# Patient Record
Sex: FEMALE | Race: WHITE | NOT HISPANIC OR LATINO | Employment: OTHER | URBAN - METROPOLITAN AREA
[De-identification: names, ages, dates, MRNs, and addresses within clinical notes are randomized per-mention and may not be internally consistent; named-entity substitution may affect disease eponyms.]

---

## 2017-05-31 DIAGNOSIS — E78.5 HYPERLIPIDEMIA, UNSPECIFIED HYPERLIPIDEMIA TYPE: Primary | ICD-10-CM

## 2017-05-31 RX ORDER — SIMVASTATIN 20 MG
TABLET ORAL
Qty: 90 TABLET | Refills: 1 | Status: SHIPPED | OUTPATIENT
Start: 2017-05-31 | End: 2017-08-16 | Stop reason: ALTCHOICE

## 2017-05-31 RX ORDER — LOSARTAN POTASSIUM 50 MG/1
50 TABLET ORAL DAILY
Qty: 90 TABLET | Refills: 1 | Status: SHIPPED | OUTPATIENT
Start: 2017-05-31 | End: 2017-08-29 | Stop reason: SDUPTHER

## 2017-05-31 RX ORDER — AMLODIPINE BESYLATE 5 MG/1
TABLET ORAL
Qty: 90 TABLET | Refills: 1 | Status: SHIPPED | OUTPATIENT
Start: 2017-05-31 | End: 2017-05-31 | Stop reason: SDUPTHER

## 2017-05-31 RX ORDER — SPIRONOLACTONE AND HYDROCHLOROTHIAZIDE 25; 25 MG/1; MG/1
1 TABLET ORAL DAILY
Qty: 90 TABLET | Refills: 1 | Status: SHIPPED | OUTPATIENT
Start: 2017-05-31 | End: 2017-08-16 | Stop reason: ALTCHOICE

## 2017-05-31 RX ORDER — AMLODIPINE BESYLATE 5 MG/1
TABLET ORAL
Qty: 90 TABLET | Refills: 0 | Status: SHIPPED | OUTPATIENT
Start: 2017-05-31 | End: 2017-08-29 | Stop reason: SDUPTHER

## 2017-08-16 ENCOUNTER — OFFICE VISIT (OUTPATIENT)
Dept: CARDIOLOGY | Facility: CLINIC | Age: 77
End: 2017-08-16

## 2017-08-16 VITALS
SYSTOLIC BLOOD PRESSURE: 150 MMHG | HEIGHT: 64 IN | HEART RATE: 71 BPM | DIASTOLIC BLOOD PRESSURE: 60 MMHG | BODY MASS INDEX: 25.27 KG/M2 | WEIGHT: 148 LBS

## 2017-08-16 DIAGNOSIS — I49.1 PREMATURE ATRIAL COMPLEXES: Primary | ICD-10-CM

## 2017-08-16 DIAGNOSIS — I10 ESSENTIAL HYPERTENSION: ICD-10-CM

## 2017-08-16 DIAGNOSIS — G47.33 OBSTRUCTIVE SLEEP APNEA SYNDROME: ICD-10-CM

## 2017-08-16 PROCEDURE — 93000 ELECTROCARDIOGRAM COMPLETE: CPT | Performed by: INTERNAL MEDICINE

## 2017-08-16 PROCEDURE — 99214 OFFICE O/P EST MOD 30 MIN: CPT | Performed by: INTERNAL MEDICINE

## 2017-08-16 NOTE — PROGRESS NOTES
Date of Office Visit: 2017  Encounter Provider: Sandra Winters MD  Place of Service: [facility] CARDIOLOGY  Patient Name: Itzel Edmond  :1940      Patient ID:  Itzel Edmond is a 77 y.o. female is here for  followup for hypertension.        History of Present Illness    She had a complete cardiovascular evaluation 2010 at [facility] including  a treadmill stress nuclear study showing no ischemia and an ejection fraction of 70%. She  had mild hypertension with exercise. She had a normal abdominal ultrasound showing no  evidence of abdominal aortic aneurysm. Her carotid duplex study done 2010 showed  moderate bilateral internal carotid artery disease. She had an echocardiogram done at  [facility] 2010 showing ejection fraction of 60 to 65% with mild  diastolic dysfunction, mild mitral and tricuspid insufficiency. She had a history of a  normal venous duplex study done for swelling of the right leg done 2009.      She saw me for palpitations and hypertension. We made some medication adjustments and set  her up for a sleep study. It turns out she had obstructive sleep apnea, was placed on a  CPAP machine, and she says she has done so much better.      She has a history of a dry, hacking cough with lisinopril and was switched to losartan and  has tolerated this well.     She had an exercise treadmill stress study done on 2013, which was  normal. She exercised for six minutes. She had a TSH, which was normal. Her creatinine  was slightly elevated at 1.23. She had a Holter recording, which was fairly unremarkable.  It showed sinus rhythm, rare premature ventricular complexes, and a short run of  nonsustained supraventricular tachycardia.      I saw on 2014. At that time she was having chest tightness and short-windedness  with activity which sounded anginal in nature. We set her up for a PET stress study  which  was done in 06/2014 which showed no evidence of ischemia. She was also having a lot of  lower extremity pain which was worse in the right than that left and she had normal  bilateral arterial duplex studies done.          She had an echocardiogram on 07/09/2015 which revealed an ejection fraction of 63%, grade I diastolic dysfunction, mild mitral and mild tricuspid insufficiency, and normal right ventricular systolic pressure.      She is here today for follow-up.  She is having some mild dizziness which she still has.  Because of this her Aldactazide was discontinued.  She thinks the dizziness may be more vertigo.  It did get slightly better with discontinuance of the Aldactazide but she started having lower extremity edema.  She has no chest pain.  She's had no syncope.  She hasn't difficulty breathing.  Her energy level is stable.  Overall she feels like she's doing pretty well.    Past Medical History:   Diagnosis Date   • Atrial premature complex    • Carotid stenosis    • Chest pain    • Claudication of lower extremity    • Difficulty breathing    • Dyspnea    • GERD (gastroesophageal reflux disease)    • Hyperlipidemia    • Hypertension    • JHOANA (obstructive sleep apnea)    • PAC (premature atrial contraction)    • Tachycardia          Past Surgical History:   Procedure Laterality Date   • BLADDER SURGERY     • HERNIA REPAIR     • HYSTERECTOMY     • OOPHORECTOMY         Current Outpatient Prescriptions on File Prior to Visit   Medication Sig Dispense Refill   • amLODIPine (NORVASC) 5 MG tablet TAKE 1 TABLET BY MOUTH DAILY. 90 tablet 0   • aspirin 81 MG tablet Take 1 tablet by mouth daily. 90 tablet 3   • Coenzyme Q10 (COQ-10 PO) Take 300 mg by mouth daily     • gabapentin (NEURONTIN) 300 MG capsule Take 1 capsule by mouth daily     • losartan (COZAAR) 50 MG tablet Take 1 tablet by mouth Daily. 90 tablet 1   • Misc Natural Products (OSTEO BI-FLEX JOINT SHIELD PO) Take by mouth     • tolterodine LA  (DETROL LA) 4 MG 24 hr capsule Take 4 mg by mouth daily.     • [DISCONTINUED] simvastatin (ZOCOR) 20 MG tablet TAKE 1 TABLET BY MOUTH EVERY NIGHT. 90 tablet 1   • [DISCONTINUED] spironolactone-hydrochlorothiazide (ALDACTAZIDE) 25-25 MG tablet Take 1 tablet by mouth Daily. 90 tablet 1     No current facility-administered medications on file prior to visit.        Social History     Social History   • Marital status:      Spouse name: N/A   • Number of children: N/A   • Years of education: N/A     Occupational History   • Not on file.     Social History Main Topics   • Smoking status: Never Smoker   • Smokeless tobacco: Not on file   • Alcohol use No      Comment: caffeine use   • Drug use: Not on file   • Sexual activity: Not on file     Other Topics Concern   • Not on file     Social History Narrative           Review of Systems   Constitution: Negative.   HENT: Negative for congestion and headaches.    Eyes: Negative for vision loss in left eye and vision loss in right eye.   Cardiovascular: Positive for dyspnea on exertion and leg swelling.   Respiratory: Negative.  Negative for cough, hemoptysis, shortness of breath, sleep disturbances due to breathing, snoring, sputum production and wheezing.    Endocrine: Negative.    Hematologic/Lymphatic: Negative.    Skin: Negative for poor wound healing and rash.   Musculoskeletal: Positive for joint pain. Negative for falls, gout, muscle cramps and myalgias.   Gastrointestinal: Negative for abdominal pain, diarrhea, dysphagia, hematemesis, melena, nausea and vomiting.   Neurological: Negative for excessive daytime sleepiness, dizziness, light-headedness, loss of balance, seizures and vertigo.   Psychiatric/Behavioral: Negative for depression and substance abuse. The patient is not nervous/anxious.        Procedures    ECG 12 Lead  Date/Time: 8/16/2017 11:36 AM  Performed by: SKYLER ESTEVEZ  Authorized by: SKYLER ESETVEZ   Comparison: compared with  "previous ECG   Similar to previous ECG  Rhythm: sinus rhythm  Clinical impression: normal ECG               Objective:      Vitals:    08/16/17 1108   BP: 150/60   Pulse: 71   Weight: 148 lb (67.1 kg)   Height: 64\" (162.6 cm)     Body mass index is 25.4 kg/(m^2).    Physical Exam   Constitutional: She is oriented to person, place, and time. She appears well-developed and well-nourished. No distress.   HENT:   Head: Normocephalic and atraumatic.   Eyes: Conjunctivae are normal. No scleral icterus.   Neck: Neck supple. No JVD present. Carotid bruit is not present. No thyromegaly present.   Cardiovascular: Normal rate, regular rhythm, S1 normal, S2 normal, normal heart sounds and intact distal pulses.   No extrasystoles are present. PMI is not displaced.  Exam reveals no gallop.    No murmur heard.  Pulses:       Carotid pulses are 2+ on the right side, and 2+ on the left side.       Radial pulses are 2+ on the right side, and 2+ on the left side.        Dorsalis pedis pulses are 2+ on the right side, and 2+ on the left side.        Posterior tibial pulses are 2+ on the right side, and 2+ on the left side.   Pulmonary/Chest: Effort normal and breath sounds normal. No respiratory distress. She has no wheezes. She has no rhonchi. She has no rales. She exhibits no tenderness.   Abdominal: Soft. Bowel sounds are normal. She exhibits no distension, no abdominal bruit and no mass. There is no tenderness.   Musculoskeletal: She exhibits no edema or deformity.   Lymphadenopathy:     She has no cervical adenopathy.   Neurological: She is alert and oriented to person, place, and time. No cranial nerve deficit.   Skin: Skin is warm and dry. No rash noted. She is not diaphoretic. No cyanosis. No pallor. Nails show no clubbing.   Psychiatric: She has a normal mood and affect. Judgment normal.   Vitals reviewed.      Lab Review:       Assessment:      Diagnosis Plan   1. Premature atrial complexes     2. Essential hypertension   "   3. Obstructive sleep apnea syndrome       1. Hypertension. Well controlled on current medical regimen. Has mild edema, restart aldactazide 25/25 1/2 tab daily.   2. Obstructive sleep apnea. Well controlled on CPAP.  3. Premature atrial complexes. Controlled with medications.  4. Hyperlipidemia on simvastatin.   5. Strong family history of coronary disease.  6. Gastroesophageal reflux disease, stable.  7. Moderate bilateral carotid artery stenosis. Normal carotid duplex study 12/2012.  8. Chest pain and dyspnea, normal PET stress study 6/2014.  9. Tachycardia at night, Normal Holter and TSH.  10. Right leg pain, normal LE duplex for claudication done 6/2014. She has neuropathy  of her right leg causing pain.  11. Chest pain and fatigue, normal echocardiogram 7/9/16.      Plan:       See back in 1 year.

## 2017-08-29 RX ORDER — SIMVASTATIN 20 MG
20 TABLET ORAL NIGHTLY
Qty: 90 TABLET | Refills: 0 | Status: SHIPPED | OUTPATIENT
Start: 2017-08-29 | End: 2018-10-02 | Stop reason: HOSPADM

## 2017-08-29 RX ORDER — LOSARTAN POTASSIUM 50 MG/1
50 TABLET ORAL DAILY
Qty: 90 TABLET | Refills: 3 | Status: SHIPPED | OUTPATIENT
Start: 2017-08-29 | End: 2020-05-04 | Stop reason: SDUPTHER

## 2017-08-29 RX ORDER — AMLODIPINE BESYLATE 5 MG/1
5 TABLET ORAL DAILY
Qty: 90 TABLET | Refills: 3 | Status: SHIPPED | OUTPATIENT
Start: 2017-08-29 | End: 2018-09-05 | Stop reason: SDUPTHER

## 2017-10-25 RX ORDER — SPIRONOLACTONE AND HYDROCHLOROTHIAZIDE 25; 25 MG/1; MG/1
TABLET ORAL
Qty: 45 TABLET | Refills: 3 | Status: SHIPPED | OUTPATIENT
Start: 2017-10-25 | End: 2018-09-05 | Stop reason: SDUPTHER

## 2018-09-05 RX ORDER — AMLODIPINE BESYLATE 5 MG/1
TABLET ORAL
Qty: 90 TABLET | Refills: 1 | Status: SHIPPED | OUTPATIENT
Start: 2018-09-05 | End: 2019-02-04 | Stop reason: SDUPTHER

## 2018-09-05 RX ORDER — SPIRONOLACTONE AND HYDROCHLOROTHIAZIDE 25; 25 MG/1; MG/1
TABLET ORAL
Qty: 45 TABLET | Refills: 1 | Status: SHIPPED | OUTPATIENT
Start: 2018-09-05 | End: 2019-02-04 | Stop reason: SDUPTHER

## 2018-10-02 ENCOUNTER — OFFICE VISIT (OUTPATIENT)
Dept: CARDIOLOGY | Facility: CLINIC | Age: 78
End: 2018-10-02

## 2018-10-02 VITALS
DIASTOLIC BLOOD PRESSURE: 76 MMHG | SYSTOLIC BLOOD PRESSURE: 130 MMHG | BODY MASS INDEX: 23.42 KG/M2 | HEART RATE: 66 BPM | WEIGHT: 137.2 LBS | HEIGHT: 64 IN

## 2018-10-02 DIAGNOSIS — I49.1 PREMATURE ATRIAL COMPLEXES: ICD-10-CM

## 2018-10-02 DIAGNOSIS — I10 ESSENTIAL HYPERTENSION: Primary | ICD-10-CM

## 2018-10-02 DIAGNOSIS — G47.33 OBSTRUCTIVE SLEEP APNEA SYNDROME: ICD-10-CM

## 2018-10-02 DIAGNOSIS — E78.5 HYPERLIPIDEMIA, UNSPECIFIED HYPERLIPIDEMIA TYPE: ICD-10-CM

## 2018-10-02 DIAGNOSIS — G25.81 RESTLESS LEG SYNDROME: ICD-10-CM

## 2018-10-02 PROCEDURE — 93000 ELECTROCARDIOGRAM COMPLETE: CPT | Performed by: NURSE PRACTITIONER

## 2018-10-02 PROCEDURE — 99214 OFFICE O/P EST MOD 30 MIN: CPT | Performed by: NURSE PRACTITIONER

## 2018-10-02 RX ORDER — DULOXETIN HYDROCHLORIDE 60 MG/1
60 CAPSULE, DELAYED RELEASE ORAL DAILY
COMMUNITY
End: 2022-04-28

## 2018-10-02 RX ORDER — PUMPKIN SEED EXTRACT/SOY GERM 300 MG
1 CAPSULE ORAL 2 TIMES DAILY
COMMUNITY
End: 2020-05-04

## 2018-10-02 RX ORDER — MELATONIN 10 MG
CAPSULE ORAL NIGHTLY
Status: ON HOLD | COMMUNITY
End: 2019-01-05

## 2018-10-02 NOTE — PROGRESS NOTES
"  Date of Office Visit: 10/02/2018  Encounter Provider: ARABELLA Calvo  Place of Service: [facility] CARDIOLOGY  Patient Name: Itzel Edmond  :1940      Chief Complaint   Patient presents with   • Follow-up   :     Dear Dr. Luther,     HPI: Itzel Edmond is a pleasant 78 y.o. female who presents today for cardiac follow up. She is a new patient to me and her previous records have been reviewed.  She has a history of palpitations, hypertension, hyperlipidemia, GERD, APCs, and obstructive sleep apnea.  She had a Holter monitor in  which showed normal sinus rhythm with rare PVCs and short run of nonsustained SVT.  She had a cardiac PET scan in 2014 which showed no evidence of ischemia.  She had an echocardiogram in 2015 which revealed an EF of 63%, grade 1 diastolic dysfunction, mild mitral and tricuspid insufficiency.  She is an established patient of Dr. Sandra Winters and was last in the office in 2017.    She presents today for follow-up.  Her  recently passed away in August from Alzheimer's disease.  She says she is very saddened that she had appointment in nursing home towards the end of his life.  She says she is now \"living her life\" and able to do the things that she wants.  She is not sleeping well at nighttime and she attributes this to taking care of him for so long.  She also experiences restless leg syndrome and I've recommended follow-up with her family physician.  She has no longer taking gabapentin due to dizziness.  On occasion her heart beat will race briefly and resolves on its own.  She denies chest pain, shortness of air, PND, orthopnea, edema, dizziness, or syncope.  She is compliant with her BiPAP machine.  Blood pressure and heart rate are both normal today.    Past Medical History:   Diagnosis Date   • Atrial premature complex    • Carotid stenosis    • Claudication of lower extremity (CMS/HCC)    • GERD " (gastroesophageal reflux disease)    • Hyperlipidemia    • Hypertension    • JHOANA (obstructive sleep apnea)     BiPap   • PAC (premature atrial contraction)    • Tachycardia        Past Surgical History:   Procedure Laterality Date   • BLADDER SURGERY     • HERNIA REPAIR     • HYSTERECTOMY     • OOPHORECTOMY         Social History     Social History   • Marital status:      Spouse name: N/A   • Number of children: N/A   • Years of education: N/A     Occupational History   • Not on file.     Social History Main Topics   • Smoking status: Never Smoker   • Smokeless tobacco: Never Used   • Alcohol use No      Comment: caffeine use   • Drug use: Unknown   • Sexual activity: Not on file       Family History   Problem Relation Age of Onset   • Heart disease Mother    • Stroke Mother    • Heart attack Father    • Heart disease Sister         s/p CABG   • Heart attack Brother    • Heart disease Brother         s/p CABG   • Heart disease Other    • Hypertension Other        The following portion of the patient's history were reviewed and updated as appropriate: past medical history, past surgical history, past social history, past family history, allergies, current medications, and problem list.    Review of Systems   Constitution: Negative for chills, diaphoresis, fever, malaise/fatigue, night sweats, weight gain and weight loss.   HENT: Negative for hearing loss, nosebleeds, sore throat and tinnitus.    Eyes: Negative for blurred vision, double vision, pain and visual disturbance.   Cardiovascular: Negative for chest pain, claudication, cyanosis, dyspnea on exertion, irregular heartbeat, leg swelling, near-syncope, orthopnea, palpitations, paroxysmal nocturnal dyspnea and syncope.   Respiratory: Positive for snoring. Negative for cough, hemoptysis, shortness of breath and wheezing.    Endocrine: Negative for cold intolerance, heat intolerance and polyuria.   Hematologic/Lymphatic: Negative for bleeding problem. Does  "not bruise/bleed easily.   Skin: Negative for color change, dry skin, flushing and itching.   Musculoskeletal: Negative for falls, joint pain, joint swelling, muscle cramps, muscle weakness and myalgias.   Gastrointestinal: Negative for abdominal pain, constipation, heartburn, melena, nausea and vomiting.   Genitourinary: Negative for dysuria and hematuria.   Neurological: Negative for excessive daytime sleepiness, dizziness, light-headedness, loss of balance, numbness, paresthesias, seizures and vertigo.   Psychiatric/Behavioral: Positive for depression. Negative for altered mental status, memory loss and substance abuse. The patient does not have insomnia and is not nervous/anxious.    Allergic/Immunologic: Negative for environmental allergies.       Allergies   Allergen Reactions   • Ace Inhibitors      Cough   • Gabapentin Dizziness   • Lisinopril Cough   • Pregabalin      Vision problems, dizziness         Current Outpatient Prescriptions:   •  amLODIPine (NORVASC) 5 MG tablet, TAKE 1 TABLET EVERY DAY, Disp: 90 tablet, Rfl: 1  •  aspirin 81 MG tablet, Take 1 tablet by mouth daily., Disp: 90 tablet, Rfl: 3  •  Coenzyme Q10 (COQ-10 PO), Take 300 mg by mouth daily, Disp: , Rfl:   •  DULoxetine (CYMBALTA) 30 MG capsule, Take 30 mg by mouth Daily., Disp: , Rfl:   •  losartan (COZAAR) 50 MG tablet, Take 1 tablet by mouth Daily., Disp: 90 tablet, Rfl: 3  •  Melatonin 10 MG capsule, Take  by mouth Every Night., Disp: , Rfl:   •  Misc Natural Products (OSTEO BI-FLEX JOINT SHIELD PO), Take by mouth, Disp: , Rfl:   •  Pumpkin Seed-Soy Germ (AZO BLADDER CONTROL/GO-LESS) capsule, Take  by mouth., Disp: , Rfl:   •  spironolactone-hydrochlorothiazide (ALDACTAZIDE) 25-25 MG tablet, TAKE 1/2 TABLET EVERY DAY, Disp: 45 tablet, Rfl: 1        Objective:     Vitals:    10/02/18 1112   BP: 130/76   Pulse: 66   Weight: 62.2 kg (137 lb 3.2 oz)   Height: 162.6 cm (64\")     Body mass index is 23.55 kg/m².    PHYSICAL EXAM:    Vitals " Reviewed.   General Appearance: No acute distress, well developed and well nourished. Thin.   Eyes: Conjunctiva and lids: No erythema, swelling, or discharge. Sclera non-icteric.   HENT: Atraumatic, normocephalic. External eyes, ears, and nose normal. No hearing loss noted. Mucous membranes normal. Lips not cyanotic. Neck supple with no tenderness.  Respiratory: No signs of respiratory distress. Respiration rhythm and depth normal.   Clear to auscultation. No rales, crackles, rhonchi, or wheezing auscultated.   Cardiovascular:  Jugular Venous Pressure: Normal  Heart Rate and Rhythm: Normal, Heart Sounds: Normal S1 and S2. No S3 or S4 noted.  Murmurs: No murmurs noted. No rubs, thrills, or gallops.   Arterial Pulses: Carotid pulses normal. No carotid bruit noted. Posterior tibialis and dorsalis pedis pulses normal.   Lower Extremities: No edema noted.  Gastrointestinal:  Abdomen soft, non-distended, non-tender. Normal bowel sounds. No hepatomegaly.   Musculoskeletal: Normal movement of extremities  Skin and Nails: General appearance normal. No pallor, cyanosis, diaphoresis. Skin temperature normal. No clubbing of fingernails.   Psychiatric: Patient alert and oriented to person, place, and time. Speech and behavior appropriate. Normal mood and affect.       ECG 12 Lead  Date/Time: 10/2/2018 11:05 AM  Performed by: JADA BRIDGES  Authorized by: JADA BRIDGES   Comparison: compared with previous ECG from 8/16/2017  Rhythm: sinus rhythm  Rate: normal  BPM: 66  Conduction: conduction normal  ST Segments: ST segments normal  T Waves: T waves normal  QRS axis: normal  Other: no other findings  Clinical impression: normal ECG              Assessment:       Diagnosis Plan   1. Essential hypertension     2. Premature atrial complexes     3. Obstructive sleep apnea syndrome     4. Hyperlipidemia, unspecified hyperlipidemia type     5. Restless leg syndrome            Plan:       1.  Hypertension: Blood pressure is  well-controlled on current medication regimen.  I reviewed her BMP from June 2018 which showed stable kidney function and normal potassium.    2.  APCs: History of atrial premature contractions and short run of SVT.  She occasionally experiences palpitations at night and feels that these are stable.      3.  Obstructive Sleep Apnea: She is compliant with CPAP machine.    4.  Hyperlipidemia: Managed by her PCP.  She has no longer taking simvastatin.    5.  Restless Leg Syndrome and insomnia: I've recommended follow-up with her PCP to discuss further management.    6.  Follow-up with Dr. Winters in one year, unless otherwise needed sooner.    As always, it has been a pleasure to participate in your patient's care. Thank you.       Sincerely,         ARABELLA Hogan

## 2018-12-26 ENCOUNTER — TELEPHONE (OUTPATIENT)
Dept: CARDIOLOGY | Facility: CLINIC | Age: 78
End: 2018-12-26

## 2018-12-26 NOTE — TELEPHONE ENCOUNTER
12/26/18  2:21 PM  Itzel Edmond  1940    Home Phone [phone]   Mobile [phone]       Itzel Edmond is a patient of Dr. Winters/Sia, calling in to say she is having fatigue, SOA, diaphoresis and nausea, with pain radiating down the back of her arms.    Asked pt to call and try to get in w/PCP. In the meantime, Dr. Cutler said if she cannot get in today to the PCP, she should go to the ER.    Called the pt and informed her. She could not get in w/PCP until 1/3/19. So advised her to come to [facility] ER.    Allison Lindsay RN

## 2019-01-05 ENCOUNTER — APPOINTMENT (OUTPATIENT)
Dept: GENERAL RADIOLOGY | Facility: HOSPITAL | Age: 79
End: 2019-01-05

## 2019-01-05 ENCOUNTER — HOSPITAL ENCOUNTER (OUTPATIENT)
Facility: HOSPITAL | Age: 79
Discharge: HOME | End: 2019-01-06
Attending: EMERGENCY MEDICINE | Admitting: INTERNAL MEDICINE

## 2019-01-05 DIAGNOSIS — R07.9 CHEST PAIN WITH HIGH RISK FOR CARDIAC ETIOLOGY: Primary | ICD-10-CM

## 2019-01-05 LAB
ALBUMIN SERPL-MCNC: 4.2 G/DL (ref 3.5–5.2)
ALBUMIN/GLOB SERPL: 1.5 G/DL
ALP SERPL-CCNC: 56 U/L (ref 39–117)
ALT SERPL W P-5'-P-CCNC: 14 U/L (ref 1–33)
ANION GAP SERPL CALCULATED.3IONS-SCNC: 13.9 MMOL/L
AST SERPL-CCNC: 24 U/L (ref 1–32)
BASOPHILS # BLD AUTO: 0 10*3/MM3 (ref 0–0.2)
BASOPHILS NFR BLD AUTO: 0 % (ref 0–1.5)
BILIRUB SERPL-MCNC: 0.7 MG/DL (ref 0.1–1.2)
BUN BLD-MCNC: 27 MG/DL (ref 8–23)
BUN/CREAT SERPL: 16.5 (ref 7–25)
CALCIUM SPEC-SCNC: 8.8 MG/DL (ref 8.6–10.5)
CHLORIDE SERPL-SCNC: 100 MMOL/L (ref 98–107)
CO2 SERPL-SCNC: 25.1 MMOL/L (ref 22–29)
CREAT BLD-MCNC: 1.64 MG/DL (ref 0.57–1)
DEPRECATED RDW RBC AUTO: 38.4 FL (ref 37–54)
EOSINOPHIL # BLD AUTO: 0.03 10*3/MM3 (ref 0–0.7)
EOSINOPHIL NFR BLD AUTO: 0.5 % (ref 0.3–6.2)
ERYTHROCYTE [DISTWIDTH] IN BLOOD BY AUTOMATED COUNT: 12.5 % (ref 11.7–13)
GFR SERPL CREATININE-BSD FRML MDRD: 30 ML/MIN/1.73
GLOBULIN UR ELPH-MCNC: 2.8 GM/DL
GLUCOSE BLD-MCNC: 108 MG/DL (ref 65–99)
HCT VFR BLD AUTO: 34.4 % (ref 35.6–45.5)
HGB BLD-MCNC: 12.2 G/DL (ref 11.9–15.5)
HOLD SPECIMEN: NORMAL
HOLD SPECIMEN: NORMAL
IMM GRANULOCYTES # BLD AUTO: 0.01 10*3/MM3 (ref 0–0.03)
IMM GRANULOCYTES NFR BLD AUTO: 0.2 % (ref 0–0.5)
LIPASE SERPL-CCNC: 64 U/L (ref 13–60)
LYMPHOCYTES # BLD AUTO: 0.86 10*3/MM3 (ref 0.9–4.8)
LYMPHOCYTES NFR BLD AUTO: 15.2 % (ref 19.6–45.3)
MCH RBC QN AUTO: 30 PG (ref 26.9–32)
MCHC RBC AUTO-ENTMCNC: 35.5 G/DL (ref 32.4–36.3)
MCV RBC AUTO: 84.7 FL (ref 80.5–98.2)
MONOCYTES # BLD AUTO: 0.59 10*3/MM3 (ref 0.2–1.2)
MONOCYTES NFR BLD AUTO: 10.4 % (ref 5–12)
NEUTROPHILS # BLD AUTO: 4.17 10*3/MM3 (ref 1.9–8.1)
NEUTROPHILS NFR BLD AUTO: 73.9 % (ref 42.7–76)
NT-PROBNP SERPL-MCNC: 549.8 PG/ML (ref 0–1800)
PLATELET # BLD AUTO: 223 10*3/MM3 (ref 140–500)
PMV BLD AUTO: 10.4 FL (ref 6–12)
POTASSIUM BLD-SCNC: 3.6 MMOL/L (ref 3.5–5.2)
PROT SERPL-MCNC: 7 G/DL (ref 6–8.5)
RBC # BLD AUTO: 4.06 10*6/MM3 (ref 3.9–5.2)
SODIUM BLD-SCNC: 139 MMOL/L (ref 136–145)
TROPONIN T SERPL-MCNC: <0.01 NG/ML (ref 0–0.03)
WBC NRBC COR # BLD: 5.65 10*3/MM3 (ref 4.5–10.7)
WHOLE BLOOD HOLD SPECIMEN: NORMAL
WHOLE BLOOD HOLD SPECIMEN: NORMAL

## 2019-01-05 PROCEDURE — 83690 ASSAY OF LIPASE: CPT | Performed by: PHYSICIAN ASSISTANT

## 2019-01-05 PROCEDURE — 99219 PR INITIAL OBSERVATION CARE/DAY 50 MINUTES: CPT | Performed by: INTERNAL MEDICINE

## 2019-01-05 PROCEDURE — 93010 ELECTROCARDIOGRAM REPORT: CPT | Performed by: INTERNAL MEDICINE

## 2019-01-05 PROCEDURE — 80053 COMPREHEN METABOLIC PANEL: CPT | Performed by: PHYSICIAN ASSISTANT

## 2019-01-05 PROCEDURE — G0378 HOSPITAL OBSERVATION PER HR: HCPCS

## 2019-01-05 PROCEDURE — 99284 EMERGENCY DEPT VISIT MOD MDM: CPT

## 2019-01-05 PROCEDURE — 85025 COMPLETE CBC W/AUTO DIFF WBC: CPT | Performed by: PHYSICIAN ASSISTANT

## 2019-01-05 PROCEDURE — 84484 ASSAY OF TROPONIN QUANT: CPT | Performed by: PHYSICIAN ASSISTANT

## 2019-01-05 PROCEDURE — 71045 X-RAY EXAM CHEST 1 VIEW: CPT

## 2019-01-05 PROCEDURE — 93005 ELECTROCARDIOGRAM TRACING: CPT | Performed by: EMERGENCY MEDICINE

## 2019-01-05 PROCEDURE — 84484 ASSAY OF TROPONIN QUANT: CPT | Performed by: INTERNAL MEDICINE

## 2019-01-05 PROCEDURE — 96360 HYDRATION IV INFUSION INIT: CPT

## 2019-01-05 PROCEDURE — 83880 ASSAY OF NATRIURETIC PEPTIDE: CPT | Performed by: PHYSICIAN ASSISTANT

## 2019-01-05 RX ORDER — SODIUM CHLORIDE 0.9 % (FLUSH) 0.9 %
3-10 SYRINGE (ML) INJECTION AS NEEDED
Status: DISCONTINUED | OUTPATIENT
Start: 2019-01-05 | End: 2019-01-06 | Stop reason: HOSPADM

## 2019-01-05 RX ORDER — DULOXETIN HYDROCHLORIDE 30 MG/1
30 CAPSULE, DELAYED RELEASE ORAL DAILY
Status: DISCONTINUED | OUTPATIENT
Start: 2019-01-06 | End: 2019-01-06 | Stop reason: HOSPADM

## 2019-01-05 RX ORDER — NITROGLYCERIN 0.4 MG/1
0.4 TABLET SUBLINGUAL
Status: DISCONTINUED | OUTPATIENT
Start: 2019-01-05 | End: 2019-01-06 | Stop reason: HOSPADM

## 2019-01-05 RX ORDER — DULOXETIN HYDROCHLORIDE 30 MG/1
30 CAPSULE, DELAYED RELEASE ORAL DAILY
Status: DISCONTINUED | OUTPATIENT
Start: 2019-01-05 | End: 2019-01-05

## 2019-01-05 RX ORDER — SPIRONOLACTONE AND HYDROCHLOROTHIAZIDE 25; 25 MG/1; MG/1
0.5 TABLET ORAL DAILY
Status: DISCONTINUED | OUTPATIENT
Start: 2019-01-05 | End: 2019-01-06 | Stop reason: HOSPADM

## 2019-01-05 RX ORDER — LOSARTAN POTASSIUM 50 MG/1
50 TABLET ORAL DAILY
Status: DISCONTINUED | OUTPATIENT
Start: 2019-01-05 | End: 2019-01-05

## 2019-01-05 RX ORDER — AMLODIPINE BESYLATE 5 MG/1
5 TABLET ORAL DAILY
Status: DISCONTINUED | OUTPATIENT
Start: 2019-01-05 | End: 2019-01-05

## 2019-01-05 RX ORDER — ACETAMINOPHEN 325 MG/1
650 TABLET ORAL EVERY 4 HOURS PRN
Status: DISCONTINUED | OUTPATIENT
Start: 2019-01-05 | End: 2019-01-06 | Stop reason: HOSPADM

## 2019-01-05 RX ORDER — LOSARTAN POTASSIUM 50 MG/1
50 TABLET ORAL DAILY
Status: DISCONTINUED | OUTPATIENT
Start: 2019-01-06 | End: 2019-01-06 | Stop reason: HOSPADM

## 2019-01-05 RX ORDER — SODIUM CHLORIDE 0.9 % (FLUSH) 0.9 %
10 SYRINGE (ML) INJECTION AS NEEDED
Status: DISCONTINUED | OUTPATIENT
Start: 2019-01-05 | End: 2019-01-06 | Stop reason: HOSPADM

## 2019-01-05 RX ORDER — SODIUM CHLORIDE 0.9 % (FLUSH) 0.9 %
3 SYRINGE (ML) INJECTION EVERY 12 HOURS SCHEDULED
Status: DISCONTINUED | OUTPATIENT
Start: 2019-01-05 | End: 2019-01-06 | Stop reason: HOSPADM

## 2019-01-05 RX ORDER — ASPIRIN 81 MG/1
81 TABLET, CHEWABLE ORAL DAILY
Status: DISCONTINUED | OUTPATIENT
Start: 2019-01-05 | End: 2019-01-06 | Stop reason: HOSPADM

## 2019-01-05 RX ORDER — AMLODIPINE BESYLATE 5 MG/1
5 TABLET ORAL DAILY
Status: DISCONTINUED | OUTPATIENT
Start: 2019-01-06 | End: 2019-01-06 | Stop reason: HOSPADM

## 2019-01-05 RX ADMIN — SODIUM CHLORIDE 1000 ML: 9 INJECTION, SOLUTION INTRAVENOUS at 11:20

## 2019-01-05 RX ADMIN — LOSARTAN POTASSIUM 50 MG: 50 TABLET ORAL at 17:30

## 2019-01-05 RX ADMIN — AMLODIPINE BESYLATE 5 MG: 5 TABLET ORAL at 17:29

## 2019-01-05 RX ADMIN — DULOXETIN HYDROCHLORIDE 30 MG: 30 CAPSULE, DELAYED RELEASE ORAL at 17:31

## 2019-01-05 RX ADMIN — SODIUM CHLORIDE, PRESERVATIVE FREE 3 ML: 5 INJECTION INTRAVENOUS at 20:01

## 2019-01-05 RX ADMIN — SPIRONOLACTONE AND HYDROCHLOROTHIAZIDE 0.5 TABLET: 25; 25 TABLET, FILM COATED ORAL at 17:27

## 2019-01-05 RX ADMIN — Medication 81 MG: at 17:27

## 2019-01-05 NOTE — PLAN OF CARE
Problem: Patient Care Overview  Goal: Plan of Care Review  Outcome: Ongoing (interventions implemented as appropriate)   01/05/19 9723   Coping/Psychosocial   Plan of Care Reviewed With patient;daughter   Plan of Care Review   Progress no change   OTHER   Outcome Summary admitted from ER for fatigue for 2 weeks, SOA w exertion, arms heavy, jaw pain per MD, family hx of CABG/heart disease; so plan for heart cath in am; HHD, NPO after midnight; VSS, SR on monitor; no pain or SOA at this time; will continue to monitor     Goal: Individualization and Mutuality  Outcome: Ongoing (interventions implemented as appropriate)    Goal: Discharge Needs Assessment  Outcome: Ongoing (interventions implemented as appropriate)    Goal: Interprofessional Rounds/Family Conf  Outcome: Ongoing (interventions implemented as appropriate)      Problem: Cardiac: ACS (Acute Coronary Syndrome) (Adult)  Goal: Signs and Symptoms of Listed Potential Problems Will be Absent, Minimized or Managed (Cardiac: ACS)  Outcome: Ongoing (interventions implemented as appropriate)

## 2019-01-05 NOTE — ED PROVIDER NOTES
"EMERGENCY DEPARTMENT ENCOUNTER    Room Number:  30/30  Date seen:  1/5/2019  Time seen: 8:58 AM  PCP: Rhoda Luther MD  Historian: Pt  Cardiology Dr. Winters     HPI:  Chief complaint: Vomiting, SOA  Context: Itzel Edmond is a 78 y.o. female who presents to the ED c/o vomiting multiple times yesterday with diarrhea. Pt also c/o episodes of SOA with exertion, nausea, diaphoresis, and  weakness for the past 2 weeks. Pt states her arms feel \"heavy\" with these episodes. Pt states they symptoms started as intermittent but are becoming more constant. Pt denies CP, fever, and urinary sx.      Duration:  2 weeks  Onset: gradual  Timing: intermittent  Intensity/Severity: moderate  Progression: worsening  Associated Symptoms: SOA with exertion, nausea, diaphoresis, weakness, arms heaviness   Aggravating Factors: exertion  Previous Episodes: none study  Treatment before arrival: none         MEDICAL RECORD REVIEW     No previous pertinent medical records.       ALLERGIES  Ace inhibitors; Gabapentin; Lisinopril; and Pregabalin    PAST MEDICAL HISTORY  Active Ambulatory Problems     Diagnosis Date Noted   • Hypertension 07/05/2016   • Obstructive sleep apnea syndrome 07/05/2016   • Premature atrial complexes 07/05/2016   • Hyperlipidemia 10/02/2018   • Restless leg syndrome 10/02/2018     Resolved Ambulatory Problems     Diagnosis Date Noted   • No Resolved Ambulatory Problems     Past Medical History:   Diagnosis Date   • Atrial premature complex    • Carotid stenosis    • Claudication of lower extremity (CMS/HCC)    • GERD (gastroesophageal reflux disease)    • Hyperlipidemia    • Hypertension    • JHOANA (obstructive sleep apnea)    • PAC (premature atrial contraction)    • Tachycardia        PAST SURGICAL HISTORY  Past Surgical History:   Procedure Laterality Date   • BLADDER SURGERY     • HERNIA REPAIR     • HYSTERECTOMY     • OOPHORECTOMY         FAMILY HISTORY  Family History   Problem Relation Age of Onset   • Heart " "disease Mother    • Stroke Mother    • Heart attack Father    • Heart disease Sister         s/p CABG   • Heart attack Brother    • Heart disease Brother         s/p CABG   • Heart disease Other    • Hypertension Other        SOCIAL HISTORY  Social History     Socioeconomic History   • Marital status:      Spouse name: Not on file   • Number of children: Not on file   • Years of education: Not on file   • Highest education level: Not on file   Social Needs   • Financial resource strain: Not on file   • Food insecurity - worry: Not on file   • Food insecurity - inability: Not on file   • Transportation needs - medical: Not on file   • Transportation needs - non-medical: Not on file   Occupational History   • Not on file   Tobacco Use   • Smoking status: Never Smoker   • Smokeless tobacco: Never Used   Substance and Sexual Activity   • Alcohol use: No     Comment: caffeine use   • Drug use: Not on file   • Sexual activity: Not on file   Other Topics Concern   • Not on file   Social History Narrative   • Not on file           REVIEW OF SYSTEMS  Review of Systems   Constitutional: Positive for diaphoresis. Negative for fever.   HENT: Negative for sore throat.    Eyes: Negative.    Respiratory: Positive for shortness of breath. Negative for cough.    Cardiovascular: Negative for chest pain.   Gastrointestinal: Positive for diarrhea, nausea and vomiting. Negative for abdominal pain.   Genitourinary: Negative for dysuria.   Musculoskeletal: Negative for neck pain.        Arms feels \"heavy\"   Skin: Negative for rash.   Allergic/Immunologic: Negative.    Neurological: Positive for weakness. Negative for numbness and headaches.   Hematological: Negative.    Psychiatric/Behavioral: Negative.    All other systems reviewed and are negative.          PHYSICAL EXAM  ED Triage Vitals [01/05/19 0853]   Temp Heart Rate Resp BP SpO2   98.2 °F (36.8 °C) 90 18 -- 98 %      Temp src Heart Rate Source Patient Position BP Location " FiO2 (%)   Tympanic Monitor -- -- --     Physical Exam   Constitutional: She is oriented to person, place, and time. No distress.   HENT:   Head: Normocephalic and atraumatic.   Eyes: EOM are normal. Pupils are equal, round, and reactive to light.   Neck: Normal range of motion. Neck supple.   Cardiovascular: Normal rate, regular rhythm and normal heart sounds.   Pulmonary/Chest: Effort normal and breath sounds normal. No respiratory distress.   Abdominal: Soft. There is no tenderness. There is no rebound and no guarding.   Musculoskeletal: Normal range of motion. She exhibits no edema.   Neurological: She is alert and oriented to person, place, and time. She has normal sensation and normal strength.   Skin: Skin is warm and dry. No rash noted.   Psychiatric: Mood and affect normal.   Nursing note and vitals reviewed.        LAB RESULTS  Recent Results (from the past 24 hour(s))   Light Blue Top    Collection Time: 01/05/19  9:06 AM   Result Value Ref Range    Extra Tube hold for add-on    Green Top (Gel)    Collection Time: 01/05/19  9:06 AM   Result Value Ref Range    Extra Tube Hold for add-ons.    Lavender Top    Collection Time: 01/05/19  9:06 AM   Result Value Ref Range    Extra Tube hold for add-on    Gold Top - SST    Collection Time: 01/05/19  9:06 AM   Result Value Ref Range    Extra Tube Hold for add-ons.    Lipase    Collection Time: 01/05/19  9:06 AM   Result Value Ref Range    Lipase 64 (H) 13 - 60 U/L   Comprehensive Metabolic Panel    Collection Time: 01/05/19  9:06 AM   Result Value Ref Range    Glucose 108 (H) 65 - 99 mg/dL    BUN 27 (H) 8 - 23 mg/dL    Creatinine 1.64 (H) 0.57 - 1.00 mg/dL    Sodium 139 136 - 145 mmol/L    Potassium 3.6 3.5 - 5.2 mmol/L    Chloride 100 98 - 107 mmol/L    CO2 25.1 22.0 - 29.0 mmol/L    Calcium 8.8 8.6 - 10.5 mg/dL    Total Protein 7.0 6.0 - 8.5 g/dL    Albumin 4.20 3.50 - 5.20 g/dL    ALT (SGPT) 14 1 - 33 U/L    AST (SGOT) 24 1 - 32 U/L    Alkaline Phosphatase 56  39 - 117 U/L    Total Bilirubin 0.7 0.1 - 1.2 mg/dL    eGFR Non African Amer 30 (L) >60 mL/min/1.73    Globulin 2.8 gm/dL    A/G Ratio 1.5 g/dL    BUN/Creatinine Ratio 16.5 7.0 - 25.0    Anion Gap 13.9 mmol/L   Troponin    Collection Time: 01/05/19  9:06 AM   Result Value Ref Range    Troponin T <0.010 0.000 - 0.030 ng/mL   BNP    Collection Time: 01/05/19  9:06 AM   Result Value Ref Range    proBNP 549.8 0.0-1,800.0 pg/mL   CBC Auto Differential    Collection Time: 01/05/19  9:06 AM   Result Value Ref Range    WBC 5.65 4.50 - 10.70 10*3/mm3    RBC 4.06 3.90 - 5.20 10*6/mm3    Hemoglobin 12.2 11.9 - 15.5 g/dL    Hematocrit 34.4 (L) 35.6 - 45.5 %    MCV 84.7 80.5 - 98.2 fL    MCH 30.0 26.9 - 32.0 pg    MCHC 35.5 32.4 - 36.3 g/dL    RDW 12.5 11.7 - 13.0 %    RDW-SD 38.4 37.0 - 54.0 fl    MPV 10.4 6.0 - 12.0 fL    Platelets 223 140 - 500 10*3/mm3    Neutrophil % 73.9 42.7 - 76.0 %    Lymphocyte % 15.2 (L) 19.6 - 45.3 %    Monocyte % 10.4 5.0 - 12.0 %    Eosinophil % 0.5 0.3 - 6.2 %    Basophil % 0.0 0.0 - 1.5 %    Immature Grans % 0.2 0.0 - 0.5 %    Neutrophils, Absolute 4.17 1.90 - 8.10 10*3/mm3    Lymphocytes, Absolute 0.86 (L) 0.90 - 4.80 10*3/mm3    Monocytes, Absolute 0.59 0.20 - 1.20 10*3/mm3    Eosinophils, Absolute 0.03 0.00 - 0.70 10*3/mm3    Basophils, Absolute 0.00 0.00 - 0.20 10*3/mm3    Immature Grans, Absolute 0.01 0.00 - 0.03 10*3/mm3       I ordered the above labs and reviewed the results        RADIOLOGY  XR Chest 1 View   Final Result   No focal pulmonary consolidation. COPD change.. Follow-up as   clinical indications persist.       This report was finalized on 1/5/2019 11:16 AM by Dr. Noah Nguyen M.D.              I ordered the above noted radiological studies and reviewed the images on the PACS system.      MEDICATIONS GIVEN IN ER  Medications   sodium chloride 0.9 % flush 10 mL (not administered)   sodium chloride 0.9 % bolus 1,000 mL (1,000 mL Intravenous New Bag 1/5/19 1120)            EKG  Interpreted by ED Physician. Please see their document for interpretation.         PROCEDURES  Procedures  HEART SCORE    History Highly suspicious (2)  ECG Normal (0)  Age > or = 65 (2)  Risk factors 1 or 2 (1)  Troponin < or = Normal limit (0)    This patient's HEART score is 5    HEART Score Key:  Scores 0-3: 0.9-1.7% risk of adverse cardiac event. In the HEART Score study, these patients were discharged (0.99% in the retrospective study, 1.7% in the prospective study)  Scores 4-6: 12-16.6% risk of adverse cardiac event. In the HEART Score study, these patients were admitted to the hospital. (11.6% retrospective, 16.6% prospective)  Scores ?7: 50-65% risk of adverse cardiac event. In the HEART Score study, these patients were candidates for early invasive measures. (65.2% retrospective, 50.1% prospective)          COURSE & MEDICAL DECISION MAKING  Pertinent Labs and Imaging studies that were ordered and reviewed are noted above.  Results were reviewed/discussed with the patient and they were also made aware of online access.  Pt also made aware that some labs, such as cultures, will not be resulted during ER visit and follow up with PMD is necessary.         PROGRESS AND CONSULTS  9:02 AM  EKG ordered.     9:13 AM  Labs, CXR, and IV fluids ordered for evaluation.     10:39 AM  Reviewed pt's history and workup with Dr. Dotson (ER physician).  After a bedside evaluation, Dr. Dotson agrees with the plan of care    10:59 AM  Consult placed to cardiology.     11:44 AM  Dr. Dotson spoke with Dr. Rosenbaum, cardiology, who agreed to admit.     Based on the patient's lab findings and presenting symptoms, the doctor and I feel it is appropriate to admit the patient for further management, evaluation, and treatment.  I have discussed this with the admitting team.  I have also discussed this with the patient/family.  They are in agreement with admission.          Disposition vitals:  /63   Pulse 70    "Temp 98.2 °F (36.8 °C) (Tympanic)   Resp 18   Ht 160 cm (63\")   Wt 60.9 kg (134 lb 3.2 oz)   SpO2 98%   BMI 23.77 kg/m²         DIAGNOSIS  Final diagnoses:   Chest pain with high risk for cardiac etiology         DISPOSITION  ADMISSION    Discussed treatment plan and reason for admission with pt/family and admitting physician.  Pt/family voiced understanding of the plan for admission for further testing/treatment as needed.         Documentation assistance provided by shyanne Gray for Cale Ballesteros PA-C.  Information recorded by the shyanne was done at my direction and has been verified and validated by me.         Barbara Gray  01/05/19 1213       Cale Ballesteros PA  01/05/19 1256    "

## 2019-01-05 NOTE — H&P
Date of Hospital Visit: 19  Encounter Provider: Joyce Mclaughlin, RN EXTENDER  Place of Service: [facility] CARDIOLOGY  Patient Name: Itzel Edmond  :1940  [id number]      Chief complaint: nausea, dyspnea, diaphoresis      History of Present Illness:  Ms. Edmond is an 78 year old woman who follows with Dr. Winters and has history of PACs, palpitations, hypertension, and sleep apnea on CPAP. She had a nuclear stress test in 2010 which showed no evidence of ischemia and EF 70%. Carotid doppler studies at that time shoowed moderate bilateral ICA disease. An Echocardiogram showed EF 60-65%, mild diastolic dysfunction, and mild MR/ TR. She reported chest pressure in  and had a treadmill stress test which was normal. She also reported palpitations at night and holter monitor showed rare PVCs. In , she reported dyspnea and chest tightness with exertion. She had a stress PET test which showed no evidence of ischemia and EF 79%. At office visit in 2015, she reported chest pain and fatigue. She had an echocardiogram which showed EF 63%, grade I diastolic dysfunction, and mild MR/ TR. She was last seen in office on 10/2/18 and no changes were warranted at that time.     She called our office and reported fatigue, dyspnea, diaphoresis, nausea, and bilateral arm pain. She was advised to go to ED at that time. She presents to ED today with continued dyspnea with exertion, nausea, diaphoresis, and weakness. She also reports diarrhea. Upon arrival, /70 with HR 90; ekg showed sinus rhythm with PACs. Labs: BUN 27, Crea 1.64, troponin <0.010, proBNP 550. CXR shows no acute disease.     Cardiac Testing:  Echocardiogram 7/9/15      Stress PET 14      Past Medical History:   Diagnosis Date   • Atrial premature complex    • Carotid stenosis    • Claudication of lower extremity (CMS/HCC)    • GERD (gastroesophageal reflux disease)    • Hyperlipidemia    •  Hypertension    • JHOANA (obstructive sleep apnea)     BiPap   • PAC (premature atrial contraction)    • Tachycardia          Past Surgical History:   Procedure Laterality Date   • BLADDER SURGERY     • HERNIA REPAIR     • HYSTERECTOMY     • OOPHORECTOMY           (Not in a hospital admission)    Current Meds  No current facility-administered medications on file prior to encounter.      Current Outpatient Medications on File Prior to Encounter   Medication Sig Dispense Refill   • amLODIPine (NORVASC) 5 MG tablet TAKE 1 TABLET EVERY DAY 90 tablet 1   • aspirin 81 MG tablet Take 1 tablet by mouth daily. 90 tablet 3   • Coenzyme Q10 (COQ-10 PO) Take 300 mg by mouth daily     • DULoxetine (CYMBALTA) 30 MG capsule Take 30 mg by mouth Daily.     • losartan (COZAAR) 50 MG tablet Take 1 tablet by mouth Daily. 90 tablet 3   • Melatonin 10 MG capsule Take  by mouth Every Night.     • Misc Natural Products (OSTEO BI-FLEX JOINT SHIELD PO) Take by mouth     • Pumpkin Seed-Soy Germ (AZO BLADDER CONTROL/GO-LESS) capsule Take  by mouth.     • spironolactone-hydrochlorothiazide (ALDACTAZIDE) 25-25 MG tablet TAKE 1/2 TABLET EVERY DAY 45 tablet 1         Social History     Socioeconomic History   • Marital status:      Spouse name: Not on file   • Number of children: Not on file   • Years of education: Not on file   • Highest education level: Not on file   Social Needs   • Financial resource strain: Not on file   • Food insecurity - worry: Not on file   • Food insecurity - inability: Not on file   • Transportation needs - medical: Not on file   • Transportation needs - non-medical: Not on file   Occupational History   • Not on file   Tobacco Use   • Smoking status: Never Smoker   • Smokeless tobacco: Never Used   Substance and Sexual Activity   • Alcohol use: No     Comment: caffeine use   • Drug use: Not on file   • Sexual activity: Not on file   Other Topics Concern   • Not on file   Social History Narrative   • Not on file  "      Family Hx: Non-contributory      REVIEW OF SYSTEMS:   ROS was performed and is negative except as outlined in HPI     REVIEW OF SYSTEMS:   CONSTITUTIONAL: No weight loss, fever, chills, weakness or fatigue.   HEENT: Eyes: No visual loss, blurred vision, double vision or yellow sclerae. Ears, Nose, Throat: No hearing loss, sneezing, congestion, runny nose or sore throat.   SKIN: No rash or itching.     RESPIRATORY: No shortness of breath, hemoptysis, cough or sputum.   GASTROINTESTINAL: No anorexia, nausea, vomiting or diarrhea. No abdominal pain, bright red blood per rectum or melena.  NEUROLOGICAL: No headache, dizziness, syncope, paralysis, numbness or tingling in the extremities.  MUSCULOSKELETAL: No muscle, back pain, joint pain or stiffness.   HEMATOLOGIC: No anemia, bleeding or bruising.   LYMPHATICS: No enlarged nodes.  PSYCHIATRIC: No history of depression, anxiety, hallucinations.   ENDOCRINOLOGIC: No reports of sweating, cold or heat intolerance. No polyuria or polydipsia.        Objective:     Vitals:    01/05/19 0853 01/05/19 0901   BP:  121/70   Pulse: 90    Resp: 18    Temp: 98.2 °F (36.8 °C)    TempSrc: Tympanic    SpO2: 98%    Weight:  60.9 kg (134 lb 3.2 oz)   Height: 160 cm (63\")      Body mass index is 23.77 kg/m².  Flowsheet Rows      First Filed Value   Admission Height  160 cm (63\") Documented at 01/05/2019 0853   Admission Weight  60.9 kg (134 lb 3.2 oz) Documented at 01/05/2019 0901          General Appearance:    Alert, oriented x 3, in no acute distress   Head:    Normocephalic, without obvious abnormality, atraumatic   Ears:    Ears appear intact with no abnormalities noted   Throat:   No oral lesions, dentition good   Neck:   No adenopathy, supple, trachea midline, no thyromegaly, no carotid bruit, no JVD   Lungs:    Breath sounds are equal and  clear to auscultation    Heart:   Normal S1 and S2, RRR, no murmur/gallop or rub   Abdomen:    Normal bowel sounds, obese, soft non-tender, " non-distended, no organomegaly, no guarding   Extremities:   Moves all extremities well, no edema, no cyanosis, no redness   Pulses:   Pulses palpable and equal bilaterally. Normal radial pulses   Skin:   No bleeding, bruising or rash   Lymph nodes:   No palpable adenopathy       EKG:          I personally viewed and interpreted the patient's EKG/Telemetry data    Assessment:  Active Hospital Problems    Diagnosis Date Noted   • Chest pain with high risk for cardiac etiology [R07.9] 01/05/2019      Resolved Hospital Problems   No resolved problems to display.         Plan:    78 years old white female with significant cardiac risk factors has episodes of chest tightness jaw pain associated with shortness of breath bradycardia and cold sweats and both arm pains moderate to severe in intensity gradually worsening over the several months    Considering the patient's symptoms highly suggestive of coronary artery disease options of stress test and Discussed and was decided to proceed with diagnostic heart catheter    Procedure, risks and options of cardiac cath explained to pt INCLUDING BUT NOT LIMITED TO MI, STROKE, DEATH, INFECTION HAEMORRHAGE, . Pt understands well and agrees with no further questions.    Christopher Rosenbaum MD

## 2019-01-05 NOTE — ED PROVIDER NOTES
"Pt is a 78 y.o. female who presents to the ED complaining of nausea that started 2-3 weeks ago. She c/o diaphoresis and mid sternal tightness which is worsened w/ exertion. It has radiated to her jaw and shoulder in the past, but has not today. Pt also notes that her heart occasionally \"flutters\" at night. She also c/o diarrhea and vomiting which began 1 day ago. PMHx: sleep apnea, HTN,       On exam,  GENERAL: not distressed  HENT: nares patent  EYES: no scleral icterus  CV: regular rhythm, regular rate, 2+ radial pulses bilat  RESPIRATORY: normal effort  ABDOMEN: soft, non tender  MUSCULOSKELETAL: no deformity, no BLE edema or tenderness, no chest wall tenderness  NEURO: alert, LI, FC  SKIN: warm, dry    Vital signs and nursing notes reviewed.        EKG          EKG time: 0913  Rhythm/Rate: sinus 77  P waves and WV: RAA  QRS, axis: low voltage   ST and T waves: normal     Interpreted Contemporaneously by me, independently viewed  No prior.     Labs and imaging reviewed.     Plan: Consult cardiology regarding admission vs. discharge      MD ATTESTATION NOTE    The MISBAH and I have discussed this patient's history, physical exam, and treatment plan.  I have reviewed the documentation and personally had a face to face interaction with the patient. I affirm the documentation and agree with the treatment and plan.  The attached note describes my personal findings.      Documentation assistance provided by shyanne Triplett for Dr. Dotson. Information recorded by the scribe was done at my direction and has been verified and validated by me.               Kelsey Triplett  01/05/19 3760       Bala Dotson II, MD  01/05/19 2118    "

## 2019-01-06 VITALS
TEMPERATURE: 97.5 F | DIASTOLIC BLOOD PRESSURE: 61 MMHG | RESPIRATION RATE: 16 BRPM | HEIGHT: 64 IN | SYSTOLIC BLOOD PRESSURE: 120 MMHG | WEIGHT: 134.2 LBS | OXYGEN SATURATION: 98 % | HEART RATE: 71 BPM | BODY MASS INDEX: 22.91 KG/M2

## 2019-01-06 LAB
ANION GAP SERPL CALCULATED.3IONS-SCNC: 12.5 MMOL/L
BUN BLD-MCNC: 21 MG/DL (ref 8–23)
BUN/CREAT SERPL: 16.5 (ref 7–25)
CALCIUM SPEC-SCNC: 7.9 MG/DL (ref 8.6–10.5)
CHLORIDE SERPL-SCNC: 104 MMOL/L (ref 98–107)
CO2 SERPL-SCNC: 25.5 MMOL/L (ref 22–29)
CREAT BLD-MCNC: 1.27 MG/DL (ref 0.57–1)
DEPRECATED RDW RBC AUTO: 39.6 FL (ref 37–54)
ERYTHROCYTE [DISTWIDTH] IN BLOOD BY AUTOMATED COUNT: 12.7 % (ref 11.7–13)
GFR SERPL CREATININE-BSD FRML MDRD: 40 ML/MIN/1.73
GLUCOSE BLD-MCNC: 110 MG/DL (ref 65–99)
HCT VFR BLD AUTO: 31 % (ref 35.6–45.5)
HGB BLD-MCNC: 10.9 G/DL (ref 11.9–15.5)
MCH RBC QN AUTO: 30 PG (ref 26.9–32)
MCHC RBC AUTO-ENTMCNC: 35.2 G/DL (ref 32.4–36.3)
MCV RBC AUTO: 85.4 FL (ref 80.5–98.2)
PLATELET # BLD AUTO: 179 10*3/MM3 (ref 140–500)
PMV BLD AUTO: 10.5 FL (ref 6–12)
POTASSIUM BLD-SCNC: 3.7 MMOL/L (ref 3.5–5.2)
RBC # BLD AUTO: 3.63 10*6/MM3 (ref 3.9–5.2)
SODIUM BLD-SCNC: 142 MMOL/L (ref 136–145)
TROPONIN T SERPL-MCNC: <0.01 NG/ML (ref 0–0.03)
WBC NRBC COR # BLD: 5.89 10*3/MM3 (ref 4.5–10.7)

## 2019-01-06 PROCEDURE — 25010000002 HEPARIN (PORCINE) PER 1000 UNITS: Performed by: INTERNAL MEDICINE

## 2019-01-06 PROCEDURE — C1894 INTRO/SHEATH, NON-LASER: HCPCS | Performed by: INTERNAL MEDICINE

## 2019-01-06 PROCEDURE — 93005 ELECTROCARDIOGRAM TRACING: CPT | Performed by: INTERNAL MEDICINE

## 2019-01-06 PROCEDURE — 80048 BASIC METABOLIC PNL TOTAL CA: CPT | Performed by: INTERNAL MEDICINE

## 2019-01-06 PROCEDURE — G0378 HOSPITAL OBSERVATION PER HR: HCPCS

## 2019-01-06 PROCEDURE — 84484 ASSAY OF TROPONIN QUANT: CPT | Performed by: INTERNAL MEDICINE

## 2019-01-06 PROCEDURE — A9270 NON-COVERED ITEM OR SERVICE: HCPCS | Performed by: INTERNAL MEDICINE

## 2019-01-06 PROCEDURE — 0 IOPAMIDOL PER 1 ML: Performed by: INTERNAL MEDICINE

## 2019-01-06 PROCEDURE — C1769 GUIDE WIRE: HCPCS | Performed by: INTERNAL MEDICINE

## 2019-01-06 PROCEDURE — 63710000001 SPIRONOLACTONE-HYDROCHLOROTHIAZIDE 25-25 MG TABLET: Performed by: INTERNAL MEDICINE

## 2019-01-06 PROCEDURE — 25010000002 MIDAZOLAM PER 1 MG: Performed by: INTERNAL MEDICINE

## 2019-01-06 PROCEDURE — 93010 ELECTROCARDIOGRAM REPORT: CPT | Performed by: INTERNAL MEDICINE

## 2019-01-06 PROCEDURE — 85027 COMPLETE CBC AUTOMATED: CPT | Performed by: INTERNAL MEDICINE

## 2019-01-06 PROCEDURE — 25010000002 FENTANYL CITRATE (PF) 100 MCG/2ML SOLUTION: Performed by: INTERNAL MEDICINE

## 2019-01-06 PROCEDURE — 99217 PR OBSERVATION CARE DISCHARGE MANAGEMENT: CPT | Performed by: INTERNAL MEDICINE

## 2019-01-06 PROCEDURE — 93458 L HRT ARTERY/VENTRICLE ANGIO: CPT | Performed by: INTERNAL MEDICINE

## 2019-01-06 RX ORDER — FENTANYL CITRATE 50 UG/ML
INJECTION, SOLUTION INTRAMUSCULAR; INTRAVENOUS AS NEEDED
Status: DISCONTINUED | OUTPATIENT
Start: 2019-01-06 | End: 2019-01-06 | Stop reason: HOSPADM

## 2019-01-06 RX ORDER — SODIUM CHLORIDE 9 MG/ML
INJECTION, SOLUTION INTRAVENOUS CONTINUOUS PRN
Status: COMPLETED | OUTPATIENT
Start: 2019-01-06 | End: 2019-01-06

## 2019-01-06 RX ORDER — MIDAZOLAM HYDROCHLORIDE 1 MG/ML
INJECTION INTRAMUSCULAR; INTRAVENOUS AS NEEDED
Status: DISCONTINUED | OUTPATIENT
Start: 2019-01-06 | End: 2019-01-06 | Stop reason: HOSPADM

## 2019-01-06 RX ORDER — SODIUM CHLORIDE 9 MG/ML
100 INJECTION, SOLUTION INTRAVENOUS CONTINUOUS
Status: DISCONTINUED | OUTPATIENT
Start: 2019-01-06 | End: 2019-01-06 | Stop reason: HOSPADM

## 2019-01-06 RX ORDER — LIDOCAINE HYDROCHLORIDE 20 MG/ML
INJECTION, SOLUTION INFILTRATION; PERINEURAL AS NEEDED
Status: DISCONTINUED | OUTPATIENT
Start: 2019-01-06 | End: 2019-01-06 | Stop reason: HOSPADM

## 2019-01-06 RX ADMIN — AMLODIPINE BESYLATE 5 MG: 5 TABLET ORAL at 12:01

## 2019-01-06 RX ADMIN — SPIRONOLACTONE AND HYDROCHLOROTHIAZIDE 0.5 TABLET: 25; 25 TABLET, FILM COATED ORAL at 11:59

## 2019-01-06 RX ADMIN — LOSARTAN POTASSIUM 50 MG: 50 TABLET ORAL at 12:00

## 2019-01-06 RX ADMIN — DULOXETIN HYDROCHLORIDE 30 MG: 30 CAPSULE, DELAYED RELEASE ORAL at 12:01

## 2019-01-06 RX ADMIN — Medication 81 MG: at 08:19

## 2019-01-06 RX ADMIN — SODIUM CHLORIDE 100 ML/HR: 9 INJECTION, SOLUTION INTRAVENOUS at 11:57

## 2019-01-06 RX ADMIN — SODIUM CHLORIDE, PRESERVATIVE FREE 3 ML: 5 INJECTION INTRAVENOUS at 08:21

## 2019-01-06 NOTE — PLAN OF CARE
Problem: Patient Care Overview  Goal: Plan of Care Review  Outcome: Ongoing (interventions implemented as appropriate)   01/06/19 0558   Coping/Psychosocial   Plan of Care Reviewed With patient   Plan of Care Review   Progress improving   OTHER   Outcome Summary pt's HR could go up to 120's once a while but remained SR most of the time; no c/o pain; NPO after midnight for heart cath; will continue to monitor.     Goal: Individualization and Mutuality  Outcome: Ongoing (interventions implemented as appropriate)    Goal: Discharge Needs Assessment  Outcome: Ongoing (interventions implemented as appropriate)    Goal: Interprofessional Rounds/Family Conf  Outcome: Ongoing (interventions implemented as appropriate)      Problem: Cardiac: ACS (Acute Coronary Syndrome) (Adult)  Goal: Signs and Symptoms of Listed Potential Problems Will be Absent, Minimized or Managed (Cardiac: ACS)  Outcome: Ongoing (interventions implemented as appropriate)

## 2019-01-06 NOTE — DISCHARGE SUMMARY
Itzel Edmond  [id number]    Date of Admit: 1/5/2019  Date of Discharge:  1/6/2019    Discharge Diagnosis:  Active Hospital Problems    Diagnosis Date Noted   • Chest pain with high risk for cardiac etiology [R07.9] 01/05/2019      Resolved Hospital Problems   No resolved problems to display.           Hospital Course:     Ms Edmond is an 78 year old patient who was admitted on 01/05/2019 with bilateral arm pain, WOODARD, nausea, and diaphoresis. She stated it had become worse over the last few months.  Her troponin's were negative but with her symptoms being highly suggestive of coronary artery disease it was felt best to proceed with catheterization.  She underwent cardiac catheterization today and showed no significant coronary disease.  It is felt she is stable for discharge.  She will follow up with Sia BELL in approximately 2-3 weeks and keep her previously scheduled appointment with Dr Winters.        Procedures Performed  Procedure(s):  Left Heart Cath  Coronary angiography  Left ventriculography       Consults     Date and Time Order Name Status Description    1/5/2019 1059 [facility] (on-call MD unless specified) Completed           Discharge Medications     Your medication list      CONTINUE taking these medications      Instructions Last Dose Given Next Dose Due   amLODIPine 5 MG tablet  Commonly known as:  NORVASC      TAKE 1 TABLET EVERY DAY       aspirin 81 MG tablet      Take 1 tablet by mouth daily.       AZO BLADDER CONTROL/GO-LESS capsule      Take 1 capsule by mouth 2 (Two) Times a Day.       COQ-10 PO      Take 100 mg by mouth Daily.       DULoxetine 30 MG capsule  Commonly known as:  CYMBALTA      Take 30 mg by mouth Daily.       losartan 50 MG tablet  Commonly known as:  COZAAR      Take 1 tablet by mouth Daily.       OSTEO BI-FLEX JOINT SHIELD PO      Take 1 capsule by mouth 2 (Two) Times a Day.       spironolactone-hydrochlorothiazide 25-25 MG tablet  Commonly known as:  ALDACTAZIDE       TAKE 1/2 TABLET EVERY DAY              Discharge Diet: Healthy Heart    Activity at Discharge: As tolerated    Discharge disposition: Home    Condition on Discharge:Stable    Follow-up Appointments  Future Appointments   Date Time Provider Department Center   10/2/2019 10:45 AM Sandra Winters MD [facility] None         Test Results Pending at Discharge       Christopher Rosenbaum MD  01/06/19  1:45 PM

## 2019-01-14 ENCOUNTER — OFFICE VISIT (OUTPATIENT)
Dept: CARDIOLOGY | Facility: CLINIC | Age: 79
End: 2019-01-14

## 2019-01-14 VITALS
DIASTOLIC BLOOD PRESSURE: 60 MMHG | HEART RATE: 71 BPM | SYSTOLIC BLOOD PRESSURE: 132 MMHG | BODY MASS INDEX: 23.15 KG/M2 | WEIGHT: 135.6 LBS | HEIGHT: 64 IN

## 2019-01-14 DIAGNOSIS — R73.9 ELEVATED BLOOD SUGAR LEVEL: ICD-10-CM

## 2019-01-14 DIAGNOSIS — I49.1 PREMATURE ATRIAL COMPLEXES: ICD-10-CM

## 2019-01-14 DIAGNOSIS — R07.2 PRECORDIAL PAIN: Primary | ICD-10-CM

## 2019-01-14 DIAGNOSIS — R01.1 HEART MURMUR: ICD-10-CM

## 2019-01-14 DIAGNOSIS — G47.33 OBSTRUCTIVE SLEEP APNEA SYNDROME: ICD-10-CM

## 2019-01-14 DIAGNOSIS — E78.5 HYPERLIPIDEMIA, UNSPECIFIED HYPERLIPIDEMIA TYPE: ICD-10-CM

## 2019-01-14 DIAGNOSIS — N28.9 RENAL INSUFFICIENCY: ICD-10-CM

## 2019-01-14 DIAGNOSIS — I10 ESSENTIAL HYPERTENSION: ICD-10-CM

## 2019-01-14 PROCEDURE — 99214 OFFICE O/P EST MOD 30 MIN: CPT | Performed by: NURSE PRACTITIONER

## 2019-01-14 PROCEDURE — 93000 ELECTROCARDIOGRAM COMPLETE: CPT | Performed by: NURSE PRACTITIONER

## 2019-01-14 NOTE — PROGRESS NOTES
Date of Office Visit: 2019  Encounter Provider: ARABELLA Calvo  Place of Service: [facility] CARDIOLOGY  Patient Name: Itzel Edmond  :1940      Chief Complaint   Patient presents with   • Follow-up   :     Dear Dr. Luther,     HPI: Itzel Edmond is a pleasant 78 y.o. female who presents today for cardiac follow up. She has a history of palpitations, hypertension, hyperlipidemia, GERD, APCs, and obstructive sleep apnea (compliant with BiPAP machine).  She had a Holter monitor in  which showed normal sinus rhythm with rare PVCs and short run of nonsustained SVT.  She had a cardiac PET scan in 2014 which showed no evidence of ischemia.  She had an echocardiogram in 2015 which revealed an EF of 63%, grade 1 diastolic dysfunction, mild mitral and tricuspid insufficiency.  She is an established patient of Dr. Sandra Winters .    I evaluated her on 10/2/18.  She reported occasional palpitations at nighttime that were brief and never sustained.  She denied chest pain or shortness of breath.  Overall, that she was doing well and recommended follow-up with Dr. Winters in one year.    She contacted our office on 18 with complaints of fatigue, shortness of air, diaphoresis, nausea, and pain radiating down the back of her arms.  She was recommended to present to the emergency department.  Upon review of the emergency department records she did not go until 19.  Her troponin level was normal and she was recommended to have a cardiac catheterization which showed the following results: Left main normal, and early atherosclerotic plaque of the LAD, left circumflex, and RCA.  Medical management was recommended.    She presents today for follow-up.  She is going for a sleep apnea evaluation next week.  She continues to experience mild chest pain and diaphoresis with exertion that resolves with rest.  She has a mild cough and has dizziness at  nighttime.  She denies shortness of breath, edema, palpitations, or syncope.  Her blood pressure and heart rate are both normal today.    Past Medical History:   Diagnosis Date   • Atrial premature complex    • Carotid stenosis    • Claudication of lower extremity (CMS/HCC)    • GERD (gastroesophageal reflux disease)    • Hyperlipidemia    • Hypertension    • JHOANA (obstructive sleep apnea)     BiPap   • PAC (premature atrial contraction)    • Tachycardia        Past Surgical History:   Procedure Laterality Date   • BACK SURGERY     • BLADDER SURGERY     • CARDIAC CATHETERIZATION N/A 1/6/2019    Procedure: Left Heart Cath;  Surgeon: Chrsitopher Rosenbaum MD;  Location:  [facility];  Service: Cardiology   • CARDIAC CATHETERIZATION N/A 1/6/2019    Procedure: Coronary angiography;  Surgeon: Christopher Rosenbaum MD;  Location:  [facility];  Service: Cardiology   • CARDIAC CATHETERIZATION N/A 1/6/2019    Procedure: Left ventriculography;  Surgeon: Christopher Rosenbaum MD;  Location:  [facility];  Service: Cardiology   • HEMORROIDECTOMY     • HERNIA REPAIR     • HYSTERECTOMY     • OOPHORECTOMY         Social History     Socioeconomic History   • Marital status:      Spouse name: Not on file   • Number of children: Not on file   • Years of education: Not on file   • Highest education level: Not on file   Social Needs   • Financial resource strain: Not on file   • Food insecurity - worry: Not on file   • Food insecurity - inability: Not on file   • Transportation needs - medical: Not on file   • Transportation needs - non-medical: Not on file   Occupational History   • Not on file   Tobacco Use   • Smoking status: Never Smoker   • Smokeless tobacco: Never Used   Substance and Sexual Activity   • Alcohol use: No     Comment: caffeine use   • Drug use: Not on file   • Sexual activity: Not on file   Other Topics Concern   • Not on file   Social History Narrative    • Not on file       Family History   Problem Relation Age of Onset   • Heart disease Mother    • Stroke Mother    • Heart attack Father    • Heart disease Sister         s/p CABG   • Heart attack Brother    • Heart disease Brother         s/p CABG   • Heart disease Other    • Hypertension Other        The following portion of the patient's history were reviewed and updated as appropriate: past medical history, past surgical history, past social history, past family history, allergies, current medications, and problem list.    Review of Systems   Constitution: Positive for chills and malaise/fatigue. Negative for diaphoresis, fever, night sweats, weight gain and weight loss.   HENT: Negative for hearing loss, nosebleeds, sore throat and tinnitus.    Eyes: Negative for blurred vision, double vision, pain and visual disturbance.   Cardiovascular: Positive for dyspnea on exertion. Negative for chest pain, claudication, cyanosis, irregular heartbeat, leg swelling, near-syncope, orthopnea, palpitations, paroxysmal nocturnal dyspnea and syncope.   Respiratory: Positive for cough and snoring. Negative for hemoptysis, shortness of breath and wheezing.    Endocrine: Negative.  Negative for cold intolerance, heat intolerance and polyuria.   Hematologic/Lymphatic: Negative for bleeding problem. Does not bruise/bleed easily.   Skin: Positive for itching. Negative for color change, dry skin and flushing.   Musculoskeletal: Positive for joint pain. Negative for falls, joint swelling, muscle cramps, muscle weakness and myalgias.   Gastrointestinal: Positive for nausea and vomiting. Negative for abdominal pain, constipation, heartburn and melena.   Genitourinary: Positive for frequency. Negative for dysuria and hematuria.   Neurological: Negative for excessive daytime sleepiness, dizziness, light-headedness, loss of balance, numbness, paresthesias, seizures and vertigo.   Psychiatric/Behavioral: Negative for altered mental status,  "depression, memory loss and substance abuse. The patient does not have insomnia and is not nervous/anxious.    Allergic/Immunologic: Negative for environmental allergies.       Allergies   Allergen Reactions   • Ace Inhibitors      Cough   • Gabapentin Dizziness   • Lisinopril Cough   • Pregabalin      Vision problems, dizziness         Current Outpatient Medications:   •  amLODIPine (NORVASC) 5 MG tablet, TAKE 1 TABLET EVERY DAY, Disp: 90 tablet, Rfl: 1  •  aspirin 81 MG tablet, Take 1 tablet by mouth daily., Disp: 90 tablet, Rfl: 3  •  Coenzyme Q10 (COQ-10 PO), Take 100 mg by mouth Daily., Disp: , Rfl:   •  DULoxetine (CYMBALTA) 30 MG capsule, Take 30 mg by mouth Daily., Disp: , Rfl:   •  losartan (COZAAR) 50 MG tablet, Take 1 tablet by mouth Daily., Disp: 90 tablet, Rfl: 3  •  Misc Natural Products (OSTEO BI-FLEX JOINT SHIELD PO), Take 1 capsule by mouth 2 (Two) Times a Day., Disp: , Rfl:   •  Pumpkin Seed-Soy Germ (AZO BLADDER CONTROL/GO-LESS) capsule, Take 1 capsule by mouth 2 (Two) Times a Day., Disp: , Rfl:   •  spironolactone-hydrochlorothiazide (ALDACTAZIDE) 25-25 MG tablet, TAKE 1/2 TABLET EVERY DAY, Disp: 45 tablet, Rfl: 1        Objective:     Vitals:    01/14/19 1407   BP: 132/60   BP Location: Left arm   Pulse: 71   Weight: 61.5 kg (135 lb 9.6 oz)   Height: 162.6 cm (64\")     Body mass index is 23.28 kg/m².    PHYSICAL EXAM:    Vitals Reviewed.   General Appearance: No acute distress, well developed and well nourished.   Eyes: Conjunctiva and lids: No erythema, swelling, or discharge. Sclera non-icteric.   HENT: Atraumatic, normocephalic. External eyes, ears, and nose normal. No hearing loss noted. Mucous membranes normal. Lips not cyanotic. Neck supple with no tenderness.  Respiratory: No signs of respiratory distress. Respiration rhythm and depth normal.   Clear to auscultation. No rales, crackles, rhonchi, or wheezing auscultated.   Cardiovascular:  Jugular Venous Pressure: Normal  Heart Rate and " Rhythm: Normal, Heart Sounds: Normal S1 and S2. No S3 or S4 noted.  Murmurs: RUSB grade 1/6 systolic murmur noted. No rubs, thrills, or gallops.   Arterial Pulses: Carotid pulses normal. No carotid bruit noted. Posterior tibialis and dorsalis pedis pulses normal.   Lower Extremities: No edema noted.  Gastrointestinal:  Abdomen soft, non-distended, non-tender. Normal bowel sounds. No hepatomegaly.   Musculoskeletal: Normal movement of extremities  Skin and Nails: General appearance normal. No pallor, cyanosis, diaphoresis. Skin temperature normal. No clubbing of fingernails.   Psychiatric: Patient alert and oriented to person, place, and time. Speech and behavior appropriate. Normal mood and affect.       ECG 12 Lead  Date/Time: 1/14/2019 2:09 PM  Performed by: Sia Jason APRN  Authorized by: Sia Jason APRN   Comparison: compared with previous ECG from 1/6/2019  Rhythm: sinus rhythm  Rate: normal  BPM: 71  Conduction: conduction normal  ST Segments: ST segments normal  T Waves: T waves normal  QRS axis: normal  Other findings: PRWP  Clinical impression: abnormal ECG              Assessment:       Diagnosis Plan   1. Precordial pain     2. Heart murmur  Adult Transthoracic Echo Complete W/ Cont if Necessary Per Protocol   3. Essential hypertension     4. Premature atrial complexes     5. Obstructive sleep apnea syndrome     6. Hyperlipidemia, unspecified hyperlipidemia type     7. Renal insufficiency     8. Elevated blood sugar level            Plan:       1.   Chest Pain: She continues to experience mild chest discomfort with exertion that resolves with rest.  She had an essentially normal cardiac catheterization.  She has a mild heart murmur so we'll check an echocardiogram.    2.  Hypertension: Blood pressure is well-controlled on current medication regimen.       2.  APCs: History of atrial premature contractions and short run of SVT.  She occasionally experiences palpitations at night and feels  that these are stable.       3.  Obstructive Sleep Apnea: She is compliant with Bipap/CPAP machine.     4.  Hyperlipidemia: Managed by her PCP.  She has no longer taking simvastatin.     5.  Elevated Creatinine: On 1/6/19 her creatinine was 1.27.  Continue to monitor.    6.  Elevated Blood Sugars: Her blood sugars have been greater than 99 on her last few lab test.  She is going to further discuss with Dr. Castillo.     7.  Follow-up with Dr. Winters as previously scheduled.     As always, it has been a pleasure to participate in your patient's care. Thank you.       Sincerely,         ARABELLA Hogan

## 2019-01-15 PROBLEM — R01.1 HEART MURMUR: Status: ACTIVE | Noted: 2019-01-15

## 2019-01-15 PROBLEM — R07.9 CHEST PAIN WITH HIGH RISK FOR CARDIAC ETIOLOGY: Status: RESOLVED | Noted: 2019-01-05 | Resolved: 2019-01-15

## 2019-01-24 ENCOUNTER — HOSPITAL ENCOUNTER (OUTPATIENT)
Dept: CARDIOLOGY | Facility: HOSPITAL | Age: 79
Discharge: HOME | End: 2019-01-24
Admitting: NURSE PRACTITIONER

## 2019-01-24 VITALS
BODY MASS INDEX: 23.05 KG/M2 | WEIGHT: 135 LBS | HEIGHT: 64 IN | SYSTOLIC BLOOD PRESSURE: 141 MMHG | DIASTOLIC BLOOD PRESSURE: 54 MMHG | HEART RATE: 82 BPM

## 2019-01-24 DIAGNOSIS — R01.1 HEART MURMUR: ICD-10-CM

## 2019-01-24 LAB
ASCENDING AORTA: 3 CM
BH CV ECHO MEAS - ACS: 1.5 CM
BH CV ECHO MEAS - AO MAX PG (FULL): 4.1 MMHG
BH CV ECHO MEAS - AO MAX PG: 7.2 MMHG
BH CV ECHO MEAS - AO MEAN PG (FULL): 2.3 MMHG
BH CV ECHO MEAS - AO MEAN PG: 4.3 MMHG
BH CV ECHO MEAS - AO ROOT AREA (BSA CORRECTED): 1.2
BH CV ECHO MEAS - AO ROOT AREA: 3.1 CM^2
BH CV ECHO MEAS - AO ROOT DIAM: 2 CM
BH CV ECHO MEAS - AO V2 MAX: 134.4 CM/SEC
BH CV ECHO MEAS - AO V2 MEAN: 97.6 CM/SEC
BH CV ECHO MEAS - AO V2 VTI: 29.5 CM
BH CV ECHO MEAS - AVA(I,A): 1.8 CM^2
BH CV ECHO MEAS - AVA(I,D): 1.8 CM^2
BH CV ECHO MEAS - AVA(V,A): 2 CM^2
BH CV ECHO MEAS - AVA(V,D): 2 CM^2
BH CV ECHO MEAS - BSA(HAYCOCK): 1.7 M^2
BH CV ECHO MEAS - BSA: 1.7 M^2
BH CV ECHO MEAS - BZI_BMI: 23.2 KILOGRAMS/M^2
BH CV ECHO MEAS - BZI_METRIC_HEIGHT: 162.6 CM
BH CV ECHO MEAS - BZI_METRIC_WEIGHT: 61.2 KG
BH CV ECHO MEAS - EDV(MOD-SP2): 54 ML
BH CV ECHO MEAS - EDV(MOD-SP4): 56 ML
BH CV ECHO MEAS - EDV(TEICH): 64.5 ML
BH CV ECHO MEAS - EF(CUBED): 69.1 %
BH CV ECHO MEAS - EF(MOD-BP): 64 %
BH CV ECHO MEAS - EF(MOD-SP2): 59.3 %
BH CV ECHO MEAS - EF(MOD-SP4): 67.9 %
BH CV ECHO MEAS - EF(TEICH): 61.4 %
BH CV ECHO MEAS - ESV(MOD-SP2): 22 ML
BH CV ECHO MEAS - ESV(MOD-SP4): 18 ML
BH CV ECHO MEAS - ESV(TEICH): 24.9 ML
BH CV ECHO MEAS - FS: 32.4 %
BH CV ECHO MEAS - IVS/LVPW: 0.96
BH CV ECHO MEAS - IVSD: 0.97 CM
BH CV ECHO MEAS - LAT PEAK E' VEL: 8 CM/SEC
BH CV ECHO MEAS - LV DIASTOLIC VOL/BSA (35-75): 33.8 ML/M^2
BH CV ECHO MEAS - LV MASS(C)D: 117.8 GRAMS
BH CV ECHO MEAS - LV MASS(C)DI: 71.2 GRAMS/M^2
BH CV ECHO MEAS - LV MAX PG: 3.1 MMHG
BH CV ECHO MEAS - LV MEAN PG: 2 MMHG
BH CV ECHO MEAS - LV SYSTOLIC VOL/BSA (12-30): 10.9 ML/M^2
BH CV ECHO MEAS - LV V1 MAX: 88.3 CM/SEC
BH CV ECHO MEAS - LV V1 MEAN: 66.8 CM/SEC
BH CV ECHO MEAS - LV V1 VTI: 17.9 CM
BH CV ECHO MEAS - LVIDD: 3.9 CM
BH CV ECHO MEAS - LVIDS: 2.6 CM
BH CV ECHO MEAS - LVLD AP2: 6.6 CM
BH CV ECHO MEAS - LVLD AP4: 7.2 CM
BH CV ECHO MEAS - LVLS AP2: 6.1 CM
BH CV ECHO MEAS - LVLS AP4: 5.6 CM
BH CV ECHO MEAS - LVOT AREA (M): 3.1 CM^2
BH CV ECHO MEAS - LVOT AREA: 3 CM^2
BH CV ECHO MEAS - LVOT DIAM: 2 CM
BH CV ECHO MEAS - LVPWD: 1 CM
BH CV ECHO MEAS - MED PEAK E' VEL: 7 CM/SEC
BH CV ECHO MEAS - MR MAX PG: 33.8 MMHG
BH CV ECHO MEAS - MR MAX VEL: 290.5 CM/SEC
BH CV ECHO MEAS - MV A DUR: 0.14 SEC
BH CV ECHO MEAS - MV A MAX VEL: 94.1 CM/SEC
BH CV ECHO MEAS - MV DEC SLOPE: 196.8 CM/SEC^2
BH CV ECHO MEAS - MV DEC TIME: 0.29 SEC
BH CV ECHO MEAS - MV E MAX VEL: 57.7 CM/SEC
BH CV ECHO MEAS - MV E/A: 0.61
BH CV ECHO MEAS - MV MAX PG: 6.8 MMHG
BH CV ECHO MEAS - MV MEAN PG: 1.8 MMHG
BH CV ECHO MEAS - MV P1/2T MAX VEL: 59.2 CM/SEC
BH CV ECHO MEAS - MV P1/2T: 88.1 MSEC
BH CV ECHO MEAS - MV V2 MAX: 130.3 CM/SEC
BH CV ECHO MEAS - MV V2 MEAN: 61.6 CM/SEC
BH CV ECHO MEAS - MV V2 VTI: 26.5 CM
BH CV ECHO MEAS - MVA P1/2T LCG: 3.7 CM^2
BH CV ECHO MEAS - MVA(P1/2T): 2.5 CM^2
BH CV ECHO MEAS - MVA(VTI): 2.1 CM^2
BH CV ECHO MEAS - PA ACC TIME: 0.12 SEC
BH CV ECHO MEAS - PA MAX PG (FULL): 2.7 MMHG
BH CV ECHO MEAS - PA MAX PG: 4 MMHG
BH CV ECHO MEAS - PA PR(ACCEL): 23.5 MMHG
BH CV ECHO MEAS - PA V2 MAX: 100.4 CM/SEC
BH CV ECHO MEAS - PULM A REVS DUR: 0.14 SEC
BH CV ECHO MEAS - PULM A REVS VEL: 25.2 CM/SEC
BH CV ECHO MEAS - PULM DIAS VEL: 37.3 CM/SEC
BH CV ECHO MEAS - PULM S/D: 1.3
BH CV ECHO MEAS - PULM SYS VEL: 49.5 CM/SEC
BH CV ECHO MEAS - PVA(V,A): 1.7 CM^2
BH CV ECHO MEAS - PVA(V,D): 1.7 CM^2
BH CV ECHO MEAS - QP/QS: 0.7
BH CV ECHO MEAS - RAP SYSTOLE: 3 MMHG
BH CV ECHO MEAS - RV MAX PG: 1.4 MMHG
BH CV ECHO MEAS - RV MEAN PG: 0.81 MMHG
BH CV ECHO MEAS - RV V1 MAX: 58.7 CM/SEC
BH CV ECHO MEAS - RV V1 MEAN: 42.9 CM/SEC
BH CV ECHO MEAS - RV V1 VTI: 13 CM
BH CV ECHO MEAS - RVOT AREA: 2.9 CM^2
BH CV ECHO MEAS - RVOT DIAM: 1.9 CM
BH CV ECHO MEAS - RVSP: 26.9 MMHG
BH CV ECHO MEAS - SI(AO): 55.7 ML/M^2
BH CV ECHO MEAS - SI(CUBED): 24.1 ML/M^2
BH CV ECHO MEAS - SI(LVOT): 32.9 ML/M^2
BH CV ECHO MEAS - SI(MOD-SP2): 19.3 ML/M^2
BH CV ECHO MEAS - SI(MOD-SP4): 23 ML/M^2
BH CV ECHO MEAS - SI(TEICH): 23.9 ML/M^2
BH CV ECHO MEAS - SUP REN AO DIAM: 1.9 CM
BH CV ECHO MEAS - SV(AO): 92.2 ML
BH CV ECHO MEAS - SV(CUBED): 39.9 ML
BH CV ECHO MEAS - SV(LVOT): 54.4 ML
BH CV ECHO MEAS - SV(MOD-SP2): 32 ML
BH CV ECHO MEAS - SV(MOD-SP4): 38 ML
BH CV ECHO MEAS - SV(RVOT): 37.9 ML
BH CV ECHO MEAS - SV(TEICH): 39.6 ML
BH CV ECHO MEAS - TAPSE (>1.6): 1.7 CM2
BH CV ECHO MEAS - TR MAX VEL: 244.3 CM/SEC
BH CV ECHO MEASUREMENTS AVERAGE E/E' RATIO: 7.69
BH CV XLRA - RV BASE: 2.6 CM
BH CV XLRA - TDI S': 16 CM/SEC
LEFT ATRIUM VOLUME INDEX: 17 ML/M2
LV EF 2D ECHO EST: 64 %
SINUS: 2.4 CM
STJ: 2.7 CM

## 2019-01-24 PROCEDURE — 93306 TTE W/DOPPLER COMPLETE: CPT

## 2019-01-24 PROCEDURE — 93306 TTE W/DOPPLER COMPLETE: CPT | Performed by: INTERNAL MEDICINE

## 2019-01-25 ENCOUNTER — TELEPHONE (OUTPATIENT)
Dept: CARDIOLOGY | Facility: CLINIC | Age: 79
End: 2019-01-25

## 2019-01-25 NOTE — TELEPHONE ENCOUNTER
----- Message from ARABELLA Estrella sent at 1/15/2019 12:38 PM EST -----    Follow-up on echocardiogram 1/24/19

## 2019-01-25 NOTE — TELEPHONE ENCOUNTER
Echocardiogram Results:    Result Text     · Left ventricular systolic function is normal. Calculated EF = 64%. Estimated EF was in agreement with the calculated EF. Estimated EF = 64%. Normal left ventricular cavity size and wall thickness noted. All left ventricular wall segments contract normally. Left ventricular diastolic dysfunction is noted (grade I) consistent with impaired relaxation.  · There is thickening of the aortic valve. Trace aortic valve regurgitation is present.  · Moderate MAC is present. Mild mitral valve regurgitation is present.  · Physiologic tricuspid valve regurgitation is present. Calculated right ventricular systolic pressure from tricuspid regurgitation is 26.9 mmHg. Insufficient TR velocity profile to estimate the right ventricular systolic pressure.        I spoke with patient via telephone and she verbalizes understanding of her results.    She is scheduled in February with Dr. Winters and she's feeling so good she wants to extend that out.  I have recommended follow-up in 3-4 months.    Linus-please cancel appointment with Dr. Winters in February and reschedule an 3-4 months.  Thank you

## 2019-02-05 RX ORDER — SPIRONOLACTONE AND HYDROCHLOROTHIAZIDE 25; 25 MG/1; MG/1
TABLET ORAL
Qty: 45 TABLET | Refills: 1 | Status: SHIPPED | OUTPATIENT
Start: 2019-02-05 | End: 2019-06-24 | Stop reason: SDUPTHER

## 2019-02-05 RX ORDER — AMLODIPINE BESYLATE 5 MG/1
TABLET ORAL
Qty: 90 TABLET | Refills: 1 | Status: SHIPPED | OUTPATIENT
Start: 2019-02-05 | End: 2019-06-24 | Stop reason: SDUPTHER

## 2019-04-30 ENCOUNTER — OFFICE VISIT (OUTPATIENT)
Dept: CARDIOLOGY | Facility: CLINIC | Age: 79
End: 2019-04-30

## 2019-04-30 VITALS
DIASTOLIC BLOOD PRESSURE: 70 MMHG | HEIGHT: 63 IN | BODY MASS INDEX: 24.56 KG/M2 | WEIGHT: 138.6 LBS | SYSTOLIC BLOOD PRESSURE: 130 MMHG | HEART RATE: 74 BPM

## 2019-04-30 DIAGNOSIS — I10 ESSENTIAL HYPERTENSION: Primary | ICD-10-CM

## 2019-04-30 DIAGNOSIS — E78.5 HYPERLIPIDEMIA, UNSPECIFIED HYPERLIPIDEMIA TYPE: ICD-10-CM

## 2019-04-30 DIAGNOSIS — G47.33 OBSTRUCTIVE SLEEP APNEA SYNDROME: ICD-10-CM

## 2019-04-30 PROCEDURE — 93000 ELECTROCARDIOGRAM COMPLETE: CPT | Performed by: INTERNAL MEDICINE

## 2019-04-30 PROCEDURE — 99214 OFFICE O/P EST MOD 30 MIN: CPT | Performed by: INTERNAL MEDICINE

## 2019-04-30 RX ORDER — ZALEPLON 10 MG/1
1 CAPSULE ORAL NIGHTLY
COMMUNITY
Start: 2019-03-22 | End: 2019-05-15 | Stop reason: ALTCHOICE

## 2019-04-30 RX ORDER — DOXYCYCLINE 100 MG/1
1 CAPSULE ORAL DAILY
Refills: 0 | COMMUNITY
Start: 2019-04-24 | End: 2020-10-15

## 2019-05-15 ENCOUNTER — OFFICE VISIT (OUTPATIENT)
Dept: SLEEP MEDICINE | Facility: HOSPITAL | Age: 79
End: 2019-05-15

## 2019-05-15 ENCOUNTER — DOCUMENTATION (OUTPATIENT)
Dept: SLEEP MEDICINE | Facility: HOSPITAL | Age: 79
End: 2019-05-15

## 2019-05-15 VITALS
WEIGHT: 138 LBS | SYSTOLIC BLOOD PRESSURE: 151 MMHG | OXYGEN SATURATION: 97 % | BODY MASS INDEX: 24.45 KG/M2 | DIASTOLIC BLOOD PRESSURE: 76 MMHG | HEART RATE: 78 BPM | HEIGHT: 63 IN

## 2019-05-15 DIAGNOSIS — Z78.9 DIFFICULTY WITH CPAP NASAL MASK USE: Primary | ICD-10-CM

## 2019-05-15 DIAGNOSIS — I49.1 PREMATURE ATRIAL COMPLEXES: ICD-10-CM

## 2019-05-15 DIAGNOSIS — G47.33 OBSTRUCTIVE SLEEP APNEA SYNDROME: ICD-10-CM

## 2019-05-15 PROCEDURE — G0463 HOSPITAL OUTPT CLINIC VISIT: HCPCS

## 2019-05-15 NOTE — PROGRESS NOTES
Patient seen in sleep clinic today with NP Becky Kaur Np requested epap min pressure change from setting of8cm to new setting of12cm. Changes made in [employer] through patients active modem. MAB

## 2019-05-15 NOTE — PROGRESS NOTES
[facility]  Telephone: [phone] / Fax: [phone] [place]  Telephone: [phone] / Fax: [phone] [place]ian [place]    Referring Physician: Dr. Winters  PCP: Rhdoa Luther MD    Reason for consult:  sleep apnea    Itzel Edmond is a 78 y.o.female  was seen in the Sleep Disorders Center today for evaluation of sleep apnea. She was diagnosed with JHOANA 20 years ago and has been on the machine on/off since then. She took a trip to [place] 10 yrs ago and stopped using her machine for few years. She has been seeing Dr. Harvey for JHOANA. She also developed insomnia and was prescribed Sonata-but it caused SE and she is no longer on it. She saw Dr. Winters recently and was recommended to see us so that we assist patient with resumption of PAP device.  She used the machine for the past few days.  Her current nasal mask leaks. She feels that air is escaping from her mask.  She tried a full facemask in the past, but it did not fit well and mask was leaking air constantly. Without the machine-she snores loudly and makes noises in her sleep. She wakes up gasping for breath. She has recurrent nocturnal arousals.  I reviewed her latest CPAP titration study done through Dr Harvey in 2015. At that time, she was recommended BIPAP 16/10. Here machine is 4 years old and works well. She got it from [employer].  Her sleep schedule is 11 PM-7:30 AM.  She falls asleep within 15 minutes but wakes up feeling tired.     Social history, patient is retired, no alcohol, no tobacco, drinks 1 coffee a day.    Review of systems, hematuria, frequent urination, painful joints and muscles, swollen joints, irregular heartbeat, chest pain, swollen ankles, shortness of breath, dizziness, anxiety, depression.  Rest is negative and outlined in sleep disorder questionnaire form.      Itzel Edmond  has a past medical history of Atrial premature complex, Carotid stenosis, Claudication of lower extremity (CMS/HCC), GERD  "(gastroesophageal reflux disease), Heart murmur, Hyperlipidemia, Hypertension, JHOANA (obstructive sleep apnea), PAC (premature atrial contraction), Precordial pain, Premature atrial complexes, Renal insufficiency, and Tachycardia.    Current Medications:    Current Outpatient Medications:   •  amLODIPine (NORVASC) 5 MG tablet, TAKE 1 TABLET EVERY DAY, Disp: 90 tablet, Rfl: 1  •  aspirin 81 MG tablet, Take 1 tablet by mouth daily., Disp: 90 tablet, Rfl: 3  •  Coenzyme Q10 (COQ-10 PO), Take 100 mg by mouth Daily., Disp: , Rfl:   •  doxycycline (MONODOX) 100 MG capsule, Take 1 capsule by mouth Daily., Disp: , Rfl: 0  •  DULoxetine (CYMBALTA) 30 MG capsule, Take 30 mg by mouth Daily., Disp: , Rfl:   •  losartan (COZAAR) 50 MG tablet, Take 1 tablet by mouth Daily., Disp: 90 tablet, Rfl: 3  •  Misc Natural Products (OSTEO BI-FLEX JOINT SHIELD PO), Take 1 capsule by mouth 2 (Two) Times a Day., Disp: , Rfl:   •  Pumpkin Seed-Soy Germ (AZO BLADDER CONTROL/GO-LESS) capsule, Take 1 capsule by mouth 2 (Two) Times a Day., Disp: , Rfl:   •  spironolactone-hydrochlorothiazide (ALDACTAZIDE) 25-25 MG tablet, TAKE 1/2 TABLET EVERY DAY, Disp: 45 tablet, Rfl: 1    I have reviewed Past Medical History, Past Surgical History, Medication List, Social History and Family History as entered in Sleep Questionnaire and EPIC.    ESS  2   Vital Signs /76   Pulse 78   Ht 160 cm (63\")   Wt 62.6 kg (138 lb)   SpO2 97%   BMI 24.45 kg/m²  Body mass index is 24.45 kg/m².    General Alert and oriented. No acute distress noted   Pharynx/Throat Class IV Mallampati airway, large tongue, no evidence of redundant lateral pharyngeal tissue. No oral lesions. No thrush. Moist mucous membranes.   Head Normocephalic. Symmetrical. Atraumatic.    Nose No septal deviation. No drainage   Chest Wall Normal shape. Symmetric expansion with respiration. No tenderness.   Neck Trachea midline, no thyromegaly or adenopathy    Lungs Clear to auscultation " bilaterally. No wheezes. No rhonchi. No rales. Respirations regular, even and unlabored.   Heart Regular rhythm and normal rate. Normal S1 and S2. No murmur   Abdomen Soft, non-tender and non-distended. Normal bowel sounds. No masses.   Extremities Moves all extremities well. No edema   Psychiatric Normal mood and affect.           Impression:  1. Difficulty with CPAP nasal mask use    2. Obstructive sleep apnea syndrome          Plan:  She will be fitted with Dreamwear nasal cushion. We looked at several masks.  She needs small headgear and medium nasal cushion.  She seemed to like this style the most.  Cole contacted DME.  They were able to link our lab to view the data on [employer].  Her current machine is auto BiPAP.  Her settings are IPAP max 20 cm, EPAP minimum of 8 cm, pressure support of 4.  In the past 30 days she used the device for 2 days.  The average pressure on those 2 days is 16/12.  Average AHI 17.4.  Average CA index is 8.8, average OA index is 6.3.  I asked the patient to use the device consistently.  I will ask Cole to adjust the pressures to EPAP minimum of 12.  IPAP max will stay at 20cm and pressure support will be kept at 4.  Patient will be asked to return to the clinic to see Dr. Ferrell in 4-6 weeks to evaluate AHI and mask fit.      I reviewed her previous titration study done at sleep centers [facility] in 2015.  I do not have the report of the diagnostic study done many years ago.  I informed her that with severe obstructive sleep apnea, her risk for stroke and cardiac arrhythmias is high, and treatment is crucial.  I asked her to use the device nightly, without interruptions.  I recommended she stops Sonata.    Thank you for allowing me to participate in your patient's care.      ARABELLA Rodriguez  [facility]  Phone: [phone]      Part of this note may be an electronic transcription/translation of spoken language to printed text using the Dragon Dictation System.  Some errors may exist even though the document was edited.

## 2019-06-24 RX ORDER — SPIRONOLACTONE AND HYDROCHLOROTHIAZIDE 25; 25 MG/1; MG/1
TABLET ORAL
Qty: 45 TABLET | Refills: 2 | Status: SHIPPED | OUTPATIENT
Start: 2019-06-24 | End: 2020-01-21

## 2019-06-24 RX ORDER — AMLODIPINE BESYLATE 5 MG/1
TABLET ORAL
Qty: 90 TABLET | Refills: 2 | Status: SHIPPED | OUTPATIENT
Start: 2019-06-24 | End: 2020-01-21

## 2019-07-19 ENCOUNTER — OFFICE VISIT (OUTPATIENT)
Dept: SLEEP MEDICINE | Facility: HOSPITAL | Age: 79
End: 2019-07-19

## 2019-07-19 VITALS
HEIGHT: 63 IN | DIASTOLIC BLOOD PRESSURE: 62 MMHG | SYSTOLIC BLOOD PRESSURE: 120 MMHG | WEIGHT: 141 LBS | BODY MASS INDEX: 24.98 KG/M2 | HEART RATE: 76 BPM

## 2019-07-19 DIAGNOSIS — Z78.9 DIFFICULTY WITH CPAP NASAL MASK USE: ICD-10-CM

## 2019-07-19 DIAGNOSIS — G25.81 RESTLESS LEGS SYNDROME (RLS): ICD-10-CM

## 2019-07-19 DIAGNOSIS — G47.33 OBSTRUCTIVE SLEEP APNEA: Primary | ICD-10-CM

## 2019-07-19 PROCEDURE — G0463 HOSPITAL OUTPT CLINIC VISIT: HCPCS

## 2019-07-19 RX ORDER — PRAMIPEXOLE DIHYDROCHLORIDE 0.25 MG/1
0.25 TABLET ORAL NIGHTLY
Qty: 30 TABLET | Refills: 2 | Status: SHIPPED | OUTPATIENT
Start: 2019-07-19 | End: 2020-12-04 | Stop reason: SDUPTHER

## 2019-07-19 NOTE — PROGRESS NOTES
[facility]  [address]  [address]rd [address]  [place] [facility] [address]  [phone]    PCP: Rhoda Luther MD    Reason for visit:  Sleep disorders: JHOANA    Itzel is a 79 y.o.female who was seen in the Sleep Disorders Center today. She has been on a CPAP for over 20 yrs. Her last study was in 2015 with Dr. Harvey. Last visit her pressure was changed from min epap 8 to min epap 12. She feels the pressure is too high. On review of her study pr of 8/4 was also adequate but unclear if enough supine sleep. Currently with dreamwear nasal pillows but does not fit well. She was previously using nasal mask. She sleeps from 11:30p to 5:30a. She reports dry mouth excessive. She has a chin strap and does not use it - hates it. Her sleep is very restless, she is not on meds.  [place] Sleepiness Scale is 5. Caffeine 1 per day. Alcohol 0 per week.    Itzel  reports that she has never smoked. She has never used smokeless tobacco.    Pertinent Positive Review of Systems of soa  Rest of Review of Systems was negative as recorded in Sleep Questionnaire.    Patient  has a past medical history of Atrial premature complex, Carotid stenosis, Claudication of lower extremity (CMS/HCC), GERD (gastroesophageal reflux disease), Heart murmur, Hyperlipidemia, Hypertension, JHOANA (obstructive sleep apnea), PAC (premature atrial contraction), Precordial pain, Premature atrial complexes, Renal insufficiency, and Tachycardia.     Current Medications:    Current Outpatient Medications:   •  amLODIPine (NORVASC) 5 MG tablet, TAKE 1 TABLET EVERY DAY, Disp: 90 tablet, Rfl: 2  •  aspirin 81 MG tablet, Take 1 tablet by mouth daily., Disp: 90 tablet, Rfl: 3  •  Coenzyme Q10 (COQ-10 PO), Take 100 mg by mouth Daily., Disp: , Rfl:   •  doxycycline (MONODOX) 100 MG capsule, Take 1 capsule by mouth Daily., Disp: , Rfl: 0  •  DULoxetine (CYMBALTA) 30 MG capsule, Take 30 mg by mouth Daily., Disp: , Rfl:   •  losartan (COZAAR) 50 MG tablet, Take 1  "tablet by mouth Daily., Disp: 90 tablet, Rfl: 3  •  Misc Natural Products (OSTEO BI-FLEX JOINT SHIELD PO), Take 1 capsule by mouth 2 (Two) Times a Day., Disp: , Rfl:   •  pramipexole (MIRAPEX) 0.25 MG tablet, Take 1 tablet by mouth Every Night for 90 days. Take 1 hour prior to bed-time., Disp: 30 tablet, Rfl: 2  •  Pumpkin Seed-Soy Germ (AZO BLADDER CONTROL/GO-LESS) capsule, Take 1 capsule by mouth 2 (Two) Times a Day., Disp: , Rfl:   •  spironolactone-hydrochlorothiazide (ALDACTAZIDE) 25-25 MG tablet, TAKE 1/2 TABLET EVERY DAY, Disp: 45 tablet, Rfl: 2   also entered in Sleep Questionnaire         Vital Signs: /62   Pulse 76   Ht 160 cm (63\")   Wt 64 kg (141 lb)   BMI 24.98 kg/m²     Body mass index is 24.98 kg/m².       Tongue: large      Dentition: good       Pharynx: Posterior pharyngeal pillars are narrow   Mallampatti: III (soft and hard palate and base of uvula visible)        General: Alert. Cooperative. Well developed. No acute distress.             Head:  Normocephalic. Symmetrical. Atraumatic.              Nose: No septal deviation. No drainage.          Throat: No oral lesions. No thrush. Moist mucous membranes.    Chest Wall:  Normal shape. Symmetric expansion with respiration. No tenderness.             Neck:  Trachea midline.           Lungs:  Clear to auscultation bilaterally. No wheezes. No rhonchi. No rales. Respirations regular, even and unlabored.            Heart:  Regular rhythm and normal rate. Normal S1 and S2. No murmur.     Abdomen:  Soft, non-tender and non-distended. Normal bowel sounds. No masses.  Extremities:  Moves all extremities well. No edema.    Psychiatric: Normal mood and affect.    Study:  · I could not find the diagnostic study that was done just prior to below.  · 12/14/2015 sleep center [facility]: CPAP was tried but failed.  There was subsequent successful titration with BiPAP.  Optimal pressures were 16/10.    Testing:  · Average usage 6 hours 35 minutes " average BiPAP pressure 18/14 with AHI 9.3.  Auto BiPAP is set between 20 and 12 with a pressure support of 4.    [facility] [employer]: [employer] switch to [employer]    Impression:  1. Obstructive sleep apnea    2. Difficulty with CPAP nasal mask use    3. Restless legs syndrome (RLS)        Plan:  Itzel got a new mask fit here, she does well with a [employer] small DreamWear mask. Go back to min epap 4 since she is having trouble with the pressures.    She has definite symptoms of RLS - try mirapex 0.25 mg qhs    I reiterated the importance of effective treatment of obstructive sleep apnea with PAP machine.  Cardiovascular health risks of untreated sleep apnea were again reviewed.  Patient was asked to remain cautious if there is persistent hypersomnolence. The benefit of weight loss in reducing severity of obstructive sleep apnea was discussed.  Patient would benefit from adhering to a strict diet to achieve ideal BMI.     Change of PAP supplies regularly is important for effective use.  Change of cushion on the mask or plugs on nasal pillows along with disposable filters once every month and change of mask frame, tubing, headgear and Velcro straps every 6 months at the minimum was reiterated.    Patient will follow up in this clinic in 1 month    Thank you for allowing me to participate in your patient's care.    Edwin Ferrell MD    Part of this note may be an electronic transcription/translation of spoken language to printed text using the Dragon Dictation System.

## 2019-09-16 ENCOUNTER — APPOINTMENT (OUTPATIENT)
Dept: SLEEP MEDICINE | Facility: HOSPITAL | Age: 79
End: 2019-09-16

## 2019-10-04 ENCOUNTER — OFFICE VISIT (OUTPATIENT)
Dept: SLEEP MEDICINE | Facility: HOSPITAL | Age: 79
End: 2019-10-04

## 2019-10-04 VITALS
WEIGHT: 141 LBS | DIASTOLIC BLOOD PRESSURE: 59 MMHG | SYSTOLIC BLOOD PRESSURE: 121 MMHG | BODY MASS INDEX: 24.98 KG/M2 | OXYGEN SATURATION: 98 % | HEART RATE: 72 BPM | HEIGHT: 63 IN

## 2019-10-04 DIAGNOSIS — G25.81 RESTLESS LEGS SYNDROME (RLS): ICD-10-CM

## 2019-10-04 DIAGNOSIS — G47.33 OBSTRUCTIVE SLEEP APNEA: Primary | ICD-10-CM

## 2019-10-04 PROCEDURE — G0463 HOSPITAL OUTPT CLINIC VISIT: HCPCS

## 2019-10-04 NOTE — PROGRESS NOTES
Pressure changed to IPAP max 20. EPAP min 8 ,PS 4 Humidifier 2 changed via modem . Per Becky CHAMBERS

## 2019-10-04 NOTE — PROGRESS NOTES
[facility]  Telephone: [phone] / Fax: [phone] [place]  Telephone: [phone] / Fax: [phone] Lian Llanes    PCP: Rhoda Luther MD    Reason for visit: JHOANA f/u    Itzel Edmond is a 79 y.o.female  was last seen at [facility] sleep lab in July 2019. She was started on Mirapex 0.25mg and has been taking right before bedtime. It helped the leg pain and leg movement at night.  However, she continues to wake up a lot during the night to use the restroom and is asking if Mirapex dose should be increased. She wakes up at 5:30am but feels tired. She likes her nasal mask-Dreamwear nasal. Despite using her machine nightly, she continues to snore.     Social history, no tobacco, no alcohol, no energy drinks, drinks 1 cup of coffee a day.    Review of systems, positive for nasal congestion, shortness of breath.  Rest is unremarkable    [facility]    Current Medications:    Current Outpatient Medications:   •  amLODIPine (NORVASC) 5 MG tablet, TAKE 1 TABLET EVERY DAY, Disp: 90 tablet, Rfl: 2  •  aspirin 81 MG tablet, Take 1 tablet by mouth daily., Disp: 90 tablet, Rfl: 3  •  Coenzyme Q10 (COQ-10 PO), Take 100 mg by mouth Daily., Disp: , Rfl:   •  doxycycline (MONODOX) 100 MG capsule, Take 1 capsule by mouth Daily., Disp: , Rfl: 0  •  DULoxetine (CYMBALTA) 30 MG capsule, Take 30 mg by mouth Daily., Disp: , Rfl:   •  losartan (COZAAR) 50 MG tablet, Take 1 tablet by mouth Daily., Disp: 90 tablet, Rfl: 3  •  Misc Natural Products (OSTEO BI-FLEX JOINT SHIELD PO), Take 1 capsule by mouth 2 (Two) Times a Day., Disp: , Rfl:   •  pramipexole (MIRAPEX) 0.25 MG tablet, Take 1 tablet by mouth Every Night for 90 days. Take 1 hour prior to bed-time., Disp: 30 tablet, Rfl: 2  •  Pumpkin Seed-Soy Germ (AZO BLADDER CONTROL/GO-LESS) capsule, Take 1 capsule by mouth 2 (Two) Times a Day., Disp: , Rfl:   •  spironolactone-hydrochlorothiazide (ALDACTAZIDE) 25-25 MG tablet, TAKE 1/2 TABLET EVERY DAY,  "Disp: 45 tablet, Rfl: 2   also entered in Sleep Questionnaire    Patient  has a past medical history of Atrial premature complex, Carotid stenosis, Claudication of lower extremity (CMS/HCC), GERD (gastroesophageal reflux disease), Heart murmur, Hyperlipidemia, Hypertension, JHOANA (obstructive sleep apnea), PAC (premature atrial contraction), Precordial pain, Premature atrial complexes, Renal insufficiency, and Tachycardia.    I have reviewed the Past Medical History, Past Surgical History, Social History and Family History.    Vital Signs /59   Pulse 72   Ht 160 cm (63\")   Wt 64 kg (141 lb)   SpO2 98%   BMI 24.98 kg/m²  Body mass index is 24.98 kg/m².    General Alert and oriented. No acute distress noted   Pharynx/Throat Class IV Mallampati airway, large tongue, no evidence of redundant lateral pharyngeal tissue. No oral lesions. No thrush. Moist mucous membranes.   Head Normocephalic. Symmetrical. Atraumatic.    Nose No septal deviation. No drainage   Chest Wall Normal shape. Symmetric expansion with respiration. No tenderness.   Neck Trachea midline, no thyromegaly or adenopathy    Lungs Clear to auscultation bilaterally. No wheezes. No rhonchi. No rales. Respirations regular, even and unlabored.   Heart Regular rhythm and normal rate. Normal S1 and S2. No murmur   Abdomen Soft, non-tender and non-distended. Normal bowel sounds. No masses.   Extremities Moves all extremities well. No edema   Psychiatric Normal mood and affect.     Testing:  Download July 5, 2019-October 2, 2019 shows 97% compliance with average nightly use of 6 hours and 52 minutes on auto BiPAP with average pressure of 11/10, AHI 7.  Her settings on the device are showing that maximum IPAP is set at 14, minimum EPAP is set at 4.  Her pressure support however is 0.    Study:  I could not find the diagnostic study that was done just prior to below.     12/14/2015 sleep center [facility]: CPAP was tried but failed.  There was " subsequent successful titration with BiPAP.  Optimal pressures were 16/10.    Impression:  1. Obstructive sleep apnea    2. Restless legs syndrome (RLS)          Plan:  She was advised to take the Mirapex 2-3 hours prior to bedtime. As RLS is now in a much better control, I would like to avoid raising the dose of Mirapex to avoid augmentation.  I will change her humidity to level 2 in view of some dry mouth. As her residual AHI is 7, I will change pressures to IPAP max 20, EPAP min 8, PS 4. She was asked to see Dr. Ferrell in  3 months. Patient uses the CPAP device and benefits from its use in terms of reduction of hypersomnia and snoring.  I reviewed download report with the patient.     Weight loss will be strongly beneficial to reduce the severity of sleep-disordered breathing.  Caution during activities that require prolonged concentration is strongly advised if sleepiness returns. Changing of PAP supplies regularly is important for effective use. Patient needs to change cushion on the mask or plugs on nasal pillows along with disposable filters once every month and change mask frame, tubing, headgear and Velcro straps every 6 months at the minimum.       Thank you for allowing me to participate in your patient's care.      ARABELLA Rodriguez  [facility]  Phone: [phone]      Part of this note may be an electronic transcription/translation of spoken language to printed text using the Dragon Dictation System.

## 2020-01-10 ENCOUNTER — OFFICE VISIT (OUTPATIENT)
Dept: SLEEP MEDICINE | Facility: HOSPITAL | Age: 80
End: 2020-01-10

## 2020-01-10 VITALS
WEIGHT: 140 LBS | SYSTOLIC BLOOD PRESSURE: 117 MMHG | OXYGEN SATURATION: 98 % | BODY MASS INDEX: 24.8 KG/M2 | DIASTOLIC BLOOD PRESSURE: 56 MMHG | HEIGHT: 63 IN | HEART RATE: 80 BPM

## 2020-01-10 DIAGNOSIS — J34.89 DRY NOSE: ICD-10-CM

## 2020-01-10 DIAGNOSIS — G25.81 RESTLESS LEGS SYNDROME (RLS): ICD-10-CM

## 2020-01-10 DIAGNOSIS — G47.33 OBSTRUCTIVE SLEEP APNEA: Primary | ICD-10-CM

## 2020-01-10 PROCEDURE — G0463 HOSPITAL OUTPT CLINIC VISIT: HCPCS

## 2020-01-21 RX ORDER — AMLODIPINE BESYLATE 5 MG/1
TABLET ORAL
Qty: 90 TABLET | Refills: 1 | Status: SHIPPED | OUTPATIENT
Start: 2020-01-21 | End: 2020-05-04 | Stop reason: SDUPTHER

## 2020-01-21 RX ORDER — SPIRONOLACTONE AND HYDROCHLOROTHIAZIDE 25; 25 MG/1; MG/1
TABLET ORAL
Qty: 45 TABLET | Refills: 1 | Status: SHIPPED | OUTPATIENT
Start: 2020-01-21 | End: 2020-05-04 | Stop reason: SDUPTHER

## 2020-01-30 ENCOUNTER — TELEPHONE (OUTPATIENT)
Dept: CARDIOLOGY | Facility: CLINIC | Age: 80
End: 2020-01-30

## 2020-01-30 NOTE — TELEPHONE ENCOUNTER
[facility] pharmacy called and want to clarify if pt is on spironolactone- hctz 25-25 MG  or HCTZ 25 MG.              S/w Dr Winters, she agreed on PCP. Called and informed [facility] pharmacy      Thanks  Lindsay SMALL

## 2020-05-04 ENCOUNTER — TELEMEDICINE - AUDIO (OUTPATIENT)
Dept: CARDIOLOGY | Facility: CLINIC | Age: 80
End: 2020-05-04

## 2020-05-04 VITALS — HEIGHT: 63 IN | WEIGHT: 140 LBS | BODY MASS INDEX: 24.8 KG/M2

## 2020-05-04 DIAGNOSIS — I25.10 ATHEROSCLEROSIS OF NATIVE CORONARY ARTERY OF NATIVE HEART WITHOUT ANGINA PECTORIS: Primary | ICD-10-CM

## 2020-05-04 DIAGNOSIS — R06.09 DYSPNEA ON EXERTION: ICD-10-CM

## 2020-05-04 DIAGNOSIS — G47.33 OBSTRUCTIVE SLEEP APNEA SYNDROME: ICD-10-CM

## 2020-05-04 DIAGNOSIS — G47.9 SLEEP DISTURBANCE: ICD-10-CM

## 2020-05-04 DIAGNOSIS — E78.2 MIXED HYPERLIPIDEMIA: ICD-10-CM

## 2020-05-04 DIAGNOSIS — I10 ESSENTIAL HYPERTENSION: ICD-10-CM

## 2020-05-04 PROBLEM — F41.1 GAD (GENERALIZED ANXIETY DISORDER): Status: ACTIVE | Noted: 2020-01-20

## 2020-05-04 PROCEDURE — 99441 PR PHYS/QHP TELEPHONE EVALUATION 5-10 MIN: CPT | Performed by: NURSE PRACTITIONER

## 2020-05-04 RX ORDER — ATORVASTATIN CALCIUM 20 MG/1
20 TABLET, FILM COATED ORAL DAILY
Qty: 90 TABLET | Refills: 2 | Status: SHIPPED | OUTPATIENT
Start: 2020-05-04 | End: 2020-10-15 | Stop reason: SDUPTHER

## 2020-05-04 RX ORDER — SPIRONOLACTONE AND HYDROCHLOROTHIAZIDE 25; 25 MG/1; MG/1
0.5 TABLET ORAL DAILY
Qty: 45 TABLET | Refills: 3 | Status: SHIPPED | OUTPATIENT
Start: 2020-05-04 | End: 2020-10-15 | Stop reason: SDUPTHER

## 2020-05-04 RX ORDER — AMLODIPINE BESYLATE 5 MG/1
5 TABLET ORAL DAILY
Qty: 90 TABLET | Refills: 3 | Status: SHIPPED | OUTPATIENT
Start: 2020-05-04 | End: 2020-10-15 | Stop reason: SDUPTHER

## 2020-05-04 RX ORDER — LOSARTAN POTASSIUM 50 MG/1
50 TABLET ORAL DAILY
Qty: 90 TABLET | Refills: 3 | Status: SHIPPED | OUTPATIENT
Start: 2020-05-04 | End: 2020-10-15 | Stop reason: SDUPTHER

## 2020-05-04 NOTE — PROGRESS NOTES
Telehealth Visit    Date of Visit: 2020  Encounter Provider: ARABELLA Calvo  Place of Service: [facility] CARDIOLOGY  Patient Name: Itzel Edmond  :1940  Primary Cardiologist: Dr. Sandra Winters    Chief Complaint   Patient presents with   • Follow-up   :     Dear Dr. Rhoda Luther,     HPI: Itzel Edmond is a pleasant 79 y.o. female who is an established patient of our practice. Due to COVID-19 virus, I am conducting a telehealth visit via telephone with patient and she has consented to this visit today.      She has a known history of hypertension, hyperlipidemia, and obstructive sleep apnea.  Since , she has had intermittent palpitations and Holter monitor at that time showed normal sinus rhythm with rare PVCs and short run of nonsustained SVT.  In 2014, she had a cardiac PET scan which showed no evidence of ischemia.  In 2015, she had an echocardiogram which revealed an EF of 63%, grade 1 diastolic dysfunction, and mild mitral and tricuspid insufficiency.    In 2019, she presented to the ED with symptoms of fatigue, shortness of breath, diaphoresis, nausea, and bilateral arm pain.  She ruled out for acute ACS.  On 2019 she had a cardiac catheterization completed which showed early atherosclerosis of the LAD, left circumflex, and RCA.  On 2019 2D echocardiogram revealed LVEF 64%, grade 1 diastolic dysfunction, trace aortic valve regurgitation, moderate mitral annular calcification with mild mitral valve regurgitation, and physiologic tricuspid valve regurgitation.  In 2019, she followed up with Dr. Winters and reported palpitations.    Today is her annual follow-up visit and were conducting this via telephone.  She thinks that she has been doing very well from a cardiac standpoint.  She has chronic shortness of breath which is unchanged, but is still able to do her daily activities.  She says she is not exercising as  "much as she should be.  She has some mild lightheadedness this morning of unknown reason.  She says she is not been sleeping well and averages about 5 hours of sleep per night.  She is taking melatonin 10 mg at nighttime.  She denies chest pain, PND, orthopnea, edema, palpitations, dizziness, or syncope.  Upon review of her medications, she is not taking simvastatin and she does not know why.  She says \"I need to be on that\".    I reviewed blood work from 1/20/2020: CBC normal.  CMP normal except for potassium 5.5, glucose 100, and BUN 24. Total cholesterol 194, triglycerides 207, HDL 37, and .  TSH abnormal at 4.74.  She had replete blood work on 2/11/2020 which showed potassium of 5.2 and BUN 26.  TSH normal at 3.7.    Past Medical History:   Diagnosis Date   • Atrial premature complex    • Carotid stenosis    • Claudication of lower extremity (CMS/HCC)    • GERD (gastroesophageal reflux disease)    • Heart murmur    • Hyperlipidemia    • Hypertension    • JHOANA (obstructive sleep apnea)     BiPap   • PAC (premature atrial contraction)    • Precordial pain    • Premature atrial complexes    • Renal insufficiency    • Tachycardia        Past Surgical History:   Procedure Laterality Date   • BACK SURGERY     • BLADDER SURGERY     • CARDIAC CATHETERIZATION N/A 1/6/2019    Procedure: Left Heart Cath;  Surgeon: Christopher Rosenbaum MD;  [facility];  Service: Cardiology   • CARDIAC CATHETERIZATION N/A 1/6/2019    Procedure: Coronary angiography;  Surgeon: Christopher Rosenbaum MD;  Location:  [facility] CATH INVASIVE LOCATION;  Service: Cardiology   • CARDIAC CATHETERIZATION N/A 1/6/2019    Procedure: Left ventriculography;  Surgeon: Christopher Rosenbaum MD;  Location:  [facility] CATH INVASIVE LOCATION;  Service: Cardiology   • HEMORROIDECTOMY     • HERNIA REPAIR     • HYSTERECTOMY     • OOPHORECTOMY         Social History     Socioeconomic History   • Marital status:      Spouse " name: Not on file   • Number of children: Not on file   • Years of education: Not on file   • Highest education level: Not on file   Tobacco Use   • Smoking status: Never Smoker   • Smokeless tobacco: Never Used   Substance and Sexual Activity   • Alcohol use: No     Comment: caffeine use: Very little       Family History   Problem Relation Age of Onset   • Heart disease Mother    • Stroke Mother    • Heart attack Father    • Heart disease Sister         s/p CABG   • Heart attack Brother    • Heart disease Brother         s/p CABG   • Heart disease Other    • Hypertension Other        The following portion of the patient's history were reviewed and updated as appropriate: past medical history, past surgical history, past social history, past family history, allergies, current medications, and problem list.    Review of Systems   Constitution: Positive for malaise/fatigue.   Cardiovascular: Positive for dyspnea on exertion. Negative for chest pain, irregular heartbeat, leg swelling, near-syncope, orthopnea, palpitations, paroxysmal nocturnal dyspnea and syncope.   Respiratory: Negative for cough and wheezing.    Neurological: Positive for light-headedness.       Allergies   Allergen Reactions   • Ace Inhibitors      Cough   • Gabapentin Dizziness     Other reaction(s): Dizziness   • Lisinopril Cough   • Pregabalin      Vision problems, dizziness   • Sulfamethoxazole-Trimethoprim Itching and Nausea And Vomiting         Current Outpatient Medications:   •  amLODIPine (NORVASC) 5 MG tablet, Take 1 tablet by mouth Daily., Disp: 90 tablet, Rfl: 3  •  aspirin 81 MG tablet, Take 1 tablet by mouth daily., Disp: 90 tablet, Rfl: 3  •  doxycycline (MONODOX) 100 MG capsule, Take 1 capsule by mouth Daily., Disp: , Rfl: 0  •  DULoxetine (CYMBALTA) 30 MG capsule, Take 30 mg by mouth Daily., Disp: , Rfl:   •  losartan (COZAAR) 50 MG tablet, Take 1 tablet by mouth Daily., Disp: 90 tablet, Rfl: 3  •  Mirabegron ER (Myrbetriq) 50 MG  "tablet sustained-release 24 hour 24 hr tablet, Take 50 mg by mouth Daily., Disp: , Rfl:   •  Misc Natural Products (OSTEO BI-FLEX JOINT SHIELD PO), Take 1 capsule by mouth 2 (Two) Times a Day., Disp: , Rfl:   •  spironolactone-hydrochlorothiazide (ALDACTAZIDE) 25-25 MG tablet, Take 0.5 tablets by mouth Daily., Disp: 45 tablet, Rfl: 3  •      Objective:     Vitals:    05/04/20 0950   Weight: 63.5 kg (140 lb)   Height: 160 cm (63\")     Body mass index is 24.8 kg/m².    Due to telehealth visit, there was no EKG, vitals, or weight performed in our office.  Vitals/Weight were reported by the patient and conducted at home.       Assessment:       Diagnosis Plan   1. Atherosclerosis of native coronary artery of native heart without angina pectoris     2. Essential hypertension     3. Dyspnea on exertion     4. Mixed hyperlipidemia     5. Obstructive sleep apnea syndrome     6. Sleep disturbance            Plan:       1.  Atherosclerosis: She was noted to have early plaque of the LAD, left circumflex, and RCA per catheterization.  Risk factor modification was discussed.  She is taking aspirin.  I think she would benefit from restarting statin therapy and would like to start atorvastatin 20 mg daily instead of simvastatin.    2.  Hypertension: Blood pressure was not checked today.  We will continue to monitor.    3.  Dyspnea upon Exertion: Chronic and unchanged according to patient.    4.  Hyperlipidemia: Start atorvastatin 20 mg daily.  If her lipids have not been rechecked by her 6-month appointment with me, I will arrange for them to be done at that time.    5.  Obstructive Sleep Apnea: Follows with [facility].    6.  Sleep Disturbance: She states that she is not sleeping well at nighttime and takes melatonin 10 mg daily.  I recommended stopping the melatonin and trying magnesium 500 mg OTC.  I will follow-up with her via telephone in a month to see if this is helping.    7.  She comes to our office on an " annual basis.  Since we are not seeing her face-to-face today, she would prefer a 6-month follow-up visit with me.    This patient has consented to a telehealth visit via telephone. The visit was scheduled as a follow-up telephone visit to comply with patient safety concerns in accordance with CDC recommendations.  All vitals recorded within this visit are reported by the patient.  I spent 25 minutes in total including but not limited to the 9 minutes spent in direct conversation with this patient.      As always, it has been a pleasure to participate in your patient's care. Thank you.       Sincerely,         ARABELLA Hogan        Dictated utilizing Dragon dictation

## 2020-06-15 ENCOUNTER — OFFICE VISIT (OUTPATIENT)
Dept: SLEEP MEDICINE | Facility: HOSPITAL | Age: 80
End: 2020-06-15

## 2020-06-15 VITALS
BODY MASS INDEX: 25.09 KG/M2 | HEIGHT: 63 IN | OXYGEN SATURATION: 98 % | DIASTOLIC BLOOD PRESSURE: 76 MMHG | WEIGHT: 141.6 LBS | SYSTOLIC BLOOD PRESSURE: 153 MMHG | HEART RATE: 80 BPM

## 2020-06-15 DIAGNOSIS — G25.81 RESTLESS LEGS SYNDROME (RLS): ICD-10-CM

## 2020-06-15 DIAGNOSIS — G47.33 OBSTRUCTIVE SLEEP APNEA: Primary | ICD-10-CM

## 2020-06-15 PROCEDURE — G0463 HOSPITAL OUTPT CLINIC VISIT: HCPCS

## 2020-06-15 NOTE — PROGRESS NOTES
[facility]  Telephone: [phone] / Fax: [phone] [place]  Telephone: [phone] / Fax: [phone] [place]ian [place]anes    PCP: Rhoda Luther MD    Reason for visit: JHOANA f/u    Itzel Edmond is a 80 y.o.female  was last seen at [facility] sleep lab in January 2020. She c/o snoring despite using the CPAP machine. She goes to bed at 11, up 2-3 times per night to use the restroom. She is up at 5am-6am feeling tired. She takes her diuretics in early evening. She also has RLS and is on Pramipexole 0.25mg. She takes it right at bedtime. She has pramipexole that she takes right at bedtime. She is unaware of aerophagia, excessive dry mouth or air leak.  Her ESS is 5.     SH- no tobacco, no alcohol,  1 cup of coffee daily    ROS- +nasal congestion, +SOA, +chest pain, rest is negative.    [facility] for supplies, [facility] for machine    Current Medications:    Current Outpatient Medications:   •  amLODIPine (NORVASC) 5 MG tablet, Take 1 tablet by mouth Daily., Disp: 90 tablet, Rfl: 3  •  aspirin 81 MG tablet, Take 1 tablet by mouth daily., Disp: 90 tablet, Rfl: 3  •  atorvastatin (LIPITOR) 20 MG tablet, Take 1 tablet by mouth Daily., Disp: 90 tablet, Rfl: 2  •  doxycycline (MONODOX) 100 MG capsule, Take 1 capsule by mouth Daily., Disp: , Rfl: 0  •  DULoxetine (CYMBALTA) 30 MG capsule, Take 30 mg by mouth Daily., Disp: , Rfl:   •  losartan (COZAAR) 50 MG tablet, Take 1 tablet by mouth Daily., Disp: 90 tablet, Rfl: 3  •  Mirabegron ER (Myrbetriq) 50 MG tablet sustained-release 24 hour 24 hr tablet, Take 50 mg by mouth Daily., Disp: , Rfl:   •  Misc Natural Products (OSTEO BI-FLEX JOINT SHIELD PO), Take 1 capsule by mouth 2 (Two) Times a Day., Disp: , Rfl:   •  spironolactone-hydrochlorothiazide (ALDACTAZIDE) 25-25 MG tablet, Take 0.5 tablets by mouth Daily., Disp: 45 tablet, Rfl: 3   also entered in Sleep Questionnaire    Patient  has a past medical history of Atrial premature complex, Carotid  "stenosis, Claudication of lower extremity (CMS/HCC), GERD (gastroesophageal reflux disease), Heart murmur, Hyperlipidemia, Hypertension, JHOANA (obstructive sleep apnea), PAC (premature atrial contraction), Precordial pain, Premature atrial complexes, Renal insufficiency, and Tachycardia.    I have reviewed the Past Medical History, Past Surgical History, Social History and Family History.    Vital Signs /76   Pulse 80   Ht 160 cm (63\")   Wt 64.2 kg (141 lb 9.6 oz)   SpO2 98%   BMI 25.08 kg/m²  Body mass index is 25.08 kg/m².    General Alert and oriented. No acute distress noted   Pharynx/Throat Class III Mallampati airway, large tongue, no evidence of redundant lateral pharyngeal tissue. No oral lesions. No thrush. Moist mucous membranes.   Head Normocephalic. Symmetrical. Atraumatic.    Nose No septal deviation. No drainage   Chest Wall Normal shape. Symmetric expansion with respiration. No tenderness.   Neck Trachea midline, no thyromegaly or adenopathy    Lungs Clear to auscultation bilaterally. No wheezes. No rhonchi. No rales. Respirations regular, even and unlabored.   Heart Regular rhythm and normal rate. Normal S1 and S2. No murmur   Abdomen Soft, non-tender and non-distended. Normal bowel sounds. No masses.   Extremities Moves all extremities well. No edema   Psychiatric Normal mood and affect.     Testing:  · Download 3/17/20- 6/14/20- 97% use with average nightly use of 5 hours and 45 minutes on auto BIPAP with max IPAP 20, min EPAP 8, PS 4, avg pr is 15/11, AHI 6.5.    Study:  · I could not find the diagnostic study that was done just prior to below.  · 12/14/2015 sleep center [facility]: CPAP was tried but failed.  There was subsequent successful titration with BiPAP.  Optimal pressures were 16/10.       Impression:  1. Obstructive sleep apnea    2. Restless legs syndrome (RLS)          Plan:  Advised to take diuretic in am. Take pramipexole 2-3 hrs prior to bedtime. Avoid naps and " excessive caffeine/alcohol. If taking pramipexole 2-3 hours prior to bed time does not help reduce RLS, can increase to 0.5mg.  Her current BIPAP is over 5 years old. She would like to replace her device. I will order her a new machine with settings of IPAP max 20, EPAP min 10. I asked her to decrease fluid intake after dinner, reduce bright light exposure 2 hours prior to bedtime. Avoid sleeping pills due to risk of falls and injury. I discussed all of the above with patient. Multiple questions were answered during the encounter today. I reviewed all of her medications in their original containers. She has both spironolactone/HCTZ and HCTZ by itself.  Advised to not take both. I asked her to f/u with Dr. Ferrell in 3 months to review her progress on the machine. I will order the new machine with slightly higher EPAP min as on most recent download data her residual AHI is 6.5.        Follow up with us in 3 months.    Thank you for allowing me to participate in your patient's care.      ARABELLA Rodriguez  [facility]  Phone: [phone]      Part of this note may be an electronic transcription/translation of spoken language to printed text using the Dragon Dictation System.

## 2020-06-22 ENCOUNTER — TELEPHONE (OUTPATIENT)
Dept: SLEEP MEDICINE | Facility: HOSPITAL | Age: 80
End: 2020-06-22

## 2020-08-31 ENCOUNTER — TELEPHONE (OUTPATIENT)
Dept: GASTROENTEROLOGY | Facility: CLINIC | Age: 80
End: 2020-08-31

## 2020-09-14 ENCOUNTER — TELEPHONE (OUTPATIENT)
Dept: SLEEP MEDICINE | Facility: HOSPITAL | Age: 80
End: 2020-09-14

## 2020-09-14 ENCOUNTER — OFFICE VISIT (OUTPATIENT)
Dept: SLEEP MEDICINE | Facility: HOSPITAL | Age: 80
End: 2020-09-14

## 2020-09-14 VITALS
BODY MASS INDEX: 25.87 KG/M2 | DIASTOLIC BLOOD PRESSURE: 68 MMHG | OXYGEN SATURATION: 98 % | WEIGHT: 146 LBS | HEIGHT: 63 IN | HEART RATE: 79 BPM | SYSTOLIC BLOOD PRESSURE: 136 MMHG

## 2020-09-14 DIAGNOSIS — G25.81 RESTLESS LEGS SYNDROME (RLS): ICD-10-CM

## 2020-09-14 DIAGNOSIS — G47.33 OBSTRUCTIVE SLEEP APNEA: Primary | ICD-10-CM

## 2020-09-14 PROCEDURE — G0463 HOSPITAL OUTPT CLINIC VISIT: HCPCS

## 2020-09-14 NOTE — PROGRESS NOTES
[facility]  Telephone: [phone] / Fax: [phone] [place]  Telephone: [phone] / Fax: [phone] Lian [place]anes    PCP: Rhoda Luther MD    Reason for visit: JHOANA f/u    Itzel Edmond is a 80 y.o.female  was last seen at [facility] sleep lab in June 2020. We tried ordering her a new machine last visit but she was told by DME that she won't be eligible for a new machine until next year. She reports burning smell coming from the machine. She washes her mask and hose. She uses distilled water and changes it regularly.    She increased the Pramipexole to 0.5mg few months. She takes it right at bedtime-not 2-3 hours prior to bedtime as previously advised. However, she still notices symptoms of RLS interfering with sleep. She tried OTC sleep aids. However, shortly after taking them she fell asleep on the toilet sit and almost fell on her face.    She reports air leak with nasal mask. She tried the FFM but she cannot tolerate it. She reports snoring despite BIPAP. Her sleep schedule is 11pm-5am. ESS is 5.    SH-  No tobacco, no alcohol, 1 cup of coffee per day, no energy drinks.    ROS- +nasal congestion, +SOA, +wheezing, rest is negative.    DME wants to use [employer] for a new machine, not [employer].      Current Medications:    Current Outpatient Medications:   •  amLODIPine (NORVASC) 5 MG tablet, Take 1 tablet by mouth Daily., Disp: 90 tablet, Rfl: 3  •  aspirin 81 MG tablet, Take 1 tablet by mouth daily., Disp: 90 tablet, Rfl: 3  •  atorvastatin (LIPITOR) 20 MG tablet, Take 1 tablet by mouth Daily., Disp: 90 tablet, Rfl: 2  •  doxycycline (MONODOX) 100 MG capsule, Take 1 capsule by mouth Daily., Disp: , Rfl: 0  •  DULoxetine (CYMBALTA) 30 MG capsule, Take 30 mg by mouth Daily., Disp: , Rfl:   •  losartan (COZAAR) 50 MG tablet, Take 1 tablet by mouth Daily., Disp: 90 tablet, Rfl: 3  •  Mirabegron ER (Myrbetriq) 50 MG tablet sustained-release 24 hour 24 hr tablet, Take 50 mg by mouth  "Daily., Disp: , Rfl:   •  Misc Natural Products (OSTEO BI-FLEX JOINT SHIELD PO), Take 1 capsule by mouth 2 (Two) Times a Day., Disp: , Rfl:   •  spironolactone-hydrochlorothiazide (ALDACTAZIDE) 25-25 MG tablet, Take 0.5 tablets by mouth Daily., Disp: 45 tablet, Rfl: 3   also entered in Sleep Questionnaire    Patient  has a past medical history of Atrial premature complex, Carotid stenosis, Claudication of lower extremity (CMS/HCC), GERD (gastroesophageal reflux disease), Heart murmur, Hyperlipidemia, Hypertension, JHOANA (obstructive sleep apnea), PAC (premature atrial contraction), Precordial pain, Premature atrial complexes, Renal insufficiency, and Tachycardia.    I have reviewed the Past Medical History, Past Surgical History, Social History and Family History.    Vital Signs /68   Pulse 79   Ht 160 cm (63\")   Wt 66.2 kg (146 lb)   SpO2 98%   BMI 25.86 kg/m²  Body mass index is 25.86 kg/m².    General Alert and oriented. No acute distress noted   Pharynx/Throat Class III Mallampati airway, large tongue, no evidence of redundant lateral pharyngeal tissue. No oral lesions. No thrush. Moist mucous membranes.   Head Normocephalic. Symmetrical. Atraumatic.    Nose No septal deviation. No drainage   Chest Wall Normal shape. Symmetric expansion with respiration. No tenderness.   Neck Trachea midline, no thyromegaly or adenopathy    Lungs Clear to auscultation bilaterally. No wheezes. No rhonchi. No rales. Respirations regular, even and unlabored.   Heart Regular rhythm and normal rate. Normal S1 and S2. No murmur   Abdomen Soft, non-tender and non-distended. Normal bowel sounds. No masses.   Extremities Moves all extremities well. No edema   Psychiatric Normal mood and affect.     Testing:  · Download 6/13/20-9/10/20- 96% use with average nightly use of 5 hours and 53 minutes on BIPAP with IPAP max 20, EPAP min 10, PS 4. Average pressures of 17/13. AHI 7.4,    Study-  · I could not find the diagnostic study " that was done just prior to below. 12/14/2015 sleep center [facility]: CPAP was tried but failed.  There was subsequent successful titration with BiPAP.  Optimal pressures were 16/10.    Impression:  1. Obstructive sleep apnea    2. Restless legs syndrome (RLS)          Plan:  We discussed the possible need to add Gabapentin. However, she is concerned re-drowsiness side effect. I asked her again today to take the pramipexole at 9pm or even a bit earlier to allow some time for the medication to take effect. She will continue abstaining from caffeine.     I reviewed download report with patient. Her average AHI is still elevated-though in a mild range of 7.4. I asked our staff to change EPAP min to 12cm. I will try ordering a new BIPAP device from [employer] for her as current machine emits burning smells and may break down at any time.    She uses the BIPAP device and benefits from its use in terms of reduction of hypersomnia and snoring. Weight loss will be strongly beneficial to reduce the severity of sleep-disordered breathing.  Caution during activities that require prolonged concentration is strongly advised if sleepiness returns.     Follow up with Dr. Ferrell in 3 months if she is able to obtain a new BIPAP from [facility]. Otherwise, f/u in 6 months.    Thank you for allowing me to participate in your patient's care.      ARABELLA Rodriguez  [facility]  Phone: [phone]      Part of this note may be an electronic transcription/translation of spoken language to printed text using the Dragon Dictation System.

## 2020-09-22 ENCOUNTER — OFFICE VISIT (OUTPATIENT)
Dept: GASTROENTEROLOGY | Facility: CLINIC | Age: 80
End: 2020-09-22

## 2020-09-22 VITALS — WEIGHT: 143.3 LBS | TEMPERATURE: 97 F | HEIGHT: 63 IN | BODY MASS INDEX: 25.39 KG/M2

## 2020-09-22 DIAGNOSIS — K59.01 SLOW TRANSIT CONSTIPATION: Primary | ICD-10-CM

## 2020-09-22 PROCEDURE — 99204 OFFICE O/P NEW MOD 45 MIN: CPT | Performed by: INTERNAL MEDICINE

## 2020-09-22 NOTE — PROGRESS NOTES
Chief Complaint   Patient presents with   • Constipation   • Hemorrhoids       Itzel Edmond is a 80 y.o. female who presents with constipation    80-year-old with past medical history significant for bladder reconstruction hysterectomy 20 years ago, now presents with constipation and the inability to evacuate despite excess straining.  This been going on and getting progressively worse for quite some time.  No rectal bleeding.  She has abdominal pain and fullness in the lower quadrants when she is unable to evacuate.  Her last colonoscopy was 5 years ago with removal of polyps.  She has told me that she has looked in the mirror at her anus and has seen what sounds like the beginnings of prolapse    There is no weight loss rectal bleeding      Past Medical History:   Diagnosis Date   • Atrial premature complex    • Carotid stenosis    • Claudication of lower extremity (CMS/HCC)    • GERD (gastroesophageal reflux disease)    • Heart murmur    • Hyperlipidemia    • Hypertension    • JHOANA (obstructive sleep apnea)     BiPap   • PAC (premature atrial contraction)    • Precordial pain    • Premature atrial complexes    • Renal insufficiency    • Tachycardia        Past Surgical History:   Procedure Laterality Date   • BACK SURGERY     • BLADDER SURGERY     • CARDIAC CATHETERIZATION N/A 1/6/2019    Procedure: Left Heart Cath;  Surgeon: Christopher Rosenbaum MD;  Location:  [facility];  Service: Cardiology   • CARDIAC CATHETERIZATION N/A 1/6/2019    Procedure: Coronary angiography;  Surgeon: Christopher Rosenbaum MD;  Location:  [facility];  Service: Cardiology   • CARDIAC CATHETERIZATION N/A 1/6/2019    Procedure: Left ventriculography;  Surgeon: Christopher Rosenbaum MD;  Location:  [facility];  Service: Cardiology   • COLONOSCOPY  2015   • HEMORROIDECTOMY     • HERNIA REPAIR     • HYSTERECTOMY     • OOPHORECTOMY           Current Outpatient Medications:   •   amLODIPine (NORVASC) 5 MG tablet, Take 1 tablet by mouth Daily., Disp: 90 tablet, Rfl: 3  •  aspirin 81 MG tablet, Take 1 tablet by mouth daily., Disp: 90 tablet, Rfl: 3  •  atorvastatin (LIPITOR) 20 MG tablet, Take 1 tablet by mouth Daily., Disp: 90 tablet, Rfl: 2  •  doxycycline (MONODOX) 100 MG capsule, Take 1 capsule by mouth Daily., Disp: , Rfl: 0  •  DULoxetine (CYMBALTA) 30 MG capsule, Take 30 mg by mouth Daily., Disp: , Rfl:   •  losartan (COZAAR) 50 MG tablet, Take 1 tablet by mouth Daily., Disp: 90 tablet, Rfl: 3  •  Mirabegron ER (Myrbetriq) 50 MG tablet sustained-release 24 hour 24 hr tablet, Take 50 mg by mouth Daily., Disp: , Rfl:   •  Misc Natural Products (OSTEO BI-FLEX JOINT SHIELD PO), Take 1 capsule by mouth 2 (Two) Times a Day., Disp: , Rfl:   •  spironolactone-hydrochlorothiazide (ALDACTAZIDE) 25-25 MG tablet, Take 0.5 tablets by mouth Daily., Disp: 45 tablet, Rfl: 3    Allergies   Allergen Reactions   • Ace Inhibitors      Cough   • Gabapentin Dizziness     Other reaction(s): Dizziness   • Lisinopril Cough   • Pregabalin      Vision problems, dizziness   • Sulfamethoxazole-Trimethoprim Itching and Nausea And Vomiting       Social History     Socioeconomic History   • Marital status:      Spouse name: Not on file   • Number of children: Not on file   • Years of education: Not on file   • Highest education level: Not on file   Tobacco Use   • Smoking status: Never Smoker   • Smokeless tobacco: Never Used   Substance and Sexual Activity   • Alcohol use: No     Comment: caffeine use: Very little       Family History   Problem Relation Age of Onset   • Heart disease Mother    • Stroke Mother    • Heart attack Father    • Heart disease Sister         s/p CABG   • Heart attack Brother    • Heart disease Brother         s/p CABG   • Heart disease Other    • Hypertension Other        Review of Systems   Gastrointestinal: Positive for abdominal distention, abdominal pain and constipation.   All  other systems reviewed and are negative.      Vitals:    09/22/20 1053   Temp: 97 °F (36.1 °C)       Physical Exam  Vitals signs and nursing note reviewed.   Constitutional:       Appearance: She is well-developed.   HENT:      Head: Normocephalic and atraumatic.   Eyes:      Pupils: Pupils are equal, round, and reactive to light.   Cardiovascular:      Rate and Rhythm: Normal rate and regular rhythm.      Heart sounds: Normal heart sounds.   Pulmonary:      Effort: Pulmonary effort is normal.      Breath sounds: Normal breath sounds.   Abdominal:      General: Bowel sounds are normal. There is no distension or abdominal bruit.      Palpations: Abdomen is soft. Abdomen is not rigid. There is no shifting dullness, fluid wave, mass or pulsatile mass.      Tenderness: There is no abdominal tenderness. There is no guarding.      Hernia: No hernia is present.   Musculoskeletal: Normal range of motion.   Skin:     General: Skin is warm and dry.   Neurological:      Mental Status: She is alert and oriented to person, place, and time.   Psychiatric:         Behavior: Behavior normal.         Thought Content: Thought content normal.         Thyroid function  function testing and electrolytes and CBC normal this year    Problem list    Incomplete evacuation with excess straining  History of bladder repair with hysterectomy and pelvic reconstruction 20 years ago  Possible prolapse  History of polyps      Assessment/Plan    Plan for colonoscopy to rule out obstructing lesion  Thyroid function testing and CBC and CMP normal earlier in the year these do not need to be repeated  If her colonoscopy was within normal limits, I do believe she is dealing with prolapse and possible pelvic floor dysfunction, a referral to Dr. Grayson with anorectal manometry and balloon expulsion testing with defecography may be in order  Unfortunately there may be an issue that would require surgical repair as she has had pelvic surgery in the past  In  the meantime she will continue lifestyle modifications which includes drinking plenty of water high-fiber diet, stool softeners

## 2020-09-25 ENCOUNTER — TRANSCRIBE ORDERS (OUTPATIENT)
Dept: SLEEP MEDICINE | Facility: HOSPITAL | Age: 80
End: 2020-09-25

## 2020-09-25 DIAGNOSIS — Z01.818 OTHER SPECIFIED PRE-OPERATIVE EXAMINATION: Primary | ICD-10-CM

## 2020-09-30 ENCOUNTER — LAB (OUTPATIENT)
Dept: LAB | Facility: HOSPITAL | Age: 80
End: 2020-09-30

## 2020-09-30 DIAGNOSIS — Z01.818 OTHER SPECIFIED PRE-OPERATIVE EXAMINATION: ICD-10-CM

## 2020-09-30 PROCEDURE — U0004 COV-19 TEST NON-CDC HGH THRU: HCPCS

## 2020-09-30 PROCEDURE — C9803 HOPD COVID-19 SPEC COLLECT: HCPCS

## 2020-10-01 LAB — SARS-COV-2 RNA RESP QL NAA+PROBE: NOT DETECTED

## 2020-10-02 ENCOUNTER — ANESTHESIA EVENT (OUTPATIENT)
Dept: GASTROENTEROLOGY | Facility: HOSPITAL | Age: 80
End: 2020-10-02

## 2020-10-02 ENCOUNTER — HOSPITAL ENCOUNTER (OUTPATIENT)
Facility: HOSPITAL | Age: 80
Setting detail: HOSPITAL OUTPATIENT SURGERY
Discharge: HOME | End: 2020-10-02
Attending: INTERNAL MEDICINE | Admitting: INTERNAL MEDICINE

## 2020-10-02 ENCOUNTER — ANESTHESIA (OUTPATIENT)
Dept: GASTROENTEROLOGY | Facility: HOSPITAL | Age: 80
End: 2020-10-02

## 2020-10-02 VITALS
HEIGHT: 63 IN | WEIGHT: 140 LBS | OXYGEN SATURATION: 95 % | HEART RATE: 66 BPM | DIASTOLIC BLOOD PRESSURE: 78 MMHG | BODY MASS INDEX: 24.8 KG/M2 | RESPIRATION RATE: 17 BRPM | SYSTOLIC BLOOD PRESSURE: 165 MMHG | TEMPERATURE: 97.4 F

## 2020-10-02 DIAGNOSIS — K59.01 SLOW TRANSIT CONSTIPATION: ICD-10-CM

## 2020-10-02 PROCEDURE — 45385 COLONOSCOPY W/LESION REMOVAL: CPT | Performed by: INTERNAL MEDICINE

## 2020-10-02 PROCEDURE — 88305 TISSUE EXAM BY PATHOLOGIST: CPT | Performed by: INTERNAL MEDICINE

## 2020-10-02 PROCEDURE — 25010000002 PROPOFOL 10 MG/ML EMULSION: Performed by: ANESTHESIOLOGY

## 2020-10-02 RX ORDER — LIDOCAINE HYDROCHLORIDE 20 MG/ML
INJECTION, SOLUTION INFILTRATION; PERINEURAL AS NEEDED
Status: DISCONTINUED | OUTPATIENT
Start: 2020-10-02 | End: 2020-10-02 | Stop reason: SURG

## 2020-10-02 RX ORDER — PROPOFOL 10 MG/ML
VIAL (ML) INTRAVENOUS CONTINUOUS PRN
Status: DISCONTINUED | OUTPATIENT
Start: 2020-10-02 | End: 2020-10-02 | Stop reason: SURG

## 2020-10-02 RX ORDER — PROPOFOL 10 MG/ML
VIAL (ML) INTRAVENOUS AS NEEDED
Status: DISCONTINUED | OUTPATIENT
Start: 2020-10-02 | End: 2020-10-02 | Stop reason: SURG

## 2020-10-02 RX ORDER — SODIUM CHLORIDE, SODIUM LACTATE, POTASSIUM CHLORIDE, CALCIUM CHLORIDE 600; 310; 30; 20 MG/100ML; MG/100ML; MG/100ML; MG/100ML
30 INJECTION, SOLUTION INTRAVENOUS CONTINUOUS PRN
Status: DISCONTINUED | OUTPATIENT
Start: 2020-10-02 | End: 2020-10-02 | Stop reason: HOSPADM

## 2020-10-02 RX ADMIN — SODIUM CHLORIDE, POTASSIUM CHLORIDE, SODIUM LACTATE AND CALCIUM CHLORIDE 30 ML/HR: 600; 310; 30; 20 INJECTION, SOLUTION INTRAVENOUS at 09:26

## 2020-10-02 RX ADMIN — PROPOFOL 100 MG: 10 INJECTION, EMULSION INTRAVENOUS at 10:03

## 2020-10-02 RX ADMIN — LIDOCAINE HYDROCHLORIDE 60 MG: 20 INJECTION, SOLUTION INFILTRATION; PERINEURAL at 10:03

## 2020-10-02 RX ADMIN — PROPOFOL 100 MCG/KG/MIN: 10 INJECTION, EMULSION INTRAVENOUS at 10:03

## 2020-10-02 NOTE — DISCHARGE INSTRUCTIONS
For the next 24 hours patient needs to be with a responsible adult.    For 24 hours DO NOT drive, operate machinery, appliances, drink alcohol, make important decisions or sign legal documents.    Start with a light or bland diet if you are feeling sick to your stomach otherwise advance to regular diet as tolerated.    Follow recommendations on procedure report if provided by your doctor.    Call Dr Frias for problems [phone]    Problems may include but not limited to: large amounts of bleeding, trouble breathing, repeated vomiting, severe unrelieved pain, fever or chills.

## 2020-10-02 NOTE — ANESTHESIA PREPROCEDURE EVALUATION
Anesthesia Evaluation     Patient summary reviewed and Nursing notes reviewed                Airway   Mallampati: I  TM distance: >3 FB  Neck ROM: full  No difficulty expected  Dental - normal exam     Pulmonary - normal exam   (+) sleep apnea,   Cardiovascular - normal exam    ECG reviewed    (+) hypertension, valvular problems/murmurs murmur, CAD, hyperlipidemia,  carotid artery disease      Neuro/Psych  (+) psychiatric history Anxiety,     GI/Hepatic/Renal/Endo    (+)  GERD,  renal disease CRI,     Musculoskeletal (-) negative ROS    Abdominal  - normal exam    Bowel sounds: normal.   Substance History - negative use     OB/GYN negative ob/gyn ROS         Other                        Anesthesia Plan    ASA 3     MAC       Anesthetic plan, all risks, benefits, and alternatives have been provided, discussed and informed consent has been obtained with: patient.

## 2020-10-02 NOTE — ANESTHESIA POSTPROCEDURE EVALUATION
"Patient: Itzel Edmond    Procedure Summary     Date: 10/02/20 Room / Location: [facility] ENDOSCOPY 7 / [facility] ENDOSCOPY    Anesthesia Start: 1001 Anesthesia Stop: 1043    Procedure: COLONOSCOPY into cecum with polypectomy (N/A ) Diagnosis:       Slow transit constipation      (Slow transit constipation [K59.01])    Surgeon: Tommy Frias MD Provider: Diego Bill MD    Anesthesia Type: MAC ASA Status: 3          Anesthesia Type: MAC    Vitals  Vitals Value Taken Time   /78 10/02/20 1102   Temp     Pulse 66 10/02/20 1102   Resp 17 10/02/20 1102   SpO2 95 % 10/02/20 1102           Post Anesthesia Care and Evaluation    Patient location during evaluation: PACU  Patient participation: complete - patient participated  Level of consciousness: awake  Pain score: 0  Pain management: adequate  Airway patency: patent  Anesthetic complications: No anesthetic complications  PONV Status: none  Cardiovascular status: acceptable  Respiratory status: acceptable  Hydration status: acceptable    Comments: /78 (BP Location: Left arm, Patient Position: Sitting)   Pulse 66   Temp 36.3 °C (97.4 °F) (Oral)   Resp 17   Ht 160 cm (63\")   Wt 63.5 kg (140 lb)   SpO2 95%   BMI 24.80 kg/m²       "

## 2020-10-05 ENCOUNTER — TELEPHONE (OUTPATIENT)
Dept: GASTROENTEROLOGY | Facility: CLINIC | Age: 80
End: 2020-10-05

## 2020-10-05 LAB
CYTO UR: NORMAL
LAB AP CASE REPORT: NORMAL
LAB AP CLINICAL INFORMATION: NORMAL
PATH REPORT.FINAL DX SPEC: NORMAL
PATH REPORT.GROSS SPEC: NORMAL

## 2020-10-05 NOTE — TELEPHONE ENCOUNTER
----- Message from Tommy Frias MD sent at 10/5/2020 12:41 PM EDT -----  Tubular adenoma colon polyp  Refer to colo- rectal surgeon, Dr. Grayson for prolapse

## 2020-10-05 NOTE — TELEPHONE ENCOUNTER
Call to pt.  Advise per DR Frias note.  Verb understanding.     Message to Dr Frias and Scheduling.

## 2020-10-05 NOTE — TELEPHONE ENCOUNTER
Spoke to pt she is scheduled with Dr Rock Grayson 11-20-20 at 9:15 Also had them add her to wait list . Spoke to No at there office . They will mail her some paper work to fill out and bring to appointment

## 2020-10-15 ENCOUNTER — OFFICE VISIT (OUTPATIENT)
Dept: CARDIOLOGY | Facility: CLINIC | Age: 80
End: 2020-10-15

## 2020-10-15 VITALS
HEART RATE: 74 BPM | SYSTOLIC BLOOD PRESSURE: 170 MMHG | WEIGHT: 148.2 LBS | DIASTOLIC BLOOD PRESSURE: 100 MMHG | HEIGHT: 63 IN | BODY MASS INDEX: 26.26 KG/M2

## 2020-10-15 DIAGNOSIS — G47.33 OBSTRUCTIVE SLEEP APNEA SYNDROME: ICD-10-CM

## 2020-10-15 DIAGNOSIS — I25.10 CORONARY ARTERY DISEASE INVOLVING NATIVE CORONARY ARTERY OF NATIVE HEART WITHOUT ANGINA PECTORIS: ICD-10-CM

## 2020-10-15 DIAGNOSIS — E78.2 MIXED HYPERLIPIDEMIA: ICD-10-CM

## 2020-10-15 DIAGNOSIS — R06.09 DYSPNEA ON EXERTION: ICD-10-CM

## 2020-10-15 DIAGNOSIS — I10 ESSENTIAL HYPERTENSION: Primary | ICD-10-CM

## 2020-10-15 PROCEDURE — 99214 OFFICE O/P EST MOD 30 MIN: CPT | Performed by: NURSE PRACTITIONER

## 2020-10-15 PROCEDURE — 93000 ELECTROCARDIOGRAM COMPLETE: CPT | Performed by: NURSE PRACTITIONER

## 2020-10-15 RX ORDER — AMLODIPINE BESYLATE 5 MG/1
5 TABLET ORAL DAILY
Qty: 90 TABLET | Refills: 3 | Status: SHIPPED | OUTPATIENT
Start: 2020-10-15

## 2020-10-15 RX ORDER — ATORVASTATIN CALCIUM 20 MG/1
20 TABLET, FILM COATED ORAL DAILY
Qty: 90 TABLET | Refills: 3 | Status: SHIPPED | OUTPATIENT
Start: 2020-10-15 | End: 2021-09-02

## 2020-10-15 RX ORDER — LOSARTAN POTASSIUM 50 MG/1
50 TABLET ORAL DAILY
Qty: 90 TABLET | Refills: 3 | Status: SHIPPED | OUTPATIENT
Start: 2020-10-15 | End: 2022-04-28

## 2020-10-15 RX ORDER — SPIRONOLACTONE AND HYDROCHLOROTHIAZIDE 25; 25 MG/1; MG/1
1 TABLET ORAL DAILY
Qty: 90 TABLET | Refills: 3 | Status: SHIPPED | OUTPATIENT
Start: 2020-10-15 | End: 2021-04-22 | Stop reason: ALTCHOICE

## 2020-10-15 RX ORDER — HYDROCHLOROTHIAZIDE 25 MG/1
25 TABLET ORAL DAILY
COMMUNITY
End: 2020-10-15

## 2020-10-15 NOTE — PROGRESS NOTES
Date of Office Visit: 10/15/2020  Encounter Provider: ARABELLA Calvo  Place of Service: [facility]  Patient Name: Itzel Edmond  :1940  Primary Cardiologist: Dr. Sandra Winters    Chief Complaint   Patient presents with   • Hypertension   • Follow-up   :     Dear Dr. Rhoda Luther,     HPI: Itzel Edmond is a pleasant 80 y.o. female who presents today for cardiac follow up.     She has a known history of hypertension, hyperlipidemia, and obstructive sleep apnea. In , she has had intermittent palpitations and Holter monitor at that time showed normal sinus rhythm with rare PVCs and short run of nonsustained SVT.  In 2014, she had a cardiac PET scan which showed no evidence of ischemia.  In 2015, she had an echocardiogram which revealed an EF of 63%, grade 1 diastolic dysfunction, and mild mitral and tricuspid insufficiency.     In 2019, she presented to the ED with symptoms of fatigue, shortness of breath, diaphoresis, nausea, and bilateral arm pain.  She ruled out for acute ACS. On 2019 she had a cardiac catheterization completed which showed early atherosclerosis of the LAD, left circumflex, and RCA.  On 2019 2D echocardiogram revealed LVEF 64%, grade 1 diastolic dysfunction, trace aortic valve regurgitation, moderate mitral annular calcification with mild mitral valve regurgitation, and physiologic tricuspid valve regurgitation.      She presents today for follow-up visit.  She explains that she became emotionally upset recently and had a run over to her neighbors because she was feeling shaky.  She said it was just due to her nerves.  She has some mild dyspnea with exertion which is been chronic.  She only experiences palpitations if she becomes emotionally upset.  She denies chest pain, edema, dizziness, or syncope.  Her blood pressure is quite elevated today and she does not check at home.  She just received a new BiPAP  machine and really likes it.  She has been taking HCTZ 25 mg daily, but also has a bottle of spironolactone-HCTZ which she has not been taking.    Past Medical History:   Diagnosis Date   • Atrial premature complex    • Carotid stenosis    • Claudication of lower extremity (CMS/HCC)    • GERD (gastroesophageal reflux disease)    • Heart murmur    • Hyperlipidemia    • Hypertension    • JHOANA (obstructive sleep apnea)     BiPap   • PAC (premature atrial contraction)    • Precordial pain    • Premature atrial complexes    • Renal insufficiency    • Tachycardia        Past Surgical History:   Procedure Laterality Date   • BACK SURGERY     • BLADDER SURGERY     • CARDIAC CATHETERIZATION N/A 1/6/2019    Procedure: Left Heart Cath;  Surgeon: Christopher Rosenbaum MD;  [facility];  Service: Cardiology   • CARDIAC CATHETERIZATION N/A 1/6/2019    Procedure: Coronary angiography;  Surgeon: Christopher Rosenbaum MD;  Location:  [facility] CATH INVASIVE LOCATION;  Service: Cardiology   • CARDIAC CATHETERIZATION N/A 1/6/2019    Procedure: Left ventriculography;  Surgeon: Christopher Rosenbaum MD;  Location:  [facility] CATH INVASIVE LOCATION;  Service: Cardiology   • COLONOSCOPY  2015   • COLONOSCOPY N/A 10/2/2020    Procedure: COLONOSCOPY into cecum with polypectomy;  Surgeon: Tommy Frias MD;  Location: [facility] ENDOSCOPY;  Service: Gastroenterology;  Laterality: N/A;  diverticulosis, polyps,stercoral ulcer, hemorrhoids   • HEMORROIDECTOMY     • HERNIA REPAIR     • HYSTERECTOMY     • OOPHORECTOMY         Social History     Socioeconomic History   • Marital status:      Spouse name: Not on file   • Number of children: Not on file   • Years of education: Not on file   • Highest education level: Not on file   Tobacco Use   • Smoking status: Never Smoker   • Smokeless tobacco: Never Used   Substance and Sexual Activity   • Alcohol use: No     Comment: caffeine use: 1 cup daily       Family History    Problem Relation Age of Onset   • Heart disease Mother    • Stroke Mother    • Heart attack Father    • Heart disease Sister         s/p CABG   • Heart attack Brother    • Heart disease Brother         s/p CABG   • Heart disease Other    • Hypertension Other        The following portion of the patient's history were reviewed and updated as appropriate: past medical history, past surgical history, past social history, past family history, allergies, current medications, and problem list.    Review of Systems   Constitution: Negative for chills, diaphoresis, fever, malaise/fatigue, night sweats, weight gain and weight loss.   HENT: Negative for hearing loss, nosebleeds, sore throat and tinnitus.    Eyes: Negative for blurred vision, double vision, pain and visual disturbance.   Cardiovascular: Positive for dyspnea on exertion and palpitations. Negative for chest pain, claudication, cyanosis, irregular heartbeat, leg swelling, near-syncope, orthopnea, paroxysmal nocturnal dyspnea and syncope.   Respiratory: Negative for cough, hemoptysis, shortness of breath, snoring and wheezing.    Endocrine: Negative for cold intolerance, heat intolerance and polyuria.   Hematologic/Lymphatic: Negative for bleeding problem. Does not bruise/bleed easily.   Skin: Negative for color change, dry skin, flushing and itching.   Musculoskeletal: Negative for falls, joint pain, joint swelling, muscle cramps, muscle weakness and myalgias.   Gastrointestinal: Negative for abdominal pain, constipation, heartburn, melena, nausea and vomiting.   Genitourinary: Negative for dysuria and hematuria.   Neurological: Negative for excessive daytime sleepiness, dizziness, light-headedness, loss of balance, numbness, paresthesias, seizures and vertigo.   Psychiatric/Behavioral: Negative for altered mental status, depression, memory loss and substance abuse. The patient does not have insomnia and is not nervous/anxious.    Allergic/Immunologic: Negative  "for environmental allergies.       Allergies   Allergen Reactions   • Ace Inhibitors      Cough   • Gabapentin Dizziness     Other reaction(s): Dizziness   • Lisinopril Cough   • Pregabalin      Vision problems, dizziness   • Sulfamethoxazole-Trimethoprim Itching and Nausea And Vomiting         Current Outpatient Medications:   •  amLODIPine (NORVASC) 5 MG tablet, Take 1 tablet by mouth Daily., Disp: 90 tablet, Rfl: 3  •  aspirin 81 MG tablet, Take 1 tablet by mouth daily., Disp: 90 tablet, Rfl: 3  •  atorvastatin (LIPITOR) 20 MG tablet, Take 1 tablet by mouth Daily., Disp: 90 tablet, Rfl: 2  •  DULoxetine (CYMBALTA) 30 MG capsule, Take 30 mg by mouth Daily., Disp: , Rfl:   •  hydroCHLOROthiazide (HYDRODIURIL) 25 MG tablet, Take 25 mg by mouth Daily., Disp: , Rfl:   •  losartan (COZAAR) 50 MG tablet, Take 1 tablet by mouth Daily., Disp: 90 tablet, Rfl: 3  •  Mirabegron ER (Myrbetriq) 50 MG tablet sustained-release 24 hour 24 hr tablet, Take 50 mg by mouth Daily., Disp: , Rfl:   •  Misc Natural Products (OSTEO BI-FLEX JOINT SHIELD PO), Take 1 capsule by mouth 2 (Two) Times a Day., Disp: , Rfl:   •  spironolactone-hydrochlorothiazide (ALDACTAZIDE) 25-25 MG tablet, Take 0.5 tablets by mouth Daily., Disp: 45 tablet, Rfl: 3        Objective:     Vitals:    10/15/20 1049 10/15/20 1050 10/15/20 1108   BP: 150/78 160/80 170/100   BP Location: Left arm Right arm Left arm   Patient Position:   Sitting   Pulse: 74     Weight: 67.2 kg (148 lb 3.2 oz)     Height: 160 cm (63\")       Body mass index is 26.25 kg/m².    PHYSICAL EXAM:    Vitals Reviewed.   General Appearance: No acute distress, well developed and well nourished. Obese.   Eyes: Conjunctiva and lids: No erythema, swelling, or discharge. Sclera non-icteric.   HENT: Atraumatic, normocephalic. External eyes, ears, and nose normal. No hearing loss noted. Mucous membranes normal. Lips not cyanotic. Neck supple with no tenderness.  Respiratory: No signs of respiratory " distress. Respiration rhythm and depth normal.   Clear to auscultation. No rales, crackles, rhonchi, or wheezing auscultated.   Cardiovascular:  Jugular Venous Pressure: Normal  Heart Rate and Rhythm: Normal, Heart Sounds: Normal S1 and S2. No S3 or S4 noted.  Murmurs: No murmurs noted. No rubs, thrills, or gallops.   Arterial Pulses: Carotid pulses normal. No carotid bruit noted. Posterior tibialis and dorsalis pedis pulses normal.   Lower Extremities: No edema noted.  Gastrointestinal:  Abdomen soft, non-distended, non-tender. Normal bowel sounds.    Musculoskeletal: Normal movement of extremities  Skin and Nails: General appearance normal. No pallor, cyanosis, diaphoresis. Skin temperature normal. No clubbing of fingernails.   Psychiatric: Patient alert and oriented to person, place, and time. Speech and behavior appropriate. Normal mood and affect.       ECG 12 Lead    Date/Time: 10/15/2020 10:49 AM  Performed by: Sia Jason APRN  Authorized by: Sia Jason APRN   Comparison: compared with previous ECG   Similar to previous ECG  Rhythm: sinus rhythm  Rate: normal  BPM: 74  Conduction: conduction normal  ST Segments: ST segments normal  T Waves: T waves normal  QRS axis: normal  Other: no other findings    Clinical impression: normal ECG              Assessment:       Diagnosis Plan   1. Essential hypertension     2. Coronary artery disease involving native coronary artery of native heart without angina pectoris     3. Dyspnea on exertion     4. Mixed hyperlipidemia     5. Obstructive sleep apnea syndrome            Plan:       1.    Essential Hypertension: Blood pressure quite elevated today.  We reviewed her medications I recommended she stop taking the plain hydrochlorothiazide.  She will restart spironolactone-HCTZ 25-25 mg and take 1 tablet daily.  I recommended a repeat BMP in 1 week.  She is going to purchase a blood pressure machine and start checking her blood pressures at home.  We will  follow-up with a telehealth visit in 1 month.    2.  Atherosclerosis: She was noted to have early plaque of the LAD, left circumflex, and RCA per catheterization.  Risk factor modification was discussed.    Continue aspirin and atorvastatin.    3.  Dyspnea upon Exertion: Chronic and unchanged according to patient.     4.  Hyperlipidemia: Continue atorvastatin 20 mg daily.       5.  Obstructive Sleep Apnea: Follows with [facility] and compliant with BiPAP machine.     6.  Follow-up in 1 month with a telehealth visit.       As always, it has been a pleasure to participate in your patient's care. Thank you.       Sincerely,       ARABELLA Hogan  [facility]      · COVID-19 Precautions - Patient was compliant in wearing a mask. When I saw the patient, I used appropriate personal protective equipment (PPE) including mask (standard procedure).  Additionally, I used gown, gloves, and eye shield if indicated.  Hand hygiene was completed before and after seeing the patient.  · Dictated utilizing Dragon Dictation

## 2020-10-16 ENCOUNTER — TELEPHONE (OUTPATIENT)
Dept: CARDIOLOGY | Facility: CLINIC | Age: 80
End: 2020-10-16

## 2020-10-16 NOTE — TELEPHONE ENCOUNTER
Plan:       1.    Essential Hypertension: Blood pressure quite elevated today.  We reviewed her medications I recommended she stop taking the plain hydrochlorothiazide.  She will restart spironolactone-HCTZ 25-25 mg and take 1 tablet daily.  I recommended a repeat BMP in 1 week.  She is going to purchase a blood pressure machine and start checking her blood pressures at home.  We will follow-up with a telehealth visit in 1 month.      Pt called and would like to speak with you in regards to her medication changes

## 2020-10-19 ENCOUNTER — TELEPHONE (OUTPATIENT)
Dept: CARDIOLOGY | Facility: CLINIC | Age: 80
End: 2020-10-19

## 2020-10-19 NOTE — TELEPHONE ENCOUNTER
----- Message from ARABELLA Estrella sent at 10/15/2020 12:33 PM EDT -----    Please schedule a telehealth visit with me in November.  Thank you

## 2020-10-21 NOTE — TELEPHONE ENCOUNTER
I have tried calling patient many times.  When I call, it rings and then does a click.  I do not think her phone is working.  I will await for her to call me back.

## 2020-10-26 NOTE — TELEPHONE ENCOUNTER
10/26/20 x 3    Sia,   I was unable to reach pt by phone. I mailed pt a letter asking her to contact the office to make an appointment.   Thanks,  Ramesh

## 2020-11-03 ENCOUNTER — TELEPHONE (OUTPATIENT)
Dept: CARDIOLOGY | Facility: CLINIC | Age: 80
End: 2020-11-03

## 2020-11-03 NOTE — TELEPHONE ENCOUNTER
----- Message from Clementina Romero sent at 6/12/2020  9:12 AM EDT -----  Sia   Pt continued to answer the phone and hang up. After three attempts of being unable to leave a voicemail, I mailed pt a letter with appointment date & time & phone number of the office.   Thanks,  Ramesh     ----- Message -----  From: Sia Jason APRN  Sent: 6/11/2020   9:55 PM EDT  To: ARABELLA Estrella, Estrella        ----- Message -----  From: Sia Jason APRN  Sent: 5/4/2020  10:41 AM EDT  To: ARABELLA Estrella Brandon Goss      Please arrange a 6 month follow up with me. Thanks.

## 2020-11-20 ENCOUNTER — OFFICE VISIT (OUTPATIENT)
Dept: SURGERY | Facility: CLINIC | Age: 80
End: 2020-11-20

## 2020-11-20 VITALS
TEMPERATURE: 97.4 F | DIASTOLIC BLOOD PRESSURE: 58 MMHG | OXYGEN SATURATION: 98 % | SYSTOLIC BLOOD PRESSURE: 120 MMHG | WEIGHT: 144.2 LBS | HEIGHT: 62 IN | BODY MASS INDEX: 26.54 KG/M2 | HEART RATE: 78 BPM

## 2020-11-20 DIAGNOSIS — K62.6 SOLITARY RECTAL ULCER: Primary | ICD-10-CM

## 2020-11-20 PROCEDURE — 99204 OFFICE O/P NEW MOD 45 MIN: CPT | Performed by: COLON & RECTAL SURGERY

## 2020-11-20 RX ORDER — MULTIPLE VITAMINS W/ MINERALS TAB 9MG-400MCG
1 TAB ORAL DAILY
COMMUNITY
End: 2021-04-22

## 2020-11-20 RX ORDER — UBIDECARENONE 100 MG
100 CAPSULE ORAL DAILY
COMMUNITY
End: 2021-04-22

## 2020-11-20 NOTE — PROGRESS NOTES
Itzel Edmond is a 80 y.o. female who is seen as a consult at the request of Nolberto Reynoso MD for rectal ulceration    HPI:  Denies tissue hanging out. Had bladder repair and sling 20+ years ago. Hysterectomy 40 yrs ago. Was told after recent cy that she had rectal prolapse. States strains sometimes with BM; Ritchie 3-4. Denies ss or fiber. Takes a probiotic. Denies rb or use of anal cream.  Denies any rectal bleeding.    Past Medical History:   Diagnosis Date   • Allergic rhinitis    • Atherosclerosis    • CAD (coronary artery disease) 2019    Overview:  Mild non obstructive CAD on cath 2019, medical mgmt recommended   • Carotid stenosis    • Cataract    • Cellulitis of right leg 2016   • Chest discomfort 2019   • Claudication of lower extremity (CMS/HCC)    • CMC arthritis    • Colon polyps     FOLLOWED BY DR. NOLBERTO REYNOSO   • Cystitis 2019   • WOODARD (dyspnea on exertion) 10/2020   • Dysphagia    • Environmental allergies    • Exercise intolerance    • JAMES (generalized anxiety disorder) 2020   • GERD (gastroesophageal reflux disease)    • Heart murmur    • Hemorrhoids    • Hyperglycemia    • Hyperkalemia 2020   • Hyperlipidemia     MIXED   • Hypertension    • Influenza 2018   • Insomnia    • Myalgia 2016   • Neuropathy 2017   • OAB (overactive bladder)    • JHOANA (obstructive sleep apnea)     BiPap   • Precordial pain 2019   • Premature atrial complexes    • Rectal bleeding    • Rectal prolapse 10/2020   • Rectal ulcer 10/02/2020    5 MM SINGLE ULCER ON CY   • Rectocele 2019   • Renal insufficiency    • Restless leg syndrome 10/2/2018   • SAB (spontaneous )      A1   • Serum potassium elevated 2016   • Slow transit constipation 2020    Added automatically from request for surgery [id number]   • Tachycardia 2013   • Trigger finger of left thumb 10/21/2019   • Urge incontinence    • Urinary frequency    • Vaginal atrophy        Past Surgical History:    Procedure Laterality Date   • ANTERIOR AND POSTERIOR VAGINAL REPAIR N/A    • BACK SURGERY     • BILATERAL SALPINGO OOPHORECTOMY Bilateral 2002   • BLADDER SURGERY N/A 2002    BLADDER SLING   • CARDIAC CATHETERIZATION N/A 1/6/2019    Procedure: Left Heart Cath;  Surgeon: Christopher Rosenbaum MD;  Location:  [facility] CATH INVASIVE LOCATION;  Service: Cardiology   • CARDIAC CATHETERIZATION N/A 1/6/2019    Procedure: Coronary angiography;  Surgeon: Christopher Rosenbaum MD;  Location:  [facility] CATH INVASIVE LOCATION;  Service: Cardiology   • CARDIAC CATHETERIZATION N/A 1/6/2019    Procedure: Left ventriculography;  Surgeon: Christopher Rosenbaum MD;  Location:  [facility] CATH INVASIVE LOCATION;  Service: Cardiology   • CATARACT EXTRACTION Left 12/15/2015    WITH LENS IMPLANT, DR. AMRIT DIAZ AT [facility]   • CATARACT EXTRACTION Right 12/01/2015     WITH LENS IMPLANT, DR. AMRIT DIAZ AT [facility]   • COLONOSCOPY N/A 10/2/2020    2 TUBULAR ADENOMA POLYPS IN TRANSVERSE, HEMORRHOIDS, DIVERTICULOSIS, 5 MM ULCER IN RECTUM, OTHER BIOPSIES BENIGN, DR. NOLBERTO REYNOSO AT [facility]   • COLONOSCOPY W/ POLYPECTOMY N/A 08/24/2015    1 CM TUBULAR ADENOMA POLYP IN TRANSVERSE, MODERATE DIVERTICULOSIS, HEMORRHOIDS, RESCOPE IN 5 YRS, DR. MAYA SAHU AT [place]   • HEMORROIDECTOMY N/A    • HERNIA REPAIR     • HYSTERECTOMY N/A 1970    [facility]       Social History:   reports that she has never smoked. She has never used smokeless tobacco. She reports that she does not drink alcohol or use drugs.      Marriage status:     Family History   Problem Relation Age of Onset   • Heart disease Mother    • Stroke Mother    • Heart attack Father    • Heart disease Father    • Heart disease Sister         s/p CABG   • Heart attack Brother    • Heart disease Brother         s/p CABG   • Hypertension Other    • Heart disease Maternal Grandmother    • Stroke Maternal Grandmother          Current Outpatient Medications:   •  amLODIPine (NORVASC) 5 MG  tablet, Take 1 tablet by mouth Daily., Disp: 90 tablet, Rfl: 3  •  aspirin 81 MG tablet, Take 1 tablet by mouth daily., Disp: 90 tablet, Rfl: 3  •  atorvastatin (LIPITOR) 20 MG tablet, Take 1 tablet by mouth Daily., Disp: 90 tablet, Rfl: 3  •  coenzyme Q10 100 MG capsule, Take 100 mg by mouth Daily., Disp: , Rfl:   •  DULoxetine (CYMBALTA) 30 MG capsule, Take 30 mg by mouth Daily., Disp: , Rfl:   •  losartan (COZAAR) 50 MG tablet, Take 1 tablet by mouth Daily., Disp: 90 tablet, Rfl: 3  •  Mirabegron ER (Myrbetriq) 50 MG tablet sustained-release 24 hour 24 hr tablet, Take 50 mg by mouth Daily., Disp: , Rfl:   •  Misc Natural Products (OSTEO BI-FLEX JOINT SHIELD PO), Take 1 capsule by mouth 2 (Two) Times a Day., Disp: , Rfl:   •  multivitamin with minerals (MULTIVITAMIN ADULT PO), Take 1 tablet by mouth Daily., Disp: , Rfl:   •  spironolactone-hydrochlorothiazide (ALDACTAZIDE) 25-25 MG tablet, Take 1 tablet by mouth Daily., Disp: 90 tablet, Rfl: 3    Allergy  Gabapentin, Pregabalin, Sulfamethoxazole-trimethoprim, Ace inhibitors, and Lisinopril    Review of Systems   Constitution: Negative for decreased appetite and weight gain.   HENT: Negative for congestion, hearing loss and hoarse voice.    Eyes: Negative for blurred vision, discharge and visual disturbance.   Cardiovascular: Negative for chest pain, cyanosis and leg swelling.   Respiratory: Positive for shortness of breath. Negative for cough, sleep disturbances due to breathing and snoring.    Endocrine: Negative for cold intolerance and heat intolerance.   Hematologic/Lymphatic: Does not bruise/bleed easily.   Skin: Positive for skin cancer. Negative for itching and poor wound healing.   Musculoskeletal: Positive for arthritis and joint pain. Negative for back pain and joint swelling.   Gastrointestinal: Negative for abdominal pain, change in bowel habit, bowel incontinence and constipation.   Genitourinary: Positive for bladder incontinence. Negative for  dysuria and hematuria.   Neurological: Negative for brief paralysis, excessive daytime sleepiness, dizziness, focal weakness, headaches, light-headedness and weakness.   Psychiatric/Behavioral: Negative for altered mental status and hallucinations. The patient does not have insomnia.    Allergic/Immunologic: Negative for HIV exposure and persistent infections.   All other systems reviewed and are negative.      Vitals:    11/20/20 0909   BP: 120/58   Pulse: 78   Temp: 97.4 °F (36.3 °C)   SpO2: 98%     Body mass index is 26.37 kg/m².    Physical Exam  Exam conducted with a chaperone present.   Constitutional:       General: She is not in acute distress.     Appearance: She is well-developed.   HENT:      Head: Normocephalic and atraumatic.      Nose: Nose normal.   Eyes:      Conjunctiva/sclera: Conjunctivae normal.      Pupils: Pupils are equal, round, and reactive to light.   Neck:      Musculoskeletal: Normal range of motion.      Trachea: No tracheal deviation.   Pulmonary:      Effort: Pulmonary effort is normal. No respiratory distress.      Breath sounds: Normal breath sounds.   Abdominal:      General: Bowel sounds are normal. There is no distension.      Palpations: Abdomen is soft.   Genitourinary:     Comments: Perianal exam: external hem - NONE  YASMINE- good tone, no masses  Anoscopy performed:  Grade 1 x 3 internal hem    Musculoskeletal: Normal range of motion.         General: No deformity.   Skin:     General: Skin is warm and dry.   Neurological:      Mental Status: She is alert and oriented to person, place, and time.      Cranial Nerves: No cranial nerve deficit.      Coordination: Coordination normal.      Gait: Gait normal.   Psychiatric:         Behavior: Behavior normal.         Judgment: Judgment normal.         Review of Medical Record:  I reviewed colonoscopy results from 10.02.2020 which showed a solitary rectal ulceration along with suspicion of rectal prolapse.     Assessment:  1. Solitary  rectal ulcer        Plan: Since patient is not prolapsing the treatment for solitary rectal ulcer syndrome is physical therapy and biofeedback.  I gave patient referral.    I recommend fiber therapy and detailed and gave written instructions on how to achieve a high fiber diet; 1 gummy daily x 7 days, then 2 daily x 7 days, then maintenance dose of 2 am and 2 pm.RTC 3 months.      Scribed for Rock Grayson MD by ARABELLA Fisher. 11/21/2020  10:14 EST  This patient was evaluated by me, recommendations made, documentation reviewed, edited, and revised by me, Rock Grayson MD

## 2020-12-04 ENCOUNTER — OFFICE VISIT (OUTPATIENT)
Dept: SLEEP MEDICINE | Facility: HOSPITAL | Age: 80
End: 2020-12-04

## 2020-12-04 ENCOUNTER — TELEPHONE (OUTPATIENT)
Dept: SLEEP MEDICINE | Facility: HOSPITAL | Age: 80
End: 2020-12-04

## 2020-12-04 VITALS
BODY MASS INDEX: 26.05 KG/M2 | OXYGEN SATURATION: 97 % | HEIGHT: 63 IN | HEART RATE: 83 BPM | SYSTOLIC BLOOD PRESSURE: 125 MMHG | WEIGHT: 147 LBS | DIASTOLIC BLOOD PRESSURE: 66 MMHG

## 2020-12-04 DIAGNOSIS — G47.33 OBSTRUCTIVE SLEEP APNEA: Primary | ICD-10-CM

## 2020-12-04 DIAGNOSIS — G47.61 PERIODIC LIMB MOVEMENT DISORDER (PLMD): ICD-10-CM

## 2020-12-04 PROCEDURE — G0463 HOSPITAL OUTPT CLINIC VISIT: HCPCS

## 2020-12-04 RX ORDER — PRAMIPEXOLE DIHYDROCHLORIDE 0.25 MG/1
0.25 TABLET ORAL NIGHTLY
Qty: 90 TABLET | Refills: 3 | Status: SHIPPED | OUTPATIENT
Start: 2020-12-04 | End: 2021-04-22

## 2020-12-04 NOTE — PROGRESS NOTES
[facility] SLEEP MEDICINE  [address]  3RD FLOOR  [facility] [address]  [phone]    PCP: Rhoda Luther MD    Reason for visit:  Sleep disorders: JHOANA    Itzel is a 80 y.o.female who was seen in the Sleep Disorders Center today. She got a refurbished device from [facility] [place] [facility] since not eligible for new machine. She sleeps from 11pm to 7am. She uses nasal mask and cannot tell if any leak. She is a restless sleeper. She takes pramipexole qhs with relief of restlessness. She is now using benadryl 25 mg qhs to help her sleep. She denies much dry mouth but her machine uses all the water in the tank. She has chin strap but does not like to use them. She is inquiring about INSPIRE. She is waking up rested and refreshed.  Rarden Sleepiness Scale is dnc. Caffeine 1 per day. Alcohol 0 per week.    Itzel  reports that she has never smoked. She has never used smokeless tobacco.    Pertinent Positive Review of Systems of nasal congestion, soa, depression  Rest of Review of Systems was negative as recorded in Sleep Questionnaire.    Patient  has a past medical history of Allergic rhinitis, Atherosclerosis, CAD (coronary artery disease) (1/16/2019), Carotid stenosis, Cataract, Cellulitis of right leg (06/2016), Chest discomfort (01/2019), Claudication of lower extremity (CMS/HCC), CMC arthritis, Colon polyps, Cystitis (11/19/2019), WOODARD (dyspnea on exertion) (10/2020), Dysphagia, Environmental allergies, Exercise intolerance, JAMES (generalized anxiety disorder) (1/20/2020), GERD (gastroesophageal reflux disease), Heart murmur, Hemorrhoids, Hyperglycemia, Hyperkalemia (02/2020), Hyperlipidemia, Hypertension, Influenza (02/14/2018), Insomnia, Myalgia (05/2016), Neuropathy (09/2017), OAB (overactive bladder), JHOANA (obstructive sleep apnea), Precordial pain (01/2019), Premature atrial complexes, Rectal bleeding, Rectal prolapse (10/2020), Rectal ulcer (10/02/2020), Rectocele (12/2019), Renal insufficiency, Restless leg  "syndrome (10/2/2018), SAB (spontaneous ), Serum potassium elevated (2016), Slow transit constipation (2020), Tachycardia (2013), Trigger finger of left thumb (10/21/2019), Urge incontinence, Urinary frequency, and Vaginal atrophy.     Current Medications:    Current Outpatient Medications:   •  amLODIPine (NORVASC) 5 MG tablet, Take 1 tablet by mouth Daily., Disp: 90 tablet, Rfl: 3  •  aspirin 81 MG tablet, Take 1 tablet by mouth daily., Disp: 90 tablet, Rfl: 3  •  atorvastatin (LIPITOR) 20 MG tablet, Take 1 tablet by mouth Daily., Disp: 90 tablet, Rfl: 3  •  coenzyme Q10 100 MG capsule, Take 100 mg by mouth Daily., Disp: , Rfl:   •  DULoxetine (CYMBALTA) 30 MG capsule, Take 30 mg by mouth Daily., Disp: , Rfl:   •  losartan (COZAAR) 50 MG tablet, Take 1 tablet by mouth Daily., Disp: 90 tablet, Rfl: 3  •  Mirabegron ER (Myrbetriq) 50 MG tablet sustained-release 24 hour 24 hr tablet, Take 50 mg by mouth Daily., Disp: , Rfl:   •  Misc Natural Products (OSTEO BI-FLEX JOINT SHIELD PO), Take 1 capsule by mouth 2 (Two) Times a Day., Disp: , Rfl:   •  multivitamin with minerals (MULTIVITAMIN ADULT PO), Take 1 tablet by mouth Daily., Disp: , Rfl:   •  pramipexole (Mirapex) 0.25 MG tablet, Take 1 tablet by mouth Every Night. Take 1 hour prior to bed-time., Disp: 90 tablet, Rfl: 3  •  spironolactone-hydrochlorothiazide (ALDACTAZIDE) 25-25 MG tablet, Take 1 tablet by mouth Daily., Disp: 90 tablet, Rfl: 3   also entered in Sleep Questionnaire         Vital Signs: /66   Pulse 83   Ht 160 cm (63\")   Wt 66.7 kg (147 lb)   SpO2 97%   BMI 26.04 kg/m²     Body mass index is 26.04 kg/m².       Tongue: Normal       Dentition: good       Pharynx: Posterior pharyngeal pillars are narrow   Mallampatti: III (soft and hard palate and base of uvula visible)        General: Alert. Cooperative. Well developed. No acute distress.             Head:  Normocephalic. Symmetrical. Atraumatic.              Nose: No septal " deviation. No drainage.          Throat: No oral lesions. No thrush. Moist mucous membranes.    Chest Wall:  Normal shape. Symmetric expansion with respiration. No tenderness.             Neck:  Trachea midline.           Lungs:  Clear to auscultation bilaterally. No wheezes. No rhonchi. No rales. Respirations regular, even and unlabored.            Heart:  Regular rhythm and normal rate. Normal S1 and S2. No murmur.     Abdomen:  Soft, non-tender and non-distended. Normal bowel sounds. No masses.  Extremities:  Moves all extremities well. No edema.    Psychiatric: Normal mood and affect.    Study:  · I could not find the diagnostic study that was done just prior to below.  · 12/14/2015 sleep center [facility]: CPAP was tried but failed.  There was subsequent successful titration with BiPAP.  Optimal pressures were 16/10.    Testing:  · 96% compliance average usage 6 hours 50 minutes AHI 1.7 average bilevel pressure is 17/13    DME Company: [employer]    Impression:  1. Obstructive sleep apnea    2. Periodic limb movement disorder (PLMD)        Plan:  Itzel is compliant.  She has good results with BiPAP pressure therapy.  She had questions about INSPIRE procedure.  Discussed and does not want INSPIRE.    Continue pramipexole with good control of symptoms.  Advised to limit caffeine.    She has excessive dry mouth at night.  Opening mouth at night - does not want to use chin strap. OK as wakes up rested.    I reiterated the importance of effective treatment of obstructive sleep apnea with PAP machine.  Cardiovascular health risks of untreated sleep apnea were again reviewed.  Patient was asked to remain cautious if there is persistent hypersomnolence. The benefit of weight loss in reducing severity of obstructive sleep apnea was discussed.  Patient would benefit from adhering to a strict diet to achieve ideal BMI.     Change of PAP supplies regularly is important for effective use.  Change of cushion on the mask  or plugs on nasal pillows along with disposable filters once every month and change of mask frame, tubing, headgear and Velcro straps every 6 months at the minimum was reiterated.    This patient is compliant with PAP machine and benefits from its use.  Apnea hypopneas index is corrected/improved.  Daytime hypersomnolence has resolved.     Patient will follow up in this clinic in 1 year APRN    Thank you for allowing me to participate in your patient's care.    Electronically signed by Edwin Ferrell MD, 12/04/20, 10:07 AM EST.    Part of this note may be an electronic transcription/translation of spoken language to printed text using the Dragon Dictation System.

## 2021-02-19 ENCOUNTER — OFFICE VISIT (OUTPATIENT)
Dept: SURGERY | Facility: CLINIC | Age: 81
End: 2021-02-19

## 2021-02-19 DIAGNOSIS — K62.6 SOLITARY RECTAL ULCER: Primary | ICD-10-CM

## 2021-02-19 PROCEDURE — 99441 PR PHYS/QHP TELEPHONE EVALUATION 5-10 MIN: CPT | Performed by: COLON & RECTAL SURGERY

## 2021-02-19 NOTE — PROGRESS NOTES
You have chosen to receive care through a telephone visit. Do you consent to use a telephone visit for your medical care today? Yes    Fiber helping a lot  Extremely pleased  No rb  BM More formed. Not having to strain.  PT Exercises - only went once but continuing exercises  Squatty potty helps    Plan: keep fiber  Encouraged to return to PT  F/u prn      Time 10 mins

## 2021-03-09 DIAGNOSIS — Z23 IMMUNIZATION DUE: ICD-10-CM

## 2021-04-22 ENCOUNTER — OFFICE VISIT (OUTPATIENT)
Dept: CARDIOLOGY | Facility: CLINIC | Age: 81
End: 2021-04-22

## 2021-04-22 VITALS
WEIGHT: 148 LBS | DIASTOLIC BLOOD PRESSURE: 60 MMHG | BODY MASS INDEX: 26.22 KG/M2 | HEIGHT: 63 IN | HEART RATE: 75 BPM | SYSTOLIC BLOOD PRESSURE: 120 MMHG

## 2021-04-22 DIAGNOSIS — E78.2 MIXED HYPERLIPIDEMIA: ICD-10-CM

## 2021-04-22 DIAGNOSIS — I49.1 PREMATURE ATRIAL COMPLEXES: ICD-10-CM

## 2021-04-22 DIAGNOSIS — G47.33 OBSTRUCTIVE SLEEP APNEA SYNDROME: ICD-10-CM

## 2021-04-22 DIAGNOSIS — I10 ESSENTIAL HYPERTENSION: ICD-10-CM

## 2021-04-22 DIAGNOSIS — I25.10 CORONARY ARTERY DISEASE INVOLVING NATIVE CORONARY ARTERY OF NATIVE HEART WITHOUT ANGINA PECTORIS: Primary | ICD-10-CM

## 2021-04-22 PROCEDURE — 93000 ELECTROCARDIOGRAM COMPLETE: CPT | Performed by: INTERNAL MEDICINE

## 2021-04-22 PROCEDURE — 99214 OFFICE O/P EST MOD 30 MIN: CPT | Performed by: INTERNAL MEDICINE

## 2021-04-22 RX ORDER — HYDROCHLOROTHIAZIDE 25 MG/1
25 TABLET ORAL DAILY
COMMUNITY

## 2021-04-22 NOTE — PROGRESS NOTES
Date of Office Visit: 2021  Encounter Provider: Sandra Winters MD  Place of Service: [facility] CARDIOLOGY  Patient Name: Itzel Edmond  :1940      Patient ID:  Itzel Edmond is a 80 y.o. female is here for  followup for hypertension.        History of Present Illness    She had a complete cardiovascular evaluation 2010 at [facility] [place] including  a treadmill stress nuclear study showing no ischemia and an ejection fraction of 70%. She  had mild hypertension with exercise. She had a normal abdominal ultrasound showing no  evidence of abdominal aortic aneurysm. Her carotid duplex study done 2010 showed  moderate bilateral internal carotid artery disease. She had an echocardiogram done at  [facility] 2010 showing ejection fraction of 60 to 65% with mild  diastolic dysfunction, mild mitral and tricuspid insufficiency. She had a history of a  normal venous duplex study done for swelling of the right leg done 2009.      She has obstructive sleep apnea, was placed on a  CPAP machine, and has done well with this.      She has a history of a dry, hacking cough with lisinopril and was switched to losartan and  has tolerated this well.     She had a Holter recording in , which was fairly unremarkable.  It showed sinus rhythm, rare premature ventricular complexes, and a short run of  nonsustained supraventricular tachycardia.      I saw on 2014. At that time she was having chest tightness and short-windedness  with activity which sounded anginal in nature. We set her up for a PET stress study which  was done in 2014 which showed no evidence of ischemia. She was also having a lot of  lower extremity pain which was worse in the right than that left and she had normal  bilateral arterial duplex studies done.      She had a cardiac catheterization done 19, showing minimal CAD. She had an echocardiogram done 2019  showing ejection fraction 64% with grade 1 diastolic dysfunction, mild mitral insufficiency, trace aortic insufficiency.     On 2/2/2021 show potassium 5.4 which is unchanged from about a week prior to that.  Potassium 2/1/2020 was 5.2.  Her creatinine on 2/2/21 was 1.4.  She had a normal TSH in 1/25/1 with a normal CBC done 1/25/21.  She saw Dr. Gary Junior for hyperkalemia and he stopped her spironolactone.  She is also having lower extremity edema at that time and he decreased her amlodipine to 5 mg daily.  He did not think she needed follow-up.  Her blood pressures been fine.  She has had no exertional chest tightness or pressure.  Her energy level is good.  She does not feel her heart racing or skipping.  She is had no dizziness or syncope.  She is really doing quite well.    Past Medical History:   Diagnosis Date   • Allergic rhinitis    • Atherosclerosis    • CAD (coronary artery disease) 1/16/2019    Overview:  Mild non obstructive CAD on cath 1/2019, medical mgmt recommended   • Carotid stenosis    • Cataract    • Cellulitis of right leg 06/2016   • Chest discomfort 01/2019   • Claudication of lower extremity (CMS/HCC)    • CMC arthritis    • Colon polyps     FOLLOWED BY DR. NOLBERTO REYNOSO   • Cystitis 11/19/2019   • WOODARD (dyspnea on exertion) 10/2020   • Dysphagia    • Elevated serum creatinine 02/2021   • Environmental allergies    • Exercise intolerance    • JAMES (generalized anxiety disorder) 1/20/2020   • GERD (gastroesophageal reflux disease)    • Heart murmur    • Hemorrhoids    • Hyperglycemia    • Hyperkalemia 02/2020   • Hyperlipidemia     MIXED   • Hypertension    • Influenza 02/14/2018   • Insomnia    • Myalgia 05/2016   • Neuropathy 09/2017   • OAB (overactive bladder)    • JHOANA (obstructive sleep apnea)     BiPap   • PLMD (periodic limb movement disorder)    • Precordial pain 01/2019   • Premature atrial complexes    • Rectal bleeding    • Rectal prolapse 10/2020   • Rectal ulcer 10/02/2020    5 MM  SINGLE ULCER ON CY   • Rectocele 2019   • Renal insufficiency    • Restless leg syndrome 10/2/2018   • SAB (spontaneous )      A1   • Serum potassium elevated 2016   • Slow transit constipation 2020   • Tachycardia 2013   • Trigger finger of left thumb 10/21/2019   • Urge incontinence    • Urinary frequency    • Vaginal atrophy          Past Surgical History:   Procedure Laterality Date   • ANTERIOR AND POSTERIOR VAGINAL REPAIR N/A    • BACK SURGERY     • BILATERAL SALPINGO OOPHORECTOMY Bilateral    • BLADDER SURGERY N/A     BLADDER SLING   • CARDIAC CATHETERIZATION N/A 2019    Procedure: Left Heart Cath;  Surgeon: Christopher Rosenbaum MD;  Location:  [facility];  Service: Cardiology   • CARDIAC CATHETERIZATION N/A 2019    Procedure: Coronary angiography;  Surgeon: Christopher Rosenbaum MD;  Location:  [facility];  Service: Cardiology   • CARDIAC CATHETERIZATION N/A 2019    Procedure: Left ventriculography;  Surgeon: Christopher Rosenbaum MD;  Location:  [facility];  Service: Cardiology   • CATARACT EXTRACTION Left 12/15/2015    WITH LENS IMPLANT, DR. AMRIT DIAZ AT [facility]   • CATARACT EXTRACTION Right 2015     WITH LENS IMPLANT, DR. AMRIT DIAZ AT [facility]   • COLONOSCOPY N/A 10/2/2020    2 TUBULAR ADENOMA POLYPS IN TRANSVERSE, HEMORRHOIDS, DIVERTICULOSIS, 5 MM ULCER IN RECTUM, OTHER BIOPSIES BENIGN, DR. NOLBERTO REYNOSO AT [facility]   • COLONOSCOPY W/ POLYPECTOMY N/A 2015    1 CM TUBULAR ADENOMA POLYP IN TRANSVERSE, MODERATE DIVERTICULOSIS, HEMORRHOIDS, RESCOPE IN 5 YRS, DR. MAYA SAHU AT [facility]   • HEMORROIDECTOMY N/A    • HERNIA REPAIR     • HYSTERECTOMY N/A 1970    [facility]       Current Outpatient Medications on File Prior to Visit   Medication Sig Dispense Refill   • amLODIPine (NORVASC) 5 MG tablet Take 1 tablet by mouth Daily. 90 tablet 3   • aspirin 81 MG tablet Take 1 tablet by mouth  daily. 90 tablet 3   • atorvastatin (LIPITOR) 20 MG tablet Take 1 tablet by mouth Daily. 90 tablet 3   • DULoxetine (CYMBALTA) 60 MG capsule Take 60 mg by mouth Daily.     • hydroCHLOROthiazide (HYDRODIURIL) 25 MG tablet Take 25 mg by mouth Daily.     • losartan (COZAAR) 50 MG tablet Take 1 tablet by mouth Daily. 90 tablet 3   • [DISCONTINUED] coenzyme Q10 100 MG capsule Take 100 mg by mouth Daily.     • [DISCONTINUED] Mirabegron ER (Myrbetriq) 50 MG tablet sustained-release 24 hour 24 hr tablet Take 50 mg by mouth Daily.     • [DISCONTINUED] Misc Natural Products (OSTEO BI-FLEX JOINT SHIELD PO) Take 1 capsule by mouth 2 (Two) Times a Day.     • [DISCONTINUED] multivitamin with minerals (MULTIVITAMIN ADULT PO) Take 1 tablet by mouth Daily.     • [DISCONTINUED] pramipexole (Mirapex) 0.25 MG tablet Take 1 tablet by mouth Every Night. Take 1 hour prior to bed-time. 90 tablet 3   • [DISCONTINUED] spironolactone-hydrochlorothiazide (ALDACTAZIDE) 25-25 MG tablet Take 1 tablet by mouth Daily. 90 tablet 3     No current facility-administered medications on file prior to visit.       Social History     Socioeconomic History   • Marital status:      Spouse name: Not on file   • Number of children: Not on file   • Years of education: Not on file   • Highest education level: Not on file   Tobacco Use   • Smoking status: Never Smoker   • Smokeless tobacco: Never Used   Substance and Sexual Activity   • Alcohol use: No     Comment: caffeine use: 1 cup daily   • Drug use: Never   • Sexual activity: Not Currently     Birth control/protection: Post-menopausal, Surgical     Comment: .           ROS    Procedures    ECG 12 Lead    Date/Time: 4/22/2021 1:24 PM  Performed by: Sandra Winters MD  Authorized by: Sandra Winters MD   Comparison: compared with previous ECG   Similar to previous ECG  Rhythm: sinus rhythm    Clinical impression: normal ECG                Objective:      Vitals:    04/22/21 1258   BP:  "120/60   BP Location: Left arm   Pulse: 75   Weight: 67.1 kg (148 lb)   Height: 160 cm (63\")     Body mass index is 26.22 kg/m².    Vitals reviewed.   Constitutional:       General: Not in acute distress.     Appearance: Well-developed. Not diaphoretic.   Eyes:      General: No scleral icterus.     Conjunctiva/sclera: Conjunctivae normal.   HENT:      Head: Normocephalic and atraumatic.   Neck:      Thyroid: No thyromegaly.      Vascular: No carotid bruit or JVD.      Lymphadenopathy: No cervical adenopathy.   Pulmonary:      Effort: Pulmonary effort is normal. No respiratory distress.      Breath sounds: Normal breath sounds. No wheezing. No rhonchi. No rales.   Chest:      Chest wall: Not tender to palpatation.   Cardiovascular:      Normal rate. Regular rhythm.      Murmurs: There is no murmur.      No gallop.   Pulses:     Intact distal pulses.   Edema:     Peripheral edema absent.   Abdominal:      General: Bowel sounds are normal. There is no distension or abdominal bruit.      Palpations: Abdomen is soft. There is no abdominal mass.      Tenderness: There is no abdominal tenderness.   Musculoskeletal:         General: No deformity.      Extremities: No clubbing present.     Cervical back: Neck supple. Skin:     General: Skin is warm and dry. There is no cyanosis.      Coloration: Skin is not pale.      Findings: No rash.   Neurological:      Mental Status: Alert and oriented to person, place, and time.      Cranial Nerves: No cranial nerve deficit.   Psychiatric:         Judgment: Judgment normal.         Lab Review:       Assessment:      Diagnosis Plan   1. Coronary artery disease involving native coronary artery of native heart without angina pectoris     2. Essential hypertension     3. Mixed hyperlipidemia     4. Premature atrial complexes     5. Obstructive sleep apnea syndrome       1. Hypertension. Well controlled on current medical regimen.   2. Obstructive sleep apnea. Refer to sleep medicine.   3. " Premature atrial complexes. Controlled with medications.  4. Hyperlipidemia on simvastatin.   5. Strong family history of coronary disease.  6. Gastroesophageal reflux disease, stable.  7. Normal carotid duplex study 12/2012.  8. Right leg pain, normal LE duplex for claudication done 6/2014. She has neuropathy  of her right leg causing pain.     Plan:       See Sia in 1 year, no changes.  Remain off spironolactone due to hyperkalemia and remain on reduced dose of amlodipine due to lower extremity edema.

## 2021-09-02 RX ORDER — ATORVASTATIN CALCIUM 20 MG/1
TABLET, FILM COATED ORAL
Qty: 90 TABLET | Refills: 3 | Status: SHIPPED | OUTPATIENT
Start: 2021-09-02 | End: 2022-04-28 | Stop reason: SDUPTHER

## 2021-11-28 ENCOUNTER — APPOINTMENT (OUTPATIENT)
Dept: MRI IMAGING | Facility: HOSPITAL | Age: 81
End: 2021-11-28

## 2021-11-28 ENCOUNTER — APPOINTMENT (OUTPATIENT)
Dept: GENERAL RADIOLOGY | Facility: HOSPITAL | Age: 81
End: 2021-11-28

## 2021-11-28 ENCOUNTER — APPOINTMENT (OUTPATIENT)
Dept: CT IMAGING | Facility: HOSPITAL | Age: 81
End: 2021-11-28

## 2021-11-28 ENCOUNTER — HOSPITAL ENCOUNTER (OUTPATIENT)
Facility: HOSPITAL | Age: 81
Setting detail: OBSERVATION
Discharge: HOME | End: 2021-11-29
Attending: EMERGENCY MEDICINE | Admitting: EMERGENCY MEDICINE

## 2021-11-28 DIAGNOSIS — M54.9 UPPER BACK PAIN: ICD-10-CM

## 2021-11-28 DIAGNOSIS — G45.9 TIA (TRANSIENT ISCHEMIC ATTACK): Primary | ICD-10-CM

## 2021-11-28 DIAGNOSIS — F07.81 POSTCONCUSSIVE SYNDROME: ICD-10-CM

## 2021-11-28 DIAGNOSIS — S06.0X0A CONCUSSION WITHOUT LOSS OF CONSCIOUSNESS, INITIAL ENCOUNTER: ICD-10-CM
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
## 2021-11-28 LAB
ALBUMIN SERPL-MCNC: 4.5 G/DL (ref 3.5–5.2)
ALBUMIN/GLOB SERPL: 2 G/DL
ALP SERPL-CCNC: 89 U/L (ref 39–117)
ALT SERPL W P-5'-P-CCNC: 17 U/L (ref 1–33)
ANION GAP SERPL CALCULATED.3IONS-SCNC: 9 MMOL/L (ref 5–15)
APTT PPP: 26.9 SECONDS (ref 22.7–35.4)
AST SERPL-CCNC: 23 U/L (ref 1–32)
BACTERIA UR QL AUTO: ABNORMAL /HPF
BASOPHILS # BLD AUTO: 0.03 10*3/MM3 (ref 0–0.2)
BASOPHILS NFR BLD AUTO: 0.4 % (ref 0–1.5)
BILIRUB SERPL-MCNC: 0.6 MG/DL (ref 0–1.2)
BILIRUB UR QL STRIP: NEGATIVE
BUN SERPL-MCNC: 23 MG/DL (ref 8–23)
BUN/CREAT SERPL: 18.1 (ref 7–25)
CALCIUM SPEC-SCNC: 9.4 MG/DL (ref 8.6–10.5)
CHLORIDE SERPL-SCNC: 101 MMOL/L (ref 98–107)
CLARITY UR: CLEAR
CO2 SERPL-SCNC: 29 MMOL/L (ref 22–29)
COLOR UR: YELLOW
CREAT SERPL-MCNC: 1.27 MG/DL (ref 0.57–1)
D DIMER PPP FEU-MCNC: 0.3 MCGFEU/ML (ref 0–0.49)
DEPRECATED RDW RBC AUTO: 40.3 FL (ref 37–54)
EOSINOPHIL # BLD AUTO: 0.18 10*3/MM3 (ref 0–0.4)
EOSINOPHIL NFR BLD AUTO: 2.3 % (ref 0.3–6.2)
ERYTHROCYTE [DISTWIDTH] IN BLOOD BY AUTOMATED COUNT: 12.9 % (ref 12.3–15.4)
GFR SERPL CREATININE-BSD FRML MDRD: 40 ML/MIN/1.73
GLOBULIN UR ELPH-MCNC: 2.3 GM/DL
GLUCOSE SERPL-MCNC: 104 MG/DL (ref 65–99)
GLUCOSE UR STRIP-MCNC: NEGATIVE MG/DL
HCT VFR BLD AUTO: 34.7 % (ref 34–46.6)
HGB BLD-MCNC: 12.1 G/DL (ref 12–15.9)
HGB UR QL STRIP.AUTO: NEGATIVE
HYALINE CASTS UR QL AUTO: ABNORMAL /LPF
IMM GRANULOCYTES # BLD AUTO: 0.03 10*3/MM3 (ref 0–0.05)
IMM GRANULOCYTES NFR BLD AUTO: 0.4 % (ref 0–0.5)
INR PPP: 1.05 (ref 0.9–1.1)
KETONES UR QL STRIP: NEGATIVE
LEUKOCYTE ESTERASE UR QL STRIP.AUTO: ABNORMAL
LYMPHOCYTES # BLD AUTO: 1.26 10*3/MM3 (ref 0.7–3.1)
LYMPHOCYTES NFR BLD AUTO: 15.8 % (ref 19.6–45.3)
MCH RBC QN AUTO: 30.2 PG (ref 26.6–33)
MCHC RBC AUTO-ENTMCNC: 34.9 G/DL (ref 31.5–35.7)
MCV RBC AUTO: 86.5 FL (ref 79–97)
MONOCYTES # BLD AUTO: 0.86 10*3/MM3 (ref 0.1–0.9)
MONOCYTES NFR BLD AUTO: 10.8 % (ref 5–12)
NEUTROPHILS NFR BLD AUTO: 5.63 10*3/MM3 (ref 1.7–7)
NEUTROPHILS NFR BLD AUTO: 70.3 % (ref 42.7–76)
NITRITE UR QL STRIP: NEGATIVE
NRBC BLD AUTO-RTO: 0 /100 WBC (ref 0–0.2)
NT-PROBNP SERPL-MCNC: 360.4 PG/ML (ref 0–1800)
PH UR STRIP.AUTO: 7 [PH] (ref 5–8)
PLATELET # BLD AUTO: 250 10*3/MM3 (ref 140–450)
PMV BLD AUTO: 9.7 FL (ref 6–12)
POTASSIUM SERPL-SCNC: 4.5 MMOL/L (ref 3.5–5.2)
PROT SERPL-MCNC: 6.8 G/DL (ref 6–8.5)
PROT UR QL STRIP: NEGATIVE
PROTHROMBIN TIME: 13.5 SECONDS (ref 11.7–14.2)
RBC # BLD AUTO: 4.01 10*6/MM3 (ref 3.77–5.28)
RBC # UR STRIP: ABNORMAL /HPF
REF LAB TEST METHOD: ABNORMAL
SARS-COV-2 RNA PNL SPEC NAA+PROBE: NOT DETECTED
SODIUM SERPL-SCNC: 139 MMOL/L (ref 136–145)
SP GR UR STRIP: 1.01 (ref 1–1.03)
SQUAMOUS #/AREA URNS HPF: ABNORMAL /HPF
TROPONIN T SERPL-MCNC: <0.01 NG/ML (ref 0–0.03)
UROBILINOGEN UR QL STRIP: ABNORMAL
WBC # UR STRIP: ABNORMAL /HPF
WBC NRBC COR # BLD: 7.99 10*3/MM3 (ref 3.4–10.8)

## 2021-11-28 PROCEDURE — 71045 X-RAY EXAM CHEST 1 VIEW: CPT

## 2021-11-28 PROCEDURE — C9803 HOPD COVID-19 SPEC COLLECT: HCPCS

## 2021-11-28 PROCEDURE — 85610 PROTHROMBIN TIME: CPT | Performed by: EMERGENCY MEDICINE

## 2021-11-28 PROCEDURE — 84484 ASSAY OF TROPONIN QUANT: CPT | Performed by: EMERGENCY MEDICINE

## 2021-11-28 PROCEDURE — 87635 SARS-COV-2 COVID-19 AMP PRB: CPT | Performed by: EMERGENCY MEDICINE

## 2021-11-28 PROCEDURE — G0378 HOSPITAL OBSERVATION PER HR: HCPCS

## 2021-11-28 PROCEDURE — 80053 COMPREHEN METABOLIC PANEL: CPT | Performed by: EMERGENCY MEDICINE

## 2021-11-28 PROCEDURE — 81001 URINALYSIS AUTO W/SCOPE: CPT | Performed by: NURSE PRACTITIONER

## 2021-11-28 PROCEDURE — 70450 CT HEAD/BRAIN W/O DYE: CPT

## 2021-11-28 PROCEDURE — 85379 FIBRIN DEGRADATION QUANT: CPT | Performed by: EMERGENCY MEDICINE

## 2021-11-28 PROCEDURE — 87077 CULTURE AEROBIC IDENTIFY: CPT | Performed by: NURSE PRACTITIONER

## 2021-11-28 PROCEDURE — 83880 ASSAY OF NATRIURETIC PEPTIDE: CPT | Performed by: EMERGENCY MEDICINE

## 2021-11-28 PROCEDURE — 70551 MRI BRAIN STEM W/O DYE: CPT

## 2021-11-28 PROCEDURE — 87186 SC STD MICRODIL/AGAR DIL: CPT | Performed by: NURSE PRACTITIONER

## 2021-11-28 PROCEDURE — 93005 ELECTROCARDIOGRAM TRACING: CPT | Performed by: EMERGENCY MEDICINE

## 2021-11-28 PROCEDURE — 85025 COMPLETE CBC W/AUTO DIFF WBC: CPT | Performed by: EMERGENCY MEDICINE

## 2021-11-28 PROCEDURE — 99284 EMERGENCY DEPT VISIT MOD MDM: CPT

## 2021-11-28 PROCEDURE — 85730 THROMBOPLASTIN TIME PARTIAL: CPT | Performed by: EMERGENCY MEDICINE

## 2021-11-28 PROCEDURE — 87086 URINE CULTURE/COLONY COUNT: CPT | Performed by: NURSE PRACTITIONER

## 2021-11-28 PROCEDURE — 99204 OFFICE O/P NEW MOD 45 MIN: CPT | Performed by: PSYCHIATRY & NEUROLOGY

## 2021-11-28 RX ORDER — ACETAMINOPHEN 650 MG/1
650 SUPPOSITORY RECTAL EVERY 4 HOURS PRN
Status: DISCONTINUED | OUTPATIENT
Start: 2021-11-28 | End: 2021-11-29 | Stop reason: HOSPADM

## 2021-11-28 RX ORDER — PRAMIPEXOLE DIHYDROCHLORIDE 0.25 MG/1
0.25 TABLET ORAL DAILY
Status: DISCONTINUED | OUTPATIENT
Start: 2021-11-28 | End: 2021-11-29 | Stop reason: HOSPADM

## 2021-11-28 RX ORDER — ONDANSETRON 2 MG/ML
4 INJECTION INTRAMUSCULAR; INTRAVENOUS EVERY 6 HOURS PRN
Status: DISCONTINUED | OUTPATIENT
Start: 2021-11-28 | End: 2021-11-29 | Stop reason: HOSPADM

## 2021-11-28 RX ORDER — DULOXETIN HYDROCHLORIDE 60 MG/1
60 CAPSULE, DELAYED RELEASE ORAL DAILY
Status: DISCONTINUED | OUTPATIENT
Start: 2021-11-28 | End: 2021-11-29 | Stop reason: HOSPADM

## 2021-11-28 RX ORDER — HYDROCHLOROTHIAZIDE 25 MG/1
25 TABLET ORAL DAILY
Status: DISCONTINUED | OUTPATIENT
Start: 2021-11-28 | End: 2021-11-29 | Stop reason: HOSPADM

## 2021-11-28 RX ORDER — AMLODIPINE BESYLATE 5 MG/1
5 TABLET ORAL DAILY
Status: DISCONTINUED | OUTPATIENT
Start: 2021-11-28 | End: 2021-11-29 | Stop reason: HOSPADM

## 2021-11-28 RX ORDER — SODIUM CHLORIDE 0.9 % (FLUSH) 0.9 %
10 SYRINGE (ML) INJECTION AS NEEDED
Status: DISCONTINUED | OUTPATIENT
Start: 2021-11-28 | End: 2021-11-29 | Stop reason: HOSPADM

## 2021-11-28 RX ORDER — PRAMIPEXOLE DIHYDROCHLORIDE 0.25 MG/1
0.25 TABLET ORAL DAILY
COMMUNITY
Start: 2021-08-09 | End: 2022-08-19 | Stop reason: SDUPTHER

## 2021-11-28 RX ORDER — ATORVASTATIN CALCIUM 80 MG/1
80 TABLET, FILM COATED ORAL NIGHTLY
Status: DISCONTINUED | OUTPATIENT
Start: 2021-11-28 | End: 2021-11-29

## 2021-11-28 RX ORDER — CHLORHEXIDINE GLUCONATE 0.12 MG/ML
RINSE ORAL
COMMUNITY
Start: 2021-08-24 | End: 2022-04-28 | Stop reason: ALTCHOICE

## 2021-11-28 RX ORDER — SODIUM CHLORIDE 0.9 % (FLUSH) 0.9 %
10 SYRINGE (ML) INJECTION EVERY 12 HOURS SCHEDULED
Status: DISCONTINUED | OUTPATIENT
Start: 2021-11-28 | End: 2021-11-29 | Stop reason: HOSPADM

## 2021-11-28 RX ORDER — ASPIRIN 325 MG
325 TABLET ORAL DAILY
Status: DISCONTINUED | OUTPATIENT
Start: 2021-11-28 | End: 2021-11-29

## 2021-11-28 RX ORDER — ACETAMINOPHEN 325 MG/1
650 TABLET ORAL EVERY 4 HOURS PRN
Status: DISCONTINUED | OUTPATIENT
Start: 2021-11-28 | End: 2021-11-29 | Stop reason: HOSPADM

## 2021-11-28 RX ORDER — ASPIRIN 325 MG
325 TABLET ORAL ONCE
Status: COMPLETED | OUTPATIENT
Start: 2021-11-28 | End: 2021-11-28

## 2021-11-28 RX ORDER — ASPIRIN 300 MG/1
300 SUPPOSITORY RECTAL DAILY
Status: DISCONTINUED | OUTPATIENT
Start: 2021-11-28 | End: 2021-11-29

## 2021-11-28 RX ORDER — LOSARTAN POTASSIUM 50 MG/1
50 TABLET ORAL DAILY
Status: DISCONTINUED | OUTPATIENT
Start: 2021-11-28 | End: 2021-11-29 | Stop reason: HOSPADM

## 2021-11-28 RX ADMIN — PRAMIPEXOLE DIHYDROCHLORIDE 0.25 MG: 0.25 TABLET ORAL at 18:23

## 2021-11-28 RX ADMIN — HYDROCHLOROTHIAZIDE 25 MG: 25 TABLET ORAL at 18:24

## 2021-11-28 RX ADMIN — DULOXETINE HYDROCHLORIDE 60 MG: 60 CAPSULE, DELAYED RELEASE ORAL at 18:24

## 2021-11-28 RX ADMIN — ASPIRIN 325 MG: 325 TABLET ORAL at 14:15

## 2021-11-28 RX ADMIN — AMLODIPINE BESYLATE 5 MG: 5 TABLET ORAL at 18:24

## 2021-11-28 RX ADMIN — LOSARTAN POTASSIUM 50 MG: 50 TABLET, FILM COATED ORAL at 18:24

## 2021-11-28 RX ADMIN — SODIUM CHLORIDE, PRESERVATIVE FREE 10 ML: 5 INJECTION INTRAVENOUS at 20:58

## 2021-11-28 RX ADMIN — ATORVASTATIN CALCIUM 80 MG: 80 TABLET, FILM COATED ORAL at 20:58

## 2021-11-29 ENCOUNTER — APPOINTMENT (OUTPATIENT)
Dept: CT IMAGING | Facility: HOSPITAL | Age: 81
End: 2021-11-29

## 2021-11-29 ENCOUNTER — TELEPHONE (OUTPATIENT)
Dept: NEUROSURGERY | Facility: CLINIC | Age: 81
End: 2021-11-29

## 2021-11-29 ENCOUNTER — APPOINTMENT (OUTPATIENT)
Dept: CARDIOLOGY | Facility: HOSPITAL | Age: 81
End: 2021-11-29

## 2021-11-29 VITALS
SYSTOLIC BLOOD PRESSURE: 142 MMHG | HEIGHT: 63 IN | RESPIRATION RATE: 18 BRPM | TEMPERATURE: 98.1 F | WEIGHT: 140 LBS | HEART RATE: 64 BPM | OXYGEN SATURATION: 99 % | BODY MASS INDEX: 24.8 KG/M2 | DIASTOLIC BLOOD PRESSURE: 66 MMHG

## 2021-11-29 DIAGNOSIS — D18.1 HYGROMA: Primary | ICD-10-CM

## 2021-11-29 LAB
ALBUMIN SERPL-MCNC: 3.7 G/DL (ref 3.5–5.2)
ALBUMIN SERPL-MCNC: 3.7 G/DL (ref 3.5–5.2)
ALBUMIN/GLOB SERPL: 1.8 G/DL
ALBUMIN/GLOB SERPL: 1.9 G/DL
ALP SERPL-CCNC: 76 U/L (ref 39–117)
ALP SERPL-CCNC: 77 U/L (ref 39–117)
ALT SERPL W P-5'-P-CCNC: 13 U/L (ref 1–33)
ALT SERPL W P-5'-P-CCNC: 16 U/L (ref 1–33)
ANION GAP SERPL CALCULATED.3IONS-SCNC: 7.6 MMOL/L (ref 5–15)
ANION GAP SERPL CALCULATED.3IONS-SCNC: 7.9 MMOL/L (ref 5–15)
AORTIC DIMENSIONLESS INDEX: 0.8 (DI)
ASCENDING AORTA: 2.8 CM
AST SERPL-CCNC: 19 U/L (ref 1–32)
AST SERPL-CCNC: 20 U/L (ref 1–32)
BH CV ECHO MEAS - ACS: 1.8 CM
BH CV ECHO MEAS - AI DEC SLOPE: 118 CM/SEC^2
BH CV ECHO MEAS - AI MAX PG: 34.1 MMHG
BH CV ECHO MEAS - AI MAX VEL: 292.1 CM/SEC
BH CV ECHO MEAS - AI P1/2T: 725.3 MSEC
BH CV ECHO MEAS - AO MAX PG (FULL): 5.1 MMHG
BH CV ECHO MEAS - AO MAX PG: 9.4 MMHG
BH CV ECHO MEAS - AO MEAN PG (FULL): 2.4 MMHG
BH CV ECHO MEAS - AO MEAN PG: 4.8 MMHG
BH CV ECHO MEAS - AO ROOT AREA (BSA CORRECTED): 1.4
BH CV ECHO MEAS - AO ROOT AREA: 4 CM^2
BH CV ECHO MEAS - AO ROOT DIAM: 2.3 CM
BH CV ECHO MEAS - AO V2 MAX: 153.1 CM/SEC
BH CV ECHO MEAS - AO V2 MEAN: 101.1 CM/SEC
BH CV ECHO MEAS - AO V2 VTI: 31.8 CM
BH CV ECHO MEAS - ASC AORTA: 2.8 CM
BH CV ECHO MEAS - AVA(I,A): 2 CM^2
BH CV ECHO MEAS - AVA(I,D): 2 CM^2
BH CV ECHO MEAS - AVA(V,A): 1.8 CM^2
BH CV ECHO MEAS - AVA(V,D): 1.8 CM^2
BH CV ECHO MEAS - BSA(HAYCOCK): 1.7 M^2
BH CV ECHO MEAS - BSA: 1.7 M^2
BH CV ECHO MEAS - BZI_BMI: 24.8 KILOGRAMS/M^2
BH CV ECHO MEAS - BZI_METRIC_HEIGHT: 160 CM
BH CV ECHO MEAS - BZI_METRIC_WEIGHT: 63.5 KG
BH CV ECHO MEAS - EDV(CUBED): 106 ML
BH CV ECHO MEAS - EDV(MOD-SP2): 40 ML
BH CV ECHO MEAS - EDV(MOD-SP4): 43 ML
BH CV ECHO MEAS - EDV(TEICH): 104.1 ML
BH CV ECHO MEAS - EF(CUBED): 79.5 %
BH CV ECHO MEAS - EF(MOD-BP): 64.7 %
BH CV ECHO MEAS - EF(MOD-SP2): 52.5 %
BH CV ECHO MEAS - EF(MOD-SP4): 74.4 %
BH CV ECHO MEAS - EF(TEICH): 71.9 %
BH CV ECHO MEAS - ESV(CUBED): 21.7 ML
BH CV ECHO MEAS - ESV(MOD-SP2): 19 ML
BH CV ECHO MEAS - ESV(MOD-SP4): 11 ML
BH CV ECHO MEAS - ESV(TEICH): 29.3 ML
BH CV ECHO MEAS - FS: 41.1 %
BH CV ECHO MEAS - IVS/LVPW: 1.1
BH CV ECHO MEAS - IVSD: 0.79 CM
BH CV ECHO MEAS - LAT PEAK E' VEL: 7 CM/SEC
BH CV ECHO MEAS - LV DIASTOLIC VOL/BSA (35-75): 25.9 ML/M^2
BH CV ECHO MEAS - LV MASS(C)D: 117.2 GRAMS
BH CV ECHO MEAS - LV MASS(C)DI: 70.5 GRAMS/M^2
BH CV ECHO MEAS - LV MAX PG: 4.3 MMHG
BH CV ECHO MEAS - LV MEAN PG: 2.4 MMHG
BH CV ECHO MEAS - LV SYSTOLIC VOL/BSA (12-30): 6.6 ML/M^2
BH CV ECHO MEAS - LV V1 MAX: 103.7 CM/SEC
BH CV ECHO MEAS - LV V1 MEAN: 72.2 CM/SEC
BH CV ECHO MEAS - LV V1 VTI: 24.4 CM
BH CV ECHO MEAS - LVIDD: 4.7 CM
BH CV ECHO MEAS - LVIDS: 2.8 CM
BH CV ECHO MEAS - LVLD AP2: 6.5 CM
BH CV ECHO MEAS - LVLD AP4: 6.4 CM
BH CV ECHO MEAS - LVLS AP2: 5.2 CM
BH CV ECHO MEAS - LVLS AP4: 5.1 CM
BH CV ECHO MEAS - LVOT AREA (M): 2.5 CM^2
BH CV ECHO MEAS - LVOT AREA: 2.6 CM^2
BH CV ECHO MEAS - LVOT DIAM: 1.8 CM
BH CV ECHO MEAS - LVPWD: 0.74 CM
BH CV ECHO MEAS - MED PEAK E' VEL: 7.1 CM/SEC
BH CV ECHO MEAS - MR MAX PG: 19.1 MMHG
BH CV ECHO MEAS - MR MAX VEL: 218.3 CM/SEC
BH CV ECHO MEAS - MV A DUR: 0.13 SEC
BH CV ECHO MEAS - MV A MAX VEL: 114 CM/SEC
BH CV ECHO MEAS - MV DEC SLOPE: 241.3 CM/SEC^2
BH CV ECHO MEAS - MV DEC TIME: 298 SEC
BH CV ECHO MEAS - MV E MAX VEL: 69 CM/SEC
BH CV ECHO MEAS - MV E/A: 0.61
BH CV ECHO MEAS - MV MAX PG: 6.8 MMHG
BH CV ECHO MEAS - MV MEAN PG: 1.7 MMHG
BH CV ECHO MEAS - MV P1/2T MAX VEL: 86.9 CM/SEC
BH CV ECHO MEAS - MV P1/2T: 105.4 MSEC
BH CV ECHO MEAS - MV V2 MAX: 130.3 CM/SEC
BH CV ECHO MEAS - MV V2 MEAN: 58.1 CM/SEC
BH CV ECHO MEAS - MV V2 VTI: 28.5 CM
BH CV ECHO MEAS - MVA P1/2T LCG: 2.5 CM^2
BH CV ECHO MEAS - MVA(P1/2T): 2.1 CM^2
BH CV ECHO MEAS - MVA(VTI): 2.2 CM^2
BH CV ECHO MEAS - PA ACC TIME: 0.05 SEC
BH CV ECHO MEAS - PA MAX PG (FULL): 3.2 MMHG
BH CV ECHO MEAS - PA MAX PG: 5.2 MMHG
BH CV ECHO MEAS - PA PR(ACCEL): 57.6 MMHG
BH CV ECHO MEAS - PA V2 MAX: 114 CM/SEC
BH CV ECHO MEAS - PI END-D VEL: 81.4 CM/SEC
BH CV ECHO MEAS - PULM A REVS DUR: 0.12 SEC
BH CV ECHO MEAS - PULM A REVS VEL: 45 CM/SEC
BH CV ECHO MEAS - PULM DIAS VEL: 27 CM/SEC
BH CV ECHO MEAS - PULM S/D: 1.6
BH CV ECHO MEAS - PULM SYS VEL: 43.7 CM/SEC
BH CV ECHO MEAS - RAP SYSTOLE: 3 MMHG
BH CV ECHO MEAS - RV MAX PG: 2 MMHG
BH CV ECHO MEAS - RV MEAN PG: 1.2 MMHG
BH CV ECHO MEAS - RV V1 MAX: 71.6 CM/SEC
BH CV ECHO MEAS - RV V1 MEAN: 51.1 CM/SEC
BH CV ECHO MEAS - RV V1 VTI: 13.8 CM
BH CV ECHO MEAS - RVSP: 27 MMHG
BH CV ECHO MEAS - SI(AO): 77.1 ML/M^2
BH CV ECHO MEAS - SI(CUBED): 50.8 ML/M^2
BH CV ECHO MEAS - SI(LVOT): 38 ML/M^2
BH CV ECHO MEAS - SI(MOD-SP2): 12.6 ML/M^2
BH CV ECHO MEAS - SI(MOD-SP4): 19.3 ML/M^2
BH CV ECHO MEAS - SI(TEICH): 45 ML/M^2
BH CV ECHO MEAS - SV(AO): 128.2 ML
BH CV ECHO MEAS - SV(CUBED): 84.3 ML
BH CV ECHO MEAS - SV(LVOT): 63.1 ML
BH CV ECHO MEAS - SV(MOD-SP2): 21 ML
BH CV ECHO MEAS - SV(MOD-SP4): 32 ML
BH CV ECHO MEAS - SV(TEICH): 74.8 ML
BH CV ECHO MEAS - TAPSE (>1.6): 2.2 CM
BH CV ECHO MEAS - TR MAX PG: 24 MMHG
BH CV ECHO MEAS - TR MAX VEL: 243.2 CM/SEC
BH CV ECHO MEASUREMENTS AVERAGE E/E' RATIO: 9.79
BH CV XLRA - RV BASE: 2.9 CM
BH CV XLRA - RV LENGTH: 5.3 CM
BH CV XLRA - RV MID: 2.4 CM
BH CV XLRA - TDI S': 13.9 CM/SEC
BILIRUB SERPL-MCNC: 0.6 MG/DL (ref 0–1.2)
BILIRUB SERPL-MCNC: 0.6 MG/DL (ref 0–1.2)
BUN SERPL-MCNC: 24 MG/DL (ref 8–23)
BUN SERPL-MCNC: 24 MG/DL (ref 8–23)
BUN/CREAT SERPL: 19.7 (ref 7–25)
BUN/CREAT SERPL: 20.5 (ref 7–25)
CALCIUM SPEC-SCNC: 8.6 MG/DL (ref 8.6–10.5)
CALCIUM SPEC-SCNC: 8.7 MG/DL (ref 8.6–10.5)
CHLORIDE SERPL-SCNC: 101 MMOL/L (ref 98–107)
CHLORIDE SERPL-SCNC: 98 MMOL/L (ref 98–107)
CHOLEST SERPL-MCNC: 105 MG/DL (ref 0–200)
CO2 SERPL-SCNC: 29.1 MMOL/L (ref 22–29)
CO2 SERPL-SCNC: 29.4 MMOL/L (ref 22–29)
CREAT SERPL-MCNC: 1.17 MG/DL (ref 0.57–1)
CREAT SERPL-MCNC: 1.22 MG/DL (ref 0.57–1)
DEPRECATED RDW RBC AUTO: 40.3 FL (ref 37–54)
ERYTHROCYTE [DISTWIDTH] IN BLOOD BY AUTOMATED COUNT: 12.9 % (ref 12.3–15.4)
GFR SERPL CREATININE-BSD FRML MDRD: 42 ML/MIN/1.73
GFR SERPL CREATININE-BSD FRML MDRD: 44 ML/MIN/1.73
GLOBULIN UR ELPH-MCNC: 2 GM/DL
GLOBULIN UR ELPH-MCNC: 2.1 GM/DL
GLUCOSE BLDC GLUCOMTR-MCNC: 132 MG/DL (ref 70–130)
GLUCOSE BLDC GLUCOMTR-MCNC: 139 MG/DL (ref 70–130)
GLUCOSE SERPL-MCNC: 84 MG/DL (ref 65–99)
GLUCOSE SERPL-MCNC: 85 MG/DL (ref 65–99)
HBA1C MFR BLD: 5.55 % (ref 4.8–5.6)
HCT VFR BLD AUTO: 32.1 % (ref 34–46.6)
HDLC SERPL-MCNC: 40 MG/DL (ref 40–60)
HGB BLD-MCNC: 11.2 G/DL (ref 12–15.9)
LDLC SERPL CALC-MCNC: 43 MG/DL (ref 0–100)
LDLC/HDLC SERPL: 1.02 {RATIO}
LEFT ATRIUM VOLUME INDEX: 21.2 ML/M2
MAXIMAL PREDICTED HEART RATE: 137 BPM
MCH RBC QN AUTO: 30.2 PG (ref 26.6–33)
MCHC RBC AUTO-ENTMCNC: 34.9 G/DL (ref 31.5–35.7)
MCV RBC AUTO: 86.5 FL (ref 79–97)
PLATELET # BLD AUTO: 235 10*3/MM3 (ref 140–450)
PMV BLD AUTO: 9.9 FL (ref 6–12)
POTASSIUM SERPL-SCNC: 3.9 MMOL/L (ref 3.5–5.2)
POTASSIUM SERPL-SCNC: 4.1 MMOL/L (ref 3.5–5.2)
PROT SERPL-MCNC: 5.7 G/DL (ref 6–8.5)
PROT SERPL-MCNC: 5.8 G/DL (ref 6–8.5)
RBC # BLD AUTO: 3.71 10*6/MM3 (ref 3.77–5.28)
SINUS: 2.8 CM
SODIUM SERPL-SCNC: 135 MMOL/L (ref 136–145)
SODIUM SERPL-SCNC: 138 MMOL/L (ref 136–145)
STRESS TARGET HR: 116 BPM
TRIGL SERPL-MCNC: 121 MG/DL (ref 0–150)
VLDLC SERPL-MCNC: 22 MG/DL (ref 5–40)
WBC NRBC COR # BLD: 7.19 10*3/MM3 (ref 3.4–10.8)

## 2021-11-29 PROCEDURE — 99213 OFFICE O/P EST LOW 20 MIN: CPT | Performed by: NURSE PRACTITIONER

## 2021-11-29 PROCEDURE — G0378 HOSPITAL OBSERVATION PER HR: HCPCS

## 2021-11-29 PROCEDURE — 93306 TTE W/DOPPLER COMPLETE: CPT | Performed by: INTERNAL MEDICINE

## 2021-11-29 PROCEDURE — 70496 CT ANGIOGRAPHY HEAD: CPT

## 2021-11-29 PROCEDURE — 80053 COMPREHEN METABOLIC PANEL: CPT | Performed by: NURSE PRACTITIONER

## 2021-11-29 PROCEDURE — 72125 CT NECK SPINE W/O DYE: CPT

## 2021-11-29 PROCEDURE — 70498 CT ANGIOGRAPHY NECK: CPT

## 2021-11-29 PROCEDURE — 99214 OFFICE O/P EST MOD 30 MIN: CPT | Performed by: NURSE PRACTITIONER

## 2021-11-29 PROCEDURE — 82962 GLUCOSE BLOOD TEST: CPT

## 2021-11-29 PROCEDURE — 85027 COMPLETE CBC AUTOMATED: CPT | Performed by: NURSE PRACTITIONER

## 2021-11-29 PROCEDURE — 83036 HEMOGLOBIN GLYCOSYLATED A1C: CPT | Performed by: NURSE PRACTITIONER

## 2021-11-29 PROCEDURE — 0 IOPAMIDOL PER 1 ML: Performed by: EMERGENCY MEDICINE

## 2021-11-29 PROCEDURE — 80061 LIPID PANEL: CPT | Performed by: NURSE PRACTITIONER

## 2021-11-29 PROCEDURE — 93306 TTE W/DOPPLER COMPLETE: CPT

## 2021-11-29 RX ORDER — ASPIRIN 81 MG/1
81 TABLET, CHEWABLE ORAL DAILY
Status: DISCONTINUED | OUTPATIENT
Start: 2021-11-30 | End: 2021-11-29 | Stop reason: HOSPADM

## 2021-11-29 RX ORDER — ATORVASTATIN CALCIUM 20 MG/1
20 TABLET, FILM COATED ORAL NIGHTLY
Status: DISCONTINUED | OUTPATIENT
Start: 2021-11-29 | End: 2021-11-29 | Stop reason: HOSPADM

## 2021-11-29 RX ORDER — SODIUM CHLORIDE, SODIUM LACTATE, POTASSIUM CHLORIDE, CALCIUM CHLORIDE 600; 310; 30; 20 MG/100ML; MG/100ML; MG/100ML; MG/100ML
50 INJECTION, SOLUTION INTRAVENOUS CONTINUOUS
Status: DISCONTINUED | OUTPATIENT
Start: 2021-11-29 | End: 2021-11-29 | Stop reason: HOSPADM

## 2021-11-29 RX ADMIN — ASPIRIN 325 MG: 325 TABLET ORAL at 09:06

## 2021-11-29 RX ADMIN — SODIUM CHLORIDE, POTASSIUM CHLORIDE, SODIUM LACTATE AND CALCIUM CHLORIDE 50 ML/HR: 600; 310; 30; 20 INJECTION, SOLUTION INTRAVENOUS at 05:01

## 2021-11-29 RX ADMIN — IOPAMIDOL 95 ML: 755 INJECTION, SOLUTION INTRAVENOUS at 04:45

## 2021-11-29 NOTE — TELEPHONE ENCOUNTER
----- Message from ARABELLA Tan sent at 11/29/2021  1:37 PM EST -----  Regarding: f/up  Will need a f/up with Dr. Bailey in 4 weeks with a CT head w/o contrast to f/up on her hygromas.  Thank you!

## 2021-11-30 ENCOUNTER — READMISSION MANAGEMENT (OUTPATIENT)
Dept: CALL CENTER | Facility: HOSPITAL | Age: 81
End: 2021-11-30

## 2021-11-30 LAB — BACTERIA SPEC AEROBE CULT: ABNORMAL

## 2021-11-30 NOTE — OUTREACH NOTE
Prep Survey      Responses   [facility] facility patient discharged from? [facility]   Is LACE score < 7 ? Yes   Emergency Room discharge w/ pulse ox? No   Eligibility Readm Mgmt   Discharge diagnosis transient ischemic attack   Does the patient have one of the following disease processes/diagnoses(primary or secondary)? Stroke (TIA)   Does the patient have Home health ordered? No   Is there a DME ordered? No   Prep survey completed? Yes          Sade Culp RN

## 2021-12-03 ENCOUNTER — LAB (OUTPATIENT)
Dept: LAB | Facility: HOSPITAL | Age: 81
End: 2021-12-03

## 2021-12-03 ENCOUNTER — OFFICE VISIT (OUTPATIENT)
Dept: SLEEP MEDICINE | Facility: HOSPITAL | Age: 81
End: 2021-12-03

## 2021-12-03 ENCOUNTER — TELEPHONE (OUTPATIENT)
Dept: NEUROSURGERY | Facility: CLINIC | Age: 81
End: 2021-12-03

## 2021-12-03 ENCOUNTER — TELEPHONE (OUTPATIENT)
Dept: SLEEP MEDICINE | Facility: HOSPITAL | Age: 81
End: 2021-12-03

## 2021-12-03 VITALS
BODY MASS INDEX: 25.16 KG/M2 | OXYGEN SATURATION: 99 % | HEART RATE: 83 BPM | DIASTOLIC BLOOD PRESSURE: 69 MMHG | WEIGHT: 142 LBS | SYSTOLIC BLOOD PRESSURE: 156 MMHG | HEIGHT: 63 IN

## 2021-12-03 DIAGNOSIS — D50.8 OTHER IRON DEFICIENCY ANEMIA: ICD-10-CM

## 2021-12-03 DIAGNOSIS — G47.33 OSA (OBSTRUCTIVE SLEEP APNEA): ICD-10-CM

## 2021-12-03 DIAGNOSIS — G25.81 RESTLESS LEG SYNDROME: ICD-10-CM

## 2021-12-03 DIAGNOSIS — N30.00 ACUTE CYSTITIS WITHOUT HEMATURIA: ICD-10-CM

## 2021-12-03 LAB
FERRITIN SERPL-MCNC: 105 NG/ML (ref 13–150)
IRON 24H UR-MRATE: 60 MCG/DL (ref 37–145)
IRON SATN MFR SERPL: 19 % (ref 20–50)
TIBC SERPL-MCNC: 323 MCG/DL (ref 298–536)
TRANSFERRIN SERPL-MCNC: 217 MG/DL (ref 200–360)

## 2021-12-03 PROCEDURE — 83540 ASSAY OF IRON: CPT

## 2021-12-03 PROCEDURE — 36415 COLL VENOUS BLD VENIPUNCTURE: CPT

## 2021-12-03 PROCEDURE — 84466 ASSAY OF TRANSFERRIN: CPT

## 2021-12-03 PROCEDURE — 82728 ASSAY OF FERRITIN: CPT

## 2021-12-03 NOTE — PROGRESS NOTES
[facility]  Telephone: [phone] / Fax: [phone] [place]  Telephone: [phone] / Fax: [phone] [place]    PCP: Rhoda Luther MD    Reason for visit: JHOANA f/u    Itzel Edmond is a 81 y.o.female  [facility].  She uses auto BIPAP device, IPAP max 20, EPAP 12, PS 4. She is struggling with restless sleep and restless legs.  She takes Pramipexole 0.25 mg right before she goes to bed. She also takes 2 OTC sleep aids. She is on Cymbalta for depression. It helps her stay asleep.    She was recently in the hospital and thinks she has a UTI. I looked at cultures. Culture grew E coli. She has been unaware of it. She reports foul smelling urine. I have printed her cultures report and asked her to contact Dr Rhoda Luther.  I will route this note to PCP's office also.    Her current machine is ResMed device.She bought it out of pocket 2-3 years ago. Her residual AHI is normal. She wants to hold off on getting a new machine for now as current machine still works.    SH- no tobacco, no alcohol, 1 caffeine, 0 alcohol    ROS +post nasal drip, +SOA. Rest is negative.    DME [facility]    Current Medications:    Current Outpatient Medications:   •  amLODIPine (NORVASC) 5 MG tablet, Take 1 tablet by mouth Daily., Disp: 90 tablet, Rfl: 3  •  aspirin 81 MG tablet, Take 1 tablet by mouth daily., Disp: 90 tablet, Rfl: 3  •  atorvastatin (LIPITOR) 20 MG tablet, TAKE 1 TABLET EVERY DAY, Disp: 90 tablet, Rfl: 3  •  chlorhexidine (PERIDEX) 0.12 % solution, USE TO BRUSH IMPLANTS TWICE DAILY, Disp: , Rfl:   •  DULoxetine (CYMBALTA) 60 MG capsule, Take 60 mg by mouth Daily., Disp: , Rfl:   •  hydroCHLOROthiazide (HYDRODIURIL) 25 MG tablet, Take 25 mg by mouth Daily., Disp: , Rfl:   •  losartan (COZAAR) 50 MG tablet, Take 1 tablet by mouth Daily., Disp: 90 tablet, Rfl: 3  •  pramipexole (MIRAPEX) 0.25 MG tablet, Take 0.25 mg by mouth Daily., Disp: , Rfl:    also  "entered in Sleep Questionnaire    Patient  has a past medical history of Allergic rhinitis, Atherosclerosis, CAD (coronary artery disease) (2019), Carotid stenosis, Cataract, Cellulitis of right leg (2016), Chest discomfort (2019), Claudication of lower extremity (HCC), CMC arthritis, Colon polyps, Cystitis (2019), WOODARD (dyspnea on exertion) (10/2020), Dysphagia, Elevated serum creatinine (2021), Environmental allergies, Exercise intolerance, JAMES (generalized anxiety disorder) (2020), GERD (gastroesophageal reflux disease), Heart murmur, Hemorrhoids, Hyperglycemia, Hyperkalemia (2020), Hyperlipidemia, Hypertension, Influenza (2018), Insomnia, Myalgia (2016), Neuropathy (2017), OAB (overactive bladder), JHOANA (obstructive sleep apnea), PLMD (periodic limb movement disorder), Precordial pain (2019), Premature atrial complexes, Rectal bleeding, Rectal prolapse (10/2020), Rectal ulcer (10/02/2020), Rectocele (2019), Renal insufficiency, Restless leg syndrome (10/2/2018), SAB (spontaneous ), Serum potassium elevated (2016), Slow transit constipation (2020), Tachycardia (2013), Trigger finger of left thumb (10/21/2019), Urge incontinence, Urinary frequency, and Vaginal atrophy.    I have reviewed the Past Medical History, Past Surgical History, Social History and Family History.    Vital Signs /69   Pulse 83   Ht 160 cm (63\")   Wt 64.4 kg (142 lb)   SpO2 99%   BMI 25.15 kg/m²  Body mass index is 25.15 kg/m².    General Alert and oriented. No acute distress noted   Pharynx/Throat Class III   Mallampati airway, large tongue, no evidence of redundant lateral pharyngeal tissue. No oral lesions. No thrush. Moist mucous membranes.   Head Normocephalic. Symmetrical. Atraumatic.    Nose No septal deviation. No drainage   Chest Wall Normal shape. Symmetric expansion with respiration. No tenderness.   Neck Trachea midline, no thyromegaly or adenopathy  "   Lungs Clear to auscultation bilaterally. No wheezes. No rhonchi. No rales. Respirations regular, even and unlabored.   Heart Regular rhythm and normal rate. Normal S1 and S2. No murmur   Abdomen Soft, non-tender and non-distended. Normal bowel sounds. No masses.   Extremities Moves all extremities well. No edema   Psychiatric Normal mood and affect.     Testing:  · Download 9/2/21-11/30/21 98% use with average nightly use of 6 hours and 5 minutes on auto BIPAP, IPAP max 20 EPAP min 12, PS 4.    Study:  · I could not find the diagnostic study that was done just prior to below.  · 12/14/2015 sleep center [facility]: CPAP was tried but failed.  There was subsequent successful titration with BiPAP.  Optimal             pressures were 16/10.       Urine culture-Result Notes    Urine Culture >100,000 CFU/mL Escherichia coli Abnormal             Resulting Agency: [facility]       Susceptibility     Escherichia coli     JOSÉ MIGUEL     Ampicillin <=2 ug/ml Susceptible     Ampicillin + Sulbactam <=2 ug/ml Susceptible     Cefazolin <=4 ug/ml Susceptible     Cefepime <=1 ug/ml Susceptible     Ceftazidime <=1 ug/ml Susceptible     Ceftriaxone <=1 ug/ml Susceptible     Gentamicin <=1 ug/ml Susceptible     Levofloxacin >=8 ug/ml Resistant     Nitrofurantoin <=16 ug/ml Susceptible     Piperacillin + Tazobactam <=4 ug/ml Susceptible     Tetracycline <=1 ug/ml Susceptible     Trimethoprim + Sulfamethoxazole <=20 ug/ml Susceptible               Linear View         Specimen Collected: 11/28/21 16:01 Last Resulted: 11/30/21 06:21             Impression:  1. JHOANA (obstructive sleep apnea)    2. Other iron deficiency anemia    3. Restless leg syndrome    4. Acute cystitis without hematuria          Plan:  Increase Pramipexole to 2 tabs of 0.25mg and take 2-3 hours prior to bed. Take Cymbalta at bedtime instead of in the morning, as it helps her sleep better. She only consumes 1 cup of caffeine per day, so caffeine does not appear to be an  issue. Check iron profile and ferritin level to see if iron deficiency plays role in her severe RLS. Continue auto BIPAP. Current pressures appear adequate, her residual AHI is 1.4. She will go to [employer] and get a new mask. She uses the machine and benefits from its use.  She will reach out to her PCP's office re- UTI.    RTC in 3-4 months to see Dr Ferrell.      Thank you for allowing me to participate in your patient's care.      ARABELLA Rodriguez  [facility]  Phone: [phone]      Part of this note may be an electronic transcription/translation of spoken language to printed text using the Dragon Dictation System.

## 2021-12-03 NOTE — TELEPHONE ENCOUNTER
----- Message from ARABELLA Rodriguez sent at 12/3/2021  1:29 PM EST -----  Anne please let pt know recent labs showed no iron deficiency.

## 2021-12-03 NOTE — TELEPHONE ENCOUNTER
Caller: Itzel Edmond    Relationship to patient: Self    Best call back number: [phone]  PATIENT CALLED AND DOES NOT WANT TO FOLLOW UP WITH OUR OFFICE AT THIS TIME, SHE STATES SHE HAS AN APPT, UNABLE TO SEE ONE IN CHART.  PLEASE CHECK AND CANCEL.    THANK YOU

## 2021-12-06 ENCOUNTER — READMISSION MANAGEMENT (OUTPATIENT)
Dept: CALL CENTER | Facility: HOSPITAL | Age: 81
End: 2021-12-06

## 2021-12-06 NOTE — OUTREACH NOTE
Stroke Week 1 Survey      Responses   [facility] patient discharged from? [place]   Does the patient have one of the following disease processes/diagnoses(primary or secondary)? Stroke (TIA)   Week 1 attempt successful? Yes   Call start time 1353   Call end time 1356   Discharge diagnosis transient ischemic attack   Person spoke with today (if not patient) and relationship patient   Meds reviewed with patient/caregiver? Yes   Is the patient having any side effects they believe may be caused by any medication additions or changes? No   Does the patient have all medications ordered at discharge? Yes   Prescription comments patient started on antibiotic for UTI   Is the patient taking all medications as directed (includes completed medication regime)? Yes   Does the patient have a primary care provider?  Yes   Does the patient have an appointment with their PCP within 7 days of discharge? Yes   Comments regarding PCP Pt saw PCP Rhoda Luther   Has the patient kept scheduled appointments due by today? Yes   Psychosocial issues? No   Does the patient require any assistance with activities of daily living such as eating, bathing, dressing, walking, etc.? No   Does the patient have any residual symptoms from stroke/TIA? No   Did the patient receive a copy of their discharge instructions? Yes   Nursing interventions Reviewed instructions with patient   What is the patient's perception of their health status since discharge? Improving   Is the patient/caregiver able to teach back signs and symptoms related to disease process for when to call PCP? Yes   Is the patient/caregiver able to teach back signs and symptoms related to disease process for when to call 911? Yes   If the patient is a current smoker, are they able to teach back resources for cessation? Not a smoker   Is the patient/caregiver able to teach back the hierarchy of who to call/visit for symptoms/problems? PCP, Specialist, Home health nurse, Urgent Care,  ED, 911 Yes   Additional teach back comments Pt states she is currently being treated for a UTI.  She feels this is improving.   Week 1 call completed? Yes   Wrap up additional comments Pt states she is doing well.  She feels she is improving.  She denies questions at this time.           Radha Grayson RN

## 2021-12-15 ENCOUNTER — READMISSION MANAGEMENT (OUTPATIENT)
Dept: CALL CENTER | Facility: HOSPITAL | Age: 81
End: 2021-12-15

## 2021-12-15 NOTE — OUTREACH NOTE
Stroke Week 2 Survey      Responses   [facility] patient discharged from? [place]   Does the patient have one of the following disease processes/diagnoses(primary or secondary)? Stroke (TIA)   Week 2 attempt successful? Yes   Call start time 1626   Call end time 1628   Discharge diagnosis transient ischemic attack   Meds reviewed with patient/caregiver? Yes   Is the patient having any side effects they believe may be caused by any medication additions or changes? No   Does the patient have all medications ordered at discharge? Yes   Prescription comments Patient has finished antibiotic   Is the patient taking all medications as directed (includes completed medication regime)? Yes   Comments regarding appointments PCP appt. 12/17/21   Does the patient have an appointment with their PCP within 7 days of discharge? Yes   Comments regarding PCP Pt saw PCP Rhoda Luther   Has the patient kept scheduled appointments due by today? Yes   Has home health visited the patient within 72 hours of discharge? N/A   Psychosocial issues? No   Does the patient require any assistance with activities of daily living such as eating, bathing, dressing, walking, etc.? No   Does the patient have any residual symptoms from stroke/TIA? No   Does the patient understand the diet ordered at discharge? Yes   Did the patient receive a copy of their discharge instructions? Yes   Nursing interventions Reviewed instructions with patient   What is the patient's perception of their health status since discharge? Improving   Nursing interventions Nurse provided patient education   Is the patient able to teach back FAST for Stroke? Yes   Is the patient/caregiver able to teach back the risk factors for a stroke? High blood pressure-goal below 120/80,  High Cholesterol   Is the patient/caregiver able to teach back signs and symptoms related to disease process for when to call PCP? Yes   Is the patient/caregiver able to teach back signs and symptoms  related to disease process for when to call 911? Yes   If the patient is a current smoker, are they able to teach back resources for cessation? Not a smoker   Is the patient/caregiver able to teach back the hierarchy of who to call/visit for symptoms/problems? PCP, Specialist, Home health nurse, Urgent Care, ED, 911 Yes   Week 2 call completed? Yes          Yvonne Mckenna RN

## 2021-12-22 ENCOUNTER — HOSPITAL ENCOUNTER (OUTPATIENT)
Dept: CT IMAGING | Facility: HOSPITAL | Age: 81
End: 2021-12-22

## 2021-12-22 ENCOUNTER — READMISSION MANAGEMENT (OUTPATIENT)
Dept: CALL CENTER | Facility: HOSPITAL | Age: 81
End: 2021-12-22

## 2021-12-22 ENCOUNTER — TELEPHONE (OUTPATIENT)
Dept: NEUROSURGERY | Facility: CLINIC | Age: 81
End: 2021-12-22

## 2021-12-22 NOTE — TELEPHONE ENCOUNTER
Reji Bailey MD Bisacky, Kaitlyn, MA  She needs to go for an unenhanced CT of her head now.  We can do a telephone visit to go over the results unless she insist on coming in which case then make her an appointment.             Previous Messages       ----- Message -----   From: Kathleen Sy MA   Sent: 12/21/2021   4:07 PM EST   To: Reji Bailey MD   Subject: review                                           DX: Hygroma     Dr. Bailey, patient was seen by our service on 11-29-21 while you were on call. Patient was to follow-up with a new head Ct but declined. Patient spoke with PCP Dr. Luther and now wants to follow-up.     How do you want me to proceed with follow-up

## 2021-12-22 NOTE — TELEPHONE ENCOUNTER
S/W patient and informed her that she has a CT Head on 12/28/2021 and a f/u televisit on 12.30.2021

## 2021-12-22 NOTE — TELEPHONE ENCOUNTER
Patient is unable to go today for head CT as she is going out of town. I will set her up for next week.

## 2021-12-22 NOTE — TELEPHONE ENCOUNTER
Patient is scheduled for a head CT on 12-28 @ 9:45. Per Dr. Bailey patient needs telephone visit after.

## 2021-12-22 NOTE — OUTREACH NOTE
Stroke Week 3 Survey      Responses   [facility] [place] patient discharged from? [place]   Does the patient have one of the following disease processes/diagnoses(primary or secondary)? Stroke (TIA)   Week 3 attempt successful? Yes   Call start time 1257   Call end time 1300   Discharge diagnosis transient ischemic attack   Is the patient taking all medications as directed (includes completed medication regime)? Yes   Does the patient have a primary care provider?  Yes   Has the patient kept scheduled appointments due by today? Yes   Psychosocial issues? No   Does the patient require any assistance with activities of daily living such as eating, bathing, dressing, walking, etc.? No   Does the patient have any residual symptoms from stroke/TIA? No   Does the patient understand the diet ordered at discharge? Yes   What is the patient's perception of their health status since discharge? Improving   Is the patient able to teach back FAST for Stroke? Yes   Is the patient/caregiver able to teach back the risk factors for a stroke? History of TIAs,  High Cholesterol,  High blood pressure-goal below 120/80   Is the patient/caregiver able to teach back signs and symptoms related to disease process for when to call PCP? Yes   Is the patient/caregiver able to teach back signs and symptoms related to disease process for when to call 911? Yes   If the patient is a current smoker, are they able to teach back resources for cessation? Not a smoker   Is the patient/caregiver able to teach back the hierarchy of who to call/visit for symptoms/problems? PCP, Specialist, Home health nurse, Urgent Care, ED, 911 Yes   Additional teach back comments State she is doing well   Week 3 call completed? Yes   Revoked No further contact(revokes)-requires comment   Is the patient interested in additional calls from an ambulatory ?  NOTE:  applies to high risk patients requiring additional follow-up. No   Graduated/Revoked comments  Denies questions or needs at this time          Tanya Gómez, [place]

## 2021-12-27 ENCOUNTER — APPOINTMENT (OUTPATIENT)
Dept: CT IMAGING | Facility: HOSPITAL | Age: 81
End: 2021-12-27

## 2021-12-28 ENCOUNTER — TELEPHONE (OUTPATIENT)
Dept: NEUROSURGERY | Facility: CLINIC | Age: 81
End: 2021-12-28

## 2021-12-28 ENCOUNTER — HOSPITAL ENCOUNTER (OUTPATIENT)
Dept: CT IMAGING | Facility: HOSPITAL | Age: 81
Discharge: HOME | End: 2021-12-28
Admitting: NURSE PRACTITIONER

## 2021-12-28 DIAGNOSIS — D18.1 HYGROMA: ICD-10-CM

## 2021-12-28 PROCEDURE — 70450 CT HEAD/BRAIN W/O DYE: CPT

## 2021-12-28 NOTE — TELEPHONE ENCOUNTER
Received a call today from Dr. Toth regarding CT head performed today 12/28/20214 follow-up on hygroma. The study was compared to previous image November 29 of 2021. The CT shows anterior and superior lateral enlargement of the right frontal hygroma. There is also increased attenuation of the hygroma and approximately 3 mm of right-to-left midline shift. She is seeing you in the office on December 30.

## 2021-12-29 NOTE — PROGRESS NOTES
Subjective   Patient ID: Itzel Edmond is a 81 y.o. female is here today via telephone for follow-up with a new CT Head done on 12.28.2021 at [facility].    You have chosen to receive care through a telephone visit. Do you consent to use a telephone visit for your medical care today? Yes    We had a telephone visit today.  The patient was at home and I was in the office.  We talked for 5 minutes.    History of Present Illness    The patient says that she feels fine.  She has no particular complaints at all.  He has no headache and no neurologic complaints.    The following portions of the patient's history were reviewed and updated as appropriate: allergies, current medications, past family history, past medical history, past social history, past surgical history and problem list.    Review of Systems    I have reviewed the review of systems as documented by my MA.      Objective       Physical Exam  Neurological:      Mental Status: She is alert and oriented to person, place, and time.       Neurologic Exam     Mental Status   Oriented to person, place, and time.           Assessment/Plan   Independent Review of Radiographic Studies:      I personally reviewed the images from the following studies.    I reviewed her CT scan done on 28 December.  This does show a slight increase in the size of the subdural by about 3 or 4 mm.  There is about 3 mm of midline shift.    Medical Decision Making:      I told the patient about the imaging.  I told her that from my point of view I would scan her again in a couple of weeks.  I would like to see her back in the office at that time however in order to discuss the details of the plan.  She is in agreement with that plan.    Diagnoses and all orders for this visit:    1. Traumatic subdural hemorrhage without loss of consciousness, subsequent encounter (Primary)  -     CT Head Without Contrast; Future      Return in about 2 weeks (around 1/13/2022).

## 2021-12-30 ENCOUNTER — OFFICE VISIT (OUTPATIENT)
Dept: NEUROSURGERY | Facility: CLINIC | Age: 81
End: 2021-12-30

## 2021-12-30 DIAGNOSIS — S06.5X0D TRAUMATIC SUBDURAL HEMORRHAGE WITHOUT LOSS OF CONSCIOUSNESS, SUBSEQUENT ENCOUNTER: Primary | ICD-10-CM

## 2021-12-30 PROBLEM — S06.5XAA SUBDURAL HEMORRHAGE FOLLOWING INJURY (HCC): Status: ACTIVE | Noted: 2021-12-30

## 2021-12-30 PROCEDURE — 99441 PR PHYS/QHP TELEPHONE EVALUATION 5-10 MIN: CPT | Performed by: NEUROLOGICAL SURGERY

## 2022-01-04 ENCOUNTER — TELEPHONE (OUTPATIENT)
Dept: SLEEP MEDICINE | Facility: HOSPITAL | Age: 82
End: 2022-01-04

## 2022-01-12 NOTE — PROGRESS NOTES
"Subjective   Patient ID: Itzel Edmond is a 81 y.o. female is here today for 2 week follow-up with a CT Head done on 01.13.2022 at.    Today patient denies HA's, no neurologic complaints.     Patient, Provider, and MA are all wearing a mask in our office today.     History of Present Illness     Patient returns today.  She is doing pretty well overall.  She has no headaches and really no neurologic deficit at all.  Her granddaughter is with her.    The following portions of the patient's history were reviewed and updated as appropriate: allergies, current medications, past family history, past medical history, past social history, past surgical history and problem list.    Review of Systems   Constitutional: Negative for chills and fever.   HENT: Negative for congestion.    Eyes: Negative for visual disturbance.   Neurological: Negative for dizziness, seizures, light-headedness and headaches.       I have reviewed the review of systems as documented by my MA.      Objective     Vitals:    01/13/22 1521   BP: 130/80   Cuff Size: Adult   Temp: 98 °F (36.7 °C)   Weight: 64.4 kg (142 lb)   Height: 160 cm (63\")     Body mass index is 25.15 kg/m².      Physical Exam  Neurological:      Mental Status: She is alert and oriented to person, place, and time.       Neurologic Exam     Mental Status   Oriented to person, place, and time.           Assessment/Plan   Independent Review of Radiographic Studies:      I personally reviewed the images from the following studies.    I reviewed her CT which was done earlier today.  This is shown an increase in the subdural hematoma from 1.5 cm to 1.8 cm.  Thus it is gotten bigger by about 3 or 4 mm.  Midline shift has also increased from 3 to 5 mm.    Medical Decision Making:      I told the patient and her granddaughter about the imaging.  I told her that with this subdural continuing to increase in size it is unlikely to reverse course and will almost certainly continue to grow.  At " some point it would become so large that it will either cause further massive bleeding into her brain or out will cause such pressure on the brain that the brain is no longer functioning and she will die.  Consequently I think we are at a point where we need to consider surgery to drain the fluid.  I explained the risk and complications of a bur hole.  I explained the entire surgery and postoperative hospital and home course to them.  I explained that there was a 2 or 3% chance of infection, bleeding, as well as anesthetic risk which is a little increased in an 81-year-old.  Most importantly I explained that this surgery never gets rid of all the fluid and that over 95% of the time any residual fluid reabsorbs and it goes away.  About 5% of the time it begins to recur again and we have to do a much bigger surgery to evacuate it.  They seem to understand all this fairly well.  The patient is a little resistant to have surgery and so they plan to talk to the patient's daughter before making a decision.  We will give them a copy of my office note so they can remember what I said and we will wait to hear back from them as to what they want to do.  The other option is to wait another 2 weeks and scan her again but there is some risk to that.  Diagnoses and all orders for this visit:    1. Traumatic subdural hemorrhage without loss of consciousness, subsequent encounter (Primary)      Return for Recheck and call after treatment or consultation.

## 2022-01-13 ENCOUNTER — HOSPITAL ENCOUNTER (OUTPATIENT)
Dept: CT IMAGING | Facility: HOSPITAL | Age: 82
Discharge: HOME | End: 2022-01-13
Admitting: NEUROLOGICAL SURGERY

## 2022-01-13 ENCOUNTER — OFFICE VISIT (OUTPATIENT)
Dept: NEUROSURGERY | Facility: CLINIC | Age: 82
End: 2022-01-13

## 2022-01-13 VITALS
TEMPERATURE: 98 F | DIASTOLIC BLOOD PRESSURE: 80 MMHG | SYSTOLIC BLOOD PRESSURE: 130 MMHG | HEIGHT: 63 IN | WEIGHT: 142 LBS | BODY MASS INDEX: 25.16 KG/M2

## 2022-01-13 DIAGNOSIS — S06.5X0D TRAUMATIC SUBDURAL HEMORRHAGE WITHOUT LOSS OF CONSCIOUSNESS, SUBSEQUENT ENCOUNTER: Primary | ICD-10-CM

## 2022-01-13 DIAGNOSIS — S06.5X0D TRAUMATIC SUBDURAL HEMORRHAGE WITHOUT LOSS OF CONSCIOUSNESS, SUBSEQUENT ENCOUNTER: ICD-10-CM

## 2022-01-13 PROCEDURE — 70450 CT HEAD/BRAIN W/O DYE: CPT

## 2022-01-13 PROCEDURE — 99213 OFFICE O/P EST LOW 20 MIN: CPT | Performed by: NEUROLOGICAL SURGERY

## 2022-01-14 ENCOUNTER — TELEPHONE (OUTPATIENT)
Dept: NEUROSURGERY | Facility: CLINIC | Age: 82
End: 2022-01-14

## 2022-01-14 NOTE — TELEPHONE ENCOUNTER
Caller: Itzel Edmond    Relationship: Self    Best call back number:[phone]    What is the best time to reach you:ANYTIME    Who are you requesting to speak with (clinical staff, provider,  specific staff member): CLINICAL STAFF    Do you know the name of the person who called: HUI    What was the call regarding:PT CALLED AND IS REQUESTING TO SPEAK TO HUI FOR SURGERY SCHEDULING-PT STATES SHE WOULD LIKE TO GET IT SCHEDULED AND OVER WITH. PT STATES SHE WOULD LIKE TO GIVE HER DAUGHTER A TIMEFRAME AS SHE IS COMING FROM OUT OF TIME-PLEASE ADVISE THANK YOU!    Do you require a callback: YES

## 2022-01-14 NOTE — TELEPHONE ENCOUNTER
I spoke with pt. She would like to schedule surgery. I am not entirely certain what the surgery is. I have asked Dr. Bailey to put in the order so that I can schedule. Pt would like this done as soon as possible.

## 2022-01-17 ENCOUNTER — TELEPHONE (OUTPATIENT)
Dept: NEUROSURGERY | Facility: CLINIC | Age: 82
End: 2022-01-17

## 2022-01-17 ENCOUNTER — PREP FOR SURGERY (OUTPATIENT)
Dept: OTHER | Facility: HOSPITAL | Age: 82
End: 2022-01-17

## 2022-01-17 DIAGNOSIS — S06.5X0D TRAUMATIC SUBDURAL HEMORRHAGE WITHOUT LOSS OF CONSCIOUSNESS, SUBSEQUENT ENCOUNTER: Primary | ICD-10-CM

## 2022-01-18 ENCOUNTER — TELEPHONE (OUTPATIENT)
Dept: NEUROSURGERY | Facility: CLINIC | Age: 82
End: 2022-01-18

## 2022-01-18 NOTE — TELEPHONE ENCOUNTER
Please call patient's daughter(Claribel).  claribel is concerned due to there is a slight change in patients vision .  Please call Claribel [phone]    Thank you

## 2022-01-19 ENCOUNTER — HOSPITAL ENCOUNTER (INPATIENT)
Facility: HOSPITAL | Age: 82
LOS: 3 days | Discharge: HOME | End: 2022-01-23
Attending: EMERGENCY MEDICINE | Admitting: INTERNAL MEDICINE

## 2022-01-19 ENCOUNTER — TELEPHONE (OUTPATIENT)
Dept: NEUROSURGERY | Facility: CLINIC | Age: 82
End: 2022-01-19

## 2022-01-19 ENCOUNTER — APPOINTMENT (OUTPATIENT)
Dept: CT IMAGING | Facility: HOSPITAL | Age: 82
End: 2022-01-19

## 2022-01-19 DIAGNOSIS — Z86.79 HISTORY OF CORONARY ARTERY DISEASE: ICD-10-CM

## 2022-01-19 DIAGNOSIS — H53.8 BLURRED VISION: ICD-10-CM

## 2022-01-19 DIAGNOSIS — Z86.79 HISTORY OF HYPERTENSION: ICD-10-CM

## 2022-01-19 DIAGNOSIS — S06.5XAA SDH (SUBDURAL HEMATOMA): ICD-10-CM

## 2022-01-19 DIAGNOSIS — S06.5XAA SUBDURAL HEMATOMA: Primary | ICD-10-CM

## 2022-01-19 DIAGNOSIS — R41.0 CONFUSION: ICD-10-CM

## 2022-01-19 LAB
ABO GROUP BLD: NORMAL
ALBUMIN SERPL-MCNC: 4.2 G/DL (ref 3.5–5.2)
ALBUMIN/GLOB SERPL: 1.8 G/DL
ALP SERPL-CCNC: 86 U/L (ref 39–117)
ALT SERPL W P-5'-P-CCNC: 20 U/L (ref 1–33)
ANION GAP SERPL CALCULATED.3IONS-SCNC: 13 MMOL/L (ref 5–15)
APTT PPP: 26.3 SECONDS (ref 22.7–35.4)
AST SERPL-CCNC: 24 U/L (ref 1–32)
BASOPHILS # BLD AUTO: 0.02 10*3/MM3 (ref 0–0.2)
BASOPHILS NFR BLD AUTO: 0.3 % (ref 0–1.5)
BILIRUB SERPL-MCNC: 0.3 MG/DL (ref 0–1.2)
BLD GP AB SCN SERPL QL: NEGATIVE
BUN SERPL-MCNC: 26 MG/DL (ref 8–23)
BUN/CREAT SERPL: 21.3 (ref 7–25)
CALCIUM SPEC-SCNC: 8.9 MG/DL (ref 8.6–10.5)
CHLORIDE SERPL-SCNC: 100 MMOL/L (ref 98–107)
CO2 SERPL-SCNC: 27 MMOL/L (ref 22–29)
CREAT SERPL-MCNC: 1.22 MG/DL (ref 0.57–1)
DEPRECATED RDW RBC AUTO: 40.8 FL (ref 37–54)
EOSINOPHIL # BLD AUTO: 0.21 10*3/MM3 (ref 0–0.4)
EOSINOPHIL NFR BLD AUTO: 2.8 % (ref 0.3–6.2)
ERYTHROCYTE [DISTWIDTH] IN BLOOD BY AUTOMATED COUNT: 12.7 % (ref 12.3–15.4)
GFR SERPL CREATININE-BSD FRML MDRD: 42 ML/MIN/1.73
GLOBULIN UR ELPH-MCNC: 2.3 GM/DL
GLUCOSE SERPL-MCNC: 100 MG/DL (ref 65–99)
HCT VFR BLD AUTO: 33.3 % (ref 34–46.6)
HGB BLD-MCNC: 11.2 G/DL (ref 12–15.9)
IMM GRANULOCYTES # BLD AUTO: 0.01 10*3/MM3 (ref 0–0.05)
IMM GRANULOCYTES NFR BLD AUTO: 0.1 % (ref 0–0.5)
INR PPP: 1.04 (ref 0.9–1.1)
LYMPHOCYTES # BLD AUTO: 1.57 10*3/MM3 (ref 0.7–3.1)
LYMPHOCYTES NFR BLD AUTO: 21 % (ref 19.6–45.3)
MCH RBC QN AUTO: 29.7 PG (ref 26.6–33)
MCHC RBC AUTO-ENTMCNC: 33.6 G/DL (ref 31.5–35.7)
MCV RBC AUTO: 88.3 FL (ref 79–97)
MONOCYTES # BLD AUTO: 0.75 10*3/MM3 (ref 0.1–0.9)
MONOCYTES NFR BLD AUTO: 10 % (ref 5–12)
NEUTROPHILS NFR BLD AUTO: 4.92 10*3/MM3 (ref 1.7–7)
NEUTROPHILS NFR BLD AUTO: 65.8 % (ref 42.7–76)
NRBC BLD AUTO-RTO: 0 /100 WBC (ref 0–0.2)
PLATELET # BLD AUTO: 227 10*3/MM3 (ref 140–450)
PMV BLD AUTO: 10.1 FL (ref 6–12)
POTASSIUM SERPL-SCNC: 3.8 MMOL/L (ref 3.5–5.2)
PROT SERPL-MCNC: 6.5 G/DL (ref 6–8.5)
PROTHROMBIN TIME: 13.4 SECONDS (ref 11.7–14.2)
RBC # BLD AUTO: 3.77 10*6/MM3 (ref 3.77–5.28)
RH BLD: POSITIVE
SARS-COV-2 RNA RESP QL NAA+PROBE: NOT DETECTED
SODIUM SERPL-SCNC: 140 MMOL/L (ref 136–145)
T&S EXPIRATION DATE: NORMAL
WBC NRBC COR # BLD: 7.48 10*3/MM3 (ref 3.4–10.8)

## 2022-01-19 PROCEDURE — U0005 INFEC AGEN DETEC AMPLI PROBE: HCPCS | Performed by: EMERGENCY MEDICINE

## 2022-01-19 PROCEDURE — 85025 COMPLETE CBC W/AUTO DIFF WBC: CPT | Performed by: EMERGENCY MEDICINE

## 2022-01-19 PROCEDURE — U0003 INFECTIOUS AGENT DETECTION BY NUCLEIC ACID (DNA OR RNA); SEVERE ACUTE RESPIRATORY SYNDROME CORONAVIRUS 2 (SARS-COV-2) (CORONAVIRUS DISEASE [COVID-19]), AMPLIFIED PROBE TECHNIQUE, MAKING USE OF HIGH THROUGHPUT TECHNOLOGIES AS DESCRIBED BY CMS-2020-01-R: HCPCS | Performed by: EMERGENCY MEDICINE

## 2022-01-19 PROCEDURE — G0378 HOSPITAL OBSERVATION PER HR: HCPCS

## 2022-01-19 PROCEDURE — 70450 CT HEAD/BRAIN W/O DYE: CPT

## 2022-01-19 PROCEDURE — 85730 THROMBOPLASTIN TIME PARTIAL: CPT | Performed by: EMERGENCY MEDICINE

## 2022-01-19 PROCEDURE — 86900 BLOOD TYPING SEROLOGIC ABO: CPT | Performed by: EMERGENCY MEDICINE

## 2022-01-19 PROCEDURE — 80053 COMPREHEN METABOLIC PANEL: CPT | Performed by: EMERGENCY MEDICINE

## 2022-01-19 PROCEDURE — 99284 EMERGENCY DEPT VISIT MOD MDM: CPT

## 2022-01-19 PROCEDURE — 86901 BLOOD TYPING SEROLOGIC RH(D): CPT | Performed by: EMERGENCY MEDICINE

## 2022-01-19 PROCEDURE — 85610 PROTHROMBIN TIME: CPT | Performed by: EMERGENCY MEDICINE

## 2022-01-19 PROCEDURE — 86850 RBC ANTIBODY SCREEN: CPT | Performed by: EMERGENCY MEDICINE

## 2022-01-19 RX ORDER — NITROGLYCERIN 0.4 MG/1
0.4 TABLET SUBLINGUAL
Status: DISCONTINUED | OUTPATIENT
Start: 2022-01-19 | End: 2022-01-23 | Stop reason: HOSPADM

## 2022-01-19 RX ORDER — DULOXETIN HYDROCHLORIDE 60 MG/1
60 CAPSULE, DELAYED RELEASE ORAL DAILY
Status: DISCONTINUED | OUTPATIENT
Start: 2022-01-20 | End: 2022-01-23 | Stop reason: HOSPADM

## 2022-01-19 RX ORDER — UREA 10 %
3 LOTION (ML) TOPICAL NIGHTLY PRN
Status: DISCONTINUED | OUTPATIENT
Start: 2022-01-19 | End: 2022-01-23 | Stop reason: HOSPADM

## 2022-01-19 RX ORDER — PRAMIPEXOLE DIHYDROCHLORIDE 0.25 MG/1
0.25 TABLET ORAL DAILY
Status: DISCONTINUED | OUTPATIENT
Start: 2022-01-20 | End: 2022-01-23 | Stop reason: HOSPADM

## 2022-01-19 RX ORDER — AMLODIPINE BESYLATE 5 MG/1
5 TABLET ORAL DAILY
Status: DISCONTINUED | OUTPATIENT
Start: 2022-01-20 | End: 2022-01-23 | Stop reason: HOSPADM

## 2022-01-19 RX ORDER — LOSARTAN POTASSIUM 50 MG/1
50 TABLET ORAL DAILY
Status: DISCONTINUED | OUTPATIENT
Start: 2022-01-20 | End: 2022-01-23 | Stop reason: HOSPADM

## 2022-01-19 RX ORDER — ONDANSETRON 4 MG/1
4 TABLET, FILM COATED ORAL EVERY 6 HOURS PRN
Status: DISCONTINUED | OUTPATIENT
Start: 2022-01-19 | End: 2022-01-23 | Stop reason: HOSPADM

## 2022-01-19 RX ORDER — ATORVASTATIN CALCIUM 20 MG/1
20 TABLET, FILM COATED ORAL DAILY
Status: DISCONTINUED | OUTPATIENT
Start: 2022-01-20 | End: 2022-01-21

## 2022-01-19 RX ORDER — ACETAMINOPHEN 325 MG/1
650 TABLET ORAL EVERY 4 HOURS PRN
Status: DISCONTINUED | OUTPATIENT
Start: 2022-01-19 | End: 2022-01-21

## 2022-01-19 RX ORDER — ONDANSETRON 2 MG/ML
4 INJECTION INTRAMUSCULAR; INTRAVENOUS EVERY 6 HOURS PRN
Status: DISCONTINUED | OUTPATIENT
Start: 2022-01-19 | End: 2022-01-23 | Stop reason: HOSPADM

## 2022-01-19 NOTE — TELEPHONE ENCOUNTER
CALLED PATIENT'S DAUGHTER BACK, AND PER JASON, I ADVISED THEM TO COME TO [facility] ED. I INFORMED [place] THAT JUST TO BE SAFE WE WOULD LIKE HER TO BE SEEN AT THE ED GIVING THAT DR. REYES HAS ALREADY DISCUSSED SURGERY AND TOLD THE PATIENT THAT SHE DOES NOT NEED TO WAIT ANY LONGER THAN 2 WEEKS TO HAVE SURGERY. PATIENTS VISION IS BEGINNING TO BE AFFECTED AND IF SHE REPORTS TO THE ED, THEN THE NEUROSURGEON ON CALL IS ABLE TO COME OVER AND EVALUATE HER IF NEED BE

## 2022-01-19 NOTE — ED TRIAGE NOTES
"Patient to er with family at side with c/o she was dx with traumatic subdural hemorrhage back in December related to fall. Family reported she is to have surgery on this but can not get in.\" family reported patient is now having vision changes. Reported no blood thinners.  Family reported she had MRI on Thursday which showed midline shift per family report. Patient has mask on in triage.   "

## 2022-01-19 NOTE — TELEPHONE ENCOUNTER
Pt is calling about surgery scheduling for right frontal no hole for subdural hematoma. Her daughter is in town from [place] to be with her.

## 2022-01-19 NOTE — ED PROVIDER NOTES
EMERGENCY DEPARTMENT ENCOUNTER    Room Number:  19/19  Date of encounter:  1/19/2022  PCP: Rhoda Luther MD  Historian: Patient, daughter      HPI:  Chief Complaint: Subdural hematoma  A complete HPI/ROS/PMH/PSH/SH/FH are unobtainable due to: None    Context: Itzel Edmond is a 81 y.o. female who presents to the ED via private vehicle for evaluation of worsening blurry vision over last couple days.  Patient reportedly diagnosed with a traumatic subdural hematoma back in December secondary to a fall.  Has been following up with neurosurgery for this, had a repeat head CT on 13 January which showed progression of the subdural hematoma with little bit of midline shift.  I was in discussions with neurosurgery about operative intervention for this, but was having difficulty with scheduling.  Ultimately over last few days with developing blurry vision and some comprehension confusion issues, they came to the ER.  Denies any numbness or weakness of the arms or legs, no significant headache but did describe some pressure in her head.  No nausea, vomiting, diarrhea.  Eating and drinking well, no balance or ambulatory issues.  Has not been taking her aspirin this week.      MEDICAL RECORD REVIEW    CT head without contrast January 13, 2022       IMPRESSION:  Moderate-sized isodense subdural hematoma overlying the right frontal lobe showing increase in size since the preceding CT dated 12/20/2021. Mild mass effect with right-to-left shift of the midline structures has also increased. No new abnormalities are identified.     This report was finalized on 1/13/2022 2:54 PM by Dr. Diego Garcia M.D.    Chart review in epic shows an office visit with Dr. Luther on December 17, 2021 for hospital follow-up, reportedly had been seen at an unclear outside hospital for a fall hitting the back of her head.  Developed a ataxia and slurred speech and was admitted for possible TIA.  MRI revealed bilateral hygromas age-indeterminate.   Neurosurgery follow-up recommended at that time.  Did follow-up with neurosurgery in the outpatient setting, they were notified of a follow-up head CT on December 28, 2021 for an enlarging anterior superior lateral right frontal hygroma with 3 mm midline shift at that time.  Saw Dr. Bailey in the office on December 30 with plans for repeat imaging in a couple of weeks.  She saw him again on the 13th, with discussions about operative intervention at that time, patient decided to wait overnight and think about it.  She did call back the next day looking to schedule the surgery.    PAST MEDICAL HISTORY  Active Ambulatory Problems     Diagnosis Date Noted   • Hypertension 07/05/2016   • Obstructive sleep apnea syndrome 07/05/2016   • Premature atrial complexes 07/05/2016   • Hyperlipidemia 10/02/2018   • Restless leg syndrome 10/02/2018   • Heart murmur 01/15/2019   • CAD (coronary artery disease) 01/16/2019   • JAMES (generalized anxiety disorder) 01/20/2020   • Slow transit constipation 09/22/2020   • TIA (transient ischemic attack) 11/28/2021   • Subdural hemorrhage following injury (HCC) 12/30/2021     Resolved Ambulatory Problems     Diagnosis Date Noted   • Chest pain with high risk for cardiac etiology 01/05/2019     Past Medical History:   Diagnosis Date   • Allergic rhinitis    • Atherosclerosis    • Carotid stenosis    • Cataract    • Cellulitis of right leg 06/2016   • Chest discomfort 01/2019   • Claudication of lower extremity (HCC)    • CMC arthritis    • Colon polyps    • Cystitis 11/19/2019   • WOODARD (dyspnea on exertion) 10/2020   • Dysphagia    • Elevated serum creatinine 02/2021   • Environmental allergies    • Exercise intolerance    • GERD (gastroesophageal reflux disease)    • Hemorrhoids    • Hyperglycemia    • Hyperkalemia 02/2020   • Influenza 02/14/2018   • Insomnia    • Myalgia 05/2016   • Neuropathy 09/2017   • OAB (overactive bladder)    • JHOANA (obstructive sleep apnea)    • PLMD (periodic limb  movement disorder)    • Precordial pain 2019   • Rectal bleeding    • Rectal prolapse 10/2020   • Rectal ulcer 10/02/2020   • Rectocele 2019   • Renal insufficiency    • SAB (spontaneous )    • Serum potassium elevated 2016   • Tachycardia 2013   • Trigger finger of left thumb 10/21/2019   • Urge incontinence    • Urinary frequency    • Vaginal atrophy          PAST SURGICAL HISTORY  Past Surgical History:   Procedure Laterality Date   • ANTERIOR AND POSTERIOR VAGINAL REPAIR N/A    • BACK SURGERY     • BILATERAL SALPINGO OOPHORECTOMY Bilateral    • BLADDER SURGERY N/A     BLADDER SLING   • CARDIAC CATHETERIZATION N/A 2019    Procedure: Left Heart Cath;  Surgeon: Christopher Rosenbaum MD;  Location:  [facility];  Service: Cardiology   • CARDIAC CATHETERIZATION N/A 2019    Procedure: Coronary angiography;  Surgeon: Christopher Rosenbaum MD;  Location:  [facility];  Service: Cardiology   • CARDIAC CATHETERIZATION N/A 2019    Procedure: Left ventriculography;  Surgeon: Christopher Rosenbaum MD;  Location:  [facility];  Service: Cardiology   • CATARACT EXTRACTION Left 12/15/2015    WITH LENS IMPLANT, DR. AMRIT DIAZ AT [facility]   • CATARACT EXTRACTION Right 2015     WITH LENS IMPLANT, DR. AMRIT DIAZ AT [facility]   • COLONOSCOPY N/A 10/2/2020    2 TUBULAR ADENOMA POLYPS IN TRANSVERSE, HEMORRHOIDS, DIVERTICULOSIS, 5 MM ULCER IN RECTUM, OTHER BIOPSIES BENIGN, DR. NOLBERTO REYNOSO AT [facility]   • COLONOSCOPY W/ POLYPECTOMY N/A 2015    1 CM TUBULAR ADENOMA POLYP IN TRANSVERSE, MODERATE DIVERTICULOSIS, HEMORRHOIDS, RESCOPE IN 5 YRS, DR. MAYA SAHU AT [place]   • HEMORROIDECTOMY N/A    • HERNIA REPAIR     • HYSTERECTOMY N/A 1970    [facility]         FAMILY HISTORY  Family History   Problem Relation Age of Onset   • Heart disease Mother    • Stroke Mother    • Heart attack Father    • Heart disease Father    • Heart  disease Sister         s/p CABG   • Heart attack Brother    • Heart disease Brother         s/p CABG   • Hypertension Other    • Heart disease Maternal Grandmother    • Stroke Maternal Grandmother          SOCIAL HISTORY  Social History     Socioeconomic History   • Marital status:    Tobacco Use   • Smoking status: Never Smoker   • Smokeless tobacco: Never Used   Substance and Sexual Activity   • Alcohol use: No     Comment: caffeine use: 1 cup daily   • Drug use: Never   • Sexual activity: Not Currently     Birth control/protection: Post-menopausal, Surgical     Comment: .         ALLERGIES  Gabapentin, Pregabalin, Sulfamethoxazole-trimethoprim, Ace inhibitors, and Lisinopril        REVIEW OF SYSTEMS  Review of Systems     All systems reviewed and negative except for those discussed in HPI.       PHYSICAL EXAM    I have reviewed the triage vital signs and nursing notes.    ED Triage Vitals [01/19/22 1702]   Temp Heart Rate Resp BP SpO2   98.2 °F (36.8 °C) 85 20 -- 97 %      Temp src Heart Rate Source Patient Position BP Location FiO2 (%)   Tympanic -- -- -- --       Physical Exam  General: No acute distress, nontoxic  HEENT: Mucous membranes moist, atraumatic, EOMI  Neck: Full ROM  Pulm: Symmetric chest rise, nonlabored, lungs CTAB  Cardiovascular: Regular rate and rhythm, intact distal pulses  GI: Soft, nontender, nondistended, no rebound, no guarding, bowel sounds present  MSK: Full ROM, no deformity  Skin: Warm, dry  Neuro: Awake, alert, oriented x 4, GCS 15, no dysarthria or aphasia, 5 out of 5 strength and sensation bilateral upper and lower extremities, no ataxia, moving all extremities, no focal deficits  Psych: Calm, cooperative      N95, protective eye goggles, and gloves used during this encounter. Patient in surgical mask.      LAB RESULTS  Recent Results (from the past 24 hour(s))   Comprehensive Metabolic Panel    Collection Time: 01/19/22  6:10 PM    Specimen: Arm, Left; Blood   Result  Value Ref Range    Glucose 100 (H) 65 - 99 mg/dL    BUN 26 (H) 8 - 23 mg/dL    Creatinine 1.22 (H) 0.57 - 1.00 mg/dL    Sodium 140 136 - 145 mmol/L    Potassium 3.8 3.5 - 5.2 mmol/L    Chloride 100 98 - 107 mmol/L    CO2 27.0 22.0 - 29.0 mmol/L    Calcium 8.9 8.6 - 10.5 mg/dL    Total Protein 6.5 6.0 - 8.5 g/dL    Albumin 4.20 3.50 - 5.20 g/dL    ALT (SGPT) 20 1 - 33 U/L    AST (SGOT) 24 1 - 32 U/L    Alkaline Phosphatase 86 39 - 117 U/L    Total Bilirubin 0.3 0.0 - 1.2 mg/dL    eGFR Non African Amer 42 (L) >60 mL/min/1.73    Globulin 2.3 gm/dL    A/G Ratio 1.8 g/dL    BUN/Creatinine Ratio 21.3 7.0 - 25.0    Anion Gap 13.0 5.0 - 15.0 mmol/L   CBC Auto Differential    Collection Time: 01/19/22  6:10 PM    Specimen: Arm, Left; Blood   Result Value Ref Range    WBC 7.48 3.40 - 10.80 10*3/mm3    RBC 3.77 3.77 - 5.28 10*6/mm3    Hemoglobin 11.2 (L) 12.0 - 15.9 g/dL    Hematocrit 33.3 (L) 34.0 - 46.6 %    MCV 88.3 79.0 - 97.0 fL    MCH 29.7 26.6 - 33.0 pg    MCHC 33.6 31.5 - 35.7 g/dL    RDW 12.7 12.3 - 15.4 %    RDW-SD 40.8 37.0 - 54.0 fl    MPV 10.1 6.0 - 12.0 fL    Platelets 227 140 - 450 10*3/mm3    Neutrophil % 65.8 42.7 - 76.0 %    Lymphocyte % 21.0 19.6 - 45.3 %    Monocyte % 10.0 5.0 - 12.0 %    Eosinophil % 2.8 0.3 - 6.2 %    Basophil % 0.3 0.0 - 1.5 %    Immature Grans % 0.1 0.0 - 0.5 %    Neutrophils, Absolute 4.92 1.70 - 7.00 10*3/mm3    Lymphocytes, Absolute 1.57 0.70 - 3.10 10*3/mm3    Monocytes, Absolute 0.75 0.10 - 0.90 10*3/mm3    Eosinophils, Absolute 0.21 0.00 - 0.40 10*3/mm3    Basophils, Absolute 0.02 0.00 - 0.20 10*3/mm3    Immature Grans, Absolute 0.01 0.00 - 0.05 10*3/mm3    nRBC 0.0 0.0 - 0.2 /100 WBC   Protime-INR    Collection Time: 01/19/22  6:11 PM    Specimen: Arm, Left; Blood   Result Value Ref Range    Protime 13.4 11.7 - 14.2 Seconds    INR 1.04 0.90 - 1.10   aPTT    Collection Time: 01/19/22  6:11 PM    Specimen: Arm, Left; Blood   Result Value Ref Range    PTT 26.3 22.7 - 35.4 seconds    Type & Screen    Collection Time: 01/19/22  6:11 PM    Specimen: Arm, Left; Blood   Result Value Ref Range    ABO Type B     RH type Positive     Antibody Screen Negative     T&S Expiration Date 1/22/2022 11:59:59 PM    COVID-19,[facility]-HOU[facility] CEPHEID/LY NP SWAB IN TRANSPORT MEDIA 8-12 HR TAT - Swab, Nasopharynx    Collection Time: 01/19/22  6:27 PM    Specimen: Nasopharynx; Swab   Result Value Ref Range    COVID19 Not Detected Not Detected - Ref. Range       Ordered the above labs and independently reviewed the results.        RADIOLOGY  CT Head Without Contrast    Result Date: 1/19/2022  EXAM:  CT HEAD WO CONTRAST-  HISTORY:  Enlarging subdural hematoma, increasing visual changes.  COMPARISON:  CT head without contrast 01/13/2022  TECHNIQUE: Noncontrast images of the brain were obtained. Reformatted images were reviewed. Radiation dose reduction techniques were utilized, including automated exposure control and exposure modulation based on body size.  FINDINGS:   There is redemonstration of a right cerebral hemispheric mixed density subdural hematoma, which may be slightly less dense compared to 01/13/2022. This is not significantly changed in size from 01/13/2022, measuring up to 1.4 cm overlying the anterior inferior right frontal lobe, previously 1.4 cm, 1.2 cm overlying the anterior right frontal lobe, previously 1.1 cm, and 1.5 cm overlying the posterior right frontal lobe, previously 1.5 cm. There is corresponding mass effect on the underlying brain parenchyma with partial effacement of the right lateral ventricle and 0.5 cm leftward midline shift at the level of the septum pellucidum, which is not significantly changed, previously 0.5 cm. No new intracranial hemorrhage is identified. There is mild global parenchymal volume loss. The basal cisterns are patent. Chronic small vessel ischemic disease is not significantly changed. The grey-white matter differentiation is maintained. There is calcific  intracranial atherosclerosis with involvement of the posterior circulation.       No significant change in size of a mixed density right cerebral hemispheric subdural hematoma and corresponding mass effect. No new intracranial hemorrhage is identified. If there is clinical concern for acute infarction, this would be better assessed with MRI.  Discussed with Dr. Romo at 6:30 PM.  This report was finalized on 1/19/2022 6:30 PM by Dr. Manasa Rogers M.D.        I ordered the above noted radiological studies. Reviewed by me.  See dictation for official radiology interpretation.      PROCEDURES    Procedures      MEDICATIONS GIVEN IN ER    Medications   nitroglycerin (NITROSTAT) SL tablet 0.4 mg (has no administration in time range)   acetaminophen (TYLENOL) tablet 650 mg (has no administration in time range)   ondansetron (ZOFRAN) tablet 4 mg (has no administration in time range)     Or   ondansetron (ZOFRAN) injection 4 mg (has no administration in time range)   melatonin tablet 3 mg (has no administration in time range)         PROGRESS, DATA ANALYSIS, CONSULTS, AND MEDICAL DECISION MAKING    All labs have been independently reviewed by me.  All radiology studies have been reviewed by me and discussed with radiologist dictating the report.   EKG's independently viewed and interpreted by me.  Discussion below represents my analysis of pertinent findings related to patient's condition, differential diagnosis, treatment plan and final disposition.    Plan for repeat imaging of the head, lab work-up, plan for hospitalization.  Overall patient is well-appearing and in no acute distress.    ED Course as of 01/19/22 2142 Wed Jan 19, 2022   1835 Discussed with Dr. Rogers, Radiologist, CT appears stable as compared to January 13 [DC]   1836 WBC: 7.48 [DC]   1836 Hemoglobin(!): 11.2 [DC]   1858 Discussed with Dr. Hilario, Neurosurgery, discussed patient's clinical course and findings today, treatment modalities, and need for  hospitalization.  He will consult. [DC]   1906 Discussed with Dr. Meza, hospitalist, discussed patient's clinical course and findings today, treatment modalities, and need for hospitalization. [DC]      ED Course User Index  [DC] Aleksey Romo MD       AS OF 21:42 EST VITALS:    BP - 140/53  HR - 72  TEMP - 98.2 °F (36.8 °C) (Tympanic)  02 SATS - 96%        DIAGNOSIS  Final diagnoses:   Subdural hematoma (HCC)   Confusion   Blurred vision   History of hypertension   History of coronary artery disease         DISPOSITION  HOSPITALIZATION    Discussed treatment plan and reason for hospitalization with pt/family and hospitalizing physician.  Pt/family voiced understanding of the plan for hospitalization for further testing/treatment as needed.                  Aleksey Romo MD  01/19/22 5884

## 2022-01-20 LAB
ANION GAP SERPL CALCULATED.3IONS-SCNC: 9.9 MMOL/L (ref 5–15)
BUN SERPL-MCNC: 25 MG/DL (ref 8–23)
BUN/CREAT SERPL: 25 (ref 7–25)
CALCIUM SPEC-SCNC: 9.2 MG/DL (ref 8.6–10.5)
CHLORIDE SERPL-SCNC: 101 MMOL/L (ref 98–107)
CO2 SERPL-SCNC: 29.1 MMOL/L (ref 22–29)
CREAT SERPL-MCNC: 1 MG/DL (ref 0.57–1)
DEPRECATED RDW RBC AUTO: 41.4 FL (ref 37–54)
ERYTHROCYTE [DISTWIDTH] IN BLOOD BY AUTOMATED COUNT: 12.7 % (ref 12.3–15.4)
GFR SERPL CREATININE-BSD FRML MDRD: 53 ML/MIN/1.73
GLUCOSE SERPL-MCNC: 96 MG/DL (ref 65–99)
HCT VFR BLD AUTO: 34.5 % (ref 34–46.6)
HGB BLD-MCNC: 11.5 G/DL (ref 12–15.9)
MCH RBC QN AUTO: 29.8 PG (ref 26.6–33)
MCHC RBC AUTO-ENTMCNC: 33.3 G/DL (ref 31.5–35.7)
MCV RBC AUTO: 89.4 FL (ref 79–97)
PLATELET # BLD AUTO: 236 10*3/MM3 (ref 140–450)
PMV BLD AUTO: 10.4 FL (ref 6–12)
POTASSIUM SERPL-SCNC: 3.8 MMOL/L (ref 3.5–5.2)
RBC # BLD AUTO: 3.86 10*6/MM3 (ref 3.77–5.28)
SODIUM SERPL-SCNC: 140 MMOL/L (ref 136–145)
WBC NRBC COR # BLD: 7.59 10*3/MM3 (ref 3.4–10.8)

## 2022-01-20 PROCEDURE — 99214 OFFICE O/P EST MOD 30 MIN: CPT | Performed by: NURSE PRACTITIONER

## 2022-01-20 PROCEDURE — 80048 BASIC METABOLIC PNL TOTAL CA: CPT | Performed by: INTERNAL MEDICINE

## 2022-01-20 PROCEDURE — 36415 COLL VENOUS BLD VENIPUNCTURE: CPT | Performed by: INTERNAL MEDICINE

## 2022-01-20 PROCEDURE — 85027 COMPLETE CBC AUTOMATED: CPT | Performed by: INTERNAL MEDICINE

## 2022-01-20 RX ADMIN — Medication 3 MG: at 23:38

## 2022-01-20 RX ADMIN — AMLODIPINE BESYLATE 5 MG: 5 TABLET ORAL at 10:53

## 2022-01-20 RX ADMIN — DULOXETINE HYDROCHLORIDE 60 MG: 60 CAPSULE, DELAYED RELEASE ORAL at 10:53

## 2022-01-20 RX ADMIN — PRAMIPEXOLE DIHYDROCHLORIDE 0.25 MG: 0.25 TABLET ORAL at 10:53

## 2022-01-20 RX ADMIN — LOSARTAN POTASSIUM 50 MG: 50 TABLET ORAL at 10:53

## 2022-01-20 RX ADMIN — ATORVASTATIN CALCIUM 20 MG: 20 TABLET, FILM COATED ORAL at 10:53

## 2022-01-20 NOTE — PROGRESS NOTES
Discharge Planning Assessment  [facility]     Patient Name: Itzel Edmond  MRN: [id number]  Today's Date: 1/20/2022    Admit Date: 1/19/2022     Discharge Needs Assessment     Row Name 01/20/22 1336       Living Environment    Lives With alone    Current Living Arrangements home/apartment/condo    Primary Care Provided by self    Provides Primary Care For no one    Family Caregiver if Needed child(emmanuel), adult    Family Caregiver Names daughter, Kaci Gordon, [phone]    Quality of Family Relationships helpful; involved; supportive    Able to Return to Prior Arrangements yes       Resource/Environmental Concerns    Resource/Environmental Concerns none       Transition Planning    Patient/Family Anticipates Transition to home    Patient/Family Anticipated Services at Transition none    Transportation Anticipated family or friend will provide       Discharge Needs Assessment    Equipment Currently Used at Home bipap    Concerns to be Addressed discharge planning    Discharge Coordination/Progress Home               Discharge Plan     Row Name 01/20/22 4234       Plan    Plan Home, follow for needs    Patient/Family in Agreement with Plan yes    Plan Comments ROTH notice checked. CCP met with pt and daughter at bedside to verify information and discuss d/c planning. Pt resides alone in a single level home, uses bipap, and denies past HH/SNF. Pt uses [facility]. Pt uncertain of needs pending treatment course. CCP to follow. Isabelle Harper LCSW              Continued Care and Services - Admitted Since 1/19/2022    Coordination has not been started for this encounter.       Expected Discharge Date and Time     Expected Discharge Date Expected Discharge Time    Jan 21, 2022          Demographic Summary     Row Name 01/20/22 133       General Information    Admission Type observation    Arrived From home    Required Notices Provided Observation Status Notice    Referral Source admission  list    Reason for Consult discharge planning    Preferred Language English               Functional Status     Row Name 01/20/22 1336       Functional Status    Usual Activity Tolerance good    Current Activity Tolerance good       Functional Status, IADL    Medications independent    Meal Preparation independent    Housekeeping independent    Laundry independent    Shopping independent       Mental Status Summary    Recent Changes in Mental Status/Cognitive Functioning no changes               Psychosocial    No documentation.                Abuse/Neglect    No documentation.                Legal    No documentation.                Substance Abuse    No documentation.                Patient Forms    No documentation.                   Terri Harper LCSW

## 2022-01-20 NOTE — CONSULTS
[facility], [place], operated by [facility] NEUROSURGERY CONSULT NOTE    Patient name: Itzel Edmond  Referring Provider: Aleksey Romo MD  Reason for Consultation: Management of Condition    Patient Care Team:  Rhoda Luther MD as PCP - General (Internal Medicine)  Sandra Winters MD as Consulting Physician (Cardiology)    Chief complaint: pressure temporal right side of her head, blurred vision    Subjective .     History of present illness:    Patient is a 81 y.o. right handed female  Dr. Bailey previously saw on 1/13 for a SDH that had increased in size from 1.5cm to 1.8cm and midline shift that had increased from 3 to 5 mm.  Dr. Bailey had discussed the possibility of a no hole and she was to return in 2 weeks with another CT head.  She was told to go home and think about the surgery, as there was no urgency, and she could return in 2 weeks with a CT head.  Daughter states that they went home and thought about it, the next day they decided they wanted surgery, and she had tried to make an appointment with Dr. Bailey, and he was unavailable.  In addition, she was having blurred vision, and pressure in the right temporal area and she was told to go to the ER.          Review of Systems  Review of Systems   Constitutional: Negative for activity change.   Eyes: Positive for visual disturbance ( upon admission, not presently).   Respiratory: Negative for chest tightness.    Cardiovascular: Negative for chest pain.   Genitourinary: Negative for difficulty urinating.   Musculoskeletal: Negative for gait problem.   Neurological: Negative for dizziness and speech difficulty.   Psychiatric/Behavioral: Negative for confusion.   All other systems reviewed and are negative.      History  PAST MEDICAL HISTORY  Past Medical History:   Diagnosis Date   • Allergic rhinitis    • Atherosclerosis    • CAD (coronary artery disease) 1/16/2019    Overview:  Mild non obstructive CAD on cath 1/2019, medical mgmt recommended   • Carotid stenosis    • Cataract     • Cellulitis of right leg 2016   • Chest discomfort 2019   • Claudication of lower extremity (HCC)    • CMC arthritis    • Colon polyps     FOLLOWED BY DR. NOLBERTO REYNOSO   • Cystitis 2019   • WOODARD (dyspnea on exertion) 10/2020   • Dysphagia    • Elevated serum creatinine 2021   • Environmental allergies    • Exercise intolerance    • JAMES (generalized anxiety disorder) 2020   • GERD (gastroesophageal reflux disease)    • Heart murmur    • Hemorrhoids    • Hyperglycemia    • Hyperkalemia 2020   • Hyperlipidemia     MIXED   • Hypertension    • Influenza 2018   • Insomnia    • Myalgia 2016   • Neuropathy 2017   • OAB (overactive bladder)    • JHOANA (obstructive sleep apnea)     BiPap   • PLMD (periodic limb movement disorder)    • Precordial pain 2019   • Premature atrial complexes    • Rectal bleeding    • Rectal prolapse 10/2020   • Rectal ulcer 10/02/2020    5 MM SINGLE ULCER ON CY   • Rectocele 2019   • Renal insufficiency    • Restless leg syndrome 10/2/2018   • SAB (spontaneous )      A1   • Serum potassium elevated 2016   • Slow transit constipation 2020   • Tachycardia 2013   • Trigger finger of left thumb 10/21/2019   • Urge incontinence    • Urinary frequency    • Vaginal atrophy        PAST SURGICAL HISTORY  Past Surgical History:   Procedure Laterality Date   • ANTERIOR AND POSTERIOR VAGINAL REPAIR N/A    • BACK SURGERY     • BILATERAL SALPINGO OOPHORECTOMY Bilateral    • BLADDER SURGERY N/A     BLADDER SLING   • CARDIAC CATHETERIZATION N/A 2019    Procedure: Left Heart Cath;  Surgeon: Christopher Rosenbaum MD;  Location:  [facility];  Service: Cardiology   • CARDIAC CATHETERIZATION N/A 2019    Procedure: Coronary angiography;  Surgeon: Christopher Rosenbaum MD;  Location:  [facility];  Service: Cardiology   • CARDIAC CATHETERIZATION N/A 2019    Procedure: Left ventriculography;  Surgeon:  Christopher Rosenbaum MD;  Location:  [facility];  Service: Cardiology   • CATARACT EXTRACTION Left 12/15/2015    WITH LENS IMPLANT, DR. AMRIT DIAZ AT [facility]   • CATARACT EXTRACTION Right 12/01/2015     WITH LENS IMPLANT, DR. AMRIT DIAZ AT [facility]   • COLONOSCOPY N/A 10/2/2020    2 TUBULAR ADENOMA POLYPS IN TRANSVERSE, HEMORRHOIDS, DIVERTICULOSIS, 5 MM ULCER IN RECTUM, OTHER BIOPSIES BENIGN, DR. NOLBERTO REYNOSO AT [facility]   • COLONOSCOPY W/ POLYPECTOMY N/A 08/24/2015    1 CM TUBULAR ADENOMA POLYP IN TRANSVERSE, MODERATE DIVERTICULOSIS, HEMORRHOIDS, RESCOPE IN 5 YRS, DR. MAYA SAHU AT [place]   • HEMORROIDECTOMY N/A    • HERNIA REPAIR     • HYSTERECTOMY N/A 1970    OVARIES IN [facility]       FAMILY HISTORY  Family History   Problem Relation Age of Onset   • Heart disease Mother    • Stroke Mother    • Heart attack Father    • Heart disease Father    • Heart disease Sister         s/p CABG   • Heart attack Brother    • Heart disease Brother         s/p CABG   • Hypertension Other    • Heart disease Maternal Grandmother    • Stroke Maternal Grandmother        SOCIAL HISTORY  Social History     Tobacco Use   • Smoking status: Never Smoker   • Smokeless tobacco: Never Used   Substance Use Topics   • Alcohol use: No     Comment: caffeine use: 1 cup daily   • Drug use: Never       unemployed    Allergies:  Gabapentin, Pregabalin, Sulfamethoxazole-trimethoprim, Ace inhibitors, and Lisinopril    MEDICATIONS:  Medications Prior to Admission   Medication Sig Dispense Refill Last Dose   • amLODIPine (NORVASC) 5 MG tablet Take 1 tablet by mouth Daily. 90 tablet 3    • aspirin 81 MG tablet Take 1 tablet by mouth daily. 90 tablet 3    • atorvastatin (LIPITOR) 20 MG tablet TAKE 1 TABLET EVERY DAY 90 tablet 3    • chlorhexidine (PERIDEX) 0.12 % solution USE TO BRUSH IMPLANTS TWICE DAILY      • DULoxetine (CYMBALTA) 60 MG capsule Take 60 mg by mouth Daily.      • hydroCHLOROthiazide (HYDRODIURIL) 25 MG tablet  Take 25 mg by mouth Daily.      • losartan (COZAAR) 50 MG tablet Take 1 tablet by mouth Daily. 90 tablet 3    • pramipexole (MIRAPEX) 0.25 MG tablet Take 0.25 mg by mouth Daily.          Objective     Results Review:  LABS:    Admission on 01/19/2022   Component Date Value Ref Range Status   • Glucose 01/19/2022 100* 65 - 99 mg/dL Final   • BUN 01/19/2022 26* 8 - 23 mg/dL Final   • Creatinine 01/19/2022 1.22* 0.57 - 1.00 mg/dL Final   • Sodium 01/19/2022 140  136 - 145 mmol/L Final   • Potassium 01/19/2022 3.8  3.5 - 5.2 mmol/L Final   • Chloride 01/19/2022 100  98 - 107 mmol/L Final   • CO2 01/19/2022 27.0  22.0 - 29.0 mmol/L Final   • Calcium 01/19/2022 8.9  8.6 - 10.5 mg/dL Final   • Total Protein 01/19/2022 6.5  6.0 - 8.5 g/dL Final   • Albumin 01/19/2022 4.20  3.50 - 5.20 g/dL Final   • ALT (SGPT) 01/19/2022 20  1 - 33 U/L Final   • AST (SGOT) 01/19/2022 24  1 - 32 U/L Final   • Alkaline Phosphatase 01/19/2022 86  39 - 117 U/L Final   • Total Bilirubin 01/19/2022 0.3  0.0 - 1.2 mg/dL Final   • eGFR Non African Amer 01/19/2022 42* >60 mL/min/1.73 Final   • Globulin 01/19/2022 2.3  gm/dL Final   • A/G Ratio 01/19/2022 1.8  g/dL Final   • BUN/Creatinine Ratio 01/19/2022 21.3  7.0 - 25.0 Final   • Anion Gap 01/19/2022 13.0  5.0 - 15.0 mmol/L Final   • Protime 01/19/2022 13.4  11.7 - 14.2 Seconds Final   • INR 01/19/2022 1.04  0.90 - 1.10 Final   • PTT 01/19/2022 26.3  22.7 - 35.4 seconds Final   • ABO Type 01/19/2022 B   Final   • RH type 01/19/2022 Positive   Final   • Antibody Screen 01/19/2022 Negative   Final   • T&S Expiration Date 01/19/2022 1/22/2022 11:59:59 PM   Final   • WBC 01/19/2022 7.48  3.40 - 10.80 10*3/mm3 Final   • RBC 01/19/2022 3.77  3.77 - 5.28 10*6/mm3 Final   • Hemoglobin 01/19/2022 11.2* 12.0 - 15.9 g/dL Final   • Hematocrit 01/19/2022 33.3* 34.0 - 46.6 % Final   • MCV 01/19/2022 88.3  79.0 - 97.0 fL Final   • MCH 01/19/2022 29.7  26.6 - 33.0 pg Final   • MCHC 01/19/2022 33.6  31.5 - 35.7  g/dL Final   • RDW 01/19/2022 12.7  12.3 - 15.4 % Final   • RDW-SD 01/19/2022 40.8  37.0 - 54.0 fl Final   • MPV 01/19/2022 10.1  6.0 - 12.0 fL Final   • Platelets 01/19/2022 227  140 - 450 10*3/mm3 Final   • Neutrophil % 01/19/2022 65.8  42.7 - 76.0 % Final   • Lymphocyte % 01/19/2022 21.0  19.6 - 45.3 % Final   • Monocyte % 01/19/2022 10.0  5.0 - 12.0 % Final   • Eosinophil % 01/19/2022 2.8  0.3 - 6.2 % Final   • Basophil % 01/19/2022 0.3  0.0 - 1.5 % Final   • Immature Grans % 01/19/2022 0.1  0.0 - 0.5 % Final   • Neutrophils, Absolute 01/19/2022 4.92  1.70 - 7.00 10*3/mm3 Final   • Lymphocytes, Absolute 01/19/2022 1.57  0.70 - 3.10 10*3/mm3 Final   • Monocytes, Absolute 01/19/2022 0.75  0.10 - 0.90 10*3/mm3 Final   • Eosinophils, Absolute 01/19/2022 0.21  0.00 - 0.40 10*3/mm3 Final   • Basophils, Absolute 01/19/2022 0.02  0.00 - 0.20 10*3/mm3 Final   • Immature Grans, Absolute 01/19/2022 0.01  0.00 - 0.05 10*3/mm3 Final   • nRBC 01/19/2022 0.0  0.0 - 0.2 /100 WBC Final   • COVID19 01/19/2022 Not Detected  Not Detected - Ref. Range Final   • Glucose 01/20/2022 96  65 - 99 mg/dL Final   • BUN 01/20/2022 25* 8 - 23 mg/dL Final   • Creatinine 01/20/2022 1.00  0.57 - 1.00 mg/dL Final   • Sodium 01/20/2022 140  136 - 145 mmol/L Final   • Potassium 01/20/2022 3.8  3.5 - 5.2 mmol/L Final   • Chloride 01/20/2022 101  98 - 107 mmol/L Final   • CO2 01/20/2022 29.1* 22.0 - 29.0 mmol/L Final   • Calcium 01/20/2022 9.2  8.6 - 10.5 mg/dL Final   • eGFR Non African Amer 01/20/2022 53* >60 mL/min/1.73 Final   • BUN/Creatinine Ratio 01/20/2022 25.0  7.0 - 25.0 Final   • Anion Gap 01/20/2022 9.9  5.0 - 15.0 mmol/L Final   • WBC 01/20/2022 7.59  3.40 - 10.80 10*3/mm3 Final   • RBC 01/20/2022 3.86  3.77 - 5.28 10*6/mm3 Final   • Hemoglobin 01/20/2022 11.5* 12.0 - 15.9 g/dL Final   • Hematocrit 01/20/2022 34.5  34.0 - 46.6 % Final   • MCV 01/20/2022 89.4  79.0 - 97.0 fL Final   • MCH 01/20/2022 29.8  26.6 - 33.0 pg Final   • MCHC  01/20/2022 33.3  31.5 - 35.7 g/dL Final   • RDW 01/20/2022 12.7  12.3 - 15.4 % Final   • RDW-SD 01/20/2022 41.4  37.0 - 54.0 fl Final   • MPV 01/20/2022 10.4  6.0 - 12.0 fL Final   • Platelets 01/20/2022 236  140 - 450 10*3/mm3 Final       DIAGNOSTICS:  CT head-year-old redemonstration of a right cerebral hemispheric mixed density subdural hematoma, possibly slightly less dense than compared to 1/13/2022, and not significantly changed.  The right cerebral hemispheric mixed density subdural hematoma previously measuring 1.4 overlying the anterior inferior right frontal lobe, presently 1.4, 1.2 cm overlying the anterior right frontal lobe previously 1.1, 1.5 cm overlying the posterior right frontal lobe previously 1.5 cm.  There is corresponding mass-effect on the underlying brain parenchyma with partial effacement of the right lateral ventricle and 0.5 cm leftward midline shift at the level of the septum pellucidum, which is not significantly changed.  No new intracranial hemorrhage identified.    Results Review:   I reviewed the patient's new clinical results.  I personally reviewed and interpreted the patient's results with Dr. Hilario.    Vital Signs   Temp:  [98.1 °F (36.7 °C)-98.6 °F (37 °C)] 98.1 °F (36.7 °C)  Heart Rate:  [66-91] 67  Resp:  [18-20] 18  BP: (140-181)/() 150/67    Physical Exam:  Physical Exam  Vitals and nursing note reviewed.   Constitutional:       Appearance: Normal appearance.   HENT:      Head: Normocephalic and atraumatic.      Right Ear: Hearing normal.      Left Ear: Hearing normal.      Mouth/Throat:      Mouth: Mucous membranes are moist.   Eyes:      General: Lids are normal.      Extraocular Movements: Extraocular movements intact.      Conjunctiva/sclera: Conjunctivae normal.      Pupils: Pupils are equal, round, and reactive to light.      Visual Fields: Right eye visual fields normal and left eye visual fields normal.   Cardiovascular:      Rate and Rhythm: Normal rate.       Pulses: Normal pulses.   Pulmonary:      Effort: Pulmonary effort is normal.      Breath sounds: Normal air entry.   Musculoskeletal:         General: Normal range of motion.      Cervical back: Normal range of motion and neck supple.      Right lower leg: No edema.      Left lower leg: No edema.   Skin:     General: Skin is warm and dry.   Neurological:      Mental Status: She is alert and oriented to person, place, and time.      GCS: GCS eye subscore is 4. GCS verbal subscore is 5. GCS motor subscore is 6.      Cranial Nerves: Cranial nerves are intact.      Sensory: Sensation is intact.      Motor: Motor function is intact.      Coordination: Coordination is intact. Finger-Nose-Finger Test and Heel to Shin Test normal.      Deep Tendon Reflexes: Strength normal.   Psychiatric:         Attention and Perception: Attention normal.         Mood and Affect: Mood normal.         Speech: Speech normal.         Behavior: Behavior is cooperative.         Cognition and Memory: Cognition normal.       Neurologic Exam     Mental Status   Oriented to person, place, and time.   Attention: normal. Concentration: normal.   Speech: speech is normal   Able to name object. Able to read. Able to repeat. Unable to write: not tested. Normal comprehension.     Cranial Nerves     CN III, IV, VI   Pupils are equal, round, and reactive to light.  Right pupil: Size: 4 mm. Shape: regular. Reactivity: brisk. Accommodation: intact.   Left pupil: Size: 4 mm. Shape: regular. Reactivity: brisk. Accommodation: intact.   CN III: no CN III palsy    Motor Exam   Muscle bulk: normal    Strength   Strength 5/5 throughout.     Sensory Exam   Light touch normal.     Gait, Coordination, and Reflexes     Coordination   Finger to nose coordination: normal  Heel to shin coordination: normal    Tremor   Resting tremor: absent  Intention tremor: absent  Action tremor: absent      Assessment/Plan       Subdural hematoma (HCC)    80yo female who had a fall in  "December and developed a SDH.  CT scans have revealed an increase in the SDH and therefore surgery was advised, on 1/13, to place a no hole to drain the blood. She and her dtr wanted time to think about the surgery, and she was to return in 2 weeks with a new CT head. She started having more symptoms on 1/19 and was then sent to the ER.      PLAN:   Discussed with daughter and patient the possibility of MMA vs no holes for treatment. Explained MMA to stop the bleeding to the subdural and discussed no holes to drain blood from the hematoma.  After discussing further with Dr. Hilario the dtr explained that her mother had been having increased cognitive issues and leaning to the side, while walking.  No holes were then advised to relieve the symptoms  NPO after MN    I discussed the patient's findings and my recommendations with patient, family, nursing staff and Dr. Sulaiman Triplett, APRN  01/20/22  09:59 EST    \"Dictated utilizing Dragon dictation\".      "

## 2022-01-20 NOTE — ED NOTES
Nursing report ED to floor  Itzel Edmond  81 y.o.  female    HPI :   Chief Complaint   Patient presents with   • Blurred Vision       Admitting doctor:   Diana Meza MD    Admitting diagnosis:   The primary encounter diagnosis was Subdural hematoma (HCC). Diagnoses of Confusion, Blurred vision, History of hypertension, and History of coronary artery disease were also pertinent to this visit.    Code status:   Current Code Status     Date Active Code Status Order ID Comments User Context       1/19/2022 1938 CPR (Attempt to Resuscitate) [id number]  Diana Meza MD ED     Advance Care Planning Activity      Questions for Current Code Status     Question Answer    Code Status (Patient has no pulse and is not breathing) CPR (Attempt to Resuscitate)    Medical Interventions (Patient has pulse or is breathing) Full          Allergies:   Gabapentin, Pregabalin, Sulfamethoxazole-trimethoprim, Ace inhibitors, and Lisinopril    Intake and Output  No intake or output data in the 24 hours ending 01/19/22 2016    Weight:   There were no vitals filed for this visit.    Most recent vitals:   Vitals:    01/19/22 1702 01/19/22 1803 01/19/22 1804   BP:  168/72    Pulse: 85 67 80   Resp: 20     Temp: 98.2 °F (36.8 °C)     TempSrc: Tympanic     SpO2: 97% 96% 97%       Active LDAs/IV Access:   Lines, Drains & Airways     Active LDAs     Name Placement date Placement time Site Days    Peripheral IV 01/19/22 1812 Left; Lower Forearm 01/19/22 1812  Forearm  less than 1                Labs (abnormal labs have a star):   Labs Reviewed   COMPREHENSIVE METABOLIC PANEL - Abnormal; Notable for the following components:       Result Value    Glucose 100 (*)     BUN 26 (*)     Creatinine 1.22 (*)     eGFR Non  Amer 42 (*)     All other components within normal limits    Narrative:     GFR Normal >60  Chronic Kidney Disease <60  Kidney Failure <15     CBC WITH AUTO DIFFERENTIAL - Abnormal; Notable for the following  components:    Hemoglobin 11.2 (*)     Hematocrit 33.3 (*)     All other components within normal limits   COVID-19,[facility] CEPHEID/LY, NP SWAB IN TRANSPORT MEDIA 8-12 HR TAT - Normal    Narrative:     Fact sheet for providers: https://www.fda.gov/media/803215/download     Fact sheet for patients: https://www.fda.gov/media/460143/download   PROTIME-INR - Normal   APTT - Normal   COVID PRE-OP / PRE-PROCEDURE SCREENING ORDER (NO ISOLATION)    Narrative:     The following orders were created for panel order COVID PRE-OP / PRE-PROCEDURE SCREENING ORDER (NO ISOLATION) - Swab, Nasopharynx.  Procedure                               Abnormality         Status                     ---------                               -----------         ------                     COVID-19,[facility]...[[id number]]  Normal              Final result                 Please view results for these tests on the individual orders.   TYPE AND SCREEN   CBC AND DIFFERENTIAL    Narrative:     The following orders were created for panel order CBC & Differential.  Procedure                               Abnormality         Status                     ---------                               -----------         ------                     CBC Auto Differential[[id number]]        Abnormal            Final result                 Please view results for these tests on the individual orders.       EKG:   No orders to display       Meds given in ED:   Medications   nitroglycerin (NITROSTAT) SL tablet 0.4 mg (has no administration in time range)   acetaminophen (TYLENOL) tablet 650 mg (has no administration in time range)   ondansetron (ZOFRAN) tablet 4 mg (has no administration in time range)     Or   ondansetron (ZOFRAN) injection 4 mg (has no administration in time range)   melatonin tablet 3 mg (has no administration in time range)       Imaging results:  CT Head Without Contrast    Result Date: 1/19/2022  No significant change in size of a mixed  density right cerebral hemispheric subdural hematoma and corresponding mass effect. No new intracranial hemorrhage is identified. If there is clinical concern for acute infarction, this would be better assessed with MRI.  Discussed with Dr. Romo at 6:30 PM.  This report was finalized on 1/19/2022 6:30 PM by Dr. Manasa Rogers M.D.        Ambulatory status:   - as tolerated    Social issues:   Social History     Socioeconomic History   • Marital status:    Tobacco Use   • Smoking status: Never Smoker   • Smokeless tobacco: Never Used   Substance and Sexual Activity   • Alcohol use: No     Comment: caffeine use: 1 cup daily   • Drug use: Never   • Sexual activity: Not Currently     Birth control/protection: Post-menopausal, Surgical     Comment: .       NIH Stroke Scale:        Nursing report ED to floor:       Amado Christiansen RN  01/19/22 2016

## 2022-01-20 NOTE — PROGRESS NOTES
Name: Itzel Edmond ADMIT: 2022   : 1940  PCP: Rhoda Luther MD    MRN: [id number] LOS: 0 days   AGE/SEX: 81 y.o. female  ROOM: [facility]     Subjective   Subjective   No new complaints or events overnight.  Blurred vision has improved.  Daughter present at bedside.    Review of Systems   Constitutional: Negative for chills and fever.   Eyes: Negative for photophobia and visual disturbance.   Respiratory: Negative for chest tightness and shortness of breath.    Cardiovascular: Negative for chest pain, palpitations and leg swelling.   Gastrointestinal: Negative for abdominal pain, diarrhea, nausea and vomiting.   Genitourinary: Negative for difficulty urinating and dysuria.   Musculoskeletal: Negative for back pain and gait problem.   Neurological: Negative for dizziness, light-headedness and headaches.   Hematological: Negative for adenopathy. Does not bruise/bleed easily.   Psychiatric/Behavioral: Negative for agitation and confusion.        Objective   Objective   Vital Signs  Temp:  [98.1 °F (36.7 °C)-98.6 °F (37 °C)] 98.1 °F (36.7 °C)  Heart Rate:  [66-91] 67  Resp:  [18-20] 18  BP: (140-181)/() 150/67  SpO2:  [92 %-97 %] 92 %  on   ;   Device (Oxygen Therapy): room air  Body mass index is 25.33 kg/m².  Physical Exam  Vitals and nursing note reviewed. Exam conducted with a chaperone present.   Constitutional:       Appearance: Normal appearance.   HENT:      Head: Normocephalic and atraumatic.   Eyes:      Extraocular Movements: Extraocular movements intact.      Conjunctiva/sclera: Conjunctivae normal.   Cardiovascular:      Rate and Rhythm: Normal rate.      Heart sounds: Murmur heard.       Pulmonary:      Effort: Pulmonary effort is normal. No respiratory distress.      Breath sounds: Normal breath sounds.   Abdominal:      General: Bowel sounds are normal. There is no distension.      Palpations: Abdomen is soft.      Tenderness: There is no abdominal tenderness.   Musculoskeletal:          General: No swelling or tenderness. Normal range of motion.   Skin:     General: Skin is warm and dry.   Neurological:      Mental Status: She is alert and oriented to person, place, and time. Mental status is at baseline.   Psychiatric:         Mood and Affect: Mood normal.         Behavior: Behavior normal.         Results Review     I reviewed the patient's new clinical results.  Results from last 7 days   Lab Units 01/20/22  0745 01/19/22  1810   WBC 10*3/mm3 7.59 7.48   HEMOGLOBIN g/dL 11.5* 11.2*   PLATELETS 10*3/mm3 236 227     Results from last 7 days   Lab Units 01/20/22  0744 01/19/22  1810   SODIUM mmol/L 140 140   POTASSIUM mmol/L 3.8 3.8   CHLORIDE mmol/L 101 100   CO2 mmol/L 29.1* 27.0   BUN mg/dL 25* 26*   CREATININE mg/dL 1.00 1.22*   GLUCOSE mg/dL 96 100*   EGFR IF NONAFRICN AM mL/min/1.73 53* 42*     Results from last 7 days   Lab Units 01/19/22  1810   ALBUMIN g/dL 4.20   BILIRUBIN mg/dL 0.3   ALK PHOS U/L 86   AST (SGOT) U/L 24   ALT (SGPT) U/L 20     Results from last 7 days   Lab Units 01/20/22  0744 01/19/22  1810   CALCIUM mg/dL 9.2 8.9   ALBUMIN g/dL  --  4.20       COVID19   Date Value Ref Range Status   01/19/2022 Not Detected Not Detected - Ref. Range Final   11/28/2021 Not Detected Not Detected - Ref. Range Final     No results found for: HGBA1C, POCGLU    CT Head Without Contrast  Narrative: EXAM:  CT HEAD WO CONTRAST-     HISTORY:  Enlarging subdural hematoma, increasing visual changes.     COMPARISON:  CT head without contrast 01/13/2022     TECHNIQUE: Noncontrast images of the brain were obtained. Reformatted  images were reviewed. Radiation dose reduction techniques were utilized,  including automated exposure control and exposure modulation based on  body size.     FINDINGS:       There is redemonstration of a right cerebral hemispheric mixed density  subdural hematoma, which may be slightly less dense compared to  01/13/2022. This is not significantly changed in size from  01/13/2022,  measuring up to 1.4 cm overlying the anterior inferior right frontal  lobe, previously 1.4 cm, 1.2 cm overlying the anterior right frontal  lobe, previously 1.1 cm, and 1.5 cm overlying the posterior right  frontal lobe, previously 1.5 cm. There is corresponding mass effect on  the underlying brain parenchyma with partial effacement of the right  lateral ventricle and 0.5 cm leftward midline shift at the level of the  septum pellucidum, which is not significantly changed, previously 0.5  cm. No new intracranial hemorrhage is identified.  There is mild global parenchymal volume loss. The basal cisterns are  patent. Chronic small vessel ischemic disease is not significantly  changed. The grey-white matter differentiation is maintained. There is  calcific intracranial atherosclerosis with involvement of the posterior  circulation.        Impression: No significant change in size of a mixed density right cerebral  hemispheric subdural hematoma and corresponding mass effect. No new  intracranial hemorrhage is identified. If there is clinical concern for  acute infarction, this would be better assessed with MRI.     Discussed with Dr. Romo at 6:30 PM.     This report was finalized on 1/19/2022 6:30 PM by Dr. Manasa Rogers M.D.       Scheduled Medications  amLODIPine, 5 mg, Oral, Daily  atorvastatin, 20 mg, Oral, Daily  DULoxetine, 60 mg, Oral, Daily  losartan, 50 mg, Oral, Daily  pramipexole, 0.25 mg, Oral, Daily    Infusions   Diet  Diet Regular  NPO Diet       Assessment/Plan     Active Hospital Problems    Diagnosis  POA   • **Subdural hematoma (HCC) [S06.5X9A]  Yes   • TIA (transient ischemic attack) [G45.9]  Yes   • CAD (coronary artery disease) [I25.10]  Yes   • Heart murmur [R01.1]  Yes   • Hyperlipidemia [E78.5]  Yes   • Obstructive sleep apnea syndrome [G47.33]  Yes   • Hypertension [I10]  Yes      Resolved Hospital Problems   No resolved problems to display.     · SDH: Per [facility], appreciate their  recommendations.  Plan is for OR tomorrow followed by ICU.  We will try to see before surgery tomorrow, if not, we will resume care when she is out of the unit.  Otherwise, labs have remained largely unremarkable.  Blurred vision has corrected.  N.p.o. after midnight, hold aspirin.  · HTN: Stable, continue current regimen.  Creatinine was very minimally elevated on admission and has corrected- HCTZ was held.    · SCDs for DVT prophylaxis.  · Full code.  · Discussed with patient and nursing staff.  · Anticipate discharge TBARABELLA Mcpherson  [facility]  01/20/22  15:23 EST

## 2022-01-20 NOTE — PLAN OF CARE
Goal Outcome Evaluation:  Plan of Care Reviewed With: patient        Progress: no change  Outcome Summary: Pt remains stable. Up ad jesus. Voiding without difficulty. No c/o pain. Neurosx consulted, made NPO for possible sx. CPAP utilized through the night. WCAMILCAR.

## 2022-01-20 NOTE — H&P
HISTORY AND PHYSICAL   [facility]        Date of Admission: 2022  Patient Identification:  Name: Itzel Edmond  Age: 81 y.o.  Sex: female  :  1940  MRN: [id number]                     Primary Care Physician: Rhoda Luther MD    Chief Complaint:  81 year old female who presented to the emergency room with blurred vision for the last two days; she had a fall which resulted in a subdural hematoma; she has been followed by neurosurgery and surgery was recommended but has not been scheduled; she had midline shift on an mri; she denies difficulty with keeping her balance; no numbness, weakness or tingling    History of Present Illness:   As above    Past Medical History:  Past Medical History:   Diagnosis Date   • Allergic rhinitis    • Atherosclerosis    • CAD (coronary artery disease) 2019    Overview:  Mild non obstructive CAD on cath 2019, medical mgmt recommended   • Carotid stenosis    • Cataract    • Cellulitis of right leg 2016   • Chest discomfort 2019   • Claudication of lower extremity (HCC)    • CMC arthritis    • Colon polyps     FOLLOWED BY DR. NOLBERTO REYNOSO   • Cystitis 2019   • WOODARD (dyspnea on exertion) 10/2020   • Dysphagia    • Elevated serum creatinine 2021   • Environmental allergies    • Exercise intolerance    • JAMES (generalized anxiety disorder) 2020   • GERD (gastroesophageal reflux disease)    • Heart murmur    • Hemorrhoids    • Hyperglycemia    • Hyperkalemia 2020   • Hyperlipidemia     MIXED   • Hypertension    • Influenza 2018   • Insomnia    • Myalgia 2016   • Neuropathy 2017   • OAB (overactive bladder)    • JHOANA (obstructive sleep apnea)     BiPap   • PLMD (periodic limb movement disorder)    • Precordial pain 2019   • Premature atrial complexes    • Rectal bleeding    • Rectal prolapse 10/2020   • Rectal ulcer 10/02/2020    5 MM SINGLE ULCER ON CY   • Rectocele 2019   • Renal insufficiency    • Restless leg  syndrome 10/2/2018   • SAB (spontaneous )      A1   • Serum potassium elevated 2016   • Slow transit constipation 2020   • Tachycardia 2013   • Trigger finger of left thumb 10/21/2019   • Urge incontinence    • Urinary frequency    • Vaginal atrophy      Past Surgical History:  Past Surgical History:   Procedure Laterality Date   • ANTERIOR AND POSTERIOR VAGINAL REPAIR N/A    • BACK SURGERY     • BILATERAL SALPINGO OOPHORECTOMY Bilateral    • BLADDER SURGERY N/A     BLADDER SLING   • CARDIAC CATHETERIZATION N/A 2019    Procedure: Left Heart Cath;  Surgeon: Christopher Rosenbaum MD;  Location:  [facility];  Service: Cardiology   • CARDIAC CATHETERIZATION N/A 2019    Procedure: Coronary angiography;  Surgeon: Christopher Rosenbaum MD;  Location:  [facility];  Service: Cardiology   • CARDIAC CATHETERIZATION N/A 2019    Procedure: Left ventriculography;  Surgeon: Christopher Rosenbaum MD;  Location:  [facility];  Service: Cardiology   • CATARACT EXTRACTION Left 12/15/2015    WITH LENS IMPLANT, DR. AMRIT DIAZ AT [facility]   • CATARACT EXTRACTION Right 2015     WITH LENS IMPLANT, DR. AMRIT DIAZ AT [facility]   • COLONOSCOPY N/A 10/2/2020    2 TUBULAR ADENOMA POLYPS IN TRANSVERSE, HEMORRHOIDS, DIVERTICULOSIS, 5 MM ULCER IN RECTUM, OTHER BIOPSIES BENIGN, DR. NOLBERTO REYNOSO AT [facility]   • COLONOSCOPY W/ POLYPECTOMY N/A 2015    1 CM TUBULAR ADENOMA POLYP IN TRANSVERSE, MODERATE DIVERTICULOSIS, HEMORRHOIDS, RESCOPE IN 5 YRS, DR. MAYA SAHU AT [facility]   • HEMORROIDECTOMY N/A    • HERNIA REPAIR     • HYSTERECTOMY N/A 1970    [facility]      Home Meds:  (Not in a hospital admission)      Allergies:  Allergies   Allergen Reactions   • Gabapentin Dizziness     Other reaction(s): Dizziness   • Pregabalin Dizziness     Vision problems, dizziness   • Sulfamethoxazole-Trimethoprim Itching and Nausea And Vomiting   • Ace  Inhibitors Cough     Cough   • Lisinopril Cough     Immunizations:  Immunization History   Administered Date(s) Administered   • COVID-19 (PFIZER) PURPLE CAP 02/14/2021, 03/05/2021, 09/15/2021   • Fluzone High Dose =>65 Years ([employer] ONLY) 09/10/2018, 10/03/2019   • Fluzone High-Dose 65+yrs 10/13/2020, 11/19/2021   • Influenza TIV (IM) 09/20/2013   • Pneumococcal Conjugate 13-Valent (PCV13) 11/16/2016   • Pneumococcal Polysaccharide (PPSV23) 10/01/2008   • Td 06/18/1996   • Tdap 05/07/2013   • Zostavax 10/01/2009     Social History:   Social History     Social History Narrative   • Not on file     Social History     Socioeconomic History   • Marital status:    Tobacco Use   • Smoking status: Never Smoker   • Smokeless tobacco: Never Used   Substance and Sexual Activity   • Alcohol use: No     Comment: caffeine use: 1 cup daily   • Drug use: Never   • Sexual activity: Not Currently     Birth control/protection: Post-menopausal, Surgical     Comment: .       Family History:  Family History   Problem Relation Age of Onset   • Heart disease Mother    • Stroke Mother    • Heart attack Father    • Heart disease Father    • Heart disease Sister         s/p CABG   • Heart attack Brother    • Heart disease Brother         s/p CABG   • Hypertension Other    • Heart disease Maternal Grandmother    • Stroke Maternal Grandmother         Review of Systems  See history of present illness and past medical history.  Patient denies headache, dizziness, syncope, falls, trauma, change in vision, change in hearing, change in taste, changes in weight, changes in appetite, focal weakness, numbness, or paresthesia.  Patient denies chest pain, palpitations, dyspnea, orthopnea, PND, cough, sinus pressure, rhinorrhea, epistaxis, hemoptysis, nausea, vomiting,hematemesis, diarrhea, constipation or hematchezia.  Denies cold or heat intolerance, polydipsia, polyuria, polyphagia. Denies hematuria, pyuria, dysuria, hesitancy, frequency  or urgency. Denies consumption of raw and under cooked meats foods or change in water source.  Denies fever, chills, sweats, night sweats.  Denies missing any routine medications. Remainder of ROS is negative.    Objective:  T Max 24 hrs: Temp (24hrs), Av.2 °F (36.8 °C), Min:98.2 °F (36.8 °C), Max:98.2 °F (36.8 °C)    Vitals Ranges:   Temp:  [98.2 °F (36.8 °C)] 98.2 °F (36.8 °C)  Heart Rate:  [67-85] 80  Resp:  [20] 20  BP: (168)/(72) 168/72      Exam:  /72   Pulse 80   Temp 98.2 °F (36.8 °C) (Tympanic)   Resp 20   SpO2 97%     General Appearance:    Alert, cooperative, no distress, appears stated age   Head:    Normocephalic, without obvious abnormality, atraumatic   Eyes:    PERRL, conjunctivae/corneas clear, EOM's intact, both eyes   Ears:    Normal external ear canals, both ears   Nose:   Nares normal, septum midline, mucosa normal, no drainage    or sinus tenderness   Throat:   Lips, mucosa, and tongue normal   Neck:   Supple, symmetrical, trachea midline, no adenopathy;     thyroid:  no enlargement/tenderness/nodules; no carotid    bruit or JVD   Back:     Symmetric, no curvature, ROM normal, no CVA tenderness   Lungs:     Decreased breath sounds bilaterally, respirations unlabored   Chest Wall:    No tenderness or deformity    Heart:    Regular rate and rhythm, S1 and S2 normal, no murmur, rub   or gallop   Abdomen:     Soft, nontender, bowel sounds active all four quadrants,     no masses, no hepatomegaly, no splenomegaly   Extremities:   Extremities normal, atraumatic, no cyanosis or edema   Pulses:   2+ and symmetric all extremities   Skin:   Skin color, texture, turgor normal, no rashes or lesions   Lymph nodes:   Cervical, supraclavicular, and axillary nodes normal   Neurologic:   CNII-XII intact, normal strength, sensation intact throughout      .    Data Review:  Labs in chart were reviewed.  WBC   Date Value Ref Range Status   2022 7.48 3.40 - 10.80 10*3/mm3 Final     Hemoglobin    Date Value Ref Range Status   01/19/2022 11.2 (L) 12.0 - 15.9 g/dL Final     Hematocrit   Date Value Ref Range Status   01/19/2022 33.3 (L) 34.0 - 46.6 % Final     Platelets   Date Value Ref Range Status   01/19/2022 227 140 - 450 10*3/mm3 Final     Sodium   Date Value Ref Range Status   01/19/2022 140 136 - 145 mmol/L Final     Potassium   Date Value Ref Range Status   01/19/2022 3.8 3.5 - 5.2 mmol/L Final     Chloride   Date Value Ref Range Status   01/19/2022 100 98 - 107 mmol/L Final     CO2   Date Value Ref Range Status   01/19/2022 27.0 22.0 - 29.0 mmol/L Final     BUN   Date Value Ref Range Status   01/19/2022 26 (H) 8 - 23 mg/dL Final     Creatinine   Date Value Ref Range Status   01/19/2022 1.22 (H) 0.57 - 1.00 mg/dL Final     Glucose   Date Value Ref Range Status   01/19/2022 100 (H) 65 - 99 mg/dL Final     Calcium   Date Value Ref Range Status   01/19/2022 8.9 8.6 - 10.5 mg/dL Final     AST (SGOT)   Date Value Ref Range Status   01/19/2022 24 1 - 32 U/L Final     ALT (SGPT)   Date Value Ref Range Status   01/19/2022 20 1 - 33 U/L Final     Alkaline Phosphatase   Date Value Ref Range Status   01/19/2022 86 39 - 117 U/L Final                Imaging Results (All)     Procedure Component Value Units Date/Time    CT Head Without Contrast [[id number]] Collected: 01/19/22 1821     Updated: 01/19/22 1833    Narrative:      EXAM:  CT HEAD WO CONTRAST-     HISTORY:  Enlarging subdural hematoma, increasing visual changes.     COMPARISON:  CT head without contrast 01/13/2022     TECHNIQUE: Noncontrast images of the brain were obtained. Reformatted  images were reviewed. Radiation dose reduction techniques were utilized,  including automated exposure control and exposure modulation based on  body size.     FINDINGS:       There is redemonstration of a right cerebral hemispheric mixed density  subdural hematoma, which may be slightly less dense compared to  01/13/2022. This is not significantly changed in size from  01/13/2022,  measuring up to 1.4 cm overlying the anterior inferior right frontal  lobe, previously 1.4 cm, 1.2 cm overlying the anterior right frontal  lobe, previously 1.1 cm, and 1.5 cm overlying the posterior right  frontal lobe, previously 1.5 cm. There is corresponding mass effect on  the underlying brain parenchyma with partial effacement of the right  lateral ventricle and 0.5 cm leftward midline shift at the level of the  septum pellucidum, which is not significantly changed, previously 0.5  cm. No new intracranial hemorrhage is identified.  There is mild global parenchymal volume loss. The basal cisterns are  patent. Chronic small vessel ischemic disease is not significantly  changed. The grey-white matter differentiation is maintained. There is  calcific intracranial atherosclerosis with involvement of the posterior  circulation.          Impression:      No significant change in size of a mixed density right cerebral  hemispheric subdural hematoma and corresponding mass effect. No new  intracranial hemorrhage is identified. If there is clinical concern for  acute infarction, this would be better assessed with MRI.     Discussed with Dr. Romo at 6:30 PM.     This report was finalized on 1/19/2022 6:30 PM by Dr. Manasa Rogers M.D.           Patient Active Problem List   Diagnosis Code   • Hypertension I10   • Obstructive sleep apnea syndrome G47.33   • Premature atrial complexes I49.1   • Hyperlipidemia E78.5   • Restless leg syndrome G25.81   • Heart murmur R01.1   • CAD (coronary artery disease) I25.10   • JAMES (generalized anxiety disorder) F41.1   • Slow transit constipation K59.01   • TIA (transient ischemic attack) G45.9   • Subdural hemorrhage following injury (HCC) S06.5X9A   • Subdural hematoma (HCC) S06.5X9A       Assessment:    Subdural hematoma (HCC)  ckd3  Cad  Sleep apnea  Anemia  Hyperglycemia  hypertension    Plan:  Neurosurgery to see  Keep npo after midnight in case she needs  surgery  Trend hgb and creatinine  Monitor on telemetry  DMounaw patient, family and ED Provider    Diana Meza MD  1/19/2022  19:39 EST

## 2022-01-21 ENCOUNTER — ANESTHESIA EVENT (OUTPATIENT)
Dept: PERIOP | Facility: HOSPITAL | Age: 82
End: 2022-01-21

## 2022-01-21 ENCOUNTER — ANESTHESIA (OUTPATIENT)
Dept: PERIOP | Facility: HOSPITAL | Age: 82
End: 2022-01-21

## 2022-01-21 LAB — GLUCOSE BLDC GLUCOMTR-MCNC: 121 MG/DL (ref 70–130)

## 2022-01-21 PROCEDURE — C1713 ANCHOR/SCREW BN/BN,TIS/BN: HCPCS | Performed by: NEUROLOGICAL SURGERY

## 2022-01-21 PROCEDURE — 0 CEFAZOLIN IN DEXTROSE 2-4 GM/100ML-% SOLUTION: Performed by: NEUROLOGICAL SURGERY

## 2022-01-21 PROCEDURE — 25010000002 ONDANSETRON PER 1 MG: Performed by: NURSE ANESTHETIST, CERTIFIED REGISTERED

## 2022-01-21 PROCEDURE — 25010000002 DEXAMETHASONE PER 1 MG: Performed by: NURSE ANESTHETIST, CERTIFIED REGISTERED

## 2022-01-21 PROCEDURE — 82962 GLUCOSE BLOOD TEST: CPT

## 2022-01-21 PROCEDURE — 0 CEFAZOLIN PER 500 MG: Performed by: NEUROLOGICAL SURGERY

## 2022-01-21 PROCEDURE — C1889 IMPLANT/INSERT DEVICE, NOC: HCPCS | Performed by: NEUROLOGICAL SURGERY

## 2022-01-21 PROCEDURE — 25010000002 NEOSTIGMINE 5 MG/10ML SOLUTION: Performed by: NURSE ANESTHETIST, CERTIFIED REGISTERED

## 2022-01-21 PROCEDURE — 00940ZZ DRAINAGE OF INTRACRANIAL SUBDURAL SPACE, OPEN APPROACH: ICD-10-PCS | Performed by: HOSPITALIST

## 2022-01-21 PROCEDURE — 25010000002 FENTANYL CITRATE (PF) 50 MCG/ML SOLUTION: Performed by: NURSE ANESTHETIST, CERTIFIED REGISTERED

## 2022-01-21 PROCEDURE — 25010000002 PROPOFOL 10 MG/ML EMULSION: Performed by: NURSE ANESTHETIST, CERTIFIED REGISTERED

## 2022-01-21 PROCEDURE — 61154 BURR HOLE W/EVAC&/DRG HMTMA: CPT | Performed by: NEUROLOGICAL SURGERY

## 2022-01-21 PROCEDURE — 0 POTASSIUM CHLORIDE PER 2 MEQ: Performed by: NEUROLOGICAL SURGERY

## 2022-01-21 DEVICE — FLOSEAL HEMOSTATIC MATRIX, 10ML
Type: IMPLANTABLE DEVICE | Site: CRANIAL | Status: FUNCTIONAL
Brand: FLOSEAL HEMOSTATIC MATRIX

## 2022-01-21 DEVICE — WAX BONE HEMO NAT 2.5G: Type: IMPLANTABLE DEVICE | Site: CRANIAL | Status: FUNCTIONAL

## 2022-01-21 DEVICE — SCRW CRS/DRV DRL FREE MICRO TI 1.5X4MM: Type: IMPLANTABLE DEVICE | Site: CRANIAL | Status: FUNCTIONAL

## 2022-01-21 DEVICE — PLT CVR LEVELONE BURHL LP CONTRL .3X17X1.5MM: Type: IMPLANTABLE DEVICE | Site: CRANIAL | Status: FUNCTIONAL

## 2022-01-21 RX ORDER — ROCURONIUM BROMIDE 10 MG/ML
INJECTION, SOLUTION INTRAVENOUS AS NEEDED
Status: DISCONTINUED | OUTPATIENT
Start: 2022-01-21 | End: 2022-01-21 | Stop reason: SURG

## 2022-01-21 RX ORDER — NALOXONE HCL 0.4 MG/ML
0.2 VIAL (ML) INJECTION AS NEEDED
Status: DISCONTINUED | OUTPATIENT
Start: 2022-01-21 | End: 2022-01-21 | Stop reason: HOSPADM

## 2022-01-21 RX ORDER — GLYCOPYRROLATE 0.2 MG/ML
INJECTION INTRAMUSCULAR; INTRAVENOUS AS NEEDED
Status: DISCONTINUED | OUTPATIENT
Start: 2022-01-21 | End: 2022-01-21 | Stop reason: SURG

## 2022-01-21 RX ORDER — ONDANSETRON 2 MG/ML
INJECTION INTRAMUSCULAR; INTRAVENOUS AS NEEDED
Status: DISCONTINUED | OUTPATIENT
Start: 2022-01-21 | End: 2022-01-21 | Stop reason: SURG

## 2022-01-21 RX ORDER — EPHEDRINE SULFATE 50 MG/ML
5 INJECTION, SOLUTION INTRAVENOUS ONCE AS NEEDED
Status: DISCONTINUED | OUTPATIENT
Start: 2022-01-21 | End: 2022-01-21 | Stop reason: HOSPADM

## 2022-01-21 RX ORDER — SODIUM CHLORIDE AND POTASSIUM CHLORIDE 150; 450 MG/100ML; MG/100ML
75 INJECTION, SOLUTION INTRAVENOUS CONTINUOUS
Status: DISCONTINUED | OUTPATIENT
Start: 2022-01-21 | End: 2022-01-22

## 2022-01-21 RX ORDER — ONDANSETRON 2 MG/ML
4 INJECTION INTRAMUSCULAR; INTRAVENOUS EVERY 6 HOURS PRN
Status: DISCONTINUED | OUTPATIENT
Start: 2022-01-21 | End: 2022-01-23 | Stop reason: HOSPADM

## 2022-01-21 RX ORDER — MORPHINE SULFATE 2 MG/ML
2 INJECTION, SOLUTION INTRAMUSCULAR; INTRAVENOUS
Status: DISCONTINUED | OUTPATIENT
Start: 2022-01-21 | End: 2022-01-23 | Stop reason: HOSPADM

## 2022-01-21 RX ORDER — HYDROCODONE BITARTRATE AND ACETAMINOPHEN 7.5; 325 MG/1; MG/1
1 TABLET ORAL ONCE AS NEEDED
Status: DISCONTINUED | OUTPATIENT
Start: 2022-01-21 | End: 2022-01-21 | Stop reason: HOSPADM

## 2022-01-21 RX ORDER — LIDOCAINE HYDROCHLORIDE 20 MG/ML
INJECTION, SOLUTION INFILTRATION; PERINEURAL AS NEEDED
Status: DISCONTINUED | OUTPATIENT
Start: 2022-01-21 | End: 2022-01-21 | Stop reason: SURG

## 2022-01-21 RX ORDER — IBUPROFEN 600 MG/1
600 TABLET ORAL ONCE AS NEEDED
Status: DISCONTINUED | OUTPATIENT
Start: 2022-01-21 | End: 2022-01-21 | Stop reason: HOSPADM

## 2022-01-21 RX ORDER — EPHEDRINE SULFATE 50 MG/ML
INJECTION, SOLUTION INTRAVENOUS AS NEEDED
Status: DISCONTINUED | OUTPATIENT
Start: 2022-01-21 | End: 2022-01-21 | Stop reason: SURG

## 2022-01-21 RX ORDER — ONDANSETRON 4 MG/1
4 TABLET, FILM COATED ORAL EVERY 6 HOURS PRN
Status: DISCONTINUED | OUTPATIENT
Start: 2022-01-21 | End: 2022-01-23 | Stop reason: HOSPADM

## 2022-01-21 RX ORDER — LIDOCAINE HYDROCHLORIDE AND EPINEPHRINE 10; 10 MG/ML; UG/ML
INJECTION, SOLUTION INFILTRATION; PERINEURAL AS NEEDED
Status: DISCONTINUED | OUTPATIENT
Start: 2022-01-21 | End: 2022-01-21 | Stop reason: HOSPADM

## 2022-01-21 RX ORDER — MIDAZOLAM HYDROCHLORIDE 1 MG/ML
0.5 INJECTION INTRAMUSCULAR; INTRAVENOUS
Status: DISCONTINUED | OUTPATIENT
Start: 2022-01-21 | End: 2022-01-21 | Stop reason: HOSPADM

## 2022-01-21 RX ORDER — HYDROMORPHONE HYDROCHLORIDE 1 MG/ML
0.5 INJECTION, SOLUTION INTRAMUSCULAR; INTRAVENOUS; SUBCUTANEOUS
Status: DISCONTINUED | OUTPATIENT
Start: 2022-01-21 | End: 2022-01-21 | Stop reason: HOSPADM

## 2022-01-21 RX ORDER — AMOXICILLIN 250 MG
1 CAPSULE ORAL NIGHTLY PRN
Status: DISCONTINUED | OUTPATIENT
Start: 2022-01-21 | End: 2022-01-23 | Stop reason: HOSPADM

## 2022-01-21 RX ORDER — PROMETHAZINE HYDROCHLORIDE 25 MG/1
25 SUPPOSITORY RECTAL ONCE AS NEEDED
Status: DISCONTINUED | OUTPATIENT
Start: 2022-01-21 | End: 2022-01-21 | Stop reason: HOSPADM

## 2022-01-21 RX ORDER — NEOSTIGMINE METHYLSULFATE 0.5 MG/ML
INJECTION, SOLUTION INTRAVENOUS AS NEEDED
Status: DISCONTINUED | OUTPATIENT
Start: 2022-01-21 | End: 2022-01-21 | Stop reason: SURG

## 2022-01-21 RX ORDER — CEFAZOLIN SODIUM 2 G/100ML
2 INJECTION, SOLUTION INTRAVENOUS ONCE
Status: COMPLETED | OUTPATIENT
Start: 2022-01-21 | End: 2022-01-21

## 2022-01-21 RX ORDER — FENTANYL CITRATE 50 UG/ML
50 INJECTION, SOLUTION INTRAMUSCULAR; INTRAVENOUS
Status: DISCONTINUED | OUTPATIENT
Start: 2022-01-21 | End: 2022-01-21 | Stop reason: HOSPADM

## 2022-01-21 RX ORDER — SODIUM CHLORIDE 0.9 % (FLUSH) 0.9 %
3-10 SYRINGE (ML) INJECTION AS NEEDED
Status: DISCONTINUED | OUTPATIENT
Start: 2022-01-21 | End: 2022-01-21 | Stop reason: HOSPADM

## 2022-01-21 RX ORDER — LIDOCAINE HYDROCHLORIDE 10 MG/ML
0.5 INJECTION, SOLUTION EPIDURAL; INFILTRATION; INTRACAUDAL; PERINEURAL ONCE AS NEEDED
Status: DISCONTINUED | OUTPATIENT
Start: 2022-01-21 | End: 2022-01-21 | Stop reason: HOSPADM

## 2022-01-21 RX ORDER — SODIUM CHLORIDE, SODIUM LACTATE, POTASSIUM CHLORIDE, CALCIUM CHLORIDE 600; 310; 30; 20 MG/100ML; MG/100ML; MG/100ML; MG/100ML
INJECTION, SOLUTION INTRAVENOUS CONTINUOUS PRN
Status: DISCONTINUED | OUTPATIENT
Start: 2022-01-21 | End: 2022-01-21 | Stop reason: SURG

## 2022-01-21 RX ORDER — LIDOCAINE HYDROCHLORIDE 40 MG/ML
SOLUTION TOPICAL AS NEEDED
Status: DISCONTINUED | OUTPATIENT
Start: 2022-01-21 | End: 2022-01-21 | Stop reason: SURG

## 2022-01-21 RX ORDER — PROMETHAZINE HYDROCHLORIDE 25 MG/1
25 TABLET ORAL ONCE AS NEEDED
Status: DISCONTINUED | OUTPATIENT
Start: 2022-01-21 | End: 2022-01-21 | Stop reason: HOSPADM

## 2022-01-21 RX ORDER — DEXAMETHASONE SODIUM PHOSPHATE 10 MG/ML
INJECTION INTRAMUSCULAR; INTRAVENOUS AS NEEDED
Status: DISCONTINUED | OUTPATIENT
Start: 2022-01-21 | End: 2022-01-21 | Stop reason: SURG

## 2022-01-21 RX ORDER — FAMOTIDINE 10 MG/ML
20 INJECTION, SOLUTION INTRAVENOUS ONCE
Status: COMPLETED | OUTPATIENT
Start: 2022-01-21 | End: 2022-01-21

## 2022-01-21 RX ORDER — DIPHENHYDRAMINE HCL 25 MG
25 CAPSULE ORAL
Status: DISCONTINUED | OUTPATIENT
Start: 2022-01-21 | End: 2022-01-21 | Stop reason: HOSPADM

## 2022-01-21 RX ORDER — HYDRALAZINE HYDROCHLORIDE 20 MG/ML
5 INJECTION INTRAMUSCULAR; INTRAVENOUS
Status: DISCONTINUED | OUTPATIENT
Start: 2022-01-21 | End: 2022-01-21 | Stop reason: HOSPADM

## 2022-01-21 RX ORDER — DIPHENHYDRAMINE HYDROCHLORIDE 50 MG/ML
12.5 INJECTION INTRAMUSCULAR; INTRAVENOUS
Status: DISCONTINUED | OUTPATIENT
Start: 2022-01-21 | End: 2022-01-21 | Stop reason: HOSPADM

## 2022-01-21 RX ORDER — OXYCODONE AND ACETAMINOPHEN 7.5; 325 MG/1; MG/1
1 TABLET ORAL EVERY 4 HOURS PRN
Status: DISCONTINUED | OUTPATIENT
Start: 2022-01-21 | End: 2022-01-21 | Stop reason: HOSPADM

## 2022-01-21 RX ORDER — OXYCODONE HYDROCHLORIDE AND ACETAMINOPHEN 5; 325 MG/1; MG/1
2 TABLET ORAL EVERY 6 HOURS PRN
Status: DISCONTINUED | OUTPATIENT
Start: 2022-01-21 | End: 2022-01-23 | Stop reason: HOSPADM

## 2022-01-21 RX ORDER — LABETALOL HYDROCHLORIDE 5 MG/ML
5 INJECTION, SOLUTION INTRAVENOUS
Status: DISCONTINUED | OUTPATIENT
Start: 2022-01-21 | End: 2022-01-21 | Stop reason: HOSPADM

## 2022-01-21 RX ORDER — FLUMAZENIL 0.1 MG/ML
0.2 INJECTION INTRAVENOUS AS NEEDED
Status: DISCONTINUED | OUTPATIENT
Start: 2022-01-21 | End: 2022-01-21 | Stop reason: HOSPADM

## 2022-01-21 RX ORDER — DOCUSATE SODIUM 100 MG/1
100 CAPSULE, LIQUID FILLED ORAL 2 TIMES DAILY PRN
Status: DISCONTINUED | OUTPATIENT
Start: 2022-01-21 | End: 2022-01-23 | Stop reason: HOSPADM

## 2022-01-21 RX ORDER — SODIUM CHLORIDE, SODIUM LACTATE, POTASSIUM CHLORIDE, CALCIUM CHLORIDE 600; 310; 30; 20 MG/100ML; MG/100ML; MG/100ML; MG/100ML
9 INJECTION, SOLUTION INTRAVENOUS CONTINUOUS
Status: DISCONTINUED | OUTPATIENT
Start: 2022-01-21 | End: 2022-01-21

## 2022-01-21 RX ORDER — SODIUM CHLORIDE 0.9 % (FLUSH) 0.9 %
3 SYRINGE (ML) INJECTION EVERY 12 HOURS SCHEDULED
Status: DISCONTINUED | OUTPATIENT
Start: 2022-01-21 | End: 2022-01-21 | Stop reason: HOSPADM

## 2022-01-21 RX ORDER — ONDANSETRON 2 MG/ML
4 INJECTION INTRAMUSCULAR; INTRAVENOUS ONCE AS NEEDED
Status: DISCONTINUED | OUTPATIENT
Start: 2022-01-21 | End: 2022-01-21 | Stop reason: HOSPADM

## 2022-01-21 RX ORDER — MAGNESIUM HYDROXIDE 1200 MG/15ML
LIQUID ORAL AS NEEDED
Status: DISCONTINUED | OUTPATIENT
Start: 2022-01-21 | End: 2022-01-21 | Stop reason: HOSPADM

## 2022-01-21 RX ORDER — FENTANYL CITRATE 50 UG/ML
INJECTION, SOLUTION INTRAMUSCULAR; INTRAVENOUS AS NEEDED
Status: DISCONTINUED | OUTPATIENT
Start: 2022-01-21 | End: 2022-01-21 | Stop reason: SURG

## 2022-01-21 RX ORDER — PROPOFOL 10 MG/ML
VIAL (ML) INTRAVENOUS AS NEEDED
Status: DISCONTINUED | OUTPATIENT
Start: 2022-01-21 | End: 2022-01-21 | Stop reason: SURG

## 2022-01-21 RX ADMIN — FENTANYL CITRATE 25 MCG: 0.05 INJECTION, SOLUTION INTRAMUSCULAR; INTRAVENOUS at 08:11

## 2022-01-21 RX ADMIN — SODIUM CHLORIDE, POTASSIUM CHLORIDE, SODIUM LACTATE AND CALCIUM CHLORIDE: 600; 310; 30; 20 INJECTION, SOLUTION INTRAVENOUS at 07:22

## 2022-01-21 RX ADMIN — DEXAMETHASONE SODIUM PHOSPHATE 6 MG: 10 INJECTION INTRAMUSCULAR; INTRAVENOUS at 08:18

## 2022-01-21 RX ADMIN — GLYCOPYRROLATE 0.3 MG: 0.2 INJECTION INTRAMUSCULAR; INTRAVENOUS at 09:16

## 2022-01-21 RX ADMIN — AMLODIPINE BESYLATE 5 MG: 5 TABLET ORAL at 13:13

## 2022-01-21 RX ADMIN — LIDOCAINE HYDROCHLORIDE 80 MG: 20 INJECTION, SOLUTION INFILTRATION; PERINEURAL at 08:11

## 2022-01-21 RX ADMIN — NEOSTIGMINE METHYLSULFATE 2.5 MG: 0.5 INJECTION INTRAVENOUS at 09:16

## 2022-01-21 RX ADMIN — SODIUM CHLORIDE, POTASSIUM CHLORIDE, SODIUM LACTATE AND CALCIUM CHLORIDE 9 ML/HR: 600; 310; 30; 20 INJECTION, SOLUTION INTRAVENOUS at 07:29

## 2022-01-21 RX ADMIN — CEFAZOLIN SODIUM 2 G: 2 INJECTION, SOLUTION INTRAVENOUS at 07:55

## 2022-01-21 RX ADMIN — LIDOCAINE HYDROCHLORIDE 1 EACH: 40 SOLUTION TOPICAL at 08:13

## 2022-01-21 RX ADMIN — PROPOFOL 120 MG: 10 INJECTION, EMULSION INTRAVENOUS at 08:11

## 2022-01-21 RX ADMIN — OXYCODONE HYDROCHLORIDE AND ACETAMINOPHEN 1 TABLET: 7.5; 325 TABLET ORAL at 15:34

## 2022-01-21 RX ADMIN — ROCURONIUM BROMIDE 30 MG: 50 INJECTION INTRAVENOUS at 08:11

## 2022-01-21 RX ADMIN — LOSARTAN POTASSIUM 50 MG: 50 TABLET ORAL at 13:12

## 2022-01-21 RX ADMIN — EPHEDRINE SULFATE 10 MG: 50 INJECTION INTRAVENOUS at 08:23

## 2022-01-21 RX ADMIN — FAMOTIDINE 20 MG: 10 INJECTION INTRAVENOUS at 07:29

## 2022-01-21 RX ADMIN — POTASSIUM CHLORIDE AND SODIUM CHLORIDE 75 ML/HR: 450; 150 INJECTION, SOLUTION INTRAVENOUS at 13:13

## 2022-01-21 RX ADMIN — ONDANSETRON 4 MG: 2 INJECTION INTRAMUSCULAR; INTRAVENOUS at 09:14

## 2022-01-21 NOTE — PROGRESS NOTES
Name: Itzel Edmond ADMIT: 2022   : 1940  PCP: Rhoda Luther MD    MRN: [id number] LOS: 1 days   AGE/SEX: 81 y.o. female  ROOM: [facility]/MAIN OR     Subjective   Subjective   With complaints of sore throat postextubation.  No new events overnight.. OR without complication today. unfortunately still on bed holds for ICU therefore will have to remain in PACU until something opens up.     Review of Systems   Constitutional: Negative for chills and fever.   Eyes: Negative for photophobia and visual disturbance.   Respiratory: Negative for chest tightness and shortness of breath.    Cardiovascular: Negative for chest pain, palpitations and leg swelling.   Gastrointestinal: Negative for abdominal pain, diarrhea, nausea and vomiting.   Genitourinary: Negative for difficulty urinating and dysuria.   Musculoskeletal: Negative for back pain and gait problem.   Neurological: Negative for dizziness, light-headedness and headaches.   Hematological: Negative for adenopathy. Does not bruise/bleed easily.   Psychiatric/Behavioral: Negative for agitation and confusion.        Objective   Objective   Vital Signs  Temp:  [97.8 °F (36.6 °C)-98.5 °F (36.9 °C)] 98.2 °F (36.8 °C)  Heart Rate:  [66-95] 84  Resp:  [16-18] 16  BP: (125-157)/(46-92) 139/63  SpO2:  [92 %-98 %] 96 %  on  Flow (L/min):  [2-4] 2;   Device (Oxygen Therapy): room air  Body mass index is 25.33 kg/m².  Physical Exam  Vitals and nursing note reviewed. Exam conducted with a chaperone present.   Constitutional:       Appearance: Normal appearance.   HENT:      Head: Normocephalic and atraumatic.   Eyes:      Extraocular Movements: Extraocular movements intact.      Conjunctiva/sclera: Conjunctivae normal.   Cardiovascular:      Rate and Rhythm: Normal rate.      Heart sounds: Murmur heard.       Pulmonary:      Effort: Pulmonary effort is normal. No respiratory distress.      Breath sounds: Normal breath sounds.   Abdominal:      General: Bowel  sounds are normal. There is no distension.      Palpations: Abdomen is soft.      Tenderness: There is no abdominal tenderness.   Musculoskeletal:         General: No swelling or tenderness. Normal range of motion.   Skin:     General: Skin is warm and dry.   Neurological:      Mental Status: She is alert and oriented to person, place, and time. Mental status is at baseline.   Psychiatric:         Mood and Affect: Mood normal.         Behavior: Behavior normal.         Results Review     I reviewed the patient's new clinical results.  Results from last 7 days   Lab Units 01/20/22  0745 01/19/22  1810   WBC 10*3/mm3 7.59 7.48   HEMOGLOBIN g/dL 11.5* 11.2*   PLATELETS 10*3/mm3 236 227     Results from last 7 days   Lab Units 01/20/22  0744 01/19/22  1810   SODIUM mmol/L 140 140   POTASSIUM mmol/L 3.8 3.8   CHLORIDE mmol/L 101 100   CO2 mmol/L 29.1* 27.0   BUN mg/dL 25* 26*   CREATININE mg/dL 1.00 1.22*   GLUCOSE mg/dL 96 100*   EGFR IF NONAFRICN AM mL/min/1.73 53* 42*     Results from last 7 days   Lab Units 01/19/22  1810   ALBUMIN g/dL 4.20   BILIRUBIN mg/dL 0.3   ALK PHOS U/L 86   AST (SGOT) U/L 24   ALT (SGPT) U/L 20     Results from last 7 days   Lab Units 01/20/22  0744 01/19/22  1810   CALCIUM mg/dL 9.2 8.9   ALBUMIN g/dL  --  4.20       COVID19   Date Value Ref Range Status   01/19/2022 Not Detected Not Detected - Ref. Range Final   11/28/2021 Not Detected Not Detected - Ref. Range Final     No results found for: HGBA1C, POCGLU    CT Head Without Contrast  Narrative: EXAM:  CT HEAD WO CONTRAST-     HISTORY:  Enlarging subdural hematoma, increasing visual changes.     COMPARISON:  CT head without contrast 01/13/2022     TECHNIQUE: Noncontrast images of the brain were obtained. Reformatted  images were reviewed. Radiation dose reduction techniques were utilized,  including automated exposure control and exposure modulation based on  body size.     FINDINGS:       There is redemonstration of a right cerebral  hemispheric mixed density  subdural hematoma, which may be slightly less dense compared to  01/13/2022. This is not significantly changed in size from 01/13/2022,  measuring up to 1.4 cm overlying the anterior inferior right frontal  lobe, previously 1.4 cm, 1.2 cm overlying the anterior right frontal  lobe, previously 1.1 cm, and 1.5 cm overlying the posterior right  frontal lobe, previously 1.5 cm. There is corresponding mass effect on  the underlying brain parenchyma with partial effacement of the right  lateral ventricle and 0.5 cm leftward midline shift at the level of the  septum pellucidum, which is not significantly changed, previously 0.5  cm. No new intracranial hemorrhage is identified.  There is mild global parenchymal volume loss. The basal cisterns are  patent. Chronic small vessel ischemic disease is not significantly  changed. The grey-white matter differentiation is maintained. There is  calcific intracranial atherosclerosis with involvement of the posterior  circulation.        Impression: No significant change in size of a mixed density right cerebral  hemispheric subdural hematoma and corresponding mass effect. No new  intracranial hemorrhage is identified. If there is clinical concern for  acute infarction, this would be better assessed with MRI.     Discussed with Dr. Romo at 6:30 PM.     This report was finalized on 1/19/2022 6:30 PM by Dr. Manasa Rogers M.D.       Scheduled Medications  amLODIPine, 5 mg, Oral, Daily  DULoxetine, 60 mg, Oral, Daily  losartan, 50 mg, Oral, Daily  pramipexole, 0.25 mg, Oral, Daily    Infusions  niCARdipine, 5-15 mg/hr  sodium chloride 0.45 % with KCl 20 mEq, 75 mL/hr, Last Rate: 75 mL/hr (01/21/22 1313)    Diet  Diet Regular       Assessment/Plan     Active Hospital Problems    Diagnosis  POA   • **Subdural hematoma (HCC) [S06.5X9A]  Yes   • TIA (transient ischemic attack) [G45.9]  Yes   • CAD (coronary artery disease) [I25.10]  Yes   • Heart murmur [R01.1]  Yes    • Hyperlipidemia [E78.5]  Yes   • Obstructive sleep apnea syndrome [G47.33]  Yes   • Hypertension [I10]  Yes      Resolved Hospital Problems   No resolved problems to display.     · SDH: Per [facility], appreciate their recommendations.  Status post no hole 1/21/22 without complication and she is doing great postoperatively. Labs have remained largely unremarkable.  Blurred vision has corrected. Possibly home tomorrow should she have no complications throughout the night.   · HTN: Stable, continue current regimen.  Creatinine was very minimally elevated on admission and has corrected- HCTZ was held.  Could likely resume tomorrow.  Monitor labs.    · SCDs for DVT prophylaxis.  · Full code.  · Discussed with patient and nursing staff.  · Anticipate discharge [facility]  [facility]  01/21/22  14:49 EST

## 2022-01-21 NOTE — SIGNIFICANT NOTE
Patient daughter Kaci updated on patient status, patient will likely be held in PACU overnight due to ICU bed availability; questions encouraged. [facility] contact phone numbers provided

## 2022-01-21 NOTE — ANESTHESIA PREPROCEDURE EVALUATION
Anesthesia Evaluation     Patient summary reviewed and Nursing notes reviewed   no history of anesthetic complications:  NPO Solid Status: > 8 hours  NPO Liquid Status: > 2 hours           Airway   Mallampati: II  TM distance: >3 FB  Neck ROM: full  No difficulty expected  Dental    (+) upper dentures and lower dentures    Pulmonary    (+) shortness of breath, sleep apnea on CPAP,   Cardiovascular   Exercise tolerance: good (4-7 METS)    ECG reviewed    (+) hypertension, hyperlipidemia,   (-) angina, WOODARD    ROS comment: TTE:  · Calculated left ventricular EF = 64.7% Estimated left ventricular EF was in agreement with the calculated left ventricular EF. Left ventricular systolic function is normal. Normal left ventricular cavity size and wall thickness noted. All left ventricular wall segments contract normally. Left ventricular diastolic function is consistent with (grade I) impaired relaxation.  · Normal left atrial cavity size noted. Saline test results are negative.  · Moderate mitral annular calcification is present. There is mild calcification of the mitral valve. Mild mitral valve regurgitation is present. No significant mitral valve stenosis is present.        Neuro/Psych  (+) TIA, psychiatric history Anxiety,       ROS Comment: SDH d/t fall  GI/Hepatic/Renal/Endo    (+)   renal disease,     Musculoskeletal (-) negative ROS    Abdominal    Substance History      OB/GYN          Other                        Anesthesia Plan    ASA 3     general   (I have reviewed the patient's history with the patient and the chart, including all pertinent laboratory results and imaging. I have explained the risks of anesthesia including but not limited to dental damage, corneal abrasion, nerve injury, MI, stroke, and death. Questions asked and answered. Anesthetic plan discussed with patient and team as indicated. Patient expressed understanding of the above.  )  intravenous induction     Anesthetic plan, all risks, benefits,  and alternatives have been provided, discussed and informed consent has been obtained with: patient.        CODE STATUS:    Code Status (Patient has no pulse and is not breathing): CPR (Attempt to Resuscitate)  Medical Interventions (Patient has pulse or is breathing): Full

## 2022-01-21 NOTE — PLAN OF CARE
Goal Outcome Evaluation:  Plan of Care Reviewed With: patient           Outcome Summary: Vital signs stable, patient A/O x4, neuro status stable overnight.  Consent signed for surgery, patient NPO since midnight, CHG bath completed, gown changed, no jewelry or dentures.  Will continue to monitor.

## 2022-01-21 NOTE — ANESTHESIA PROCEDURE NOTES
Airway  Urgency: elective    Date/Time: 1/21/2022 8:13 AM  Airway not difficult    General Information and Staff    Patient location during procedure: OR  Anesthesiologist: Conner Bravo MD  CRNA: Massimo Salinas CRNA    Indications and Patient Condition  Indications for airway management: airway protection    Preoxygenated: yes  MILS not maintained throughout  Mask difficulty assessment: 1 - vent by mask    Final Airway Details  Final airway type: endotracheal airway      Successful airway: ETT  Cuffed: yes   Successful intubation technique: direct laryngoscopy  Facilitating devices/methods: anterior pressure/BURP  Endotracheal tube insertion site: oral  Blade: Cheryl  Blade size: 3  ETT size (mm): 7.0  Cormack-Lehane Classification: grade I - full view of glottis  Placement verified by: chest auscultation and capnometry   Cuff volume (mL): 0  Measured from: lips  ETT/EBT  to lips (cm): 19  Number of attempts at approach: 1  Assessment: lips, teeth, and gum same as pre-op and atraumatic intubation    Additional Comments  Pre O2, SIAI

## 2022-01-21 NOTE — CONSULTS
Pulmonary Consultation     Patient Name: Itzel Edmond  Age/Sex: 81 y.o. female  : 1940  MRN: [id number]    Date of Admission: 2022  Date of Encounter Visit: 22  Encounter Provider: Sonal Parsons MD  Referring Provider: Diana Meza MD  Place of Service: [facility]  Patient Care Team:  Rhoda Luther MD as PCP - General (Internal Medicine)  Sandra Winters MD as Consulting Physician (Cardiology)      Subjective:     Consulted for: Subdural hematoma status post bur hole    Chief Complaint: Balance issues and lightheadedness    History of Present Illness:  Itzel Edmond is a 81 y.o. female with history of previous TIA but no history of any brain bleed before who is not on any anticoagulation.  Patient had a fall back in November and hit her head on a marble table but did not seek any attention at the time.  She started having some problem with dizziness, and lightheadedness, and while walking she was drifting slightly to the right.  Eventually she had a work-up with a CAT scan that showed subdural hematoma and surgery was advised but patient wanted to think about it and could not get in time to follow-up with the neurosurgeon so she was admitted and was seen in consultation by neurosurgery team and was taken to the OR today with bur hole procedure and evacuation of the hematoma.  Postoperatively she is doing great, awake and responsive, in no distress.  Patient denies any history of smoking or tobacco or other illicit substance abuse, she denies any asthma or COPD or respiratory issues  Denies any significant cardiac history except for benign heart murmur  Currently is in no distress denies any visual deficit, alert and oriented x4 and able to provide me with detailed history    Pulmonary Functions Testing Results:    No results found for: FEV1, FVC, TEX8NFU, TLC, DLCO    Review of Systems:   Review of Systems   Constitutional: Negative.    HENT: Negative.    Eyes:  Negative.    Respiratory: Negative.    Cardiovascular: Negative.    Gastrointestinal: Negative.    Endocrine: Negative.    Genitourinary: Negative.    Musculoskeletal: Negative.    Skin: Negative.    Allergic/Immunologic: Negative.    Neurological: Positive for dizziness and light-headedness. Negative for tremors, seizures, syncope, facial asymmetry, speech difficulty, weakness, numbness and headaches.   Hematological: Negative.    Psychiatric/Behavioral: Negative.      Past Medical History:  Past Medical History:   Diagnosis Date   • Allergic rhinitis    • Atherosclerosis    • CAD (coronary artery disease) 2019    Overview:  Mild non obstructive CAD on cath 2019, medical mgmt recommended   • Carotid stenosis    • Cataract    • Cellulitis of right leg 2016   • Chest discomfort 2019   • Claudication of lower extremity (HCC)    • CMC arthritis    • Colon polyps     FOLLOWED BY DR. NOLBERTO REYNOSO   • Cystitis 2019   • WOODARD (dyspnea on exertion) 10/2020   • Dysphagia    • Elevated serum creatinine 2021   • Environmental allergies    • Exercise intolerance    • JAMES (generalized anxiety disorder) 2020   • GERD (gastroesophageal reflux disease)    • Heart murmur    • Hemorrhoids    • Hyperglycemia    • Hyperkalemia 2020   • Hyperlipidemia     MIXED   • Hypertension    • Influenza 2018   • Insomnia    • Myalgia 2016   • Neuropathy 2017   • OAB (overactive bladder)    • JHOANA (obstructive sleep apnea)     BiPap   • PLMD (periodic limb movement disorder)    • Precordial pain 2019   • Premature atrial complexes    • Rectal bleeding    • Rectal prolapse 10/2020   • Rectal ulcer 10/02/2020    5 MM SINGLE ULCER ON CY   • Rectocele 2019   • Renal insufficiency    • Restless leg syndrome 10/2/2018   • SAB (spontaneous )      A1   • Serum potassium elevated 2016   • Slow transit constipation 2020   • Tachycardia 2013   • Trigger finger of left thumb 10/21/2019   •  Urge incontinence    • Urinary frequency    • Vaginal atrophy        Past Surgical History:   Procedure Laterality Date   • ANTERIOR AND POSTERIOR VAGINAL REPAIR N/A    • BACK SURGERY     • BILATERAL SALPINGO OOPHORECTOMY Bilateral 2002   • BLADDER SURGERY N/A 2002    BLADDER SLING   • CARDIAC CATHETERIZATION N/A 1/6/2019    Procedure: Left Heart Cath;  Surgeon: Christopher Rosenbaum MD;  Location:  [facility];  Service: Cardiology   • CARDIAC CATHETERIZATION N/A 1/6/2019    Procedure: Coronary angiography;  Surgeon: Christopher Rosenbaum MD;  Location:  [facility];  Service: Cardiology   • CARDIAC CATHETERIZATION N/A 1/6/2019    Procedure: Left ventriculography;  Surgeon: Christopher Rosenbaum MD;  Location:  [facility];  Service: Cardiology   • CATARACT EXTRACTION Left 12/15/2015    WITH LENS IMPLANT, DR. AMRIT DIAZ AT [facility]   • CATARACT EXTRACTION Right 12/01/2015     WITH LENS IMPLANT, DR. AMRIT DIAZ AT [facility]   • COLONOSCOPY N/A 10/2/2020    2 TUBULAR ADENOMA POLYPS IN TRANSVERSE, HEMORRHOIDS, DIVERTICULOSIS, 5 MM ULCER IN RECTUM, OTHER BIOPSIES BENIGN, DR. NOLBERTO REYNOSO AT [facility]   • COLONOSCOPY W/ POLYPECTOMY N/A 08/24/2015    1 CM TUBULAR ADENOMA POLYP IN TRANSVERSE, MODERATE DIVERTICULOSIS, HEMORRHOIDS, RESCOPE IN 5 YRS, DR. MAYA SAHU AT [place]   • HEMORROIDECTOMY N/A    • HERNIA REPAIR     • HYSTERECTOMY N/A 1970    [facility]       Home Medications:   Medications Prior to Admission   Medication Sig Dispense Refill Last Dose   • amLODIPine (NORVASC) 5 MG tablet Take 1 tablet by mouth Daily. 90 tablet 3    • aspirin 81 MG tablet Take 1 tablet by mouth daily. 90 tablet 3    • atorvastatin (LIPITOR) 20 MG tablet TAKE 1 TABLET EVERY DAY 90 tablet 3    • chlorhexidine (PERIDEX) 0.12 % solution USE TO BRUSH IMPLANTS TWICE DAILY      • DULoxetine (CYMBALTA) 60 MG capsule Take 60 mg by mouth Daily.      • hydroCHLOROthiazide (HYDRODIURIL) 25  MG tablet Take 25 mg by mouth Daily.      • losartan (COZAAR) 50 MG tablet Take 1 tablet by mouth Daily. 90 tablet 3    • pramipexole (MIRAPEX) 0.25 MG tablet Take 0.25 mg by mouth Daily.          Inpatient Medications:  Scheduled Meds:amLODIPine, 5 mg, Oral, Daily  DULoxetine, 60 mg, Oral, Daily  losartan, 50 mg, Oral, Daily  pramipexole, 0.25 mg, Oral, Daily      Continuous Infusions:niCARdipine, 5-15 mg/hr  sodium chloride 0.45 % with KCl 20 mEq, 75 mL/hr, Last Rate: 75 mL/hr (01/21/22 1313)      PRN Meds:.diphenhydrAMINE  •  diphenhydrAMINE  •  docusate sodium  •  ePHEDrine  •  fentanyl  •  flumazenil  •  hydrALAZINE  •  HYDROcodone-acetaminophen  •  HYDROmorphone  •  ibuprofen  •  labetalol  •  melatonin  •  Morphine  •  naloxone  •  nitroglycerin  •  ondansetron **OR** ondansetron  •  ondansetron  •  ondansetron **OR** ondansetron  •  oxyCODONE-acetaminophen  •  oxyCODONE-acetaminophen  •  phenol  •  promethazine **OR** promethazine  •  senna-docusate sodium    Allergies:  Allergies   Allergen Reactions   • Gabapentin Dizziness     Other reaction(s): Dizziness   • Pregabalin Dizziness     Vision problems, dizziness   • Sulfamethoxazole-Trimethoprim Itching and Nausea And Vomiting   • Ace Inhibitors Cough     Cough   • Lisinopril Cough       Past Social History:  Social History     Socioeconomic History   • Marital status:    Tobacco Use   • Smoking status: Never Smoker   • Smokeless tobacco: Never Used   Vaping Use   • Vaping Use: Never used   Substance and Sexual Activity   • Alcohol use: No     Comment: caffeine use: 1 cup daily   • Drug use: Never   • Sexual activity: Not Currently     Birth control/protection: Post-menopausal, Surgical     Comment: .       Past Family History:  Family History   Problem Relation Age of Onset   • Heart disease Mother    • Stroke Mother    • Heart attack Father    • Heart disease Father    • Heart disease Sister         s/p CABG   • Heart attack Brother    • Heart  disease Brother         s/p CABG   • Hypertension Other    • Heart disease Maternal Grandmother    • Stroke Maternal Grandmother            Objective:   Temp:  [97.8 °F (36.6 °C)-98.5 °F (36.9 °C)] 98.2 °F (36.8 °C)  Heart Rate:  [66-95] 95  Resp:  [16-18] 16  BP: (125-157)/(46-92) 148/79  SpO2:  [92 %-98 %] 95 %  on  Flow (L/min):  [2-4] 2 Device (Oxygen Therapy): room air     Intake/Output Summary (Last 24 hours) at 1/21/2022 1519  Last data filed at 1/21/2022 1341  Gross per 24 hour   Intake 1000 ml   Output 850 ml   Net 150 ml     Body mass index is 25.33 kg/m².      01/19/22 2249   Weight: 64.9 kg (143 lb)     Weight change:     Physical Exam:   Physical Exam   General:    No acute distress, alert and oriented x4, pleasant                   Head:    Normocephalic, the incision on the right side of the head is sutured and stapled with no bleeding or discharge. External ears and nose are normal   Eyes:          Conjunctivae and sclerae normal, no icterus, PERRLA, no  discharge   Throat:   No oral lesions, no thrush, oral mucosa moist.    Neck:   Supple, trachea midline. No JVD, no cervical or supraclavicular lymphadenopathy    Lungs:     Normal chest on inspection, CTAB, no wheezes. No rhonchi. No crackles. Respirations regular, even and unlabored.      Heart:    Regular rhythm and normal rate.  Systolic murmurs,No gallops, or rubs noted.   Abdomen:     Soft, nontender, nondistended, positive bowel sounds. No hepatospleenomegaly    Extremities:   No clubbing, cyanosis, or edema.     Pulses:   Pulses palpable and equal bilaterally.    Skin:   No bleeding or rash. No bumps, good turgor pressure    Neuro:   Nonfocal.  Moves all extremities well. Strength 5/5 and symmetrical, no sensory deficit    Psychiatric:   Normal mood and affect.     Lab Review:   Results from last 7 days   Lab Units 01/20/22  0744 01/19/22  1810   SODIUM mmol/L 140 140   POTASSIUM mmol/L 3.8 3.8   CHLORIDE mmol/L 101 100   CO2 mmol/L 29.1*  27.0   BUN mg/dL 25* 26*   CREATININE mg/dL 1.00 1.22*   GLUCOSE mg/dL 96 100*   CALCIUM mg/dL 9.2 8.9   AST (SGOT) U/L  --  24   ALT (SGPT) U/L  --  20   ALBUMIN g/dL  --  4.20         Results from last 7 days   Lab Units 01/20/22  0745 01/19/22 1810 01/19/22 1810   WBC 10*3/mm3 7.59  --  7.48   HEMOGLOBIN g/dL 11.5*  --  11.2*   HEMATOCRIT % 34.5  --  33.3*   PLATELETS 10*3/mm3 236  --  227   MCV fL 89.4   < > 88.3   MCH pg 29.8   < > 29.7   MCHC g/dL 33.3   < > 33.6   RDW % 12.7   < > 12.7   RDW-SD fl 41.4   < > 40.8   MPV fL 10.4   < > 10.1   NEUTROPHIL % %  --   --  65.8   LYMPHOCYTE % %  --   --  21.0   MONOCYTES % %  --   --  10.0   EOSINOPHIL % %  --   --  2.8   BASOPHIL % %  --   --  0.3   IMM GRAN % %  --   --  0.1   NEUTROS ABS 10*3/mm3  --   --  4.92   LYMPHS ABS 10*3/mm3  --   --  1.57   MONOS ABS 10*3/mm3  --   --  0.75   EOS ABS 10*3/mm3  --   --  0.21   BASOS ABS 10*3/mm3  --   --  0.02   IMMATURE GRANS (ABS) 10*3/mm3  --   --  0.01   NRBC /100 WBC  --   --  0.0    < > = values in this interval not displayed.     Results from last 7 days   Lab Units 01/19/22 1811   INR  1.04   APTT seconds 26.3               Invalid input(s): LDLCALC                                  Results from last 7 days   Lab Units 01/19/22 1827   COVID19  Not Detected             Imaging:  Imaging Results (Most Recent)     Procedure Component Value Units Date/Time    CT Head Without Contrast [[id number]] Collected: 01/19/22 1821     Updated: 01/19/22 1833    Narrative:      EXAM:  CT HEAD WO CONTRAST-     HISTORY:  Enlarging subdural hematoma, increasing visual changes.     COMPARISON:  CT head without contrast 01/13/2022     TECHNIQUE: Noncontrast images of the brain were obtained. Reformatted  images were reviewed. Radiation dose reduction techniques were utilized,  including automated exposure control and exposure modulation based on  body size.     FINDINGS:       There is redemonstration of a right cerebral hemispheric  mixed density  subdural hematoma, which may be slightly less dense compared to  01/13/2022. This is not significantly changed in size from 01/13/2022,  measuring up to 1.4 cm overlying the anterior inferior right frontal  lobe, previously 1.4 cm, 1.2 cm overlying the anterior right frontal  lobe, previously 1.1 cm, and 1.5 cm overlying the posterior right  frontal lobe, previously 1.5 cm. There is corresponding mass effect on  the underlying brain parenchyma with partial effacement of the right  lateral ventricle and 0.5 cm leftward midline shift at the level of the  septum pellucidum, which is not significantly changed, previously 0.5  cm. No new intracranial hemorrhage is identified.  There is mild global parenchymal volume loss. The basal cisterns are  patent. Chronic small vessel ischemic disease is not significantly  changed. The grey-white matter differentiation is maintained. There is  calcific intracranial atherosclerosis with involvement of the posterior  circulation.          Impression:      No significant change in size of a mixed density right cerebral  hemispheric subdural hematoma and corresponding mass effect. No new  intracranial hemorrhage is identified. If there is clinical concern for  acute infarction, this would be better assessed with MRI.     Discussed with Dr. Romo at 6:30 PM.     This report was finalized on 1/19/2022 6:30 PM by Dr. Manasa Rogers M.D.             I personally viewed and interpreted the patient's imaging studies.    Assessment:   Traumatic subdural hematoma  Essential hypertension  Obstructive sleep apnea  Coronary artery disease  History of TIA      Plan:     Patient is doing pretty good, neurologically intact, hemodynamically stable, pleasant and cooperative, and in no apparent distress.  Given the nature of the surgery she will be monitored in the ICU for the first 24 hours with frequent neurochecks with the plan to consider stat CT of the head in case of any new onset  changes  Monitor for hemodynamic abnormalities  Discussed with the patient and encouraged to report any subjective neuro changes  Otherwise little to add from the critical care standpoint except monitoring for any changes and acting accordingly      Thank you for allowing me to participate in the care of Itzel BEAR Mayito. Feel free to contact me directly with any further questions or concerns.    Sonal Parsons MD  [facility]   01/21/22  15:19 EST    Dictated utilizing Dragon dictation

## 2022-01-21 NOTE — ANESTHESIA POSTPROCEDURE EVALUATION
Patient: Itzel Edmond    Procedure Summary     Date: 01/21/22 Room / Location: [facility] OR 21 / [facility] MAIN OR    Anesthesia Start: 0800 Anesthesia Stop: 0952    Procedure: YADI HOLE (Right Head) Diagnosis:       SDH (subdural hematoma) (HCC)      (SDH (subdural hematoma) (HCC) [S06.5X9A])    Surgeons: Austin Hilario MD Provider: Conner Bravo MD    Anesthesia Type: general ASA Status: 3          Anesthesia Type: general    Vitals  Vitals Value Taken Time   /70 01/21/22 1201   Temp 36.8 °C (98.2 °F) 01/21/22 0949   Pulse 70 01/21/22 1211   Resp 16 01/21/22 1200   SpO2 95 % 01/21/22 1211   Vitals shown include unvalidated device data.        Post Anesthesia Care and Evaluation    Patient location during evaluation: bedside  Patient participation: complete - patient participated  Level of consciousness: awake and alert  Pain management: adequate  Airway patency: patent  Anesthetic complications: No anesthetic complications  PONV Status: controlled  Cardiovascular status: blood pressure returned to baseline and acceptable  Respiratory status: acceptable  Hydration status: acceptable

## 2022-01-21 NOTE — OP NOTE
Preoperative diagnosis: Right subacute on chronic subdural hematoma    Postoperative diagnosis: Same    Procedure performed: Omid Hilario MD      Assistant: Luiza Ramos CFA who was instrumental in helping with hemostasis, visualization of neural structures, and retraction of neural structures.  Her skilled assistance was necessary for the success of this case    Indications for the procedure: Patient is an 81-year-old female who was admitted through the emergency room with a right sided acute on chronic subdural hematoma.  She has been followed by another neurosurgeon in this practice with expansion of this right-sided subdural hematoma on follow-up imaging.  They previously discussed a craniotomy for evacuation of the subdural hematoma.  She presents to the hospital with concern for worsening of her condition.  Her daughter is at the bedside and reports that she has had some memory issues over the last couple weeks.  She has been repeating herself and had increased instability while walking.  She was consented for right-sided no holes and a possible craniotomy for evacuation of the right sided subdural hematoma.  The risks and benefits of the procedure were discussed with the patient and her daughter including the risk of hemorrhage, infection, stroke.  They expressed understanding of the risks and benefits of the procedure and elected to proceed.    Description: Patient was brought into the operating room.  A timeout was performed.  She was put to sleep with general endotracheal anesthesia.  Her scalp was prepped and draped in the usual sterile fashion.  2 separate 2 cm incisions were planned on the right side of her head.  The region was then infiltrated with 1% lidocaine with epinephrine.  An incision was performed with a 10 blade knife.  Both areas were opened and retracted with self-retaining retractors.  Hemostasis was obtained using bipolar electrocautery.  An acorn drill bit was used with a  high-speed electric drill to make 2 small bur holes.  The dura was then coagulated using bipolar forceps and then opened sharply with an 11 blade knife.  This opening was then widened using bipolar electrocautery.  There was evidence of some membranes underneath the opening which were then further opened using electrocautery.  Brisk dark fluid was evacuated under pressure.  2 L of saline irrigation was then used to irrigate until the fluid came back clear.  Her brain expanded to the undersurface of the skull.  There is no room to place a subdural drain.  2 medium size bur hole covers were secured into place using 6 separate 4 mm screws.  The galea was then closed with 2-0 Vicryl sutures and the skin closed with 3-0 Monocryl suture..  The wounds were also irrigated with antibiotic solution.  She was then extubated and transferred to the recovery unit in stable condition.    Complications: None  Estimated blood loss: Less than 50 mL  Disposition: Admit to ICU for overnight observation.

## 2022-01-21 NOTE — SIGNIFICANT NOTE
DISCUSSED WITH PATIENT ORDER TO D/C CATHETER SOON. SHE REPORTS Hx OF URINARY FREQUENCY AND ASKS TO PLEASE KEEP CATHETER IN FOR THE MOMENT UNTIL SHE IS ADMITTED TO A ROOM WITH A BATHROOM

## 2022-01-21 NOTE — PLAN OF CARE
Goal Outcome Evaluation:  VSS- Pt alert and oriented and pleasant. Pt up ad jesus. Pt eating and drinking well and voiding well. Pt NPO after midnight for surgery.

## 2022-01-22 ENCOUNTER — APPOINTMENT (OUTPATIENT)
Dept: CT IMAGING | Facility: HOSPITAL | Age: 82
End: 2022-01-22

## 2022-01-22 LAB
ANION GAP SERPL CALCULATED.3IONS-SCNC: 10.3 MMOL/L (ref 5–15)
APTT PPP: 26.2 SECONDS (ref 22.7–35.4)
BASOPHILS # BLD AUTO: 0.01 10*3/MM3 (ref 0–0.2)
BASOPHILS NFR BLD AUTO: 0.1 % (ref 0–1.5)
BUN SERPL-MCNC: 28 MG/DL (ref 8–23)
BUN/CREAT SERPL: 22.6 (ref 7–25)
CALCIUM SPEC-SCNC: 8.4 MG/DL (ref 8.6–10.5)
CHLORIDE SERPL-SCNC: 102 MMOL/L (ref 98–107)
CO2 SERPL-SCNC: 23.7 MMOL/L (ref 22–29)
CREAT SERPL-MCNC: 1.24 MG/DL (ref 0.57–1)
DEPRECATED RDW RBC AUTO: 43.5 FL (ref 37–54)
EOSINOPHIL # BLD AUTO: 0 10*3/MM3 (ref 0–0.4)
EOSINOPHIL NFR BLD AUTO: 0 % (ref 0.3–6.2)
ERYTHROCYTE [DISTWIDTH] IN BLOOD BY AUTOMATED COUNT: 12.7 % (ref 12.3–15.4)
GFR SERPL CREATININE-BSD FRML MDRD: 41 ML/MIN/1.73
GLUCOSE SERPL-MCNC: 157 MG/DL (ref 65–99)
HCT VFR BLD AUTO: 30.9 % (ref 34–46.6)
HGB BLD-MCNC: 10 G/DL (ref 12–15.9)
IMM GRANULOCYTES # BLD AUTO: 0.05 10*3/MM3 (ref 0–0.05)
IMM GRANULOCYTES NFR BLD AUTO: 0.4 % (ref 0–0.5)
INR PPP: 1.08 (ref 0.9–1.1)
LYMPHOCYTES # BLD AUTO: 0.79 10*3/MM3 (ref 0.7–3.1)
LYMPHOCYTES NFR BLD AUTO: 6.5 % (ref 19.6–45.3)
MCH RBC QN AUTO: 29.8 PG (ref 26.6–33)
MCHC RBC AUTO-ENTMCNC: 32.4 G/DL (ref 31.5–35.7)
MCV RBC AUTO: 92 FL (ref 79–97)
MONOCYTES # BLD AUTO: 0.71 10*3/MM3 (ref 0.1–0.9)
MONOCYTES NFR BLD AUTO: 5.9 % (ref 5–12)
NEUTROPHILS NFR BLD AUTO: 10.56 10*3/MM3 (ref 1.7–7)
NEUTROPHILS NFR BLD AUTO: 87.1 % (ref 42.7–76)
NRBC BLD AUTO-RTO: 0 /100 WBC (ref 0–0.2)
PLATELET # BLD AUTO: 210 10*3/MM3 (ref 140–450)
PMV BLD AUTO: 10.6 FL (ref 6–12)
POTASSIUM SERPL-SCNC: 4.8 MMOL/L (ref 3.5–5.2)
PROTHROMBIN TIME: 13.8 SECONDS (ref 11.7–14.2)
RBC # BLD AUTO: 3.36 10*6/MM3 (ref 3.77–5.28)
SODIUM SERPL-SCNC: 136 MMOL/L (ref 136–145)
WBC NRBC COR # BLD: 12.12 10*3/MM3 (ref 3.4–10.8)

## 2022-01-22 PROCEDURE — 85025 COMPLETE CBC W/AUTO DIFF WBC: CPT | Performed by: NEUROLOGICAL SURGERY

## 2022-01-22 PROCEDURE — 85730 THROMBOPLASTIN TIME PARTIAL: CPT | Performed by: NEUROLOGICAL SURGERY

## 2022-01-22 PROCEDURE — 85610 PROTHROMBIN TIME: CPT | Performed by: NEUROLOGICAL SURGERY

## 2022-01-22 PROCEDURE — 80048 BASIC METABOLIC PNL TOTAL CA: CPT | Performed by: NEUROLOGICAL SURGERY

## 2022-01-22 PROCEDURE — 0 POTASSIUM CHLORIDE PER 2 MEQ: Performed by: NEUROLOGICAL SURGERY

## 2022-01-22 PROCEDURE — 70450 CT HEAD/BRAIN W/O DYE: CPT

## 2022-01-22 PROCEDURE — 99024 POSTOP FOLLOW-UP VISIT: CPT | Performed by: NEUROLOGICAL SURGERY

## 2022-01-22 RX ADMIN — POTASSIUM CHLORIDE AND SODIUM CHLORIDE 75 ML/HR: 450; 150 INJECTION, SOLUTION INTRAVENOUS at 04:20

## 2022-01-22 RX ADMIN — Medication 3 MG: at 20:18

## 2022-01-22 RX ADMIN — LOSARTAN POTASSIUM 50 MG: 50 TABLET ORAL at 09:41

## 2022-01-22 RX ADMIN — AMLODIPINE BESYLATE 5 MG: 5 TABLET ORAL at 09:41

## 2022-01-22 RX ADMIN — OXYCODONE AND ACETAMINOPHEN 2 TABLET: 5; 325 TABLET ORAL at 04:27

## 2022-01-22 RX ADMIN — OXYCODONE AND ACETAMINOPHEN 2 TABLET: 5; 325 TABLET ORAL at 20:17

## 2022-01-22 RX ADMIN — DULOXETINE HYDROCHLORIDE 60 MG: 60 CAPSULE, DELAYED RELEASE ORAL at 09:41

## 2022-01-22 RX ADMIN — PRAMIPEXOLE DIHYDROCHLORIDE 0.25 MG: 0.25 TABLET ORAL at 09:41

## 2022-01-22 NOTE — SIGNIFICANT NOTE
Patient daughter updated on patient status and room assignment to , questions encouraged. [facility] contact phone numbers provided including CCU nurse desk number

## 2022-01-22 NOTE — PROGRESS NOTES
Neurosurgery progress note    Postop day #1 s/p right-sided bur holes for subdural hematoma    Subjective: No events reported overnight.  She reports that she is doing well today.  No new complaints.  Her pain is well controlled    Objective:  Vitals:    01/22/22 0500 01/22/22 0600 01/22/22 0700 01/22/22 0800   BP: 123/57 117/52 125/56 127/56   BP Location:   Right arm    Patient Position:   Lying    Pulse: 77 71 84 75   Resp:   18    Temp: 98.2 °F (36.8 °C)  97.8 °F (36.6 °C)    TempSrc:   Oral    SpO2: 92% 92% 94% 94%   Weight:       Height:           Physical exam  Awake, alert, oriented x3  Pupils equal round reactive to light  Extraocular muscles intact  Face symmetric  Speech is fluent and clear  No pronator drift  Motor exam  Bilateral deltoids 5/5, bilateral biceps 5/5, bilateral triceps 5/5, bilateral wrist extension 5/5 bilateral hand  5/5  Bilateral hip flexion 5/5, bilateral knee extension 5/5, bilateral DF/PF 5/5  Gait deferred  Able to detect  light touch in all 4 extremities  Incisions are clean dry and intact    Assessment/plan  81-year-old female POD #1 s/p right-sided no holes for subdural hematoma  -She appears to be recovering very well from the procedure.  Reviewed the postoperative CT head which shows successful evacuation of the acute/chronic traumatic subdural hematoma.  -From my standpoint it is okay to transfer her to the floor today.  If she continues to do well I would consider discharge home tomorrow.  -Recommend PT evaluation  -Continue to maintain systolic blood pressure less than 150 mmHg

## 2022-01-22 NOTE — PLAN OF CARE
Goal Outcome Evaluation:           Progress: improving  Outcome Summary: Pt admitted to CCU after no hole procedure with Dr. Hilario. AAO, moves extremities with no issues. VSS. BP maintained < 150 sys. AM CT completed. AM labs pending.       Problem: Hypertension Comorbidity  Goal: Blood Pressure in Desired Range  Outcome: Ongoing, Progressing  Intervention: Maintain Hypertension-Management Strategies  Recent Flowsheet Documentation  Taken 1/21/2022 2035 by Manisha Zhang RN  Medication Review/Management: medications reviewed     Problem: Adult Inpatient Plan of Care  Goal: Plan of Care Review  Outcome: Ongoing, Progressing  Flowsheets (Taken 1/22/2022 0436)  Progress: improving  Outcome Summary: Pt admitted to CCU after no hole procedure with Dr. Hilario.  Goal: Patient-Specific Goal (Individualized)  Outcome: Ongoing, Progressing  Goal: Absence of Hospital-Acquired Illness or Injury  Outcome: Ongoing, Progressing  Intervention: Identify and Manage Fall Risk  Recent Flowsheet Documentation  Taken 1/21/2022 2035 by Manisha Zhang RN  Safety Promotion/Fall Prevention:   fall prevention program maintained   room organization consistent   safety round/check completed  Intervention: Prevent Skin Injury  Recent Flowsheet Documentation  Taken 1/21/2022 2035 by Manisha Zhang RN  Body Position: position changed independently  Skin Protection:   adhesive use limited   tubing/devices free from skin contact   incontinence pads utilized  Intervention: Prevent and Manage VTE (venous thromboembolism) Risk  Recent Flowsheet Documentation  Taken 1/21/2022 2035 by Manisha Zhang RN  VTE Prevention/Management:   bilateral   dorsiflexion/plantar flexion performed   sequential compression devices on  Intervention: Prevent Infection  Recent Flowsheet Documentation  Taken 1/21/2022 2035 by Manisha Zhang RN  Infection Prevention:   cohorting utilized   hand hygiene promoted   rest/sleep promoted   single patient room  provided  Goal: Optimal Comfort and Wellbeing  Outcome: Ongoing, Progressing  Intervention: Provide Person-Centered Care  Recent Flowsheet Documentation  Taken 1/21/2022 2035 by Manisha Zhang RN  Trust Relationship/Rapport:   care explained   choices provided   emotional support provided   thoughts/feelings acknowledged  Goal: Readiness for Transition of Care  Outcome: Ongoing, Progressing  Intervention: Mutually Develop Transition Plan  Recent Flowsheet Documentation  Taken 1/21/2022 2110 by Manisha Zhang RN  Transportation Anticipated: family or friend will provide  Patient/Family Anticipated Services at Transition: none  Patient/Family Anticipates Transition to: home  Taken 1/21/2022 2109 by Manisha Zhang RN  Equipment Currently Used at Home: none     Problem: Fall Injury Risk  Goal: Absence of Fall and Fall-Related Injury  Outcome: Ongoing, Progressing  Intervention: Identify and Manage Contributors to Fall Injury Risk  Recent Flowsheet Documentation  Taken 1/21/2022 2035 by Manisha Zhang RN  Medication Review/Management: medications reviewed  Intervention: Promote Injury-Free Environment  Recent Flowsheet Documentation  Taken 1/21/2022 2035 by Manisha Zhang RN  Safety Promotion/Fall Prevention:   fall prevention program maintained   room organization consistent   safety round/check completed

## 2022-01-22 NOTE — PROGRESS NOTES
"                                              LOS: 2 days   Patient Care Team:  Rhoda Luther MD as PCP - General (Internal Medicine)  Sandra Winters MD as Consulting Physician (Cardiology)    Chief Complaint:  F/up subdural hemorrhage and critical care management    Subjective   Interval History  I reviewed the prior records.     Up in the room.  No headache or dizziness.  Blood pressure seems to be stable.  No visual disturbance    REVIEW OF SYSTEMS:   CARDIOVASCULAR: No chest pain, chest pressure or chest discomfort. No palpitations or edema.    GASTROINTESTINAL: No anorexia, nausea, vomiting or diarrhea. No abdominal pain.  CONSTITUTIONAL: No fever or chills.     Ventilator/Non-Invasive Ventilation Settings (From admission, onward)            None                Physical Exam:     Vital Signs  Temp:  [97.8 °F (36.6 °C)-98.3 °F (36.8 °C)] 97.9 °F (36.6 °C)  Heart Rate:  [] 71  Resp:  [16-20] 18  BP: ()/() 135/83    Intake/Output Summary (Last 24 hours) at 1/22/2022 1356  Last data filed at 1/22/2022 0420  Gross per 24 hour   Intake 1765 ml   Output 1625 ml   Net 140 ml     Flowsheet Rows      First Filed Value   Admission Height 160 cm (63\") Documented at 01/19/2022 2249   Admission Weight 64.9 kg (143 lb) Documented at 01/19/2022 2249          PPE used per hospital policy    General Appearance:   Alert, cooperative, in no acute distress   ENMT:  Mallampati score 3, moist mucous membrane   Eyes:  Pupils equal and reactive to light. EOMI   Neck:   Large. Trachea midline. No thyromegaly.   Lungs:    Clear to auscultation,respirations regular, even and nonlabored    Heart:   Regular rhythm and normal rate, normal S1 and S2, no         murmur   Skin:   No rash or ecchymosis   Abdomen:    Obese. Soft. No tenderness. No HSM.   Neuro/psych:  Conscious, alert, oriented x3. Strength 5/5 in upper and lower  ext.  Appropriate mood and affect   Extremities:  No cyanosis, clubbing or edema.  Warm " extremities and well-perfused          Results Review:        Results from last 7 days   Lab Units 01/22/22  0355 01/20/22  0744 01/20/22  0744 01/19/22  1810 01/19/22  1810   SODIUM mmol/L 136  --  140  --  140   POTASSIUM mmol/L 4.8  --  3.8  --  3.8   CHLORIDE mmol/L 102  --  101  --  100   CO2 mmol/L 23.7  --  29.1*  --  27.0   BUN mg/dL 28*  --  25*  --  26*   CREATININE mg/dL 1.24*  --  1.00  --  1.22*   GLUCOSE mg/dL 157*   < > 96   < > 100*   CALCIUM mg/dL 8.4*  --  9.2  --  8.9    < > = values in this interval not displayed.         Results from last 7 days   Lab Units 01/22/22  0355 01/20/22  0745 01/19/22  1810   WBC 10*3/mm3 12.12* 7.59 7.48   HEMOGLOBIN g/dL 10.0* 11.5* 11.2*   HEMATOCRIT % 30.9* 34.5 33.3*   PLATELETS 10*3/mm3 210 236 227     Results from last 7 days   Lab Units 01/22/22  0355 01/19/22  1811   INR  1.08 1.04   APTT seconds 26.2 26.3         I reviewed the patient's new clinical results.        Medication Review:   amLODIPine, 5 mg, Oral, Daily  DULoxetine, 60 mg, Oral, Daily  losartan, 50 mg, Oral, Daily  pramipexole, 0.25 mg, Oral, Daily        niCARdipine, 5-15 mg/hr        Diagnostic imaging:  I personally and independently reviewed the following images:  CT brain 1/22/2022: Postsurgical changes.  No reaccumulation of bleeding.    Assessment   1. Traumatic subdural hematoma s/p right-sided no holes  2. Mild leukocytosis, stress-induced      Pertinent medical problems:  · CKD stage II, at baseline  · Chronic anemia  · HTN  · JHOANA  · CAD  · History of TIA  · RLS      All problems new to me    Plan       · Losartan and amlodipine for HTN  · Monitor blood pressure  · Neuro Check  · Continue Duloxetine and Pramipexole  · DVT prophylaxis SCD  · PT OT    Transfer to floor.  Discussed with staff      Johnny Cornell MD  01/22/22  13:56 EST            This note was dictated utilizing Dragon dictation

## 2022-01-22 NOTE — PROGRESS NOTES
Name: Itzel Edmond ADMIT: 2022   : 1940  PCP: Rhoda Luther MD    MRN: [id number] LOS: 2 days   AGE/SEX: 81 y.o. female  ROOM: [facility]     Subjective   Subjective   Looks great postop and denies complaint    Review of Systems     Objective   Objective   Vital Signs  Temp:  [97.8 °F (36.6 °C)-98.3 °F (36.8 °C)] 97.9 °F (36.6 °C)  Heart Rate:  [] 73  Resp:  [16-18] 18  BP: ()/() 121/59  SpO2:  [92 %-100 %] 96 %  on   ;   Device (Oxygen Therapy): room air  Body mass index is 28.7 kg/m².  Physical Exam  Vitals and nursing note reviewed. Exam conducted with a chaperone present.   Constitutional:       Appearance: Normal appearance.   HENT:      Head: Normocephalic and atraumatic.   Eyes:      Extraocular Movements: Extraocular movements intact.      Conjunctiva/sclera: Conjunctivae normal.   Cardiovascular:      Rate and Rhythm: Normal rate.      Heart sounds: Murmur heard.       Pulmonary:      Effort: Pulmonary effort is normal. No respiratory distress.      Breath sounds: Normal breath sounds.   Abdominal:      General: Bowel sounds are normal. There is no distension.      Palpations: Abdomen is soft.      Tenderness: There is no abdominal tenderness.   Musculoskeletal:         General: No swelling or tenderness. Normal range of motion.   Skin:     General: Skin is warm and dry.      Comments: Incision well approximated   Neurological:      Mental Status: She is alert and oriented to person, place, and time. Mental status is at baseline.   Psychiatric:         Mood and Affect: Mood normal.         Behavior: Behavior normal.         Results Review     I reviewed the patient's new clinical results.  Results from last 7 days   Lab Units 22  0355 22  0745 22  1810   WBC 10*3/mm3 12.12* 7.59 7.48   HEMOGLOBIN g/dL 10.0* 11.5* 11.2*   PLATELETS 10*3/mm3 210 236 227     Results from last 7 days   Lab Units 22  0355 22  0744 22  1810   SODIUM mmol/L 136  140 140   POTASSIUM mmol/L 4.8 3.8 3.8   CHLORIDE mmol/L 102 101 100   CO2 mmol/L 23.7 29.1* 27.0   BUN mg/dL 28* 25* 26*   CREATININE mg/dL 1.24* 1.00 1.22*   GLUCOSE mg/dL 157* 96 100*   EGFR IF NONAFRICN AM mL/min/1.73 41* 53* 42*     Results from last 7 days   Lab Units 01/19/22  1810   ALBUMIN g/dL 4.20   BILIRUBIN mg/dL 0.3   ALK PHOS U/L 86   AST (SGOT) U/L 24   ALT (SGPT) U/L 20     Results from last 7 days   Lab Units 01/22/22  0355 01/20/22  0744 01/19/22  1810   CALCIUM mg/dL 8.4* 9.2 8.9   ALBUMIN g/dL  --   --  4.20       COVID19   Date Value Ref Range Status   01/19/2022 Not Detected Not Detected - Ref. Range Final   11/28/2021 Not Detected Not Detected - Ref. Range Final     Glucose   Date/Time Value Ref Range Status   01/21/2022 2137 121 70 - 130 mg/dL Final     Comment:     Meter: [id number] : [id number] Leatha Dyer RN       CT Head Without Contrast  Narrative: CT HEAD WITHOUT CONTRAST     HISTORY: Postop craniotomy     COMPARISON: 01/19/2022     TECHNIQUE: Axial CT imaging was obtained through the brain. No IV  contrast was administered.     FINDINGS:  2 right sided no holes are identified. On prior exam, the patient was  noted to have an isodense right cerebral convexity subdural hematoma.  Maximum thickness was approximately 1.6 cm near the vertex. Residual  predominantly low attenuation fluid is seen overlying the right cerebral  convexity. Maximum thickness is 9 mm. There is a small amount of  expected pneumocephalus. Midline shift has decreased, now measuring 2  mm, previously measuring 5 mm. There is mild atrophy. There is soft  tissue swelling and extensive subcutaneous air within the right frontal  scalp. Paranasal sinuses and mastoid air cells appear clear. There is a  small amount of residual acute appearing hemorrhage overlying the right  frontal convexity. This measures approximately 1.2 cm. Previously, it  measured 4 cm.     Impression: Postsurgical changes, as noted above.  Fluid overlying the right cerebral  convexity appears improved in both thickness and attenuation. Midline  shift is also improved.     Radiation dose reduction techniques were utilized, including automated  exposure control and exposure modulation based on body size.     This report was finalized on 1/22/2022 4:29 AM by Dr. Rhoda Roth M.D.       Scheduled Medications  amLODIPine, 5 mg, Oral, Daily  DULoxetine, 60 mg, Oral, Daily  losartan, 50 mg, Oral, Daily  pramipexole, 0.25 mg, Oral, Daily    Infusions   Diet  Diet Regular       Assessment/Plan     Active Hospital Problems    Diagnosis  POA   • **Subdural hematoma (HCC) [S06.5X9A]  Yes   • TIA (transient ischemic attack) [G45.9]  Yes   • CAD (coronary artery disease) [I25.10]  Yes   • Heart murmur [R01.1]  Yes   • Hyperlipidemia [E78.5]  Yes   • Obstructive sleep apnea syndrome [G47.33]  Yes   • Hypertension [I10]  Yes      Resolved Hospital Problems   No resolved problems to display.       81 y.o. female admitted with Subdural hematoma (HCC) s/p Celso holes    · Cont routine post op care. Encouraged her to get OOB  · BP well controlled on current meds  · OK to come to tele today, anticipate d/c tomorrow  · SCDs  · D/w daughter at bedside as well      Cecilio Harding MD  [facility]  01/22/22  18:35 EST

## 2022-01-23 ENCOUNTER — READMISSION MANAGEMENT (OUTPATIENT)
Dept: CALL CENTER | Facility: HOSPITAL | Age: 82
End: 2022-01-23

## 2022-01-23 VITALS
HEART RATE: 87 BPM | BODY MASS INDEX: 27.85 KG/M2 | RESPIRATION RATE: 18 BRPM | HEIGHT: 63 IN | SYSTOLIC BLOOD PRESSURE: 143 MMHG | TEMPERATURE: 97.1 F | WEIGHT: 157.19 LBS | DIASTOLIC BLOOD PRESSURE: 62 MMHG | OXYGEN SATURATION: 95 %

## 2022-01-23 PROCEDURE — 99024 POSTOP FOLLOW-UP VISIT: CPT | Performed by: NURSE PRACTITIONER

## 2022-01-23 RX ORDER — OXYCODONE HYDROCHLORIDE AND ACETAMINOPHEN 5; 325 MG/1; MG/1
1 TABLET ORAL EVERY 6 HOURS PRN
Qty: 12 TABLET | Refills: 0 | Status: SHIPPED | OUTPATIENT
Start: 2022-01-23 | End: 2022-01-31

## 2022-01-23 RX ORDER — PSEUDOEPHEDRINE HCL 30 MG
100 TABLET ORAL 2 TIMES DAILY PRN
Start: 2022-01-23 | End: 2022-04-28 | Stop reason: ALTCHOICE

## 2022-01-23 RX ADMIN — PRAMIPEXOLE DIHYDROCHLORIDE 0.25 MG: 0.25 TABLET ORAL at 08:07

## 2022-01-23 RX ADMIN — AMLODIPINE BESYLATE 5 MG: 5 TABLET ORAL at 08:07

## 2022-01-23 RX ADMIN — LOSARTAN POTASSIUM 50 MG: 50 TABLET ORAL at 08:07

## 2022-01-23 RX ADMIN — DULOXETINE HYDROCHLORIDE 60 MG: 60 CAPSULE, DELAYED RELEASE ORAL at 08:07

## 2022-01-23 RX ADMIN — OXYCODONE AND ACETAMINOPHEN 2 TABLET: 5; 325 TABLET ORAL at 08:10

## 2022-01-23 NOTE — DISCHARGE SUMMARY
Patient Name: tIzel Edmond  : 1940  MRN: [id number]    Date of Admission: 2022  Date of Discharge:  2022  Primary Care Physician: Rhoda Luther MD      Chief Complaint:   Blurred Vision      Discharge Diagnoses     Active Hospital Problems    Diagnosis  POA   • **Subdural hematoma (HCC) [S06.5X9A]  Yes   • CAD (coronary artery disease) [I25.10]  Yes   • Heart murmur [R01.1]  Yes   • Hyperlipidemia [E78.5]  Yes   • Obstructive sleep apnea syndrome [G47.33]  Yes   • Hypertension [I10]  Yes      Resolved Hospital Problems   No resolved problems to display.        Hospital Course     Ms. Edmond is a 81 y.o. female with a history of CAD, hypertension and JHOANA along with a subdural hematoma which has been known for several months but had shown increasing size in recent weeks and patient had also developed some blurry vision.  Because of this she decided to come to the emergency room and was admitted for symptomatic subdural hematoma.  Neurosurgery saw her and recommended bur holes which she agreed to.  She underwent that procedure on  and tolerated it well.  She was monitored in the ICU overnight without any issues.  She has been up and ambulatory on the medical floor now and is doing well enough to go home.  She is going to be staying with her daughter for a while.      Day of Discharge     Subjective:  Denies any complaints, looks great    Physical Exam:  Temp:  [97.1 °F (36.2 °C)-97.9 °F (36.6 °C)] 97.1 °F (36.2 °C)  Heart Rate:  [70-87] 87  Resp:  [16-18] 18  BP: (121-154)/(59-88) 143/62  Body mass index is 27.84 kg/m².  Physical Exam  Vitals and nursing note reviewed. Exam conducted with a chaperone present.   Constitutional:       Appearance: Normal appearance.   HENT:      Head: Normocephalic and atraumatic.   Eyes:      Extraocular Movements: Extraocular movements intact.      Conjunctiva/sclera: Conjunctivae normal.   Cardiovascular:      Rate and Rhythm: Normal rate.      Heart  sounds: Murmur heard.       Pulmonary:      Effort: Pulmonary effort is normal. No respiratory distress.      Breath sounds: Normal breath sounds.   Abdominal:      General: Bowel sounds are normal. There is no distension.      Palpations: Abdomen is soft.      Tenderness: There is no abdominal tenderness.   Musculoskeletal:         General: No swelling or tenderness. Normal range of motion.   Skin:     General: Skin is warm and dry.      Comments: Incision well approximated   Neurological:      Mental Status: She is alert and oriented to person, place, and time. Mental status is at baseline.   Psychiatric:         Mood and Affect: Mood normal.         Behavior: Behavior normal.         Consultants     Consult Orders (all) (From admission, onward)     Start     Ordered    01/21/22 0956  Inpatient Intensivist Consult  Once        Provider:  Sonal Parsons MD    01/21/22 0955    01/19/22 1900  [facility] (on-call MD unless specified) Details  Once,   Status:  Canceled        Specialty:  Hospitalist  Provider:  Diana Meza MD    01/19/22 1859    01/19/22 1837  Neurosurgery (on-call MD unless specified)  STAT        Specialty:  Neurosurgery  Provider:  Austin Hilario MD    01/19/22 1836              Procedures     NO HOLE      Imaging Results (All)     Procedure Component Value Units Date/Time    CT Head Without Contrast [[id number]] Collected: 01/22/22 0420     Updated: 01/22/22 0432    Narrative:      CT HEAD WITHOUT CONTRAST     HISTORY: Postop craniotomy     COMPARISON: 01/19/2022     TECHNIQUE: Axial CT imaging was obtained through the brain. No IV  contrast was administered.     FINDINGS:  2 right sided no holes are identified. On prior exam, the patient was  noted to have an isodense right cerebral convexity subdural hematoma.  Maximum thickness was approximately 1.6 cm near the vertex. Residual  predominantly low attenuation fluid is seen overlying the right cerebral  convexity. Maximum thickness is  9 mm. There is a small amount of  expected pneumocephalus. Midline shift has decreased, now measuring 2  mm, previously measuring 5 mm. There is mild atrophy. There is soft  tissue swelling and extensive subcutaneous air within the right frontal  scalp. Paranasal sinuses and mastoid air cells appear clear. There is a  small amount of residual acute appearing hemorrhage overlying the right  frontal convexity. This measures approximately 1.2 cm. Previously, it  measured 4 cm.       Impression:      Postsurgical changes, as noted above. Fluid overlying the right cerebral  convexity appears improved in both thickness and attenuation. Midline  shift is also improved.     Radiation dose reduction techniques were utilized, including automated  exposure control and exposure modulation based on body size.     This report was finalized on 1/22/2022 4:29 AM by Dr. Rhoda Roth M.D.       CT Head Without Contrast [[id number]] Collected: 01/19/22 1821     Updated: 01/19/22 1833    Narrative:      EXAM:  CT HEAD WO CONTRAST-     HISTORY:  Enlarging subdural hematoma, increasing visual changes.     COMPARISON:  CT head without contrast 01/13/2022     TECHNIQUE: Noncontrast images of the brain were obtained. Reformatted  images were reviewed. Radiation dose reduction techniques were utilized,  including automated exposure control and exposure modulation based on  body size.     FINDINGS:       There is redemonstration of a right cerebral hemispheric mixed density  subdural hematoma, which may be slightly less dense compared to  01/13/2022. This is not significantly changed in size from 01/13/2022,  measuring up to 1.4 cm overlying the anterior inferior right frontal  lobe, previously 1.4 cm, 1.2 cm overlying the anterior right frontal  lobe, previously 1.1 cm, and 1.5 cm overlying the posterior right  frontal lobe, previously 1.5 cm. There is corresponding mass effect on  the underlying brain parenchyma with partial effacement  of the right  lateral ventricle and 0.5 cm leftward midline shift at the level of the  septum pellucidum, which is not significantly changed, previously 0.5  cm. No new intracranial hemorrhage is identified.  There is mild global parenchymal volume loss. The basal cisterns are  patent. Chronic small vessel ischemic disease is not significantly  changed. The grey-white matter differentiation is maintained. There is  calcific intracranial atherosclerosis with involvement of the posterior  circulation.          Impression:      No significant change in size of a mixed density right cerebral  hemispheric subdural hematoma and corresponding mass effect. No new  intracranial hemorrhage is identified. If there is clinical concern for  acute infarction, this would be better assessed with MRI.     Discussed with Dr. Romo at 6:30 PM.     This report was finalized on 1/19/2022 6:30 PM by Dr. Manasa Rogers M.D.               Pertinent Labs     Results from last 7 days   Lab Units 01/22/22  0355 01/20/22  0745 01/19/22  1810   WBC 10*3/mm3 12.12* 7.59 7.48   HEMOGLOBIN g/dL 10.0* 11.5* 11.2*   PLATELETS 10*3/mm3 210 236 227     Results from last 7 days   Lab Units 01/22/22  0355 01/20/22  0744 01/19/22  1810   SODIUM mmol/L 136 140 140   POTASSIUM mmol/L 4.8 3.8 3.8   CHLORIDE mmol/L 102 101 100   CO2 mmol/L 23.7 29.1* 27.0   BUN mg/dL 28* 25* 26*   CREATININE mg/dL 1.24* 1.00 1.22*   GLUCOSE mg/dL 157* 96 100*   EGFR IF NONAFRICN AM mL/min/1.73 41* 53* 42*     Results from last 7 days   Lab Units 01/19/22  1810   ALBUMIN g/dL 4.20   BILIRUBIN mg/dL 0.3   ALK PHOS U/L 86   AST (SGOT) U/L 24   ALT (SGPT) U/L 20     Results from last 7 days   Lab Units 01/22/22  0355 01/20/22  0744 01/19/22  1810   CALCIUM mg/dL 8.4* 9.2 8.9   ALBUMIN g/dL  --   --  4.20               Invalid input(s): LDLCALC      Results from last 7 days   Lab Units 01/19/22  1827   COVID19  Not Detected       Test Results Pending at Discharge       Discharge  Details        Discharge Medications      New Medications      Instructions Start Date   docusate sodium 100 MG capsule   100 mg, Oral, 2 Times Daily PRN      oxyCODONE-acetaminophen 5-325 MG per tablet  Commonly known as: PERCOCET   1 tablet, Oral, Every 6 Hours PRN         Continue These Medications      Instructions Start Date   amLODIPine 5 MG tablet  Commonly known as: NORVASC   5 mg, Oral, Daily      atorvastatin 20 MG tablet  Commonly known as: LIPITOR   TAKE 1 TABLET EVERY DAY      chlorhexidine 0.12 % solution  Commonly known as: PERIDEX   USE TO BRUSH IMPLANTS TWICE DAILY      DULoxetine 60 MG capsule  Commonly known as: CYMBALTA   60 mg, Oral, Daily      hydroCHLOROthiazide 25 MG tablet  Commonly known as: HYDRODIURIL   25 mg, Oral, Daily      losartan 50 MG tablet  Commonly known as: COZAAR   50 mg, Oral, Daily      pramipexole 0.25 MG tablet  Commonly known as: MIRAPEX   0.25 mg, Oral, Daily         Stop These Medications    aspirin 81 MG tablet            Allergies   Allergen Reactions   • Gabapentin Dizziness     Other reaction(s): Dizziness   • Pregabalin Dizziness     Vision problems, dizziness   • Sulfamethoxazole-Trimethoprim Itching and Nausea And Vomiting   • Ace Inhibitors Cough     Cough   • Lisinopril Cough       Discharge Disposition:  Home or Self Care      Discharge Diet:  No active diet order      Discharge Activity:       CODE STATUS:    Code Status and Medical Interventions:   Ordered at: 01/1940     Code Status (Patient has no pulse and is not breathing):    CPR (Attempt to Resuscitate)     Medical Interventions (Patient has pulse or is breathing):    Full       Future Appointments   Date Time Provider Department Center   3/11/2022 11:00 AM Edwin Ferrell MD [facility] None   4/28/2022  8:00 AM Sia Jason APRN [facility]      Follow-up Information     Rhoda Luther MD Follow up in 2 week(s).    Specialty: Internal Medicine  Contact information:  [facility]  [facility]  [address] [address]  [phone]                         Time Spent on Discharge:  Greater than 30 minutes      Cecilio Harding MD  [facility]  01/23/22  16:57 EST

## 2022-01-23 NOTE — PLAN OF CARE
Goal Outcome Evaluation:  Plan of Care Reviewed With: patient           Outcome Summary: Pt A&Ox4 VSS. Pt denied being in any pain unless she coughs then her head hurts. Pt ambulated in room several times with no assistance needed. Pt eager to d/c today. Pt has no other complaints or issues at this time.

## 2022-01-23 NOTE — PROGRESS NOTES
[facility] NEUROSURGERY PROGRESS NOTE    PATIENT IDENTIFICATION:   Name:  Itzel Edmond         MRN:  [id number]     81 y.o.  female               CC: POD #2 right-sided bur holes for subdural hematoma      Subjective     Interval History: Awake, alert, and oriented x3.  Daughter at bedside.  Patient denies headache, dizziness, gait instability, nausea, or vomiting.  Has been ambulating ad jesus. in her room.  States that she feels good and is ready to go home.    ROS:  Constitutional: No fever, chills  HEENT: No headache, no vision changes, no tinnitus, no hearing difficulties  GI: No nausea, vomiting, no swallow difficulties  Neuro: No numbness, tingling, or weakness, no speech difficulties, no balance difficulties    Objective     Vital signs in last 24 hours:  Temp:  [97.1 °F (36.2 °C)-97.9 °F (36.6 °C)] 97.1 °F (36.2 °C)  Heart Rate:  [62-87] 87  Resp:  [16-18] 18  BP: (110-154)/(58-88) 143/62      Intake/Output this shift:  No intake/output data recorded.      Intake/Output last 3 shifts:  I/O last 3 completed shifts:  In: 1766 [P.O.:790; I.V.:976]  Out: 100 [Urine:100]    LABS:  Results from last 7 days   Lab Units 01/22/22  0355 01/20/22  0745 01/19/22  1810   WBC 10*3/mm3 12.12* 7.59 7.48   HEMOGLOBIN g/dL 10.0* 11.5* 11.2*   HEMATOCRIT % 30.9* 34.5 33.3*   PLATELETS 10*3/mm3 210 236 227     Results from last 7 days   Lab Units 01/22/22  0355 01/20/22  0744 01/19/22  1810   SODIUM mmol/L 136 140 140   POTASSIUM mmol/L 4.8 3.8 3.8   CHLORIDE mmol/L 102 101 100   CO2 mmol/L 23.7 29.1* 27.0   BUN mg/dL 28* 25* 26*   CREATININE mg/dL 1.24* 1.00 1.22*   CALCIUM mg/dL 8.4* 9.2 8.9   BILIRUBIN mg/dL  --   --  0.3   ALK PHOS U/L  --   --  86   ALT (SGPT) U/L  --   --  20   AST (SGOT) U/L  --   --  24   GLUCOSE mg/dL 157* 96 100*          IMAGING STUDIES:  No new imaging to review    I personally viewed and interpreted the patient's clinical data and discussed it with Dr. Hilario.    Meds reviewed/changed:  Yes    Scheduled Meds:amLODIPine, 5 mg, Oral, Daily  DULoxetine, 60 mg, Oral, Daily  losartan, 50 mg, Oral, Daily  pramipexole, 0.25 mg, Oral, Daily      Continuous Infusions:   PRN Meds:.docusate sodium  •  melatonin  •  Morphine  •  nitroglycerin  •  ondansetron **OR** ondansetron  •  ondansetron **OR** ondansetron  •  oxyCODONE-acetaminophen  •  phenol  •  senna-docusate sodium      Physical Exam:    General:   Awake, alert, oriented x3. Speech clear with no aphasia.  HEENT:    Right frontal/temporal bur hole incision is noted to be well approximated with Dermabond in place.  There is no evidence of redness, swelling, or drainage.  Neck:    Supple    CN III IV VI: Extraocular movements are full , PERRL   CN V: Normal facial sensation and strength of muscles of mastication.  CN VII: Facial movements are symmetric. No weakness.      Motor: Normal muscle strength, bulk and tone in upper and lower extremities.    Sensation: Normal to light touch; no extinction  Station and Gait: Normal gait and station.    Coordination: Finger-to-nose and heel-to-shin test shows no dysmetria.    Extremities:   Wearing SCDs    Assessment/Plan     ASSESSMENT:      Subdural hematoma (HCC)    Hypertension    Obstructive sleep apnea syndrome    Hyperlipidemia    Heart murmur    CAD (coronary artery disease)    POD #2 right-sided bur holes for subdural hematoma    Today, the patient remains awake, alert, and oriented x3.  She denies headache, dizziness, nausea, vomiting, or gait instability.  She states that she feels good and is ready to go home today.  Patient states that she does not want to take any narcotic pain medication as it has made her pretty sleepy today.  Advised her that it was okay for her to take extra strength Tylenol for pain.  Advised patient to follow-up with us in 2-4 weeks. No follow-up CT head imaging needed at this time. Will order for future.     PLAN:   Ok for patient to d/c'd home.  Please see AVS for full  "neurosurgical d/c instructions  Follow-up with us in 2-4 weeks- will call patient with appt details.     I discussed the patient's findings and my recommendations with patient, family, nursing staff and Dr. Hilario       LOS: 3 days       Ana King, APRN  1/23/2022  10:43 EST    \"Dictated utilizing Dragon dictation\".    "

## 2022-01-24 ENCOUNTER — TELEPHONE (OUTPATIENT)
Dept: NEUROSURGERY | Facility: CLINIC | Age: 82
End: 2022-01-24

## 2022-01-24 NOTE — CASE MANAGEMENT/SOCIAL WORK
Case Management Discharge Note      Final Note: home with family         Selected Continued Care - Discharged on 1/23/2022 Admission date: 1/19/2022 - Discharge disposition: Home or Self Care    Destination    No services have been selected for the patient.              Durable Medical Equipment    No services have been selected for the patient.              Dialysis/Infusion    No services have been selected for the patient.              Home Medical Care    No services have been selected for the patient.              Therapy    No services have been selected for the patient.              Community Resources    No services have been selected for the patient.              Community & DME    No services have been selected for the patient.                  Transportation Services  Private: Car    Final Discharge Disposition Code: 01 - home or self-care

## 2022-01-24 NOTE — OUTREACH NOTE
Prep Survey      Responses   [facility] facility patient discharged from? [place]   Is LACE score < 7 ? No   Emergency Room discharge w/ pulse ox? No   Eligibility Readm Mgmt   Discharge diagnosis Subdural hematoma s/p no holes   Does the patient have one of the following disease processes/diagnoses(primary or secondary)? General Surgery   Does the patient have Home health ordered? No   Is there a DME ordered? No   Prep survey completed? Yes          Lyndsey Estrada RN

## 2022-01-25 PROBLEM — N18.31 STAGE 3A CHRONIC KIDNEY DISEASE (HCC): Status: ACTIVE | Noted: 2022-01-25

## 2022-01-25 NOTE — PROGRESS NOTES
Enter Query Response Below      Query Response:     CKD stage IIIa, present on admission         If applicable, please update the problem list.        Patient: Itzel Edmond        : 1940  Account: [id number]           Admit Date: 2022        Options to Respond to Query:    1. Access the Encounter     a. From the To-Do Side bar, click Respond With Note.     b. Click New Note     c. Answer query within the yellow box.                d. Update the Problem List if applicable.     Dr. Harding,     Patient admitted with worsening traumatic subdural hematoma, s/p no holes.  Creatinines:    1.22  GFR 42     1.00  GFR 53    1.24  GFR 41  CKD stage II  per pulmonary consult, CKD stage III per H&P.   IVF during admission     Please clarify if the patient is treated/monitored for the following:  -CKD stage 3  -CKD stage 2   -other______  -unable to clinically determine   KDIGO CKD staging [email or web address]  CKD Stage 1  GFR  > 90  CKD Stage 2  GFR 60-89  CKD Stage 3a   GFR 45-59  CKD Stage 3b   GFR 30-44  CKD Stage 4     GFR 15-29  CKD Stage 5      GFR <15    By submitting this query, we are merely seeking further clarification of documentation to accurately reflect all conditions that you are monitoring, evaluating, treating or that extend the hospitalization or utilize additional resources of care. Please utilize your independent clinical judgment when addressing the question(s) above.     This query and your response, once completed, will be entered into the legal medical record.    Sincerely,  [email or web address]  Clinical Documentation Integrity Program

## 2022-01-26 ENCOUNTER — READMISSION MANAGEMENT (OUTPATIENT)
Dept: CALL CENTER | Facility: HOSPITAL | Age: 82
End: 2022-01-26

## 2022-01-26 NOTE — OUTREACH NOTE
General Surgery Week 1 Survey      Responses   [facility] patient discharged from? [place]   Does the patient have one of the following disease processes/diagnoses(primary or secondary)? General Surgery   Week 1 attempt successful? Yes   Call start time 1323   Call end time 1326   General alerts for this patient Daughter is a nurse and taking care of patient    Discharge diagnosis Subdural hematoma s/p no holes   Meds reviewed with patient/caregiver? Yes   Is the patient having any side effects they believe may be caused by any medication additions or changes? No   Does the patient have all medications related to this admission filled (includes all antibiotics, pain medications, etc.) Yes   Is the patient taking all medications as directed (includes completed medication regime)? Yes   Does the patient have a follow up appointment scheduled with their surgeon? Yes  [2/23/22]   Has the patient kept scheduled appointments due by today? N/A   Comments Pt will schedule appt with PCP    Psychosocial issues? No   Did the patient receive a copy of their discharge instructions? Yes   Nursing interventions Reviewed instructions with patient   What is the patient's perception of their health status since discharge? Improving   Nursing interventions Nurse provided patient education   Is the patient /caregiver able to teach back basic post-op care? Lifting as instructed by MD in discharge instructions,  Drive as instructed by MD in discharge instructions   Is the patient/caregiver able to teach back signs and symptoms of incisional infection? Fever   Is the patient/caregiver able to teach back steps to recovery at home? Set small, achievable goals for return to baseline health,  Rest and rebuild strength, gradually increase activity,  Eat a well-balance diet   Week 1 call completed? Yes   Revoked No further contact(revokes)-requires comment   Is the patient interested in additional calls from an ambulatory case  manager?  NOTE:  applies to high risk patients requiring additional follow-up. No   Graduated/Revoked comments Daughter is a nurse and taking care of patient.          Hannah Celestin RN

## 2022-02-07 ENCOUNTER — TELEPHONE (OUTPATIENT)
Dept: NEUROSURGERY | Facility: CLINIC | Age: 82
End: 2022-02-07

## 2022-02-07 NOTE — TELEPHONE ENCOUNTER
I let her know she should not drive yet. She is 2 weeks out from no hole for sdh 1/21/22. Next follow up appointment is 2/23/22.

## 2022-02-07 NOTE — TELEPHONE ENCOUNTER
Patient would like to know if patient can drive to store .  Will see Dr. Hilario on 2-23-22. Surgery 1-21-22    Please advise

## 2022-02-23 ENCOUNTER — OFFICE VISIT (OUTPATIENT)
Dept: NEUROSURGERY | Facility: CLINIC | Age: 82
End: 2022-02-23

## 2022-02-23 VITALS
WEIGHT: 149.6 LBS | SYSTOLIC BLOOD PRESSURE: 140 MMHG | BODY MASS INDEX: 26.51 KG/M2 | DIASTOLIC BLOOD PRESSURE: 74 MMHG | HEIGHT: 63 IN | HEART RATE: 83 BPM | TEMPERATURE: 98 F

## 2022-02-23 DIAGNOSIS — S06.5X0D TRAUMATIC SUBDURAL HEMORRHAGE WITHOUT LOSS OF CONSCIOUSNESS, SUBSEQUENT ENCOUNTER: Primary | ICD-10-CM

## 2022-02-23 PROCEDURE — 99024 POSTOP FOLLOW-UP VISIT: CPT | Performed by: NEUROLOGICAL SURGERY

## 2022-02-23 NOTE — PROGRESS NOTES
Subjective   History of Present Illness: Itzel Edmond is a 81 y.o. female is here today for follow-up.She is 4 weeks out from evacuation of SDH with right-sided no holes 1/21/22. She state she has had slight headaches which is relieved by  Tylenol.  She is doing well today.  Has no new complaints.  Denies any recent changes in strength or sensation.  Denies any instability while walking.  Denies any episodes of confusion.  Denies any seizure-like episodes.  She believes she is back to baseline.    History of Present Illness    The following portions of the patient's history were reviewed and updated as appropriate: allergies, past family history, past medical history, past social history, past surgical history and problem list.    Past Medical History:   Diagnosis Date   • Allergic rhinitis    • Atherosclerosis    • CAD (coronary artery disease) 1/16/2019    Overview:  Mild non obstructive CAD on cath 1/2019, medical mgmt recommended   • Carotid stenosis    • Cataract    • Cellulitis of right leg 06/2016   • Chest discomfort 01/2019   • Claudication of lower extremity (HCC)    • CMC arthritis    • Colon polyps     FOLLOWED BY DR. NOLBERTO REYNOSO   • Cystitis 11/19/2019   • WOODARD (dyspnea on exertion) 10/2020   • Dysphagia    • Elevated serum creatinine 02/2021   • Environmental allergies    • Exercise intolerance    • JAMES (generalized anxiety disorder) 1/20/2020   • GERD (gastroesophageal reflux disease)    • Heart murmur    • Hemorrhoids    • Hyperglycemia    • Hyperkalemia 02/2020   • Hyperlipidemia     MIXED   • Hypertension    • Influenza 02/14/2018   • Insomnia    • Myalgia 05/2016   • Neuropathy 09/2017   • OAB (overactive bladder)    • JHOANA (obstructive sleep apnea)     BiPap   • PLMD (periodic limb movement disorder)    • Precordial pain 01/2019   • Premature atrial complexes    • Rectal bleeding    • Rectal prolapse 10/2020   • Rectal ulcer 10/02/2020    5 MM SINGLE ULCER ON CY   • Rectocele 12/2019   •  Renal insufficiency    • Restless leg syndrome 10/2/2018   • SAB (spontaneous )      A1   • Serum potassium elevated 2016   • Slow transit constipation 2020   • Tachycardia 2013   • Trigger finger of left thumb 10/21/2019   • Urge incontinence    • Urinary frequency    • Vaginal atrophy         Past Surgical History:   Procedure Laterality Date   • ANTERIOR AND POSTERIOR VAGINAL REPAIR N/A    • BACK SURGERY     • BILATERAL SALPINGO OOPHORECTOMY Bilateral    • BLADDER SURGERY N/A     BLADDER SLING   • YADI HOLE Right 2022    Procedure: YADI HOLE;  Surgeon: Austin Hilario MD;  Location:  [facility];  Service: Neurosurgery;  Laterality: Right;   • CARDIAC CATHETERIZATION N/A 2019    Procedure: Left Heart Cath;  Surgeon: Christopher Rosenbaum MD;  Location:  [facility];  Service: Cardiology   • CARDIAC CATHETERIZATION N/A 2019    Procedure: Coronary angiography;  Surgeon: Christopher Rosenbaum MD;  Location:  [facility];  Service: Cardiology   • CARDIAC CATHETERIZATION N/A 2019    Procedure: Left ventriculography;  Surgeon: Christopher Rosenbaum MD;  Location:  [facility];  Service: Cardiology   • CATARACT EXTRACTION Left 12/15/2015    WITH LENS IMPLANT, DR. AMRIT DIAZ AT [facility]   • CATARACT EXTRACTION Right 2015     WITH LENS IMPLANT, DR. AMRIT DIAZ AT [facility]   • COLONOSCOPY N/A 10/2/2020    2 TUBULAR ADENOMA POLYPS IN TRANSVERSE, HEMORRHOIDS, DIVERTICULOSIS, 5 MM ULCER IN RECTUM, OTHER BIOPSIES BENIGN, DR. NOLBERTO REYNOSO AT [facility]   • COLONOSCOPY W/ POLYPECTOMY N/A 2015    1 CM TUBULAR ADENOMA POLYP IN TRANSVERSE, MODERATE DIVERTICULOSIS, HEMORRHOIDS, RESCOPE IN 5 YRS, DR. MAYA SAHU AT [place]   • HEMORROIDECTOMY N/A    • HERNIA REPAIR     • HYSTERECTOMY N/A 1970    [facility]          Current Outpatient Medications:   •  amLODIPine (NORVASC) 5 MG tablet, Take 1 tablet by mouth  Daily., Disp: 90 tablet, Rfl: 3  •  atorvastatin (LIPITOR) 20 MG tablet, TAKE 1 TABLET EVERY DAY, Disp: 90 tablet, Rfl: 3  •  chlorhexidine (PERIDEX) 0.12 % solution, USE TO BRUSH IMPLANTS TWICE DAILY, Disp: , Rfl:   •  docusate sodium 100 MG capsule, Take 1 capsule by mouth 2 (Two) Times a Day As Needed for Constipation., Disp: , Rfl:   •  DULoxetine (CYMBALTA) 60 MG capsule, Take 60 mg by mouth Daily., Disp: , Rfl:   •  hydroCHLOROthiazide (HYDRODIURIL) 25 MG tablet, Take 25 mg by mouth Daily., Disp: , Rfl:   •  losartan (COZAAR) 50 MG tablet, Take 1 tablet by mouth Daily., Disp: 90 tablet, Rfl: 3  •  pramipexole (MIRAPEX) 0.25 MG tablet, Take 0.25 mg by mouth Daily., Disp: , Rfl:      Allergies   Allergen Reactions   • Gabapentin Dizziness     Other reaction(s): Dizziness   • Pregabalin Dizziness     Vision problems, dizziness   • Sulfamethoxazole-Trimethoprim Itching and Nausea And Vomiting   • Ace Inhibitors Cough     Cough   • Lisinopril Cough        Social History     Socioeconomic History   • Marital status:    Tobacco Use   • Smoking status: Never Smoker   • Smokeless tobacco: Never Used   Vaping Use   • Vaping Use: Never used   Substance and Sexual Activity   • Alcohol use: No     Comment: caffeine use: 1 cup daily   • Drug use: Never   • Sexual activity: Not Currently     Birth control/protection: Post-menopausal, Surgical     Comment: .        Family History   Problem Relation Age of Onset   • Heart disease Mother    • Stroke Mother    • Heart attack Father    • Heart disease Father    • Heart disease Sister         s/p CABG   • Heart attack Brother    • Heart disease Brother         s/p CABG   • Hypertension Other    • Heart disease Maternal Grandmother    • Stroke Maternal Grandmother         Review of Systems   Eyes: Positive for visual disturbance (blurry vision).   Neurological: Positive for headaches. Negative for seizures, speech difficulty, light-headedness and numbness.  "      Objective     Vitals:    22 1255   BP: 140/74   Pulse: 83   Temp: 98 °F (36.7 °C)   Weight: 67.9 kg (149 lb 9.6 oz)   Height: 160 cm (63\")     Body mass index is 26.5 kg/m².      Physical Exam  Neurologic Exam  Awake, alert, oriented x3  Pupils equal round reactive to light  Extraocular muscles intact  Face symmetric  Speech is fluent and clear  No pronator drift  Motor exam  Bilateral deltoids 5/5, bilateral biceps 5/5, bilateral triceps 5/5, bilateral wrist extension 5/5 bilateral hand  5/5  Bilateral hip flexion 5/5, bilateral knee extension 5/5, bilateral DF/PF 5/5  No clonus  No Lisset's reflex  Steady normal gait  Able to detect  light touch in all 4 extremities        Assessment/Plan     Medical Decision Makin-year-old female s/p right-sided bur holes for evacuation of a subdural hematoma on 2022  -She has recovered very well from that procedure.  She reports she is back to baseline.  Denies any changes in strength or sensation.  Denies any instability while walking.  Denies any episodes of confusion  -I will plan to see her back in 1 month with a repeat CT head to evaluate for any residual or recurrent subdural hematoma.  -We talked today about the risks and benefits of a middle meningeal artery embolization.  If there is any evidence of recurrent subdural hematoma we will consider an MMA embolization at that time  -I will plan to see her back in 4 weeks.  Diagnoses and all orders for this visit:    1. Traumatic subdural hemorrhage without loss of consciousness, subsequent encounter (Primary)  -     CT Head Without Contrast; Future      Return in about 4 weeks (around 3/23/2022).         "

## 2022-03-11 ENCOUNTER — APPOINTMENT (OUTPATIENT)
Dept: SLEEP MEDICINE | Facility: HOSPITAL | Age: 82
End: 2022-03-11

## 2022-03-23 ENCOUNTER — HOSPITAL ENCOUNTER (OUTPATIENT)
Dept: CT IMAGING | Facility: HOSPITAL | Age: 82
Discharge: HOME | End: 2022-03-23
Admitting: NEUROLOGICAL SURGERY

## 2022-03-23 DIAGNOSIS — S06.5X0D TRAUMATIC SUBDURAL HEMORRHAGE WITHOUT LOSS OF CONSCIOUSNESS, SUBSEQUENT ENCOUNTER: ICD-10-CM

## 2022-03-23 PROCEDURE — 70450 CT HEAD/BRAIN W/O DYE: CPT

## 2022-04-01 ENCOUNTER — OFFICE VISIT (OUTPATIENT)
Dept: NEUROSURGERY | Facility: CLINIC | Age: 82
End: 2022-04-01

## 2022-04-01 ENCOUNTER — TELEPHONE (OUTPATIENT)
Dept: NEUROSURGERY | Facility: CLINIC | Age: 82
End: 2022-04-01

## 2022-04-01 DIAGNOSIS — S06.5X0D TRAUMATIC SUBDURAL HEMORRHAGE WITHOUT LOSS OF CONSCIOUSNESS, SUBSEQUENT ENCOUNTER: Primary | ICD-10-CM

## 2022-04-01 PROCEDURE — 99024 POSTOP FOLLOW-UP VISIT: CPT | Performed by: NEUROLOGICAL SURGERY

## 2022-04-01 NOTE — PROGRESS NOTES
Subjective   History of Present Illness: Itzel Edmond is a 81 y.o. female is here today for televisit follow-up with new CT completed 3/23/22. She is 10 weeks out from the evacuation of a SDH with right-sided no holes on 1/21/22.  He reports she is doing well today.  Denies any headaches.  She reports she occasionally will see flashes of light when turning her head.  She reports occasionally her incisions will itch.  Denies any drainage from the incisions.  Denies any strokelike episodes.  Denies any changes in strength or sensation.    You have chosen to receive care through a telephone visit. Do you consent to use a telephone visit for your medical care today? Yes        History of Present Illness    The following portions of the patient's history were reviewed and updated as appropriate: allergies, past family history, past medical history, past social history, past surgical history and problem list.    Past Medical History:   Diagnosis Date   • Allergic rhinitis    • Atherosclerosis    • CAD (coronary artery disease) 1/16/2019    Overview:  Mild non obstructive CAD on cath 1/2019, medical mgmt recommended   • Carotid stenosis    • Cataract    • Cellulitis of right leg 06/2016   • Chest discomfort 01/2019   • Claudication of lower extremity (HCC)    • CMC arthritis    • Colon polyps     FOLLOWED BY DR. NOLBERTO REYNOSO   • Cystitis 11/19/2019   • WOODARD (dyspnea on exertion) 10/2020   • Dysphagia    • Elevated serum creatinine 02/2021   • Environmental allergies    • Exercise intolerance    • JAMES (generalized anxiety disorder) 1/20/2020   • GERD (gastroesophageal reflux disease)    • Heart murmur    • Hemorrhoids    • Hyperglycemia    • Hyperkalemia 02/2020   • Hyperlipidemia     MIXED   • Hypertension    • Influenza 02/14/2018   • Insomnia    • Myalgia 05/2016   • Neuropathy 09/2017   • OAB (overactive bladder)    • JHOANA (obstructive sleep apnea)     BiPap   • PLMD (periodic limb movement disorder)    • Precordial  pain 2019   • Premature atrial complexes    • Rectal bleeding    • Rectal prolapse 10/2020   • Rectal ulcer 10/02/2020    5 MM SINGLE ULCER ON CY   • Rectocele 2019   • Renal insufficiency    • Restless leg syndrome 10/2/2018   • SAB (spontaneous )      A1   • Serum potassium elevated 2016   • Slow transit constipation 2020   • Tachycardia 2013   • Trigger finger of left thumb 10/21/2019   • Urge incontinence    • Urinary frequency    • Vaginal atrophy         Past Surgical History:   Procedure Laterality Date   • ANTERIOR AND POSTERIOR VAGINAL REPAIR N/A    • BACK SURGERY     • BILATERAL SALPINGO OOPHORECTOMY Bilateral    • BLADDER SURGERY N/A     BLADDER SLING   • YADI HOLE Right 2022    Procedure: YADI HOLE;  Surgeon: Austin Hilario MD;  Location:  [facility];  Service: Neurosurgery;  Laterality: Right;   • CARDIAC CATHETERIZATION N/A 2019    Procedure: Left Heart Cath;  Surgeon: Christopher Rosenbaum MD;  Location:  [facility];  Service: Cardiology   • CARDIAC CATHETERIZATION N/A 2019    Procedure: Coronary angiography;  Surgeon: Christopher Rosenbaum MD;  Location:  [facility];  Service: Cardiology   • CARDIAC CATHETERIZATION N/A 2019    Procedure: Left ventriculography;  Surgeon: Christopher Rosenbaum MD;  Location:  [facility];  Service: Cardiology   • CATARACT EXTRACTION Left 12/15/2015    WITH LENS IMPLANT, DR. AMRIT DIAZ AT [facility]   • CATARACT EXTRACTION Right 2015     WITH LENS IMPLANT, DR. AMRIT DIAZ AT [facility]   • COLONOSCOPY N/A 10/2/2020    2 TUBULAR ADENOMA POLYPS IN TRANSVERSE, HEMORRHOIDS, DIVERTICULOSIS, 5 MM ULCER IN RECTUM, OTHER BIOPSIES BENIGN, DR. NOLBERTO REYNOSO AT [facility]   • COLONOSCOPY W/ POLYPECTOMY N/A 2015    1 CM TUBULAR ADENOMA POLYP IN TRANSVERSE, MODERATE DIVERTICULOSIS, HEMORRHOIDS, RESCOPE IN 5 YRS, DR. MAYA SAHU AT [facility]   • HEMORROIDECTOMY  N/A    • HERNIA REPAIR     • HYSTERECTOMY N/A 1970    OVARIES IN [place]          Current Outpatient Medications:   •  amLODIPine (NORVASC) 5 MG tablet, Take 1 tablet by mouth Daily., Disp: 90 tablet, Rfl: 3  •  atorvastatin (LIPITOR) 20 MG tablet, TAKE 1 TABLET EVERY DAY, Disp: 90 tablet, Rfl: 3  •  chlorhexidine (PERIDEX) 0.12 % solution, USE TO BRUSH IMPLANTS TWICE DAILY, Disp: , Rfl:   •  docusate sodium 100 MG capsule, Take 1 capsule by mouth 2 (Two) Times a Day As Needed for Constipation., Disp: , Rfl:   •  DULoxetine (CYMBALTA) 60 MG capsule, Take 60 mg by mouth Daily., Disp: , Rfl:   •  hydroCHLOROthiazide (HYDRODIURIL) 25 MG tablet, Take 25 mg by mouth Daily., Disp: , Rfl:   •  losartan (COZAAR) 50 MG tablet, Take 1 tablet by mouth Daily., Disp: 90 tablet, Rfl: 3  •  pramipexole (MIRAPEX) 0.25 MG tablet, Take 0.25 mg by mouth Daily., Disp: , Rfl:      Allergies   Allergen Reactions   • Gabapentin Dizziness     Other reaction(s): Dizziness   • Pregabalin Dizziness     Vision problems, dizziness   • Sulfamethoxazole-Trimethoprim Itching and Nausea And Vomiting   • Ace Inhibitors Cough     Cough   • Lisinopril Cough        Social History     Socioeconomic History   • Marital status:    Tobacco Use   • Smoking status: Never Smoker   • Smokeless tobacco: Never Used   Vaping Use   • Vaping Use: Never used   Substance and Sexual Activity   • Alcohol use: No     Comment: caffeine use: 1 cup daily   • Drug use: Never   • Sexual activity: Not Currently     Birth control/protection: Post-menopausal, Surgical     Comment: .        Family History   Problem Relation Age of Onset   • Heart disease Mother    • Stroke Mother    • Heart attack Father    • Heart disease Father    • Heart disease Sister         s/p CABG   • Heart attack Brother    • Heart disease Brother         s/p CABG   • Hypertension Other    • Heart disease Maternal Grandmother    • Stroke Maternal Grandmother         Review of Systems    Neurological: Positive for numbness. Negative for weakness and headaches (improving ).       Objective     There were no vitals filed for this visit.  There is no height or weight on file to calculate BMI.      Physical Exam  Neurologic Exam  No physical exam was performed because this was a telephone visit      Assessment/Plan   Independent Review of Radiographic Studies:      I personally reviewed the images from the following studies.  CT head without contrast from 2022  The follow-up CT head shows no residual subdural hematoma    Medical Decision Makin-year-old female s/p right-sided bur holes for evacuation of subdural hematoma on 2022  -She has recovered very well from this procedure.  Denies any residual neurologic deficits.  Denies any headaches.  The follow-up CT head shows no residual subdural hematoma.  -I will plan to see her back in 3 months to evaluate for any changes to her recovery.  If no concerns at that time I will plan to have her follow-up as needed  Diagnoses and all orders for this visit:    1. Traumatic subdural hemorrhage without loss of consciousness, subsequent encounter (Primary)      Return in about 3 months (around 2022).

## 2022-04-01 NOTE — TELEPHONE ENCOUNTER
Pt called to say she would like the results of her CT head for SDH.  She has been CX twice for f/u and really wants to know.  She is c/o seeing flashes of light as well as pressure in her head.   [phone]

## 2022-04-28 ENCOUNTER — OFFICE VISIT (OUTPATIENT)
Dept: CARDIOLOGY | Facility: CLINIC | Age: 82
End: 2022-04-28

## 2022-04-28 VITALS
HEIGHT: 63 IN | WEIGHT: 145 LBS | DIASTOLIC BLOOD PRESSURE: 80 MMHG | SYSTOLIC BLOOD PRESSURE: 170 MMHG | HEART RATE: 63 BPM | BODY MASS INDEX: 25.69 KG/M2

## 2022-04-28 DIAGNOSIS — I10 PRIMARY HYPERTENSION: Primary | ICD-10-CM

## 2022-04-28 DIAGNOSIS — S06.5XAA SUBDURAL HEMATOMA: ICD-10-CM

## 2022-04-28 DIAGNOSIS — I25.10 CORONARY ARTERY DISEASE INVOLVING NATIVE CORONARY ARTERY OF NATIVE HEART WITHOUT ANGINA PECTORIS: ICD-10-CM

## 2022-04-28 DIAGNOSIS — E78.2 MIXED HYPERLIPIDEMIA: ICD-10-CM

## 2022-04-28 DIAGNOSIS — G47.33 OSA ON CPAP: ICD-10-CM

## 2022-04-28 DIAGNOSIS — Z99.89 OSA ON CPAP: ICD-10-CM

## 2022-04-28 PROBLEM — IMO0001 NORMAL CARDIAC STRESS TEST: Status: RESOLVED | Noted: 2022-04-28 | Resolved: 2022-04-28

## 2022-04-28 PROBLEM — IMO0001 NORMAL CARDIAC STRESS TEST: Status: ACTIVE | Noted: 2022-04-28

## 2022-04-28 PROCEDURE — 99214 OFFICE O/P EST MOD 30 MIN: CPT | Performed by: NURSE PRACTITIONER

## 2022-04-28 PROCEDURE — 93000 ELECTROCARDIOGRAM COMPLETE: CPT | Performed by: NURSE PRACTITIONER

## 2022-04-28 RX ORDER — ATORVASTATIN CALCIUM 20 MG/1
20 TABLET, FILM COATED ORAL DAILY
Qty: 90 TABLET | Refills: 3 | Status: SHIPPED | OUTPATIENT
Start: 2022-04-28

## 2022-04-28 RX ORDER — PREDNISONE 1 MG/1
5 TABLET ORAL DAILY
COMMUNITY

## 2022-04-28 RX ORDER — LOSARTAN POTASSIUM 100 MG/1
TABLET ORAL
COMMUNITY
Start: 2022-03-04

## 2022-04-28 RX ORDER — DULOXETIN HYDROCHLORIDE 30 MG/1
30 CAPSULE, DELAYED RELEASE ORAL DAILY
COMMUNITY

## 2022-04-28 NOTE — PROGRESS NOTES
Date of Office Visit: 2022  Encounter Provider: ARABELLA Calvo  Place of Service: [facility] CARDIOLOGY  Patient Name: Itzel Edmond  :1940  Primary Cardiologist: Dr. Sandra Winters    Chief Complaint   Patient presents with   • Coronary Artery Disease   • Follow-up   :     HPI: Itzel Edmond is a pleasant 81 y.o. female who presents today for follow up on hypertension.  I have reviewed her medical records.    She has a known history of hypertension, hyperlipidemia, and obstructive sleep apnea (compliant with bipap machine).  We will avoid spironolactone because she developed hyperkalemia in the past and she had worsening lower extremity edema on amlodipine 10 mg daily.    In , she has had intermittent palpitations and Holter monitor at that time showed normal sinus rhythm with rare PVCs and short run of nonsustained SVT.  In 2014, she had a cardiac PET scan which showed no evidence of ischemia.  In 2015, she had an echocardiogram which revealed an EF of 63%, grade 1 diastolic dysfunction, and mild mitral and tricuspid insufficiency.     In 2019, she presented to the ED with symptoms of fatigue, shortness of breath, diaphoresis, nausea, and bilateral arm pain.  She ruled out for acute ACS.  Cardiac catheterization showed early atherosclerosis of the LAD, left circumflex, and RCA.  Echocardiogram showed: LVEF 64%, grade 1 diastolic dysfunction, trace aortic valve regurgitation, moderate mitral annular calcification with mild mitral valve regurgitation, and physiologic tricuspid valve regurgitation.    In 2021, she fell and hit her head on a marble table.  She was diagnosed with a subdural hematoma that was being watched by neurosurgery.  In 2021, she presented to the ED with blurry vision and her subdural hematoma had increased in size.  She underwent surgical intervention.    She presents today for follow-up visit.  He  is feeling much better since she had surgery for the subdural hematoma.  Her blood pressure is elevated today and she has not had her morning medications.  She also says that it stressful driving to our office from [facility].  Occasionally she wakes up in the middle the night with her heart racing.  She has been using her BiPAP machine, but plans to see her sleep apnea MD in the near future.  She does not feel that she needs treatment for those palpitations.  She denies chest pain, shortness of air, edema, dizziness, syncope, or bleeding.      Past Medical History:   Diagnosis Date   • Allergic rhinitis    • Atherosclerosis    • CAD (coronary artery disease) 2019    Overview:  Mild non obstructive CAD on cath 2019, medical mgmt recommended   • Carotid stenosis    • Cataract    • Cellulitis of right leg 2016   • Chest discomfort 2019   • Claudication of lower extremity (HCC)    • CMC arthritis    • Colon polyps     FOLLOWED BY DR. NOLBERTO REYNOSO   • Cystitis 2019   • WOODARD (dyspnea on exertion) 10/2020   • Dysphagia    • Elevated serum creatinine 2021   • Environmental allergies    • Exercise intolerance    • JAMES (generalized anxiety disorder) 2020   • GERD (gastroesophageal reflux disease)    • Heart murmur    • Hemorrhoids    • Hyperglycemia    • Hyperkalemia 2020   • Hyperlipidemia     MIXED   • Hypertension    • Influenza 2018   • Insomnia    • Myalgia 2016   • Neuropathy 2017   • OAB (overactive bladder)    • JHOANA (obstructive sleep apnea)     BiPap   • PLMD (periodic limb movement disorder)    • Precordial pain 2019   • Premature atrial complexes    • Rectal bleeding    • Rectal prolapse 10/2020   • Rectal ulcer 10/02/2020    5 MM SINGLE ULCER ON CY   • Rectocele 2019   • Renal insufficiency    • Restless leg syndrome 10/2/2018   • SAB (spontaneous )      A1   • Serum potassium elevated 2016   • Slow transit constipation 2020   • Tachycardia  05/2013   • Trigger finger of left thumb 10/21/2019   • Urge incontinence    • Urinary frequency    • Vaginal atrophy        Past Surgical History:   Procedure Laterality Date   • ANTERIOR AND POSTERIOR VAGINAL REPAIR N/A    • BACK SURGERY     • BILATERAL SALPINGO OOPHORECTOMY Bilateral 2002   • BLADDER SURGERY N/A 2002    BLADDER SLING   • YADI HOLE Right 1/21/2022    Procedure: YADI HOLE;  Surgeon: Austin Hilario MD;  Location:  [facility];  Service: Neurosurgery;  Laterality: Right;   • CARDIAC CATHETERIZATION N/A 1/6/2019    Procedure: Left Heart Cath;  Surgeon: Christopher Rosenbaum MD;  Location:  [facility];  Service: Cardiology   • CARDIAC CATHETERIZATION N/A 1/6/2019    Procedure: Coronary angiography;  Surgeon: Christopher Rosenbaum MD;  Location:  [facility];  Service: Cardiology   • CARDIAC CATHETERIZATION N/A 1/6/2019    Procedure: Left ventriculography;  Surgeon: Christopher Rosenbaum MD;  Location:  [facility];  Service: Cardiology   • CATARACT EXTRACTION Left 12/15/2015    WITH LENS IMPLANT, DR. AMRIT DIAZ AT [facility]   • CATARACT EXTRACTION Right 12/01/2015     WITH LENS IMPLANT, DR. AMRIT DIAZ AT [facility]   • COLONOSCOPY N/A 10/2/2020    2 TUBULAR ADENOMA POLYPS IN TRANSVERSE, HEMORRHOIDS, DIVERTICULOSIS, 5 MM ULCER IN RECTUM, OTHER BIOPSIES BENIGN, DR. NOLBERTO REYNOSO AT [facility]   • COLONOSCOPY W/ POLYPECTOMY N/A 08/24/2015    1 CM TUBULAR ADENOMA POLYP IN TRANSVERSE, MODERATE DIVERTICULOSIS, HEMORRHOIDS, RESCOPE IN 5 YRS, DR. MAYA SAHU AT [place]   • HEMORROIDECTOMY N/A    • HERNIA REPAIR     • HYSTERECTOMY N/A 1970    OVARIES IN TACT       Social History     Socioeconomic History   • Marital status:    Tobacco Use   • Smoking status: Never Smoker   • Smokeless tobacco: Never Used   Vaping Use   • Vaping Use: Never used   Substance and Sexual Activity   • Alcohol use: No     Comment: caffeine use: 1 cup daily   • Drug use:  Never   • Sexual activity: Not Currently     Birth control/protection: Post-menopausal, Surgical     Comment: .       Family History   Problem Relation Age of Onset   • Heart disease Mother    • Stroke Mother    • Heart attack Father    • Heart disease Father    • Heart disease Sister         s/p CABG   • Heart attack Brother    • Heart disease Brother         s/p CABG   • Hypertension Other    • Heart disease Maternal Grandmother    • Stroke Maternal Grandmother        The following portion of the patient's history were reviewed and updated as appropriate: past medical history, past surgical history, past social history, past family history, allergies, current medications, and problem list.    Review of Systems   Constitutional: Negative.   Cardiovascular: Positive for palpitations.   Respiratory: Positive for snoring.    Hematologic/Lymphatic: Negative.    Musculoskeletal: Positive for joint pain and myalgias.   Neurological: Negative.        Allergies   Allergen Reactions   • Gabapentin Dizziness     Other reaction(s): Dizziness   • Pregabalin Dizziness     Vision problems, dizziness   • Sulfamethoxazole-Trimethoprim Itching and Nausea And Vomiting   • Ace Inhibitors Cough     Cough   • Lisinopril Cough         Current Outpatient Medications:   •  amLODIPine (NORVASC) 5 MG tablet, Take 1 tablet by mouth Daily., Disp: 90 tablet, Rfl: 3  •  atorvastatin (LIPITOR) 20 MG tablet, Take 1 tablet by mouth Daily., Disp: 90 tablet, Rfl: 3  •  conjugated estrogens (PREMARIN) 0.625 MG/GM vaginal cream, APPLY WITH FINGERTIP OR INSERT 0.5 TO 1 GRAM VAGINALLY 2 TO 3 TIMES PER WEEK., Disp: , Rfl:   •  Diclofenac Sodium (VOLTAREN) 1 % gel gel, APPLY 4 GRAMS TOPICALLY TWICE A DAY AS NEEDED, Disp: , Rfl:   •  hydroCHLOROthiazide (HYDRODIURIL) 25 MG tablet, Take 25 mg by mouth Daily., Disp: , Rfl:   •  losartan (COZAAR) 100 MG tablet, , Disp: , Rfl:   •  pramipexole (MIRAPEX) 0.25 MG tablet, Take 0.25 mg by mouth Daily., Disp:  ", Rfl:   •  predniSONE (DELTASONE) 5 MG tablet, Take 5 mg by mouth Daily., Disp: , Rfl:   •  DULoxetine (CYMBALTA) 30 MG capsule, Take 30 mg by mouth Daily., Disp: , Rfl:         Objective:     Vitals:    04/28/22 0802 04/28/22 0815 04/28/22 0856   BP: 160/68 164/72 170/80   BP Location: Left arm Right arm Left arm   Patient Position:   Sitting   Pulse: 63     Weight: 65.8 kg (145 lb)     Height: 160 cm (63\")       Body mass index is 25.69 kg/m².    PHYSICAL EXAM:    Vitals Reviewed.   General Appearance: No acute distress, well developed and well nourished.    Eyes: Conjunctiva and lids: No erythema, swelling, or discharge. Sclera non-icteric. Glasses.   HENT: Atraumatic, normocephalic. External eyes, ears, and nose normal. No hearing loss noted. Mucous membranes normal. Lips not cyanotic. Neck supple with no tenderness. Wearing mask.   Respiratory: No signs of respiratory distress. Respiration rhythm and depth normal.   Clear to auscultation. No rales, crackles, rhonchi, or wheezing auscultated.   Cardiovascular:  Jugular Venous Pressure: Normal  Heart Rate and Rhythm: Normal, Heart Sounds: Normal S1 and S2. No S3 or S4 noted.  Murmurs: No murmurs noted. No rubs, thrills, or gallops.   Lower Extremities: No edema noted.  Gastrointestinal:  Abdomen soft, non-distended, non-tender.    Musculoskeletal: Normal movement of extremities.  Skin and Nails: General appearance normal. No pallor, cyanosis, diaphoresis. Skin temperature normal. No clubbing of fingernails.   Psychiatric: Patient alert and oriented to person, place, and time. Speech and behavior appropriate. Normal mood and affect.       ECG 12 Lead    Date/Time: 4/28/2022 8:13 AM  Performed by: Sia Jason APRN  Authorized by: Sia Jason APRN   Comparison: compared with previous ECG from 4/22/2021  Similar to previous ECG  Rhythm: sinus rhythm  Rate: normal  BPM: 63  Conduction: conduction normal  ST Segments: ST segments normal  T Waves: T waves " normal  QRS axis: normal  Other: no other findings    Clinical impression: normal ECG              Assessment:       Diagnosis Plan   1. Primary hypertension     2. Coronary artery disease involving native coronary artery of native heart without angina pectoris     3. Mixed hyperlipidemia     4. JHOANA on CPAP     5. Subdural hematoma (HCC)            Plan:       1.  Hypertension: Blood pressure goal 120/80 or less recommended.  Blood pressure elevated today and she has not had her medications.  She will start checking her blood pressure at home and call me with an update in a few weeks.  We will avoid spironolactone because she developed hyperkalemia in the past and she had worsening lower extremity edema on amlodipine 10 mg daily.    2.  Nonobstructive Coronary Artery Disease: Remains on atorvastatin.  She is not on the aspirin at this time because of the subdural hematoma.    3.   Hyperlipidemia: Last cholesterol panel in November 2021 was excellent.  I have refilled the atorvastatin.    4.  Obstructive Sleep Apnea: Compliant with BiPAP.  Occasionally she wakes up with palpitations and she has been diagnosed with APCs in the past.  She is going for another sleep apnea consultation.    5.  Subdural Hematoma: Status post repair.    6.  She will call me with an update on her home blood pressure readings in a few weeks.  I recommended a 1 year follow-up visit with Dr. Sandra Winters.      As always, it has been a pleasure to participate in your patient's care. Thank you.       Sincerely,         ARABELLA Hogan  [facility] Cardiology      · Dictated utilizing Dragon Dictation  · COVID-19 Precautions - Patient was compliant in wearing a mask. When I saw the patient, I used appropriate personal protective equipment (PPE) including mask and eye shield (standard procedure).  Additionally, I used gown and gloves if indicated.  Hand hygiene was completed before and after seeing the patient.  · I  spent 30 minutes reviewing her medical records/testing/previous office notes/labs, face-to-face interaction with patient, physical examination, formulating the plan of care, and discussion of plan of care with patient.

## 2022-05-23 ENCOUNTER — TELEPHONE (OUTPATIENT)
Dept: CARDIOLOGY | Facility: CLINIC | Age: 82
End: 2022-05-23

## 2022-05-23 NOTE — TELEPHONE ENCOUNTER
In April, I saw her in the office.  Her blood pressure was elevated at 170/80.  She had not had her morning medications.  She was to monitor her blood pressure at home and call me with an update.  I have not heard back from her.    Can you please call and check on her blood pressure and heart rates at home?  Thank you

## 2022-05-23 NOTE — TELEPHONE ENCOUNTER
Called and spoke with patient. She said she feels much better than she did before. Her B/P has not been higher than 130s-140s systolic, but pt did not have specific numbers to give me for her B/P or HR. She is going to keep a good record for the next few weeks. I let her know she could send her numbers in through [employer] and she was happy to hear that.    Thank you,    Rhiannon Ojeda RN  Triage [facility] [place]

## 2022-06-10 ENCOUNTER — OFFICE VISIT (OUTPATIENT)
Dept: SLEEP MEDICINE | Facility: HOSPITAL | Age: 82
End: 2022-06-10

## 2022-06-10 VITALS
HEART RATE: 73 BPM | BODY MASS INDEX: 25.69 KG/M2 | WEIGHT: 145 LBS | HEIGHT: 63 IN | DIASTOLIC BLOOD PRESSURE: 78 MMHG | SYSTOLIC BLOOD PRESSURE: 158 MMHG | OXYGEN SATURATION: 97 %

## 2022-06-10 DIAGNOSIS — G47.9 RESTLESS SLEEPER: ICD-10-CM

## 2022-06-10 DIAGNOSIS — G47.33 OBSTRUCTIVE SLEEP APNEA: Primary | ICD-10-CM

## 2022-06-10 DIAGNOSIS — G25.81 RESTLESS LEG SYNDROME: ICD-10-CM

## 2022-06-10 DIAGNOSIS — Z78.9 DIFFICULTY WITH CPAP NASAL MASK USE: ICD-10-CM

## 2022-06-10 PROCEDURE — G0463 HOSPITAL OUTPT CLINIC VISIT: HCPCS

## 2022-06-10 RX ORDER — ZOLPIDEM TARTRATE 5 MG/1
TABLET ORAL
Qty: 2 TABLET | Refills: 0 | Status: SHIPPED | OUTPATIENT
Start: 2022-06-10

## 2022-06-10 RX ORDER — DOXYCYCLINE HYCLATE 50 MG/1
324 CAPSULE, GELATIN COATED ORAL
Qty: 30 TABLET | Refills: 5 | Status: SHIPPED | OUTPATIENT
Start: 2022-06-10 | End: 2022-12-07

## 2022-06-10 NOTE — PROGRESS NOTES
[facility] SLEEP MEDICINE  [address]  [address]  [place] [address] [phone]    PCP: Rhoda Luther MD    Reason for visit:  Sleep disorders: JHOANA    Itzel is a 82 y.o.female who was seen in the Sleep Disorders Center today. She is having difficulty with her CPAP mask.She changed her mask style and needs to use smaller size. Sleeps from 11pm to 6am. She wakes up mostly rested. Still sleepy if sits quietly. She takes pramipexole for RLS but still symptoms if she does a lot activity during the day. Hx of low iron.  [place] Sleepiness Scale is dnc. Caffeine 1 per day. Alcohol 0 per week.    Itzel  reports that she has never smoked. She has never used smokeless tobacco.    Pertinent Positive Review of Systems of PND, SOA  Rest of Review of Systems was negative as recorded in Sleep Questionnaire.    Patient  has a past medical history of Allergic rhinitis, Atherosclerosis, CAD (coronary artery disease) (2019), Carotid stenosis, Cataract, Cellulitis of right leg (2016), Chest discomfort (2019), Claudication of lower extremity (HCC), CMC arthritis, Colon polyps, Cystitis (2019), WOODARD (dyspnea on exertion) (10/2020), Dysphagia, Elevated serum creatinine (2021), Environmental allergies, Exercise intolerance, JAMES (generalized anxiety disorder) (2020), GERD (gastroesophageal reflux disease), Heart murmur, Hemorrhoids, Hyperglycemia, Hyperkalemia (2020), Hyperlipidemia, Hypertension, Influenza (2018), Insomnia, Myalgia (2016), Neuropathy (2017), OAB (overactive bladder), JHOANA (obstructive sleep apnea), PLMD (periodic limb movement disorder), Precordial pain (2019), Premature atrial complexes, Rectal bleeding, Rectal prolapse (10/2020), Rectal ulcer (10/02/2020), Rectocele (2019), Renal insufficiency, Restless leg syndrome (10/2/2018), SAB (spontaneous ), Serum potassium elevated (2016), Slow transit constipation (2020), Tachycardia  "(05/2013), Trigger finger of left thumb (10/21/2019), Urge incontinence, Urinary frequency, and Vaginal atrophy.     Current Medications:    Current Outpatient Medications:   •  amLODIPine (NORVASC) 5 MG tablet, Take 1 tablet by mouth Daily., Disp: 90 tablet, Rfl: 3  •  atorvastatin (LIPITOR) 20 MG tablet, Take 1 tablet by mouth Daily., Disp: 90 tablet, Rfl: 3  •  conjugated estrogens (PREMARIN) 0.625 MG/GM vaginal cream, APPLY WITH FINGERTIP OR INSERT 0.5 TO 1 GRAM VAGINALLY 2 TO 3 TIMES PER WEEK., Disp: , Rfl:   •  Diclofenac Sodium (VOLTAREN) 1 % gel gel, APPLY 4 GRAMS TOPICALLY TWICE A DAY AS NEEDED, Disp: , Rfl:   •  DULoxetine (CYMBALTA) 30 MG capsule, Take 30 mg by mouth Daily., Disp: , Rfl:   •  ferrous gluconate (FERGON) 324 MG tablet, Take 1 tablet by mouth Daily With Breakfast for 180 days., Disp: 30 tablet, Rfl: 5  •  hydroCHLOROthiazide (HYDRODIURIL) 25 MG tablet, Take 25 mg by mouth Daily., Disp: , Rfl:   •  losartan (COZAAR) 100 MG tablet, , Disp: , Rfl:   •  pramipexole (MIRAPEX) 0.25 MG tablet, Take 0.25 mg by mouth Daily., Disp: , Rfl:   •  predniSONE (DELTASONE) 5 MG tablet, Take 5 mg by mouth Daily., Disp: , Rfl:   •  zolpidem (Ambien) 5 MG tablet, Bring to sleep lab DO NOT use at home. PLEASE take if not asleep within 30 mins of start of study., Disp: 2 tablet, Rfl: 0   also entered in Sleep Questionnaire         Vital Signs: /78   Pulse 73   Ht 160 cm (63\")   Wt 65.8 kg (145 lb)   SpO2 97%   BMI 25.69 kg/m²     Body mass index is 25.69 kg/m².       Tongue: Normal       Dentition: good       Pharynx: Posterior pharyngeal pillars are narrow   Mallampatti: III (soft and hard palate and base of uvula visible)        General: Alert. Cooperative. Well developed. No acute distress.             Head:  Normocephalic. Symmetrical. Atraumatic.              Nose: No septal deviation. No drainage.          Throat: No oral lesions. No thrush. Moist mucous membranes.    Chest Wall:  Normal shape. " Symmetric expansion with respiration. No tenderness.             Neck:  Trachea midline.           Lungs:  Clear to auscultation bilaterally. No wheezes. No rhonchi. No rales. Respirations regular, even and unlabored.            Heart:  Regular rhythm and normal rate. Normal S1 and S2. No murmur.     Abdomen:  Soft, non-tender and non-distended. Normal bowel sounds. No masses.  Extremities:  Moves all extremities well. No edema.    Psychiatric: Normal mood and affect.    Diagnostic data available to date is as below and was reviewed on current visit:  · I could not find the diagnostic study that was done just prior to below.  · 12/14/2015 sleep center [facility]: CPAP was tried but failed.  There was subsequent successful titration with BiPAP.  Optimal pressures were 16/10.      Most current available usage data reviewed on 06/10/2022:  · 98% compliance average 6 hours AHI 1 average bilevel pressure of 16/12    DME Company: [facility] [employer]    [place] [facility] [place] as below:    nasal mask      Description Replacement    Nasal PILLOWS      A 7034 Nasal Pillows  every 3 mth    A 7033 Repl Nasal Pillows  2 per mth    Nasal MASK/CUSHION     x A 7034 Nasal Mask/Cushion  every 3 mth   x A 7032 Repl Nasal Mask/Cushion  2 per mth    Full Face MASK      A 7030 Full Face Mask  every 3 mth    A 7031 Repl Face Mask  1 per mth      A 4604 Heated Tubing  every 3 mth    A 7037 Standard Tubing  every 3 mth   x A 7035 Headgear  every 3 mth   x A 7046 Repl Humidifier Chamber  every 6 yrs   x A 7038 Disposable Filters  2 per mth   x A 7039 Non-disposable Filter  every 6 mth   x A 7036 Chin Strap  every 6 mth         Orders Placed This Encounter   Procedures   • COVID PRE-OP / PRE-PROCEDURE SCREENING ORDER (NO ISOLATION) - Swab, Nasopharynx     Standing Status:   Future     Standing Expiration Date:   6/10/2023     Order Specific Question:   Release to patient     Answer:   Immediate     Order Specific Question:    Please select your location:     Answer:   [facility]     Order Specific Question:   COVID Screening Order:     Answer:   Inpatient/Outpatient/ED/Urgent-APTIMA PANTHER, 24 HR TAT [[id number]]     Order Specific Question:   Previously tested for COVID-19?     Answer:   No     Order Specific Question:   Employed in healthcare setting?     Answer:   No     Order Specific Question:   Symptomatic for COVID-19 as defined by CDC?     Answer:   No     Order Specific Question:   Hospitalized for COVID-19?     Answer:   No     Order Specific Question:   Admitted to ICU for COVID-19?     Answer:   No     Order Specific Question:   Resident in a congregate (group) care setting?     Answer:   No     Order Specific Question:   Pregnant?     Answer:   No   • Polysomnography 4 or More Parameters     Standing Status:   Future     Standing Expiration Date:   6/10/2023     Scheduling Instructions:      Please do nocturnal polysomnogram study.      It is permitted to use zolpidem 5-10 mg if patient is awake over 30 minutes prior to 1 AM.      If patient has Obstructive Sleep Apnea on diagnostic portion with AHI > 5 and minimum of at least 10 events; then please do titration with CPAP and/or BIPAP.      DO NOT do ASV titration if significant CA. This will need to be performed as a separate study.      If patient requires addition of supplemental oxygen therapy during PAP titration study, inform MD in AM.      Schedule follow-up in Sleep Disorder Center after study completed.     Order Specific Question:   May take own meds     Answer:   Yes     Order Specific Question:   Details     Answer:   O2 Implementation per Protocol     New Medications Ordered This Visit   Medications   • ferrous gluconate (FERGON) 324 MG tablet     Sig: Take 1 tablet by mouth Daily With Breakfast for 180 days.     Dispense:  30 tablet     Refill:  5   • zolpidem (Ambien) 5 MG tablet     Sig: Bring to sleep lab DO NOT use at home. PLEASE take if not asleep within  30 mins of start of study.     Dispense:  2 tablet     Refill:  0       Impression:  1. Obstructive sleep apnea    2. Restless leg syndrome    3. Restless sleeper    4. Difficulty with CPAP nasal mask use        Plan:  Itzel is compliant with her BiPAP. Change min EPAP to 8. May need different size nasal mask.    RLS not fully controlled. Add iron supplementation for at least 3 months. Consider higher dose pramipexole.    She wants repeat study since she has so much restlessness at night - ordered split as we do not have dx on record.    I reiterated the importance of effective treatment of obstructive sleep apnea with PAP machine.  Cardiovascular health risks of untreated sleep apnea were again reviewed.  Patient was asked to remain cautious if there is persistent hypersomnolence. The benefit of weight loss in reducing severity of obstructive sleep apnea was discussed.  Patient would benefit from adhering to a strict diet to achieve ideal BMI.     Change of PAP supplies regularly is important for effective use.  Change of cushion on the mask or plugs on nasal pillows along with disposable filters once every month and change of mask frame, tubing, headgear and Velcro straps every 6 months at the minimum was reiterated.    Patient will follow up in this clinic in 6 months.    Thank you for allowing me to participate in your patient's care.    Electronically signed by Edwin Ferrell MD, 06/10/22, 10:35 AM EDT.    Part of this note may be an electronic transcription/translation of spoken language to printed text using the Dragon Dictation System.

## 2022-06-23 ENCOUNTER — TELEPHONE (OUTPATIENT)
Dept: NEUROSURGERY | Facility: CLINIC | Age: 82
End: 2022-06-23

## 2022-06-23 NOTE — TELEPHONE ENCOUNTER
I called patient let her know that it has been a slight change in Dr. Hilario's schedule her new appointment time is still for July 1st but is now is at 3 pm. If she is unable to make the appointment we can reschedule to another date or time. Left .

## 2022-06-24 NOTE — TELEPHONE ENCOUNTER
PT STATES SHE RECEIVED A CALL 6/23/2022 AND WAS OFFERED AN APPT FOR Monday WITH DR. ADAMSON.    ATTEMPTED TO WARM TRANSFER:  UNABLE TO REACH RAFAEL.    [phone]    PT ASKED IF YOU CAN LEAVE A MESSAGE.    PT STATES JUST CALL IF SHE CAN GET THE APPT.

## 2022-07-01 ENCOUNTER — TELEPHONE (OUTPATIENT)
Dept: NEUROSURGERY | Facility: CLINIC | Age: 82
End: 2022-07-01

## 2022-07-01 ENCOUNTER — OFFICE VISIT (OUTPATIENT)
Dept: NEUROSURGERY | Facility: CLINIC | Age: 82
End: 2022-07-01

## 2022-07-01 VITALS
HEIGHT: 63 IN | SYSTOLIC BLOOD PRESSURE: 122 MMHG | DIASTOLIC BLOOD PRESSURE: 80 MMHG | OXYGEN SATURATION: 97 % | BODY MASS INDEX: 26.05 KG/M2 | HEART RATE: 86 BPM | TEMPERATURE: 97 F | WEIGHT: 147 LBS

## 2022-07-01 DIAGNOSIS — L29.9 ITCHY SCALP: ICD-10-CM

## 2022-07-01 DIAGNOSIS — S06.5X0D TRAUMATIC SUBDURAL HEMORRHAGE WITHOUT LOSS OF CONSCIOUSNESS, SUBSEQUENT ENCOUNTER: Primary | ICD-10-CM

## 2022-07-01 PROCEDURE — 99213 OFFICE O/P EST LOW 20 MIN: CPT

## 2022-07-01 NOTE — TELEPHONE ENCOUNTER
I called and LVM advising the patient that we would be prescribing a benadryl cream instead of the hydroxyzine for her itching. She was advised to call back if the medication is not helping her.

## 2022-07-01 NOTE — PROGRESS NOTES
"Subjective   Patient ID: Itzel Edmond is a 82 y.o. female is here today for follow-up.She is s/p right sided bur holes for evacuation of subdural hematoma 1/21/22.  She is doing well. She denies any HA, dizziness or new vision changes. She does have itching atincision at times.      History of Present Illness    No new complaints. States she overall feels like she is doing well. Denies HA, vision changes, speech changes, and N/V. Denies generalized and focal weakness. Denies dizziness and lightheadedness. Admits to some chronic instability with walking without acute worsening. Also admits to some mild difficulty word finding. Complains of intermittent itching at her incision. Denies fever/chills, drainage from her incision, and redness at the incision site.  Denies taking blood thinners.    Review of Systems   Constitutional: Negative for chills and fever.   Eyes: Negative for visual disturbance.   Musculoskeletal: Positive for gait problem (balance difficulty at times).   Skin: Negative for rash.   Neurological: Negative for dizziness, seizures, syncope, speech difficulty, weakness, light-headedness, numbness and headaches.       Tobacco Use: Low Risk    • Smoking Tobacco Use: Never Smoker   • Smokeless Tobacco Use: Never Used     Itzel Edmond  reports that she has never smoked. She has never used smokeless tobacco.         Objective     Vitals:    07/01/22 1531   BP: 122/80   Pulse: 86   Temp: 97 °F (36.1 °C)   SpO2: 97%   Weight: 66.7 kg (147 lb)   Height: 160 cm (63\")     Body mass index is 26.04 kg/m².      Physical Exam  HENT:      Head:      Comments:   Bur hole incision well-healed without erythema or drainage.  No obvious lesion or rash.  Eyes:      Extraocular Movements: EOM normal.      Pupils: Pupils are equal, round, and reactive to light.   Neurological:      Mental Status: She is oriented to person, place, and time.      Gait: Gait is intact.   Psychiatric:         Speech: Speech normal. "       Neurologic Exam     Mental Status   Oriented to person, place, and time.   Follows 3 step commands.   Attention: normal. Concentration: normal.   Speech: speech is normal   Level of consciousness: alert  Knowledge: good.     Cranial Nerves     CN II   Visual fields full to confrontation.   Visual acuity: normal  Right visual field deficit: none  Left visual field deficit: none     CN III, IV, VI   Pupils are equal, round, and reactive to light.  Extraocular motions are normal.   Right pupil: Size: 4 mm. Shape: regular. Reactivity: brisk. Consensual response: intact.   Left pupil: Size: 4 mm. Shape: regular. Reactivity: brisk. Consensual response: intact.   Nystagmus: none   Diplopia: none    CN V   Facial sensation intact.     CN VII   Facial expression full, symmetric.     CN VIII   CN VIII normal.     CN IX, X   Palate: symmetric    CN XII   Tongue: not atrophic  Fasciculations: absent  Tongue deviation: none    Motor Exam   Muscle bulk: normal  Overall muscle tone: normal  Right arm tone: normal  Left arm tone: normal  Right arm pronator drift: absent  Left arm pronator drift: absent  Right leg tone: normal  Left leg tone: normal    Strength   Right biceps: 5/5  Left biceps: 5/5  Right triceps: 5/5  Left triceps: 5/5  Right interossei: 5/5  Left interossei: 5/5  Right iliopsoas: 5/5  Left iliopsoas: 5/5  Right quadriceps: 5/5  Left quadriceps: 5/5  Right anterior tibial: 5/5  Left anterior tibial: 5/5  Right gastroc: 5/5  Left gastroc: 5/5    Gait, Coordination, and Reflexes     Gait  Gait: normal          Assessment & Plan     Medical Decision Makin-year-old female who presents for a follow-up approximately 5.5 months s/p bur holes for traumatic subdural hemorrhage.  She has recovered well and at this time she is neurologically intact.  Denies any new neurological complaints.  Complains of some mild intermittent itching at the incision site.  I recommend over-the-counter Benadryl cream for this  and if she has no relief we can try some hydroxyzine.  However, I feel something topical would be better at this point given that she is 82 and takes Ambien.  At this time, given her continued progress in recovery, we do not need to see her back in our office.  I discussed with her that she can follow-up for symptoms that do not resolve or worsening symptoms as well as any new concerns she may have.    Diagnoses and all orders for this visit:    1. Traumatic subdural hemorrhage without loss of consciousness, subsequent encounter (Primary)    2. Itchy scalp    Other orders  -     diphenhydrAMINE (BENADRYL) 2 % cream; Apply 1 application topically to the appropriate area as directed 3 (Three) Times a Day As Needed for Itching (On scalp incision).  Dispense: 15 g; Refill: 0      Return if symptoms worsen or fail to improve.

## 2022-07-22 ENCOUNTER — LAB (OUTPATIENT)
Dept: LAB | Facility: HOSPITAL | Age: 82
End: 2022-07-22

## 2022-07-22 DIAGNOSIS — G47.33 OBSTRUCTIVE SLEEP APNEA: ICD-10-CM

## 2022-07-22 LAB — SARS-COV-2 ORF1AB RESP QL NAA+PROBE: NOT DETECTED

## 2022-07-22 PROCEDURE — U0004 COV-19 TEST NON-CDC HGH THRU: HCPCS

## 2022-07-22 PROCEDURE — U0005 INFEC AGEN DETEC AMPLI PROBE: HCPCS

## 2022-07-22 PROCEDURE — C9803 HOPD COVID-19 SPEC COLLECT: HCPCS

## 2022-07-25 ENCOUNTER — HOSPITAL ENCOUNTER (OUTPATIENT)
Dept: SLEEP MEDICINE | Facility: HOSPITAL | Age: 82
Discharge: HOME | End: 2022-07-25
Admitting: INTERNAL MEDICINE

## 2022-07-25 DIAGNOSIS — G47.33 OBSTRUCTIVE SLEEP APNEA: ICD-10-CM

## 2022-07-25 PROCEDURE — 95811 POLYSOM 6/>YRS CPAP 4/> PARM: CPT

## 2022-08-02 ENCOUNTER — TELEPHONE (OUTPATIENT)
Dept: SLEEP MEDICINE | Facility: HOSPITAL | Age: 82
End: 2022-08-02

## 2022-08-19 ENCOUNTER — TELEPHONE (OUTPATIENT)
Dept: SLEEP MEDICINE | Facility: HOSPITAL | Age: 82
End: 2022-08-19

## 2022-08-19 ENCOUNTER — OFFICE VISIT (OUTPATIENT)
Dept: SLEEP MEDICINE | Facility: HOSPITAL | Age: 82
End: 2022-08-19

## 2022-08-19 VITALS
SYSTOLIC BLOOD PRESSURE: 164 MMHG | DIASTOLIC BLOOD PRESSURE: 79 MMHG | OXYGEN SATURATION: 98 % | HEIGHT: 63 IN | BODY MASS INDEX: 26.33 KG/M2 | WEIGHT: 148.6 LBS | HEART RATE: 83 BPM

## 2022-08-19 DIAGNOSIS — G25.81 RESTLESS LEG SYNDROME: ICD-10-CM

## 2022-08-19 DIAGNOSIS — G47.33 OBSTRUCTIVE SLEEP APNEA: Primary | ICD-10-CM

## 2022-08-19 DIAGNOSIS — Z78.9 DIFFICULTY WITH CPAP USE: ICD-10-CM

## 2022-08-19 PROCEDURE — G0463 HOSPITAL OUTPT CLINIC VISIT: HCPCS

## 2022-08-19 RX ORDER — PRAMIPEXOLE DIHYDROCHLORIDE 0.25 MG/1
0.5 TABLET ORAL NIGHTLY
Qty: 180 TABLET | Refills: 1 | Status: SHIPPED | OUTPATIENT
Start: 2022-08-19 | End: 2023-01-27 | Stop reason: SDUPTHER

## 2022-08-19 NOTE — PROGRESS NOTES
[facility] SLEEP MEDICINE  [address]  [facility] [address] [phone]    PCP: Rhoda Luther MD    Reason for visit:  Sleep disorders: JHOANA    Itzel is a 82 y.o.female who was seen in the Sleep Disorders Center today. She is here to review her titration study. She continues to have excessive PLMD even with the pramipexole. Only 1 cup of coffee per day. She has been taking iron supplementation and helped some. Her sleep quality has been worse since the pressure was increased. Her current device was self-pay. Her RLS is not controlled and starts at 9PM when she sits down.  [place] Sleepiness Scale is 6. Caffeine 1 per day. Alcohol 0 per week.    Itzel  reports that she has never smoked. She has never used smokeless tobacco.    Pertinent Positive Review of Systems of nasal congestion, soa, wheezing, depression  Rest of Review of Systems was negative as recorded in Sleep Questionnaire.    Patient  has a past medical history of Allergic rhinitis, Atherosclerosis, CAD (coronary artery disease) (1/16/2019), Carotid stenosis, Cataract, Cellulitis of right leg (06/2016), Chest discomfort (01/2019), Claudication of lower extremity (HCC), CMC arthritis, Colon polyps, Cystitis (11/19/2019), WOODARD (dyspnea on exertion) (10/2020), Dysphagia, Elevated serum creatinine (02/2021), Environmental allergies, Exercise intolerance, JAMES (generalized anxiety disorder) (1/20/2020), GERD (gastroesophageal reflux disease), Heart murmur, Hemorrhoids, Hyperglycemia, Hyperkalemia (02/2020), Hyperlipidemia, Hypertension, Influenza (02/14/2018), Insomnia, Myalgia (05/2016), Neuropathy (09/2017), OAB (overactive bladder), JHOANA (obstructive sleep apnea), PLMD (periodic limb movement disorder), Precordial pain (01/2019), Premature atrial complexes, Rectal bleeding, Rectal prolapse (10/2020), Rectal ulcer (10/02/2020), Rectocele (12/2019), Renal insufficiency, Restless leg syndrome (10/2/2018), SAB (spontaneous  "), Serum potassium elevated (2016), Slow transit constipation (2020), Tachycardia (2013), Trigger finger of left thumb (10/21/2019), Urge incontinence, Urinary frequency, and Vaginal atrophy.     Current Medications:    Current Outpatient Medications:   •  pramipexole (MIRAPEX) 0.25 MG tablet, Take 2 tablets by mouth Every Night for 180 days., Disp: 180 tablet, Rfl: 1  •  amLODIPine (NORVASC) 5 MG tablet, Take 1 tablet by mouth Daily., Disp: 90 tablet, Rfl: 3  •  atorvastatin (LIPITOR) 20 MG tablet, Take 1 tablet by mouth Daily., Disp: 90 tablet, Rfl: 3  •  conjugated estrogens (PREMARIN) 0.625 MG/GM vaginal cream, APPLY WITH FINGERTIP OR INSERT 0.5 TO 1 GRAM VAGINALLY 2 TO 3 TIMES PER WEEK., Disp: , Rfl:   •  Diclofenac Sodium (VOLTAREN) 1 % gel gel, APPLY 4 GRAMS TOPICALLY TWICE A DAY AS NEEDED, Disp: , Rfl:   •  diphenhydrAMINE (BENADRYL) 2 % cream, Apply 1 application topically to the appropriate area as directed 3 (Three) Times a Day As Needed for Itching (On scalp incision)., Disp: 15 g, Rfl: 0  •  DULoxetine (CYMBALTA) 30 MG capsule, Take 30 mg by mouth Daily., Disp: , Rfl:   •  ferrous gluconate (FERGON) 324 MG tablet, Take 1 tablet by mouth Daily With Breakfast for 180 days., Disp: 30 tablet, Rfl: 5  •  hydroCHLOROthiazide (HYDRODIURIL) 25 MG tablet, Take 25 mg by mouth Daily., Disp: , Rfl:   •  losartan (COZAAR) 100 MG tablet, , Disp: , Rfl:   •  predniSONE (DELTASONE) 5 MG tablet, Take 5 mg by mouth Daily., Disp: , Rfl:   •  zolpidem (Ambien) 5 MG tablet, Bring to sleep lab DO NOT use at home. PLEASE take if not asleep within 30 mins of start of study., Disp: 2 tablet, Rfl: 0   also entered in Sleep Questionnaire         Vital Signs: /79   Pulse 83   Ht 160 cm (63\")   Wt 67.4 kg (148 lb 9.6 oz)   SpO2 98%   BMI 26.32 kg/m²     Body mass index is 26.32 kg/m².       Tongue: Normal       Dentition: good       Pharynx: Posterior pharyngeal pillars are wide   Mallampatti: III " (soft and hard palate and base of uvula visible)        General: Alert. Cooperative. Well developed. No acute distress.             Head:  Normocephalic. Symmetrical. Atraumatic.              Nose: No septal deviation. No drainage.          Throat: No oral lesions. No thrush. Moist mucous membranes.    Chest Wall:  Normal shape. Symmetric expansion with respiration. No tenderness.             Neck:  Trachea midline.           Lungs:  Clear to auscultation bilaterally. No wheezes. No rhonchi. No rales. Respirations regular, even and unlabored.            Heart:  Regular rhythm and normal rate. Normal S1 and S2. No murmur.     Abdomen:  Soft, non-tender and non-distended. Normal bowel sounds. No masses.  Extremities:  Moves all extremities well. No edema.    Psychiatric: Normal mood and affect.    Diagnostic data available to date is as below and was reviewed on current visit:  · I could not find the diagnostic study that was done just prior to below.  · 12/14/2015 sleep center [facility]: CPAP was tried but failed.  There was subsequent successful titration with BiPAP.  Optimal pressures were 16/10.  · 7/25/22  Overnight split polysomnogram study.  Diagnostic study 9:14 PM to 12:03 AM.  Sleep efficiency very poor at 28%.  Unclear if patient took prescribed Ambien.  AHI 50.7.  REM index 46.  There was no supine sleep.  Arousal index 48.  PLM index 92.8.  Oxygen saturation fell below 89% for 15.5 minutes.  Titration study from 12:03 AM to 3:08 AM.  Sleep efficiency improved to 66%.  Slow-wave sleep is still absent and there was 6% REM sleep.  AHI index improved.  82 minutes of supine sleep noted with AHI index 11.7.  PLM index extremely high at 185.  Patient was titrated on BiPAP from 16/10 to 18/14.  Maximum sleep at 17/13 with no significant apneas or hypopneas.  Some desaturation was still seen though improved further at 18/14.    Most current available usage data reviewed on 08/19/2022:  · So far 7 days usage  100% average 4-1/2 hours.  Currently on BiPAP and average pressure 18/14    DME Company: [employer]    Prescription to DME for replacement supplies as below:    nasal mask      Description Replacement    Nasal PILLOWS      A 7034 Nasal Pillows  every 3 mth    A 7033 Repl Nasal Pillows  2 per mth    Nasal MASK/CUSHION     x A 7034 Nasal Mask/Cushion  every 3 mth   x A 7032 Repl Nasal Mask/Cushion  2 per mth    Full Face MASK     x A 7030 Full Face Mask  every 3 mth   x A 7031 Repl Face Mask  1 per mth      A 4604 Heated Tubing  every 3 mth    A 7037 Standard Tubing  every 3 mth   x A 7035 Headgear  every 3 mth   x A 7046 Repl Humidifier Chamber  every 6 yrs   x A 7038 Disposable Filters  2 per mth   x A 7039 Non-disposable Filter  every 6 mth   x A 7036 Chin Strap  every 6 mth         No orders of the defined types were placed in this encounter.    New Medications Ordered This Visit   Medications   • pramipexole (MIRAPEX) 0.25 MG tablet     Sig: Take 2 tablets by mouth Every Night for 180 days.     Dispense:  180 tablet     Refill:  1       Impression:  1. Obstructive sleep apnea    2. Restless leg syndrome    3. Difficulty with CPAP use        Plan:  Itzel needs a better fit mask, lower EPAP min to 10.  She was fitted by the sleep technician today with a AirFit 30 I small mask with smallest headgear.  I will decrease her EPAP minimum to 10.    Does not want gabapentin due to previous adverse reaction.  Her RLS is affecting her earlier in the night. Increase to 0.5 mirapex.  Advised to take the Mirapex when she sits down for the evening.    I reiterated the importance of effective treatment of obstructive sleep apnea with PAP machine.  Cardiovascular health risks of untreated sleep apnea were again reviewed.  Patient was asked to remain cautious if there is persistent hypersomnolence. The benefit of weight loss in reducing severity of obstructive sleep apnea was discussed.  Patient would benefit from adhering to a strict  diet to achieve ideal BMI.     Change of PAP supplies regularly is important for effective use.  Change of cushion on the mask or plugs on nasal pillows along with disposable filters once every month and change of mask frame, tubing, headgear and Velcro straps every 6 months at the minimum was reiterated.     Apnea hypopneas index is corrected/improved.  Daytime hypersomnolence has resolved.     Patient will follow up in this clinic in 3 months  APRN    Thank you for allowing me to participate in your patient's care.    Electronically signed by Edwin Ferrell MD, 08/19/22, 10:00 AM EDT.    Part of this note may be an electronic transcription/translation of spoken language to printed text using the Dragon Dictation System.

## 2022-08-19 NOTE — TELEPHONE ENCOUNTER
Patient was fitted with a small Airfit F30i full face mask.  Patient EPAP was decreased to 10cm per Dr. Ferrell.

## 2022-09-26 ENCOUNTER — DOCUMENTATION (OUTPATIENT)
Dept: SLEEP MEDICINE | Facility: HOSPITAL | Age: 82
End: 2022-09-26

## 2022-09-26 NOTE — PROGRESS NOTES
9-2-2022    No desat with overnight oximetry on CPAP.    Electronically signed by Edwin Ferrell MD, 09/26/22, 3:56 PM EDT.

## 2022-12-16 ENCOUNTER — OFFICE VISIT (OUTPATIENT)
Dept: SLEEP MEDICINE | Facility: HOSPITAL | Age: 82
End: 2022-12-16

## 2022-12-16 VITALS
HEART RATE: 98 BPM | OXYGEN SATURATION: 96 % | DIASTOLIC BLOOD PRESSURE: 66 MMHG | BODY MASS INDEX: 25.87 KG/M2 | HEIGHT: 63 IN | WEIGHT: 146 LBS | SYSTOLIC BLOOD PRESSURE: 139 MMHG

## 2022-12-16 DIAGNOSIS — G25.81 RESTLESS LEG SYNDROME: ICD-10-CM

## 2022-12-16 DIAGNOSIS — G47.33 OBSTRUCTIVE SLEEP APNEA: Primary | ICD-10-CM

## 2022-12-16 PROCEDURE — G0463 HOSPITAL OUTPT CLINIC VISIT: HCPCS

## 2022-12-16 NOTE — PROGRESS NOTES
[facility]  Telephone: [phone] / Fax: [phone] [place]  Telephone: [phone] / Fax: [phone] [place]    PCP: Rhoda Luther MD    Reason for visit: JHOANA f/u    Itzel Edmond is a 82 y.o.female  was last seen at [facility] sleep lab in August 2022. She had a repeat study in lab earlier this year. It showed peristent severe JHOANA requiring continued treatment with BIPAP. Her BIPAP pressures were adjusted. She loves her device. Current pressures are IPAP max 20cm H2O, EPAP min 10cm H2O, PS 4.  She would like to get a new machine. She got her current machine from [employer] -out of pocket. She has not had a new machine through insurance for years now. She uses over the nose mask that fits well. She has had some problems with headgear. It is too stretched. She needs a new one. Her sleep schedule is 11:30pm-6-7am. ESS is 7. She has RLS and remains on Pramipexole 0.25mg, 2 tabs qhs. RLS seems to be well controlled.    SH- no tobacco, no alcohol, 1 cup per day.    ROS- negative.    DME  [facility]    Current Medications:    Current Outpatient Medications:   •  amLODIPine (NORVASC) 5 MG tablet, Take 1 tablet by mouth Daily., Disp: 90 tablet, Rfl: 3  •  atorvastatin (LIPITOR) 20 MG tablet, Take 1 tablet by mouth Daily., Disp: 90 tablet, Rfl: 3  •  conjugated estrogens (PREMARIN) 0.625 MG/GM vaginal cream, APPLY WITH FINGERTIP OR INSERT 0.5 TO 1 GRAM VAGINALLY 2 TO 3 TIMES PER WEEK., Disp: , Rfl:   •  Diclofenac Sodium (VOLTAREN) 1 % gel gel, APPLY 4 GRAMS TOPICALLY TWICE A DAY AS NEEDED, Disp: , Rfl:   •  diphenhydrAMINE (BENADRYL) 2 % cream, Apply 1 application topically to the appropriate area as directed 3 (Three) Times a Day As Needed for Itching (On scalp incision)., Disp: 15 g, Rfl: 0  •  DULoxetine (CYMBALTA) 30 MG capsule, Take 30 mg by mouth Daily., Disp: , Rfl:   •  hydroCHLOROthiazide (HYDRODIURIL) 25 MG tablet, Take 25 mg by mouth Daily., Disp: , Rfl:   •  losartan (COZAAR) 100  "MG tablet, , Disp: , Rfl:   •  pramipexole (MIRAPEX) 0.25 MG tablet, Take 2 tablets by mouth Every Night for 180 days., Disp: 180 tablet, Rfl: 1  •  predniSONE (DELTASONE) 5 MG tablet, Take 5 mg by mouth Daily., Disp: , Rfl:   •  zolpidem (Ambien) 5 MG tablet, Bring to sleep lab DO NOT use at home. PLEASE take if not asleep within 30 mins of start of study., Disp: 2 tablet, Rfl: 0   also entered in Sleep Questionnaire    Patient  has a past medical history of Allergic rhinitis, Atherosclerosis, CAD (coronary artery disease) (2019), Carotid stenosis, Cataract, Cellulitis of right leg (2016), Chest discomfort (2019), Claudication of lower extremity (HCC), CMC arthritis, Colon polyps, Cystitis (2019), WOODARD (dyspnea on exertion) (10/2020), Dysphagia, Elevated serum creatinine (2021), Environmental allergies, Exercise intolerance, JAMES (generalized anxiety disorder) (2020), GERD (gastroesophageal reflux disease), Heart murmur, Hemorrhoids, Hyperglycemia, Hyperkalemia (2020), Hyperlipidemia, Hypertension, Influenza (2018), Insomnia, Myalgia (2016), Neuropathy (2017), OAB (overactive bladder), JHOANA (obstructive sleep apnea), PLMD (periodic limb movement disorder), Precordial pain (2019), Premature atrial complexes, Rectal bleeding, Rectal prolapse (10/2020), Rectal ulcer (10/02/2020), Rectocele (2019), Renal insufficiency, Restless leg syndrome (10/2/2018), SAB (spontaneous ), Serum potassium elevated (2016), Slow transit constipation (2020), Tachycardia (2013), Trigger finger of left thumb (10/21/2019), Urge incontinence, Urinary frequency, and Vaginal atrophy.    I have reviewed the Past Medical History, Past Surgical History, Social History and Family History.    Vital Signs /66   Pulse 98   Ht 160 cm (63\")   Wt 66.2 kg (146 lb)   SpO2 96%   BMI 25.86 kg/m²  Body mass index is 25.86 kg/m².    General Alert and oriented. No acute distress noted "   Pharynx/Throat Class IV  Mallampati airway, large tongue, no evidence of redundant lateral pharyngeal tissue. No oral lesions. No thrush. Moist mucous membranes.   Head Normocephalic. Symmetrical. Atraumatic.    Nose No septal deviation. No drainage   Chest Wall Normal shape. Symmetric expansion with respiration. No tenderness.   Neck Trachea midline, no thyromegaly or adenopathy    Lungs Clear to auscultation bilaterally. No wheezes. No rhonchi. No rales. Respirations regular, even and unlabored.   Heart Regular rhythm and normal rate. Normal S1 and S2. No murmur   Abdomen Soft, non-tender and non-distended. Normal bowel sounds. No masses.   Extremities Moves all extremities well. No edema   Psychiatric Normal mood and affect.     Testing:  Download 9/15/22-12/13/22 100% use with average nightly use of 6 hours and 4 minutes on auto BIPAP, IPAP max 20, EPAP min 10, PS 4, avg pr 15/11.    Study-Results:  Overnight split polysomnogram study. July 2022  Diagnostic study 9:14 PM to 12:03 AM.  Sleep efficiency very poor at 28%.  Unclear if patient took prescribed Ambien.  AHI 50.7.  REM index 46.  There was no supine sleep.  Arousal index 48.  PLM index 92.8.  Oxygen saturation fell below 89% for 15.5 minutes. Titration study from 12:03 AM to 3:08 AM.  Sleep efficiency improved to 66%.  Slow-wave sleep is still absent and there was 6% REM sleep.  AHI index improved.  82 minutes of supine sleep noted with AHI index 11.7.  PLM index extremely high at 185.  Patient was titrated on BiPAP from 16/10 to 18/14.  Maximum sleep at 17/13 with no significant apneas or hypopneas.  Some desaturation was still seen though improved further at 18/14.       Impression:  1. Obstructive sleep apnea    2. Restless leg syndrome          Plan:  Order new machine-auto BIPAP-ResMed unit-IPAP max 20 EPAP min 10, PS 4 from [facility]. She uses her machine and benefits from its use. I reviewed original sleep study and recent download report with  patient. Her RLS is well controlled on current Pramipexole dose. She denies SE. I asked her to f/u with Dr Fererll in 3-4 months to review progress on the new device.      Thank you for allowing me to participate in your patient's care.      ARABELLA Rodriguez  [facility]  Phone: [phone]      Part of this note may be an electronic transcription/translation of spoken language to printed text using the Dragon Dictation System.

## 2023-01-27 RX ORDER — PRAMIPEXOLE DIHYDROCHLORIDE 0.25 MG/1
0.5 TABLET ORAL NIGHTLY
Qty: 180 TABLET | Refills: 1 | Status: SHIPPED | OUTPATIENT
Start: 2023-01-27 | End: 2023-07-26

## 2023-04-11 ENCOUNTER — OFFICE VISIT (OUTPATIENT)
Dept: SLEEP MEDICINE | Facility: HOSPITAL | Age: 83
End: 2023-04-11
Payer: MEDICARE

## 2023-04-11 VITALS
OXYGEN SATURATION: 95 % | WEIGHT: 143 LBS | HEART RATE: 84 BPM | BODY MASS INDEX: 25.34 KG/M2 | SYSTOLIC BLOOD PRESSURE: 136 MMHG | DIASTOLIC BLOOD PRESSURE: 57 MMHG | HEIGHT: 63 IN

## 2023-04-11 DIAGNOSIS — G47.33 OBSTRUCTIVE SLEEP APNEA: Primary | ICD-10-CM

## 2023-04-11 DIAGNOSIS — Z78.9 DIFFICULTY WITH CPAP NASAL MASK USE: ICD-10-CM

## 2023-04-11 PROCEDURE — G0463 HOSPITAL OUTPT CLINIC VISIT: HCPCS

## 2023-04-11 NOTE — PROGRESS NOTES
[facility] SLEEP MEDICINE  [address] [facility]  [address]  [facility] [address] [phone]    PCP: Rhoda Luther MD    Reason for visit:  Sleep disorders: JHOANA    Itzel is a 82 y.o.female who was seen in the [facility] today. Her mask does not fit very well and it leaks. Somewhat restless sleeper. She sleeps from 11pm to 5am. Denies EDS but may take 15-20 min nap.  Meredith Sleepiness Scale is 2. Caffeine 1 per day. Alcohol dnc per week.    Itzel  reports that she has never smoked. She has never used smokeless tobacco.    Pertinent Positive Review of Systems of pnd, soa, cough  Rest of Review of Systems was negative as recorded in Sleep Questionnaire.    Patient  has a past medical history of Allergic rhinitis, Atherosclerosis, CAD (coronary artery disease) (2019), Carotid stenosis, Cataract, Cellulitis of right leg (2016), Chest discomfort (2019), Claudication of lower extremity, CMC arthritis, Colon polyps, Cystitis (2019), WOODARD (dyspnea on exertion) (10/2020), Dysphagia, Elevated serum creatinine (2021), Environmental allergies, Exercise intolerance, JAMES (generalized anxiety disorder) (2020), GERD (gastroesophageal reflux disease), Heart murmur, Hemorrhoids, Hyperglycemia, Hyperkalemia (2020), Hyperlipidemia, Hypertension, Influenza (2018), Insomnia, Myalgia (2016), Neuropathy (2017), OAB (overactive bladder), JHOANA (obstructive sleep apnea), PLMD (periodic limb movement disorder), Precordial pain (2019), Premature atrial complexes, Rectal bleeding, Rectal prolapse (10/2020), Rectal ulcer (10/02/2020), Rectocele (2019), Renal insufficiency, Restless leg syndrome (10/2/2018), SAB (spontaneous ), Serum potassium elevated (2016), Slow transit constipation (2020), Tachycardia (2013), Trigger finger of left thumb (10/21/2019), Urge incontinence, Urinary frequency, and Vaginal atrophy.     Current Medications:    Current  "Outpatient Medications:   •  amLODIPine (NORVASC) 5 MG tablet, Take 1 tablet by mouth Daily., Disp: 90 tablet, Rfl: 3  •  atorvastatin (LIPITOR) 20 MG tablet, Take 1 tablet by mouth Daily., Disp: 90 tablet, Rfl: 3  •  conjugated estrogens (PREMARIN) 0.625 MG/GM vaginal cream, APPLY WITH FINGERTIP OR INSERT 0.5 TO 1 GRAM VAGINALLY 2 TO 3 TIMES PER WEEK., Disp: , Rfl:   •  Diclofenac Sodium (VOLTAREN) 1 % gel gel, APPLY 4 GRAMS TOPICALLY TWICE A DAY AS NEEDED, Disp: , Rfl:   •  diphenhydrAMINE (BENADRYL) 2 % cream, Apply 1 application topically to the appropriate area as directed 3 (Three) Times a Day As Needed for Itching (On scalp incision)., Disp: 15 g, Rfl: 0  •  DULoxetine (CYMBALTA) 30 MG capsule, Take 30 mg by mouth Daily., Disp: , Rfl:   •  hydroCHLOROthiazide (HYDRODIURIL) 25 MG tablet, Take 25 mg by mouth Daily., Disp: , Rfl:   •  losartan (COZAAR) 100 MG tablet, , Disp: , Rfl:   •  pramipexole (MIRAPEX) 0.25 MG tablet, Take 2 tablets by mouth Every Night for 180 days., Disp: 180 tablet, Rfl: 1  •  predniSONE (DELTASONE) 5 MG tablet, Take 5 mg by mouth Daily., Disp: , Rfl:   •  zolpidem (Ambien) 5 MG tablet, Bring to sleep lab DO NOT use at home. PLEASE take if not asleep within 30 mins of start of study., Disp: 2 tablet, Rfl: 0   also entered in Sleep Questionnaire         Vital Signs: /57   Pulse 84   Ht 160 cm (63\")   Wt 64.9 kg (143 lb)   SpO2 95%   BMI 25.33 kg/m²     Body mass index is 25.33 kg/m².       Tongue: Large       Dentition: good       Pharynx: Posterior pharyngeal pillars are wide   Mallampatti: III (soft and hard palate and base of uvula visible)        General: Alert. Cooperative. Well developed. No acute distress.             Head:  Normocephalic. Symmetrical. Atraumatic.              Nose: No septal deviation. No drainage.          Throat: No oral lesions. No thrush. Moist mucous membranes.    Chest Wall:  Normal shape. Symmetric expansion with respiration. No tenderness.      "        Neck:  Trachea midline.           Lungs:  Clear to auscultation bilaterally. No wheezes. No rhonchi. No rales. Respirations regular, even and unlabored.            Heart:  Regular rhythm and normal rate. Normal S1 and S2. No murmur.     Abdomen:  Soft, non-tender and non-distended. Normal bowel sounds. No masses.  Extremities:  Moves all extremities well. No edema.    Psychiatric: Normal mood and affect.    Diagnostic data available to date is as below and was reviewed on current visit:  • I could not find the diagnostic study that was done just prior to below.  • 12/14/2015 sleep center [facility]: CPAP was tried but failed.  There was subsequent successful titration with BiPAP.  Optimal pressures were 16/10.  • 7/25/22  Overnight split polysomnogram study.  Diagnostic study 9:14 PM to 12:03 AM.  Sleep efficiency very poor at 28%.  Unclear if patient took prescribed Ambien.  AHI 50.7.  REM index 46.  There was no supine sleep.  Arousal index 48.  PLM index 92.8.  Oxygen saturation fell below 89% for 15.5 minutes.  Titration study from 12:03 AM to 3:08 AM.  Sleep efficiency improved to 66%.  Slow-wave sleep is still absent and there was 6% REM sleep.  AHI index improved.  82 minutes of supine sleep noted with AHI index 11.7.  PLM index extremely high at 185.  Patient was titrated on BiPAP from 16/10 to 18/14.  Maximum sleep at 17/13 with no significant apneas or hypopneas.  Some desaturation was still seen though improved further at 18/14.    Lab Results   Component Value Date    IRON 45 06/03/2022    TIBC 312 06/03/2022    FERRITIN 105.00 12/03/2021       Most current available usage data reviewed on 04/11/2023:  · 90% compliance average 6 hours AHI 1.4 average pressure 14/10    DME Company: [employer]    Prescription to DME for replacement supplies as below:    nasal mask      Description Replacement    Nasal PILLOWS      A 7034 Nasal Pillows  every 3 mth    A 7050 Repl Nasal Pillows  2 per mth    Nasal  MASK/CUSHION     x A 7034 Nasal Mask/Cushion  every 3 mth   x A 7032 Repl Nasal Mask/Cushion  2 per mth    Full Face MASK      A 7030 Full Face Mask  every 3 mth    A 7031 Repl Face Mask  1 per mth      A 4604 Heated Tubing  every 3 mth    A 7037 Standard Tubing  every 3 mth   x A 7035 Headgear  every 3 mth   x A 7046 Repl Humidifier Chamber  every 6 yrs   x A 7038 Disposable Filters  2 per mth   x A 7039 Non-disposable Filter  every 6 mth   x A 7036 Chin Strap  every 6 mth     No orders of the defined types were placed in this encounter.         Impression:  1. Obstructive sleep apnea    2. Difficulty with CPAP nasal mask use        Plan:  Itzel will try different mask fit. Lower EPAP min to 6.  This may prevent air leaks and also help with mild dry mouth.  Otherwise compliant and benefits.  Reminded to replace supplies regularly.    I reiterated the importance of effective treatment of obstructive sleep apnea with PAP machine.  Cardiovascular health risks of untreated sleep apnea were again reviewed.  Patient was asked to remain cautious if there is persistent hypersomnolence. The benefit of weight loss in reducing severity of obstructive sleep apnea was discussed.  Patient would benefit from adhering to a strict diet to achieve ideal BMI.     Change of PAP supplies regularly is important for effective use.  Change of cushion on the mask or plugs on nasal pillows along with disposable filters once every month and change of mask frame, tubing, headgear and Velcro straps every 6 months at the minimum was reiterated.    This patient is compliant with PAP machine and benefits from its use.  Apnea hypopneas index is corrected/improved.  Daytime hypersomnolence has resolved.     Patient will follow up in this clinic in 1 year APRN    Thank you for allowing me to participate in your patient's care.    Electronically signed by Edwin Ferrell MD, 04/11/23, 2:26 PM EDT.    Part of this note may be an electronic  transcription/translation of spoken language to printed text using the Dragon Dictation System.

## 2023-04-12 DIAGNOSIS — G47.33 OBSTRUCTIVE SLEEP APNEA: Primary | ICD-10-CM

## 2023-04-12 DIAGNOSIS — Z78.9 DIFFICULTY WITH CPAP NASAL MASK USE: ICD-10-CM

## 2023-08-21 RX ORDER — PRAMIPEXOLE DIHYDROCHLORIDE 0.25 MG/1
0.5 TABLET ORAL NIGHTLY
Qty: 180 TABLET | Refills: 0 | Status: SHIPPED | OUTPATIENT
Start: 2023-08-21 | End: 2023-11-19

## 2023-08-29 ENCOUNTER — OFFICE VISIT (OUTPATIENT)
Dept: CARDIOLOGY | Facility: CLINIC | Age: 83
End: 2023-08-29
Payer: MEDICARE

## 2023-08-29 VITALS
DIASTOLIC BLOOD PRESSURE: 68 MMHG | SYSTOLIC BLOOD PRESSURE: 122 MMHG | HEART RATE: 83 BPM | WEIGHT: 144.1 LBS | BODY MASS INDEX: 25.53 KG/M2 | HEIGHT: 63 IN | OXYGEN SATURATION: 98 %

## 2023-08-29 DIAGNOSIS — I49.1 PREMATURE ATRIAL COMPLEXES: ICD-10-CM

## 2023-08-29 DIAGNOSIS — I34.0 NONRHEUMATIC MITRAL VALVE REGURGITATION: ICD-10-CM

## 2023-08-29 DIAGNOSIS — G47.33 OSA ON CPAP: ICD-10-CM

## 2023-08-29 DIAGNOSIS — Z99.89 OSA ON CPAP: ICD-10-CM

## 2023-08-29 DIAGNOSIS — N18.31 STAGE 3A CHRONIC KIDNEY DISEASE: ICD-10-CM

## 2023-08-29 DIAGNOSIS — R06.09 DYSPNEA ON EXERTION: ICD-10-CM

## 2023-08-29 DIAGNOSIS — S06.5X0D TRAUMATIC SUBDURAL HEMORRHAGE WITHOUT LOSS OF CONSCIOUSNESS, SUBSEQUENT ENCOUNTER: ICD-10-CM

## 2023-08-29 DIAGNOSIS — E78.2 MIXED HYPERLIPIDEMIA: ICD-10-CM

## 2023-08-29 DIAGNOSIS — I25.10 CORONARY ARTERY DISEASE INVOLVING NATIVE CORONARY ARTERY OF NATIVE HEART WITHOUT ANGINA PECTORIS: Primary | ICD-10-CM

## 2023-08-29 DIAGNOSIS — I10 PRIMARY HYPERTENSION: ICD-10-CM

## 2023-08-29 DIAGNOSIS — R00.2 PALPITATIONS: ICD-10-CM

## 2023-08-29 NOTE — PROGRESS NOTES
"      [facility] CARDIOLOGY  [address]  [facility] [address]  Phone: [phone]  Fax: [phone]  Patient Name: Itzel Edmond  :1940  Age: 83 y.o.  Primary Cardiologist: Sandra Winters MD  Encounter Provider:  ARABELLA Porter    History of Present Illness     Itzel Edmond is a 83 y.o.  female whose medical history includes hypertension, hyperlipidemia, JHOANA/BiPAP.  She is followed in our office by Dr. Winters for minimal, nonobstructive coronary artery disease and PACs. I have reviewed the past medical records in preparation of today's visit.     Follow-up:  She is here for yearly follow-up and I am seeing her for the first time today.  She is not feeling great.  She has been having episodes of her heart racing at night.  She feels this is getting worse.  She also has shortness of breath with minimal exertion and reports decreased energy.  Her feet and legs are swelling and her toes feel numb.  She also has been told that she has been noted to have some events on her BiPAP.  She denies chest pain or syncope.  She is taking her medications as prescribed.    Data Review     The following data was reviewed by ARABELLA Porter on 23:    Vital Signs:   /68 (BP Location: Left arm, Patient Position: Sitting, Cuff Size: Adult)   Pulse 83   Ht 160 cm (63\")   Wt 65.4 kg (144 lb 1.6 oz)   SpO2 98%   BMI 25.53 kg/m²       Weight:  Wt Readings from Last 3 Encounters:   23 65.4 kg (144 lb 1.6 oz)   23 64.9 kg (143 lb)   22 66.2 kg (146 lb)     Body mass index is 25.53 kg/m².    Below is a summary of pertinent cardiology findings:  She had cough with lisinopril and was switched to losartan; she has done well with this.  She had hyperkalemia on spironolactone.  She also had leg swelling on high-dose amlodipine.  2009 she had normal venous duplex study for swelling of the right leg.  " 2010 she had a cardiovascular evaluation at [facility] which included a treadmill stress nuclear study showing no evidence of ischemia and EF 70%; she did have mild hypertension with exercise.  She had a normal abdominal ultrasound showing no evidence of abdominal aortic aneurysm.  She also had carotid artery duplex study which showed moderate bilateral internal carotid artery disease.  Echocardiogram at that time showed EF 60 to 65%, mild diastolic dysfunction, mild mitral and tricuspid insufficiency.  2013 Holter monitor showed sinus rhythm with rare PVCs and a short run of nonsustained SVT.  June 2014 she was seen for chest tightness and short windedness.  PET stress study showed no evidence of ischemia.  She also had a bilateral lower extremity arterial Doppler for lower extremity pain; this was normal.  January 2019 cardiac catheterization showed minimal coronary artery disease.  January 2019 echocardiogram showed EF 64%, grade 1 LV diastolic dysfunction, mild mitral insufficiency and trace aortic insufficiency.  November 2021 echocardiogram done for possible stroke showed EF 64%, normal LV cavity size and wall thickness, grade 1 LV diastolic dysfunction, negative saline test results, moderate mitral annular calcification with mild calcification of the mitral valve and mild regurgitation.  December 2021 she fell and hit her head on a marble table and was diagnosed with subdural hematoma; she eventually required neurosurgery intervention in January 2022 when she presented with blurry vision and subdural hematoma had increased in size.  Her aspirin was held at that time.    Labs:  10/20/2022:  cr 1.3, K 4.2, otherwise unremarkable CMP, Chol 119, HDL 31, LDL 45, Trig 215, Hgb 11.7, Plt 224      ECG 12 Lead    Date/Time: 8/29/2023 11:59 AM  Performed by: Joyce Mclaughlin APRN  Authorized by: Joyce Mclaughlin APRN   Comparison: compared with previous ECG from 4/28/2022  Comparison to  previous ECG: New RBBB  Rhythm: sinus rhythm  Ectopy: atrial premature contractions  Rate: normal  BPM: 69  Conduction: right bundle branch block    Clinical impression: abnormal EKG        Medications     Allergies as of 08/29/2023 - Reviewed 08/29/2023   Allergen Reaction Noted    Gabapentin Dizziness 10/02/2018    Pregabalin Dizziness 08/03/2015    Sulfamethoxazole-trimethoprim Itching and Nausea And Vomiting 01/25/2019    Ace inhibitors Cough 09/20/2013    Lisinopril Cough 06/30/2016       Current Outpatient Medications   Medication Instructions    amLODIPine (NORVASC) 5 mg, Oral, Daily    atorvastatin (LIPITOR) 20 mg, Oral, Daily    Diclofenac Sodium (VOLTAREN) 1 % gel gel APPLY 4 GRAMS TOPICALLY TWICE A DAY AS NEEDED    DULoxetine (CYMBALTA) 30 mg, Oral, Daily    hydroCHLOROthiazide (HYDRODIURIL) 25 mg, Oral, Daily    losartan (COZAAR) 100 MG tablet No dose, route, or frequency recorded.    pramipexole (MIRAPEX) 0.5 mg, Oral, Nightly        Past History, Review of Systems, Exam     Past Medical History:   Diagnosis Date    Allergic rhinitis     Atherosclerosis     CAD (coronary artery disease) 1/16/2019    Carotid stenosis     Cataract     Cellulitis of right leg 06/2016    Chest discomfort 01/2019    Claudication of lower extremity     CMC arthritis     Colon polyps     Cystitis 11/19/2019    WOODARD (dyspnea on exertion) 10/2020    Dysphagia     Elevated serum creatinine 02/2021    Environmental allergies     Exercise intolerance     JAMES (generalized anxiety disorder) 1/20/2020    GERD (gastroesophageal reflux disease)     Heart murmur     Hemorrhoids     Hyperglycemia     Hyperkalemia 02/2020    Hyperlipidemia     Hypertension     Influenza 02/14/2018    Insomnia     Myalgia 05/2016    Neuropathy 09/2017    OAB (overactive bladder)     JHOANA (obstructive sleep apnea)     PLMD (periodic limb movement disorder)     Precordial pain 01/2019    Premature atrial complexes     Rectal bleeding     Rectal prolapse 10/2020     Rectal ulcer 10/02/2020    Rectocele 2019    Renal insufficiency     Restless leg syndrome 10/2/2018    SAB (spontaneous )     Serum potassium elevated 2016    Slow transit constipation 2020    Tachycardia 2013    Trigger finger of left thumb 10/21/2019    Urge incontinence     Urinary frequency     Vaginal atrophy        Past Surgical History:   has a past surgical history that includes Hernia repair; Bladder surgery (N/A, ); Back surgery; Hemorroidectomy (N/A); Cardiac catheterization (N/A, 2019); Cardiac catheterization (N/A, 2019); Cardiac catheterization (N/A, 2019); Colonoscopy (N/A, 10/2/2020); Cataract extraction (Left, 12/15/2015); Cataract extraction (Right, 2015); Colonoscopy w/ polypectomy (N/A, 2015); Bilateral salpingoophorectomy (Bilateral, ); Hysterectomy (N/A, ); Anterior and posterior vaginal repair (N/A); and Celso Hole (Right, 2022).     Social History     Socioeconomic History    Marital status:    Tobacco Use    Smoking status: Never    Smokeless tobacco: Never   Vaping Use    Vaping Use: Never used   Substance and Sexual Activity    Alcohol use: No     Comment: caffeine use: 1 cup daily    Drug use: Never    Sexual activity: Not Currently     Birth control/protection: Post-menopausal, Surgical     Comment: .       Review of Systems   Constitutional: Positive for malaise/fatigue.   Cardiovascular:  Positive for dyspnea on exertion, leg swelling and palpitations. Negative for chest pain, claudication, cyanosis, irregular heartbeat, near-syncope, orthopnea, paroxysmal nocturnal dyspnea and syncope.     Vitals reviewed.   Constitutional:       Appearance: Not in distress.   Eyes:      Conjunctiva/sclera: Conjunctivae normal.      Pupils: Pupils are equal, round, and reactive to light.   HENT:      Head: Normocephalic.      Nose: Nose normal.    Mouth/Throat:      Pharynx: Oropharynx is clear.   Neck:      Vascular: JVD  normal.   Pulmonary:      Effort: Pulmonary effort is normal.      Breath sounds: Normal breath sounds. No wheezing. No rhonchi. No rales.   Cardiovascular:      Normal rate. Regular rhythm. Normal S1. Normal S2.       Murmurs: There is no murmur.   Pulses:     Intact distal pulses.   Edema:     Peripheral edema absent.   Abdominal:      General: Bowel sounds are normal. There is no distension.      Palpations: Abdomen is soft.      Tenderness: There is no abdominal tenderness.   Musculoskeletal: Normal range of motion.      Cervical back: Normal range of motion and neck supple. Skin:     General: Skin is warm and dry.   Neurological:      Mental Status: Alert and oriented to person, place and time.   Psychiatric:         Attention and Perception: Attention normal.         Mood and Affect: Mood normal.         Speech: Speech normal.         Behavior: Behavior is cooperative.        Assessment and Plan     Assessment:  1. Coronary artery disease involving native coronary artery of native heart without angina pectoris    2. Premature atrial complexes    3. Primary hypertension    4. Mixed hyperlipidemia    5. Nonrheumatic mitral valve regurgitation    6. Stage 3a chronic kidney disease    7. Traumatic subdural hemorrhage without loss of consciousness, subsequent encounter    8. JHOANA on CPAP    9. Dyspnea on exertion    10. Palpitations         Dyspnea on exertion: She feels this has been worsening over the past few months and she has decreased energy.  Palpitations: She has been feeling her heart racing at night and this is also becoming worse.  Coronary artery disease: She has had multiple nonischemic stress test and in January 2019 she had a cardiac catheterization showing minimal coronary artery disease.  Her medical therapy includes amlodipine, atorvastatin, and losartan.  PACs: 2013 she had a Holter monitor which showed rare PVCs and a short run of nonsustained SVT.  She is having more palpitations of  late.  Mitral valve regurgitation: Her last echocardiogram was November 2021 and showed moderate mitral annular calcification with mild regurgitation.  No murmur appreciated today.  Hypertension: Controlled.  Hyperlipidemia: Lipids at goal when checked in October 2022.  CKD 3A: Renal function stable when checked in October 2022.  History of traumatic subdural hematoma: This occurred in December 2021 when she fell and hit her head on a marble table she is following with neurosurgery.  Obstructive sleep apnea: She is compliant with BiPAP but reports that she has had some events here of late.    Ms. Edmond is a patient of Dr. Winters with history of nonsustained SVT and mitral annular calcification with mild mitral valve regurgitation.  She is having more palpitations and dyspnea with exertion of late.  I am going to check a stress PET study and a 14-day Zio patch monitor.  For now I am going to schedule her to see Dr. Armas in 1 year; further recommendations following the results of her testing.    Return in about 1 year (around 8/29/2024) for Follow-up with Dr. Winters.  Orders Placed This Encounter   Procedures    Holter Monitor - 72 Hour Up To 15 Days    Stress Test With Pet Myocardial Perfusion    ECG 12 Lead      No orders of the defined types were placed in this encounter.        Thank you for the opportunity to participate in this patient's care.    ARABELLA Snow    This office note has been dictated.

## 2023-09-11 ENCOUNTER — HOSPITAL ENCOUNTER (OUTPATIENT)
Dept: CARDIOLOGY | Facility: HOSPITAL | Age: 83
Discharge: HOME | End: 2023-09-11
Admitting: NURSE PRACTITIONER
Payer: MEDICARE

## 2023-09-11 ENCOUNTER — TELEPHONE (OUTPATIENT)
Dept: CARDIOLOGY | Facility: CLINIC | Age: 83
End: 2023-09-11
Payer: MEDICARE

## 2023-09-11 DIAGNOSIS — I49.1 PREMATURE ATRIAL COMPLEXES: ICD-10-CM

## 2023-09-11 DIAGNOSIS — R06.09 DYSPNEA ON EXERTION: ICD-10-CM

## 2023-09-11 DIAGNOSIS — R00.2 PALPITATIONS: ICD-10-CM

## 2023-09-11 LAB
BH CV NUCLEAR PRIOR STUDY: 2
BH CV REST NUCLEAR ISOTOPE DOSE: 28.4 MCI
BH CV STRESS BP STAGE 1: NORMAL
BH CV STRESS COMMENTS STAGE 1: NORMAL
BH CV STRESS DOSE REGADENOSON STAGE 1: 0.4
BH CV STRESS DURATION MIN STAGE 1: 0
BH CV STRESS DURATION SEC STAGE 1: 10
BH CV STRESS HR STAGE 1: 78
BH CV STRESS NUCLEAR ISOTOPE DOSE: 28.4 MCI
BH CV STRESS PROTOCOL 1: NORMAL
BH CV STRESS RECOVERY BP: NORMAL MMHG
BH CV STRESS RECOVERY HR: 71 BPM
BH CV STRESS STAGE 1: 1
MAXIMAL PREDICTED HEART RATE: 135 BPM
PERCENT MAX PREDICTED HR: 57.78 %
STRESS BASELINE BP: NORMAL MMHG
STRESS BASELINE HR: 63 BPM
STRESS PERCENT HR: 68 %
STRESS POST EXERCISE DUR SEC: 10 SEC
STRESS POST PEAK BP: NORMAL MMHG
STRESS POST PEAK HR: 78 BPM
STRESS TARGET HR: 115 BPM

## 2023-09-11 PROCEDURE — 78492 MYOCRD IMG PET MLT RST&STRS: CPT

## 2023-09-11 PROCEDURE — A9555 RB82 RUBIDIUM: HCPCS | Performed by: NURSE PRACTITIONER

## 2023-09-11 PROCEDURE — 25010000002 REGADENOSON 0.4 MG/5ML SOLUTION: Performed by: NURSE PRACTITIONER

## 2023-09-11 PROCEDURE — 93017 CV STRESS TEST TRACING ONLY: CPT

## 2023-09-11 PROCEDURE — 0 RUBIDIUM CHLORIDE: Performed by: NURSE PRACTITIONER

## 2023-09-11 RX ORDER — REGADENOSON 0.08 MG/ML
0.4 INJECTION, SOLUTION INTRAVENOUS
Status: COMPLETED | OUTPATIENT
Start: 2023-09-11 | End: 2023-09-11

## 2023-09-11 RX ADMIN — REGADENOSON 0.4 MG: 0.08 INJECTION, SOLUTION INTRAVENOUS at 12:00

## 2023-09-11 NOTE — TELEPHONE ENCOUNTER
Reviewed results with Itzel Edmond and patient verbalized understanding of results.    Thank you,  Terri SEARS RN  [place] [facility] [place]    15:45 EDT

## 2023-09-27 ENCOUNTER — TELEPHONE (OUTPATIENT)
Dept: CARDIOLOGY | Facility: CLINIC | Age: 83
End: 2023-09-27
Payer: MEDICARE

## 2023-09-27 RX ORDER — LOSARTAN POTASSIUM 50 MG/1
50 TABLET ORAL DAILY
Qty: 90 TABLET | Refills: 3 | Status: SHIPPED | OUTPATIENT
Start: 2023-09-27

## 2023-09-27 NOTE — TELEPHONE ENCOUNTER
Called and spoke with patient's daughter Kaci, who is also a nurse, RE: monitor results.  Will decrease losartan to 50mg Daily and add metoprolol tartrate 25mg BID.  She verbalized understanding.

## 2024-01-15 RX ORDER — AMLODIPINE BESYLATE 5 MG/1
5 TABLET ORAL DAILY
Qty: 90 TABLET | Refills: 3 | Status: SHIPPED | OUTPATIENT
Start: 2024-01-15 | End: 2024-01-17 | Stop reason: SDUPTHER

## 2024-01-16 ENCOUNTER — TELEPHONE (OUTPATIENT)
Dept: CARDIOLOGY | Facility: CLINIC | Age: 84
End: 2024-01-16
Payer: MEDICARE

## 2024-01-16 NOTE — TELEPHONE ENCOUNTER
Patient called and stated that her Nephrologist took her off o fthe Metoprolol and decreased her Amlodipine to 2.5 mg. Her PCP started her on Fosamax 70 mg, she is wanting to know if that is ok? Please advise.    CB: [phone]    [place]

## 2024-01-16 NOTE — TELEPHONE ENCOUNTER
Caller: Itzel Edmond    Relationship: Self    Best call back number: [phone]     What is the best time to reach you: ANYTIME    Who are you requesting to speak with (clinical staff, provider,  specific staff member): ANYONE    Do you know the name of the person who called: ITZEL    What was the call regarding: PATIENT CALLED IN STATING DR. ESTEVEZ PUT HER ON METOPROLOL 25MG BUT THEN PATIENT'S KIDNEY DOCTOR TOOK HER OFF THAT MEDICATION ON 1.11.24. KIDNEY DOCTOR ALSO CUT DOWN PATIENT'S AMLODOPINE 5MG TO HALF PILL. PATIENT STATES SHE TRUST  MORE THAN ANY OTHER DOCTOR AND WANTS HER OPINION IF THE CHANGES THE KIDNEY DOCTOR MADE ARE REASONABLE. PATIENT ALSO STATES SHE NEEDS TO KNOW IF SHE IS SUPPOSED TO BE TAKING LOSARTAN. PATIENT STATES OSMEL VAZQUEZ (PCP) PUT HER ON ALENDRONATE 70MG. PATIENT STATES SHE IS MORE SHAKY NOW THAT SHE IS OFF THE METOPROLOL AS IT HELPED WITH THE SHAKING.     Is it okay if the provider responds through ITS Compliancet: NO, PLEASE CALL.

## 2024-01-16 NOTE — TELEPHONE ENCOUNTER
Can you call to see why the medications were changed? Is she having low BP?  What is her HR? Is she having any palpitations?    Thanks!  ARABELLA Snow

## 2024-01-17 RX ORDER — ALENDRONATE SODIUM 70 MG/1
70 TABLET ORAL
COMMUNITY
Start: 2023-11-27 | End: 2024-11-26

## 2024-01-17 RX ORDER — AMLODIPINE BESYLATE 5 MG/1
2.5 TABLET ORAL DAILY
Start: 2024-01-17

## 2024-01-17 NOTE — TELEPHONE ENCOUNTER
Called patient back she checked her BP this AM after we spoke and it was 120/77 HR 75.    I have scheduled her to see RM 1/23 at .  She will continue to keep a BP log and bring it to her appointment along with her current medications.    I updated her med list in [facility].      Ana Esquivel RN  [place] [facility] Triage  01/17/24 09:55 EST

## 2024-01-17 NOTE — TELEPHONE ENCOUNTER
I called and spoke with patient.  Her nephrologist is Dr. Junior.  She thinks he took her off metoprolol and decreased amlodipine to 2.5 mg due to low BP.      She does not recall what her BP was at the appt with Dr. Junior.  She has a BP cuff at home but has not been checking her BP/HR.  Since her medications were changed around 1/11, she has not had any increase in palpitations.  Denies dizziness/lightheadedness.  She does have the 'shakes'/tremors, and that is not new.  She felt like metoprolol helped that.  She reports feeling pretty good overall.      Here is what she is taking as of today:  She is off metoprolol  Amlodipine 2.5 mg daily  Losartan 50 mg daily  HCTZ 25 mg daily    She is concerned that Dr. Junior changed her cardiac meds around.  I explained that sometimes they do that because low BP is not really great for kidneys either.  I explained that we probably need her to start checking her BP at home and keeping a log so we can see how this current regimen of meds is working.    I explained the correct way to measure BP and keep a log.  I asked her to check BID, one check 2 hours after AM meds and another in evening/bedtime.  When would you like her to call her log in?  She hasn't been seen since August, do you want me to make her a follow up appt instead and bring her BP log in?  Or what other recommendations do you have?    Thank you Ana Montoya RN  [place] [facility] Triage  01/17/24 08:45 EST

## 2024-01-17 NOTE — TELEPHONE ENCOUNTER
Maybe she can come in for BP check or to see me next week if I have any openings. We might be able to stop amlodipine and increase her metoprolol.     Thanks!  ARABELLA Snow

## 2024-01-23 ENCOUNTER — OFFICE VISIT (OUTPATIENT)
Dept: CARDIOLOGY | Facility: CLINIC | Age: 84
End: 2024-01-23
Payer: MEDICARE

## 2024-01-23 VITALS
HEIGHT: 63 IN | SYSTOLIC BLOOD PRESSURE: 140 MMHG | DIASTOLIC BLOOD PRESSURE: 70 MMHG | WEIGHT: 145 LBS | HEART RATE: 92 BPM | BODY MASS INDEX: 25.69 KG/M2

## 2024-01-23 DIAGNOSIS — I34.0 NONRHEUMATIC MITRAL VALVE REGURGITATION: ICD-10-CM

## 2024-01-23 DIAGNOSIS — I10 PRIMARY HYPERTENSION: Primary | ICD-10-CM

## 2024-01-23 DIAGNOSIS — I49.1 PREMATURE ATRIAL COMPLEXES: ICD-10-CM

## 2024-01-23 DIAGNOSIS — E78.2 MIXED HYPERLIPIDEMIA: ICD-10-CM

## 2024-01-23 PROCEDURE — 99214 OFFICE O/P EST MOD 30 MIN: CPT | Performed by: INTERNAL MEDICINE

## 2024-01-23 PROCEDURE — 3078F DIAST BP <80 MM HG: CPT | Performed by: INTERNAL MEDICINE

## 2024-01-23 PROCEDURE — 3077F SYST BP >= 140 MM HG: CPT | Performed by: INTERNAL MEDICINE

## 2024-01-23 PROCEDURE — 93000 ELECTROCARDIOGRAM COMPLETE: CPT | Performed by: INTERNAL MEDICINE

## 2024-01-23 RX ORDER — LOSARTAN POTASSIUM 100 MG/1
100 TABLET ORAL DAILY
COMMUNITY
Start: 2023-11-14

## 2024-01-23 RX ORDER — METOPROLOL SUCCINATE 25 MG/1
25 TABLET, EXTENDED RELEASE ORAL NIGHTLY
Qty: 90 TABLET | Refills: 3 | Status: SHIPPED | OUTPATIENT
Start: 2024-01-23

## 2024-01-23 RX ORDER — ASPIRIN 81 MG/1
81 TABLET ORAL DAILY
COMMUNITY
Start: 2023-11-15 | End: 2024-11-14

## 2024-01-23 RX ORDER — AMLODIPINE BESYLATE 2.5 MG/1
2.5 TABLET ORAL NIGHTLY
Qty: 90 TABLET | Refills: 3 | Status: SHIPPED
Start: 2024-01-23

## 2024-01-23 NOTE — PROGRESS NOTES
Date of Office Visit: 2021  Encounter Provider: Sandra Winters MD  Place of Service: [facility] CARDIOLOGY  Patient Name: Itzel Edmond  :1940      Patient ID:  Itzel Edmond is a 83 y.o. female is here for  followup for hypertension.        History of Present Illness    She has a history of hypertension, hyperlipidemia, CKD, GERD, right lower extremity neuropathy, JHOANA on CPAP.    [place] [facility] [place]mill stress nuclear study showing no ischemia and an ejection fraction of 70%. She  had mild hypertension with exercise. She had a normal abdominal ultrasound showing no  evidence of abdominal aortic aneurysm. Her carotid duplex study done 2010 showed  moderate bilateral internal carotid artery disease. [place] an [place]  [facility] [place] showing ejection fraction of 60 to 65% with mild  diastolic dysfunction, mild mitral and tricuspid insufficiency. She had a history of a  normal venous duplex study done for swelling of the right leg done 2009.     She had a Holter recording in , which was fairly unremarkable.  It showed sinus rhythm, rare premature ventricular complexes, and a short run of  nonsustained supraventricular tachycardia.      I saw on 2014. At that time she was having chest tightness and short-windedness  with activity which sounded anginal in nature. We set her up for a PET stress study which  was done in 2014 which showed no evidence of ischemia. She was also having a lot of  lower extremity pain which was worse in the right than that left and she had normal  bilateral arterial duplex studies done.      She had a cardiac catheterization done 19, showing minimal CAD. She had an echocardiogram done 2019 showing ejection fraction 64% with grade 1 diastolic dysfunction, mild mitral insufficiency, trace aortic  insufficiency. Echocardiogram done 11/29/2021 showed ejection action 65% with grade 1 diastolic dysfunction, normal left atrial size, normal saline study, mitral valve calcification with mild mitral insufficiency.     She fell and suffered sustained a subdural hematoma in 2021-the subdural hematoma grew and she had had bur holes done 1/2022.    Labs done 11/15/2023 showed triglycerides 147, total cholesterol 107, HDL 34, LDL 43, VLDL 29, creatinine 1.3 with BUN 32, otherwise normal CBC, hemoglobin 1.2 with otherwise normal CBC.  She had a nonischemic stress nuclear perfusion study done 9/11/2023.  She wore a 13-day monitor 8/29/2023 which showed 22 episodes of SVT, the fastest SVT was 7 beats at 173 with the longest SVT 19 beats with a heart rate of 101 bpm.  She had frequent PACs at 4.9% in singles and couplets.  She did report palpitations while wearing the monitor and the palpitations correlated with PACs.    She has some exertional dyspnea but she thinks it is just due to aging.  She remains very active.  She has no chest pain or pressure.  Sometimes she feels palpitations but has had no dizziness, syncope or falls.  She takes her medications as directed without difficulty.    Past Medical History:   Diagnosis Date    Allergic rhinitis     Atherosclerosis     CAD (coronary artery disease) 1/16/2019    Overview:  Mild non obstructive CAD on cath 1/2019, medical mgmt recommended    Carotid stenosis     Cataract     Cellulitis of right leg 06/2016    Chest discomfort 01/2019    Claudication of lower extremity     CMC arthritis     Colon polyps     FOLLOWED BY DR. NOLBERTO REYNOSO    Cystitis 11/19/2019    WOODARD (dyspnea on exertion) 10/2020    Dysphagia     Elevated serum creatinine 02/2021    Environmental allergies     Exercise intolerance     JAMES (generalized anxiety disorder) 1/20/2020    GERD (gastroesophageal reflux disease)     Heart murmur     Hemorrhoids     Hyperglycemia     Hyperkalemia 02/2020     Hyperlipidemia     MIXED    Hypertension     Influenza 2018    Insomnia     Myalgia 2016    Neuropathy 2017    OAB (overactive bladder)     JHOANA (obstructive sleep apnea)     BiPap    PLMD (periodic limb movement disorder)     Precordial pain 2019    Premature atrial complexes     Rectal bleeding     Rectal prolapse 10/2020    Rectal ulcer 10/02/2020    5 MM SINGLE ULCER ON CY    Rectocele 2019    Renal insufficiency     Restless leg syndrome 10/2/2018    SAB (spontaneous )      A1    Serum potassium elevated 2016    Slow transit constipation 2020    Tachycardia 2013    Trigger finger of left thumb 10/21/2019    Urge incontinence     Urinary frequency     Vaginal atrophy          Past Surgical History:   Procedure Laterality Date    ANTERIOR AND POSTERIOR VAGINAL REPAIR N/A     BACK SURGERY      BILATERAL SALPINGO OOPHORECTOMY Bilateral     BLADDER SURGERY N/A     BLADDER SLING    YADI HOLE Right 2022    Procedure: YADI HOLE;  Surgeon: Austin Hilario MD;  Location:  [facility];  Service: Neurosurgery;  Laterality: Right;    CARDIAC CATHETERIZATION N/A 2019    Procedure: Left Heart Cath;  Surgeon: Christopher Rosenbaum MD;  Location:  [facility];  Service: Cardiology    CARDIAC CATHETERIZATION N/A 2019    Procedure: Coronary angiography;  Surgeon: Christopher Rosenbaum MD;  Location:  [facility];  Service: Cardiology    CARDIAC CATHETERIZATION N/A 2019    Procedure: Left ventriculography;  Surgeon: Christopher Rosenbaum MD;  Location:  [facility];  Service: Cardiology    CATARACT EXTRACTION Left 12/15/2015    WITH LENS IMPLANT, DR. AMRIT DIAZ AT [facility]    CATARACT EXTRACTION Right 2015     WITH LENS IMPLANT, DR. AMRIT DIAZ AT [facility]    COLONOSCOPY N/A 10/2/2020    2 TUBULAR ADENOMA POLYPS IN TRANSVERSE, HEMORRHOIDS, DIVERTICULOSIS, 5 MM ULCER IN RECTUM, OTHER BIOPSIES BENIGN,  DR. NOLBERTO REYNOSO AT [facility]    COLONOSCOPY W/ POLYPECTOMY N/A 08/24/2015    1 CM TUBULAR ADENOMA POLYP IN TRANSVERSE, MODERATE DIVERTICULOSIS, HEMORRHOIDS, RESCOPE IN 5 YRS, DR. MAYA SAHU AT [place]    HEMORROIDECTOMY N/A     HERNIA REPAIR      HYSTERECTOMY N/A 1970    [facility]       Current Outpatient Medications on File Prior to Visit   Medication Sig Dispense Refill    alendronate (FOSAMAX) 70 MG tablet Take 1 tablet by mouth Every 7 (Seven) Days.      aspirin 81 MG EC tablet Take 1 tablet by mouth Daily.      atorvastatin (LIPITOR) 20 MG tablet Take 1 tablet by mouth Daily. 90 tablet 3    DULoxetine (CYMBALTA) 30 MG capsule Take 1 capsule by mouth Daily.      hydroCHLOROthiazide (HYDRODIURIL) 25 MG tablet Take 1 tablet by mouth Daily.      losartan (COZAAR) 100 MG tablet Take 1 tablet by mouth Daily.      pramipexole (MIRAPEX) 0.25 MG tablet Take 2 tablets by mouth Every Night for 90 days. 180 tablet 0    [DISCONTINUED] amLODIPine (NORVASC) 5 MG tablet Take 0.5 tablets by mouth Daily.      [DISCONTINUED] losartan (Cozaar) 50 MG tablet Take 1 tablet by mouth Daily. 90 tablet 3    [DISCONTINUED] Diclofenac Sodium (VOLTAREN) 1 % gel gel APPLY 4 GRAMS TOPICALLY TWICE A DAY AS NEEDED      [DISCONTINUED] metoprolol tartrate (LOPRESSOR) 25 MG tablet Take 1 tablet by mouth 2 (Two) Times a Day. 180 tablet 3     No current facility-administered medications on file prior to visit.       Social History     Socioeconomic History    Marital status:    Tobacco Use    Smoking status: Never     Passive exposure: Never    Smokeless tobacco: Never   Vaping Use    Vaping Use: Never used   Substance and Sexual Activity    Alcohol use: No     Comment: caffeine use: 1 cup daily    Drug use: Never    Sexual activity: Not Currently     Birth control/protection: Post-menopausal, Surgical     Comment: .           ROS    Procedures    ECG 12 Lead    Date/Time: 1/23/2024 11:46 AM  Performed by: Sandra Winters  "MD    Authorized by: Sandra Winters MD  Comparison: compared with previous ECG   Similar to previous ECG  Rhythm: sinus arrhythmia  Conduction: right bundle branch block and left anterior fascicular block    Clinical impression: abnormal EKG              Objective:      Vitals:    01/23/24 1117   BP: 140/70   Pulse: 92   Weight: 65.8 kg (145 lb)   Height: 160 cm (63\")     Body mass index is 25.69 kg/m².    Vitals reviewed.   Constitutional:       General: Not in acute distress.     Appearance: Well-developed. Not diaphoretic.   Eyes:      General: No scleral icterus.     Conjunctiva/sclera: Conjunctivae normal.   HENT:      Head: Normocephalic and atraumatic.   Neck:      Thyroid: No thyromegaly.      Vascular: No carotid bruit or JVD.      Lymphadenopathy: No cervical adenopathy.   Pulmonary:      Effort: Pulmonary effort is normal. No respiratory distress.      Breath sounds: Normal breath sounds. No wheezing. No rhonchi. No rales.   Chest:      Chest wall: Not tender to palpatation.   Cardiovascular:      Normal rate. Regular rhythm.      Murmurs: There is no murmur.      No gallop.    Pulses:     Intact distal pulses.   Edema:     Peripheral edema absent.   Abdominal:      General: Bowel sounds are normal. There is no distension or abdominal bruit.      Palpations: Abdomen is soft. There is no abdominal mass.      Tenderness: There is no abdominal tenderness.   Musculoskeletal:         General: No deformity.      Extremities: No clubbing present.     Cervical back: Neck supple. Skin:     General: Skin is warm and dry. There is no cyanosis.      Coloration: Skin is not pale.      Findings: No rash.   Neurological:      Mental Status: Alert and oriented to person, place, and time.      Cranial Nerves: No cranial nerve deficit.   Psychiatric:         Judgment: Judgment normal.         Lab Review:       Assessment:      Diagnosis Plan   1. Primary hypertension        2. Mixed hyperlipidemia        3. " Nonrheumatic mitral valve regurgitation        4. Premature atrial complexes              Hypertension, goal <120/80.    JHOANA on BiPAP, compliant.   PACs, frequent, restart metoprolol succinate 25 mg nightly.  pSVT  Hyperlipidemia, on atorvastatin  CKD  History of traumatic subdural hematoma 2021 after a fall.  Right lower extremity neuropathy.  Family history of CAD      Plan:       See Lindsay in 3 months.  Take amlodipine 2.5 mg nightly, restart metoprolol succinate 25 mg nightly for PSVT, remain on HCTZ 25 mg in the morning and losartan 100 mg nightly.  No other testing at this time.

## 2024-04-08 ENCOUNTER — OFFICE VISIT (OUTPATIENT)
Dept: SLEEP MEDICINE | Facility: HOSPITAL | Age: 84
End: 2024-04-08
Payer: MEDICARE

## 2024-04-08 VITALS
HEART RATE: 64 BPM | SYSTOLIC BLOOD PRESSURE: 135 MMHG | BODY MASS INDEX: 27 KG/M2 | DIASTOLIC BLOOD PRESSURE: 60 MMHG | OXYGEN SATURATION: 97 % | WEIGHT: 152.4 LBS | HEIGHT: 63 IN

## 2024-04-08 DIAGNOSIS — G47.33 OSA (OBSTRUCTIVE SLEEP APNEA): Primary | ICD-10-CM

## 2024-04-08 PROCEDURE — G0463 HOSPITAL OUTPT CLINIC VISIT: HCPCS

## 2024-04-08 NOTE — PROGRESS NOTES
[facility]  Telephone: [phone] / Fax: [phone] [place]  Telephone: [phone] / Fax: [phone] [place]ian [place]anes    PCP: Rhoda Luther MD    Reason for visit: JHOANA f/u    Itzel Edmond is a 83 y.o.female  was last seen at [facility] sleep lab in April 2023.  EPAP min was lowered at the time to 6cm H2O to help alleviate dry mouth and prevent leaks. Pt feels that current pressures are comfortable.  She is a restless sleeper in general. She uses nasal mask. It fits better than full face mask in general and she does not wish to change styles. She is not sure if she has a card in the BIPAP. We have no data from the device to review today. New card was ordered. Her sleep schedule is MN-6:30am. ESS is 9.    SH- no tobacco, no alcohol, 1 cup caffeine.    ROS- +nasal congestion, +post nasal drip, +SOA, +fever, +chills, +ear pain, rest is negative.    DME [employer].    Current Medications:    Current Outpatient Medications:     alendronate (FOSAMAX) 70 MG tablet, Take 1 tablet by mouth Every 7 (Seven) Days., Disp: , Rfl:     amLODIPine (NORVASC) 2.5 MG tablet, Take 1 tablet by mouth Every Night., Disp: 90 tablet, Rfl: 3    aspirin 81 MG EC tablet, Take 1 tablet by mouth Daily., Disp: , Rfl:     atorvastatin (LIPITOR) 20 MG tablet, Take 1 tablet by mouth Daily., Disp: 90 tablet, Rfl: 3    DULoxetine (CYMBALTA) 30 MG capsule, Take 1 capsule by mouth Daily., Disp: , Rfl:     hydroCHLOROthiazide (HYDRODIURIL) 25 MG tablet, Take 1 tablet by mouth Daily., Disp: , Rfl:     losartan (COZAAR) 100 MG tablet, Take 1 tablet by mouth Daily., Disp: , Rfl:     metoprolol succinate XL (TOPROL-XL) 25 MG 24 hr tablet, Take 1 tablet by mouth Every Night., Disp: 90 tablet, Rfl: 3    pramipexole (MIRAPEX) 0.25 MG tablet, Take 2 tablets by mouth Every Night for 90 days., Disp: 180 tablet, Rfl: 0   also entered in Sleep Questionnaire    Patient  has a past medical history of Allergic rhinitis, Atherosclerosis, CAD  "(coronary artery disease) (2019), Carotid stenosis, Cataract, Cellulitis of right leg (2016), Chest discomfort (2019), Claudication of lower extremity, CMC arthritis, Colon polyps, Cystitis (2019), WOODARD (dyspnea on exertion) (10/2020), Dysphagia, Elevated serum creatinine (2021), Environmental allergies, Exercise intolerance, JAMES (generalized anxiety disorder) (2020), GERD (gastroesophageal reflux disease), Heart murmur, Hemorrhoids, Hyperglycemia, Hyperkalemia (2020), Hyperlipidemia, Hypertension, Influenza (2018), Insomnia, Myalgia (2016), Neuropathy (2017), OAB (overactive bladder), JHOANA (obstructive sleep apnea), PLMD (periodic limb movement disorder), Precordial pain (2019), Premature atrial complexes, Rectal bleeding, Rectal prolapse (10/2020), Rectal ulcer (10/02/2020), Rectocele (2019), Renal insufficiency, Restless leg syndrome (10/2/2018), SAB (spontaneous ), Serum potassium elevated (2016), Slow transit constipation (2020), Tachycardia (2013), Trigger finger of left thumb (10/21/2019), Urge incontinence, Urinary frequency, and Vaginal atrophy.    I have reviewed the Past Medical History, Past Surgical History, Social History and Family History.    Vital Signs /60   Pulse 64   Ht 160 cm (63\")   Wt 69.1 kg (152 lb 6.4 oz)   SpO2 97%   BMI 27.00 kg/m²  Body mass index is 27 kg/m².    General Alert and oriented. No acute distress noted   Pharynx/Throat Class III  Mallampati airway, large tongue, no evidence of redundant lateral pharyngeal tissue. No oral lesions. No thrush. Moist mucous membranes.   Head Normocephalic. Symmetrical. Atraumatic.    Nose No septal deviation. No drainage   Chest Wall Normal shape. Symmetric expansion with respiration. No tenderness.   Neck Trachea midline, no thyromegaly or adenopathy    Lungs Clear to auscultation bilaterally. No wheezes. No rhonchi. No rales. Respirations regular, even and unlabored. "   Heart Regular rhythm and normal rate. Normal S1 and S2. No murmur   Abdomen Soft, non-tender and non-distended. Normal bowel sounds. No masses.   Extremities Moves all extremities well. No edema   Psychiatric Normal mood and affect.     Testing:  Download n/a    Study-7/25/22  Overnight split polysomnogram study.  Diagnostic study 9:14 PM to 12:03 AM.  Sleep efficiency very poor at 28%.  Unclear if patient took prescribed Ambien.  AHI 50.7.  REM index 46.  There was no supine sleep.  Arousal index 48.  PLM index 92.8.  Oxygen saturation fell below 89% for 15.5 minutes.    Titration study from 12:03 AM to 3:08 AM.  Sleep efficiency improved to 66%.  Slow-wave sleep is still absent and there was 6% REM sleep.  AHI index improved.  82 minutes of supine sleep noted with AHI index 11.7.  PLM index extremely high at 185.  Patient was titrated on BiPAP from 16/10 to 18/14.  Maximum sleep at 17/13 with no significant apneas or hypopneas.  Some desaturation was still seen though improved further at 18/14.    Impression:  1. JHOANA (obstructive sleep apnea)          Plan:  Order new card from [employer] and ask patient to bring it here for review.  Call if pressures feel excessive or insufficient. I reviewed prior sleep study report with patient. Pre treatment AHI was 50/hr. AHI on latest download from last year is 1/hr. Pt uses the BIPAP and benefits from its use.    Follow up with Dr. Ferrell in one year    Thank you for allowing me to participate in your patient's care.      ARABELLA Rodriguez  [facility]  Phone: [phone]      Part of this note may be an electronic transcription/translation of spoken language to printed text using the Dragon Dictation System.

## 2024-04-16 ENCOUNTER — OFFICE VISIT (OUTPATIENT)
Dept: CARDIOLOGY | Facility: CLINIC | Age: 84
End: 2024-04-16
Payer: MEDICARE

## 2024-04-16 VITALS
DIASTOLIC BLOOD PRESSURE: 70 MMHG | HEART RATE: 55 BPM | SYSTOLIC BLOOD PRESSURE: 130 MMHG | BODY MASS INDEX: 29.45 KG/M2 | WEIGHT: 150 LBS | HEIGHT: 60 IN

## 2024-04-16 DIAGNOSIS — R06.09 DYSPNEA ON EXERTION: ICD-10-CM

## 2024-04-16 DIAGNOSIS — N18.32 STAGE 3B CHRONIC KIDNEY DISEASE: ICD-10-CM

## 2024-04-16 DIAGNOSIS — G45.9 TIA (TRANSIENT ISCHEMIC ATTACK): ICD-10-CM

## 2024-04-16 DIAGNOSIS — I10 PRIMARY HYPERTENSION: Primary | ICD-10-CM

## 2024-04-16 DIAGNOSIS — R60.0 BILATERAL LEG EDEMA: ICD-10-CM

## 2024-04-16 DIAGNOSIS — E78.2 MIXED HYPERLIPIDEMIA: ICD-10-CM

## 2024-04-16 DIAGNOSIS — I49.1 PREMATURE ATRIAL COMPLEXES: ICD-10-CM

## 2024-04-16 DIAGNOSIS — I34.0 NONRHEUMATIC MITRAL VALVE REGURGITATION: ICD-10-CM

## 2024-04-16 DIAGNOSIS — G47.33 OSA ON CPAP: ICD-10-CM

## 2024-04-16 PROCEDURE — 93000 ELECTROCARDIOGRAM COMPLETE: CPT | Performed by: INTERNAL MEDICINE

## 2024-04-16 PROCEDURE — 1159F MED LIST DOCD IN RCRD: CPT | Performed by: INTERNAL MEDICINE

## 2024-04-16 PROCEDURE — 99214 OFFICE O/P EST MOD 30 MIN: CPT | Performed by: INTERNAL MEDICINE

## 2024-04-16 PROCEDURE — 3075F SYST BP GE 130 - 139MM HG: CPT | Performed by: INTERNAL MEDICINE

## 2024-04-16 PROCEDURE — 3078F DIAST BP <80 MM HG: CPT | Performed by: INTERNAL MEDICINE

## 2024-04-16 PROCEDURE — 1160F RVW MEDS BY RX/DR IN RCRD: CPT | Performed by: INTERNAL MEDICINE

## 2024-04-16 RX ORDER — AMLODIPINE BESYLATE 2.5 MG/1
1.25 TABLET ORAL NIGHTLY
Qty: 45 TABLET | Refills: 3 | Status: SHIPPED
Start: 2024-04-16

## 2024-04-16 NOTE — PROGRESS NOTES
Date of Office Visit: 2021  Encounter Provider: Sandra Winters MD  Place of Service: [facility] CARDIOLOGY  Patient Name: Itzel Edmond  :1940      Patient ID:  Itzel Edmond is a 83 y.o. female is here for  followup for hypertension.        History of Present Illness    She has a history of hypertension, hyperlipidemia, CKD, GERD, right lower extremity neuropathy, JHAONA on CPAP.    She had a complete cardiovascular evaluation 2010 at [facility] [place]  a treadmill stress nuclear study showing no ischemia and an ejection fraction of 70%. She  had mild hypertension with exercise. She had a normal abdominal ultrasound showing no  evidence of abdominal aortic aneurysm. Her carotid duplex study done 2010 showed  moderate bilateral internal carotid artery disease. She had an echocardiogram done at  [facility] 2010 showing ejection fraction of 60 to 65% with mild  diastolic dysfunction, mild mitral and tricuspid insufficiency. She had a history of a  normal venous duplex study done for swelling of the right leg done 2009.     She had a Holter recording in , which was fairly unremarkable.  It showed sinus rhythm, rare premature ventricular complexes, and a short run of  nonsustained supraventricular tachycardia.      I saw on 2014. At that time she was having chest tightness and short-windedness  with activity which sounded anginal in nature. We set her up for a PET stress study which  was done in 2014 which showed no evidence of ischemia. She was also having a lot of  lower extremity pain which was worse in the right than that left and she had normal  bilateral arterial duplex studies done.      She had a cardiac catheterization done 19, showing minimal CAD. She had an echocardiogram done 2019 showing ejection fraction 64% with grade 1 diastolic dysfunction, mild mitral insufficiency, trace aortic  insufficiency. Echocardiogram done 11/29/2021 showed ejection action 65% with grade 1 diastolic dysfunction, normal left atrial size, normal saline study, mitral valve calcification with mild mitral insufficiency.     She fell and suffered sustained a subdural hematoma in 2021-the subdural hematoma grew and she had had bur holes done 1/2022.    She had a nonischemic stress PET nuclear perfusion study done 9/11/2023.  She wore a 13-day monitor 8/29/2023 which showed 22 episodes of SVT, the fastest SVT was 7 beats at 173 with the longest SVT 19 beats with a heart rate of 101 bpm.  She had frequent PACs at 4.9% in singles and couplets.  She did report palpitations while wearing the monitor and the palpitations correlated with PACs.    Labs in 11/15/2023 showed triglycerides 147, total cholesterol 107, HDL 34, LDL 43, VLDL 29, creatinine 1.3 with a GFR 40, BUN 32, potassium 4.9, otherwise normal CMP, hemoglobin 11.2 with otherwise normal CBC.     She has noticed LE edema for 1 month. Her SVT is better with metoprolol.  She has no chest pain but does have exertional dyspnea.  She has no orthopnea or PND.  She did not notice the edema when she started the metoprolol.  She occasionally has palpitations still.  She has had no dizziness, syncope or falls.  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  She wonders if this is part of the reason she is feels some fatigue and short windedness.    Past Medical History:   Diagnosis Date    Allergic rhinitis     Atherosclerosis     CAD (coronary artery disease) 1/16/2019    Overview:  Mild non obstructive CAD on cath 1/2019, medical mgmt recommended    Carotid stenosis     Cataract     Cellulitis of right leg 06/2016    Chest discomfort 01/2019    Claudication of lower extremity     CMC arthritis     Colon polyps     FOLLOWED BY DR. NOLBERTO REYNOSO    Cystitis 11/19/2019    WOODARD (dyspnea on exertion) 10/2020    Dysphagia     Elevated serum creatinine  2021    Environmental allergies     Exercise intolerance     JAMES (generalized anxiety disorder) 2020    GERD (gastroesophageal reflux disease)     Heart murmur     Hemorrhoids     Hyperglycemia     Hyperkalemia 2020    Hyperlipidemia     MIXED    Hypertension     Influenza 2018    Insomnia     Myalgia 2016    Neuropathy 2017    OAB (overactive bladder)     JHOANA (obstructive sleep apnea)     BiPap    PLMD (periodic limb movement disorder)     Precordial pain 2019    Premature atrial complexes     Rectal bleeding     Rectal prolapse 10/2020    Rectal ulcer 10/02/2020    5 MM SINGLE ULCER ON CY    Rectocele 2019    Renal insufficiency     Restless leg syndrome 10/2/2018    SAB (spontaneous )      A1    Serum potassium elevated 2016    Slow transit constipation 2020    Tachycardia 2013    Trigger finger of left thumb 10/21/2019    Urge incontinence     Urinary frequency     Vaginal atrophy          Past Surgical History:   Procedure Laterality Date    ANTERIOR AND POSTERIOR VAGINAL REPAIR N/A     BACK SURGERY      BILATERAL SALPINGO OOPHORECTOMY Bilateral     BLADDER SURGERY N/A     BLADDER SLING    YADI HOLE Right 2022    Procedure: YADI HOLE;  Surgeon: Austin Hilario MD;  Location: [facility] MAIN OR;  Service: Neurosurgery;  Laterality: Right;    CARDIAC CATHETERIZATION N/A 2019    Procedure: Left Heart Cath;  Surgeon: Christopher Rosenbaum MD;  Location: [facility] CATH INVASIVE LOC[facility];  Service: Cardiology    CARDIAC CATHETERIZATION N/A 2019    Procedure: Coronary angiography;  Surgeon: Christopher Rosenbaum MD;  Location:  [facility] CATH INVASIVE LOCATION;  Service: Cardiology    CARDIAC CATHETERIZATION N/A 2019    Procedure: Left ventriculography;  Surgeon: Christopher Rosenbaum MD;  Location:  [facility] CATH INVASIVE LOCATION;  Service: Cardiology    CATARACT EXTRACTION Left 12/15/2015    WITH LENS IMPLANT, DR. AMRIT DIAZ AT [facility]     CATARACT EXTRACTION Right 12/01/2015     WITH LENS IMPLANT, DR. AMRIT DIAZ AT [facility]    COLONOSCOPY N/A 10/2/2020    2 TUBULAR ADENOMA POLYPS IN TRANSVERSE, HEMORRHOIDS, DIVERTICULOSIS, 5 MM ULCER IN RECTUM, OTHER BIOPSIES BENIGN, DR. NOLBERTO REYNOSO AT [facility]    COLONOSCOPY W/ POLYPECTOMY N/A 08/24/2015    1 CM TUBULAR ADENOMA POLYP IN TRANSVERSE, MODERATE DIVERTICULOSIS, HEMORRHOIDS, RESCOPE IN 5 YRS, DR. MAYA SAHU AT [facility]    HEMORROIDECTOMY N/A     HERNIA REPAIR      HYSTERECTOMY N/A 1970    OVARIES IN TACT       Current Outpatient Medications on File Prior to Visit   Medication Sig Dispense Refill    alendronate (FOSAMAX) 70 MG tablet Take 1 tablet by mouth Every 7 (Seven) Days.      aspirin 81 MG EC tablet Take 1 tablet by mouth Daily.      atorvastatin (LIPITOR) 20 MG tablet Take 1 tablet by mouth Daily. 90 tablet 3    DULoxetine (CYMBALTA) 30 MG capsule Take 1 capsule by mouth Daily.      hydroCHLOROthiazide (HYDRODIURIL) 25 MG tablet Take 1 tablet by mouth Daily.      losartan (COZAAR) 100 MG tablet Take 1 tablet by mouth Daily.      metoprolol succinate XL (TOPROL-XL) 25 MG 24 hr tablet Take 1 tablet by mouth Every Night. 90 tablet 3    [DISCONTINUED] amLODIPine (NORVASC) 2.5 MG tablet Take 1 tablet by mouth Every Night. 90 tablet 3    pramipexole (MIRAPEX) 0.25 MG tablet Take 2 tablets by mouth Every Night for 90 days. 180 tablet 0     No current facility-administered medications on file prior to visit.       Social History     Socioeconomic History    Marital status:    Tobacco Use    Smoking status: Never     Passive exposure: Never    Smokeless tobacco: Never   Vaping Use    Vaping status: Never Used   Substance and Sexual Activity    Alcohol use: No     Comment: caffeine use: 1 cup daily    Drug use: Never    Sexual activity: Not Currently     Birth control/protection: Post-menopausal, Surgical     Comment: .           ROS    Procedures    ECG 12 Lead    Date/Time: 4/16/2024 10:24  "AM  Performed by: Sandra Winters MD    Authorized by: Sandra Winters MD  Comparison: compared with previous ECG   Similar to previous ECG  Rhythm: sinus rhythm  Ectopy: atrial premature contractions  Conduction: right bundle branch block    Clinical impression: abnormal EKG              Objective:      Vitals:    04/16/24 1012   BP: 130/70   Pulse: 55   Weight: 68 kg (150 lb)   Height: 152.4 cm (60\")     Body mass index is 29.29 kg/m².    Vitals reviewed.   Constitutional:       General: Not in acute distress.     Appearance: Well-developed. Not diaphoretic.   Eyes:      General: No scleral icterus.     Conjunctiva/sclera: Conjunctivae normal.   HENT:      Head: Normocephalic and atraumatic.   Neck:      Thyroid: No thyromegaly.      Vascular: No carotid bruit or JVD.      Lymphadenopathy: No cervical adenopathy.   Pulmonary:      Effort: Pulmonary effort is normal. No respiratory distress.      Breath sounds: Normal breath sounds. No wheezing. No rhonchi. No rales.   Chest:      Chest wall: Not tender to palpatation.   Cardiovascular:      Normal rate. Regular rhythm.      Murmurs: There is no murmur.      No gallop.    Pulses:     Intact distal pulses.   Edema:     Peripheral edema present.     Ankle: bilateral 1+ edema of the ankle.  Abdominal:      General: Bowel sounds are normal. There is no distension or abdominal bruit.      Palpations: Abdomen is soft. There is no abdominal mass.      Tenderness: There is no abdominal tenderness.   Musculoskeletal:         General: No deformity.      Extremities: No clubbing present.     Cervical back: Neck supple. Skin:     General: Skin is warm and dry. There is no cyanosis.      Coloration: Skin is not pale.      Findings: No rash.   Neurological:      Mental Status: Alert and oriented to person, place, and time.      Cranial Nerves: No cranial nerve deficit.   Psychiatric:         Judgment: Judgment normal.         Lab Review:       Assessment:      " Diagnosis Plan   1. Primary hypertension        2. Premature atrial complexes        3. Mixed hyperlipidemia        4. Nonrheumatic mitral valve regurgitation        5. TIA (transient ischemic attack)        6. JHOANA on CPAP        7. Stage 3b chronic kidney disease        8. Bilateral leg edema  Adult Transthoracic Echo Complete W/ Cont if Necessary Per Protocol      9. Dyspnea on exertion  Adult Transthoracic Echo Complete W/ Cont if Necessary Per Protocol            Hypertension, goal <120/80.  Remain on amlodipine, losartan and hydrochlorothiazide.  JHOANA on BiPAP, compliant.   PACs, frequent, on metoprolol succinate 25 mg nightly.  pSVT  Hyperlipidemia, on atorvastatin  CKD 3b.   History of traumatic subdural hematoma 2021 after a fall.  Right lower extremity neuropathy.  Family history of CAD  LE edema, likely due to warm weather and amlodipine, decrease to 1.25mg daily.       Plan:       Check echo for dyspnea on exertion.  See aprn in 3 months.  LE edema may be due to amlodipine but possibly due to combination of amlodipine and metoprolol - decrease it to 1.25mg daily but watch BP.

## 2024-04-22 ENCOUNTER — HOSPITAL ENCOUNTER (OUTPATIENT)
Dept: CARDIOLOGY | Facility: HOSPITAL | Age: 84
Discharge: HOME | End: 2024-04-22
Admitting: INTERNAL MEDICINE
Payer: MEDICARE

## 2024-04-22 ENCOUNTER — TELEPHONE (OUTPATIENT)
Dept: CARDIOLOGY | Facility: CLINIC | Age: 84
End: 2024-04-22
Payer: MEDICARE

## 2024-04-22 VITALS
OXYGEN SATURATION: 98 % | DIASTOLIC BLOOD PRESSURE: 76 MMHG | HEART RATE: 78 BPM | HEIGHT: 60 IN | SYSTOLIC BLOOD PRESSURE: 167 MMHG | WEIGHT: 150 LBS | BODY MASS INDEX: 29.45 KG/M2

## 2024-04-22 DIAGNOSIS — R60.0 BILATERAL LEG EDEMA: ICD-10-CM

## 2024-04-22 DIAGNOSIS — R06.09 DYSPNEA ON EXERTION: ICD-10-CM

## 2024-04-22 LAB
AORTIC ARCH: 2.4 CM
AORTIC DIMENSIONLESS INDEX: 0.8 (DI)
ASCENDING AORTA: 3.3 CM
BH CV ECHO MEAS - ACS: 1.36 CM
BH CV ECHO MEAS - AI P1/2T: 449.4 MSEC
BH CV ECHO MEAS - AO MAX PG: 10.2 MMHG
BH CV ECHO MEAS - AO MEAN PG: 5 MMHG
BH CV ECHO MEAS - AO ROOT DIAM: 2.6 CM
BH CV ECHO MEAS - AO V2 MAX: 160 CM/SEC
BH CV ECHO MEAS - AO V2 VTI: 32.5 CM
BH CV ECHO MEAS - AVA(I,D): 2.16 CM2
BH CV ECHO MEAS - EDV(CUBED): 85.2 ML
BH CV ECHO MEAS - EDV(MOD-SP2): 54 ML
BH CV ECHO MEAS - EDV(MOD-SP4): 66 ML
BH CV ECHO MEAS - EF(MOD-BP): 60.7 %
BH CV ECHO MEAS - EF(MOD-SP2): 61.1 %
BH CV ECHO MEAS - EF(MOD-SP4): 59.1 %
BH CV ECHO MEAS - ESV(CUBED): 21.9 ML
BH CV ECHO MEAS - ESV(MOD-SP2): 21 ML
BH CV ECHO MEAS - ESV(MOD-SP4): 27 ML
BH CV ECHO MEAS - FS: 36.4 %
BH CV ECHO MEAS - IVS/LVPW: 1 CM
BH CV ECHO MEAS - IVSD: 1.1 CM
BH CV ECHO MEAS - LAT PEAK E' VEL: 6.5 CM/SEC
BH CV ECHO MEAS - LV DIASTOLIC VOL/BSA (35-75): 40 CM2
BH CV ECHO MEAS - LV MASS(C)D: 168.9 GRAMS
BH CV ECHO MEAS - LV MAX PG: 7.2 MMHG
BH CV ECHO MEAS - LV MEAN PG: 4 MMHG
BH CV ECHO MEAS - LV SYSTOLIC VOL/BSA (12-30): 16.3 CM2
BH CV ECHO MEAS - LV V1 MAX: 134 CM/SEC
BH CV ECHO MEAS - LV V1 VTI: 25.9 CM
BH CV ECHO MEAS - LVIDD: 4.4 CM
BH CV ECHO MEAS - LVIDS: 2.8 CM
BH CV ECHO MEAS - LVOT AREA: 2.7 CM2
BH CV ECHO MEAS - LVOT DIAM: 1.86 CM
BH CV ECHO MEAS - LVPWD: 1.1 CM
BH CV ECHO MEAS - MED PEAK E' VEL: 6.6 CM/SEC
BH CV ECHO MEAS - MR MAX PG: 62.8 MMHG
BH CV ECHO MEAS - MR MAX VEL: 396.3 CM/SEC
BH CV ECHO MEAS - MV A DUR: 0.13 SEC
BH CV ECHO MEAS - MV A MAX VEL: 130 CM/SEC
BH CV ECHO MEAS - MV DEC SLOPE: 508.4 CM/SEC2
BH CV ECHO MEAS - MV DEC TIME: 0.25 SEC
BH CV ECHO MEAS - MV E MAX VEL: 68.9 CM/SEC
BH CV ECHO MEAS - MV E/A: 0.53
BH CV ECHO MEAS - MV MAX PG: 9 MMHG
BH CV ECHO MEAS - MV MEAN PG: 3 MMHG
BH CV ECHO MEAS - MV P1/2T: 41.9 MSEC
BH CV ECHO MEAS - MV V2 VTI: 27 CM
BH CV ECHO MEAS - MVA(P1/2T): 5.2 CM2
BH CV ECHO MEAS - MVA(VTI): 2.6 CM2
BH CV ECHO MEAS - PA ACC TIME: 0.08 SEC
BH CV ECHO MEAS - PA V2 MAX: 95 CM/SEC
BH CV ECHO MEAS - PI END-D VEL: 100.9 CM/SEC
BH CV ECHO MEAS - PULM A REVS DUR: 0.12 SEC
BH CV ECHO MEAS - PULM A REVS VEL: 37 CM/SEC
BH CV ECHO MEAS - PULM DIAS VEL: 36.5 CM/SEC
BH CV ECHO MEAS - PULM S/D: 1.77
BH CV ECHO MEAS - PULM SYS VEL: 64.8 CM/SEC
BH CV ECHO MEAS - QP/QS: 0.4
BH CV ECHO MEAS - RAP SYSTOLE: 3 MMHG
BH CV ECHO MEAS - RV MAX PG: 1.59 MMHG
BH CV ECHO MEAS - RV V1 MAX: 63 CM/SEC
BH CV ECHO MEAS - RV V1 VTI: 11.6 CM
BH CV ECHO MEAS - RVOT DIAM: 1.74 CM
BH CV ECHO MEAS - RVSP: 28 MMHG
BH CV ECHO MEAS - SUP REN AO DIAM: 1.8 CM
BH CV ECHO MEAS - SV(LVOT): 70.1 ML
BH CV ECHO MEAS - SV(MOD-SP2): 33 ML
BH CV ECHO MEAS - SV(MOD-SP4): 39 ML
BH CV ECHO MEAS - SV(RVOT): 27.7 ML
BH CV ECHO MEAS - SVI(MOD-SP2): 20 ML/M2
BH CV ECHO MEAS - SVI(MOD-SP4): 23.6 ML/M2
BH CV ECHO MEAS - TAPSE (>1.6): 1.79 CM
BH CV ECHO MEAS - TR MAX PG: 24.9 MMHG
BH CV ECHO MEAS - TR MAX VEL: 249.6 CM/SEC
BH CV ECHO MEASUREMENTS AVERAGE E/E' RATIO: 10.52
BH CV XLRA - RV BASE: 2.5 CM
BH CV XLRA - RV LENGTH: 7.2 CM
BH CV XLRA - RV MID: 2.05 CM
BH CV XLRA - TDI S': 10.3 CM/SEC
LEFT ATRIUM VOLUME INDEX: 27.2 ML/M2
SINUS: 2.6 CM
STJ: 2.5 CM

## 2024-04-22 PROCEDURE — 93306 TTE W/DOPPLER COMPLETE: CPT

## 2024-04-22 PROCEDURE — 93306 TTE W/DOPPLER COMPLETE: CPT | Performed by: INTERNAL MEDICINE

## 2024-04-22 NOTE — TELEPHONE ENCOUNTER
I tried to call Itzel Edmond but there was no answer.  Left a voicemail asking patient to call back.  Will continue to try to reach pt.    HUB- if pt calls back, please transfer through to triage.    Thank you,    Dorothy LEDBETTER RN  Triage [facility]  04/22/24 16:15 EDT

## 2024-04-23 NOTE — TELEPHONE ENCOUNTER
I spoke with Itzel Edmond and updated pt on results from provider.  They verbalized understanding and have no further questions at this time.    Thank you,    Dorothy LEDBETTER, RN  Triage [facility]  04/23/24 08:29 EDT

## 2024-07-23 ENCOUNTER — OFFICE VISIT (OUTPATIENT)
Dept: CARDIOLOGY | Facility: CLINIC | Age: 84
End: 2024-07-23
Payer: MEDICARE

## 2024-07-23 VITALS
HEART RATE: 49 BPM | WEIGHT: 142 LBS | OXYGEN SATURATION: 98 % | BODY MASS INDEX: 27.88 KG/M2 | SYSTOLIC BLOOD PRESSURE: 142 MMHG | HEIGHT: 60 IN | DIASTOLIC BLOOD PRESSURE: 78 MMHG

## 2024-07-23 DIAGNOSIS — G47.33 OSA ON CPAP: ICD-10-CM

## 2024-07-23 DIAGNOSIS — I34.0 NONRHEUMATIC MITRAL VALVE REGURGITATION: Primary | ICD-10-CM

## 2024-07-23 DIAGNOSIS — I10 PRIMARY HYPERTENSION: ICD-10-CM

## 2024-07-23 DIAGNOSIS — E78.2 MIXED HYPERLIPIDEMIA: ICD-10-CM

## 2024-07-23 PROCEDURE — 1160F RVW MEDS BY RX/DR IN RCRD: CPT | Performed by: INTERNAL MEDICINE

## 2024-07-23 PROCEDURE — 93000 ELECTROCARDIOGRAM COMPLETE: CPT | Performed by: INTERNAL MEDICINE

## 2024-07-23 PROCEDURE — 99214 OFFICE O/P EST MOD 30 MIN: CPT | Performed by: INTERNAL MEDICINE

## 2024-07-23 PROCEDURE — 1159F MED LIST DOCD IN RCRD: CPT | Performed by: INTERNAL MEDICINE

## 2024-07-23 NOTE — PROGRESS NOTES
Date of Office Visit: 2024  Encounter Provider: Sandra Winters MD  Place of Service: [facility] CARDIOLOGY  Patient Name: Itzel Edmond  :1940      Patient ID:  Itzel Edmond is a 84 y.o. female is here for  followup for hypertension and lower extremity edema        History of Present Illness    She has a history of hypertension, hyperlipidemia, CKD, GERD, right lower extremity neuropathy, JHOANA on CPAP.     [place] [facility] [place]mill stress nuclear study showing no ischemia and an ejection fraction of 70%. She  had mild hypertension with exercise. She had a normal abdominal ultrasound showing no  evidence of abdominal aortic aneurysm. Her carotid duplex study done 2010 showed  moderate bilateral internal carotid artery disease. She [place]  [facility] [place] showing ejection fraction of 60 to 65% with mild  diastolic dysfunction, mild mitral and tricuspid insufficiency. She had a history of a  normal venous duplex study done for swelling of the right leg done 2009.      She had a Holter recording in , which was fairly unremarkable.  It showed sinus rhythm, rare premature ventricular complexes, and a short run of  nonsustained supraventricular tachycardia.      I saw on 2014. At that time she was having chest tightness and short-windedness  with activity which sounded anginal in nature.  She had a nonischemic PET stress study done in 2014.  She was also having a lot of  lower extremity pain which was worse in the right than that left and she had normal  bilateral arterial duplex studies done.      She had a cardiac catheterization done 19, showing minimal CAD. She had an echocardiogram done 2019 showing ejection fraction 64% with grade 1 diastolic dysfunction, mild mitral insufficiency, trace aortic insufficiency.  Echocardiogram done 11/29/2021 showed ejection action 65% with grade 1 diastolic dysfunction, normal left atrial size, normal saline study, mitral valve calcification with mild mitral insufficiency.     She fell and suffered sustained a subdural hematoma in 2021-the subdural hematoma grew and she had had bur holes done 1/2022.     She had a nonischemic stress PET nuclear perfusion study done 9/11/2023.  She wore a 13-day monitor 8/29/2023 which showed 22 episodes of SVT, the fastest SVT was 7 beats at 173 with the longest SVT 19 beats with a heart rate of 101 bpm.  She had frequent PACs at 4.9% in singles and couplets.  She did report palpitations while wearing the monitor and the palpitations correlated with PACs.     Labs in 11/15/2023 showed triglycerides 147, total cholesterol 107, HDL 34, LDL 43, VLDL 29, creatinine 1.3 with a GFR 40, BUN 32, potassium 4.9, otherwise normal CMP, hemoglobin 11.2 with otherwise normal CBC.     Echo done 4/22/2024 showed normal diastolic function, ejection fraction of 61%, aortic valve calcification without stenosis, severe mitral annular calcification, trace to mild mitral insufficiency.     Her SVT is well-controlled metoprolol.  She has no further lower extremity edema since decreasing the amlodipine to 1.25 mg daily.  She takes her medications as directed without difficulty.  She does continue to exercise and stay active.  She has no dizziness, syncope or falls.  She has no orthopnea or PND.  She has no exertional dyspnea.  She has no exertional chest pain.  Generally, she feels like she is doing well.    Past Medical History:   Diagnosis Date    Allergic rhinitis     Atherosclerosis     CAD (coronary artery disease) 1/16/2019    Overview:  Mild non obstructive CAD on cath 1/2019, medical mgmt recommended    Carotid stenosis     Cataract     Cellulitis of right leg 06/2016    Chest discomfort 01/2019    Claudication of lower extremity     CMC arthritis     Colon polyps      FOLLOWED BY DR. NOLBERTO REYNOSO    Cystitis 2019    WOODARD (dyspnea on exertion) 10/2020    Dysphagia     Elevated serum creatinine 2021    Environmental allergies     Exercise intolerance     JAMES (generalized anxiety disorder) 2020    GERD (gastroesophageal reflux disease)     Heart murmur     Hemorrhoids     Hyperglycemia     Hyperkalemia 2020    Hyperlipidemia     MIXED    Hypertension     Influenza 2018    Insomnia     Myalgia 2016    Neuropathy 2017    OAB (overactive bladder)     JHOANA (obstructive sleep apnea)     BiPap    PLMD (periodic limb movement disorder)     Precordial pain 2019    Premature atrial complexes     Rectal bleeding     Rectal prolapse 10/2020    Rectal ulcer 10/02/2020    5 MM SINGLE ULCER ON CY    Rectocele 2019    Renal insufficiency     Restless leg syndrome 10/2/2018    SAB (spontaneous )      A1    Serum potassium elevated 2016    Slow transit constipation 2020    Tachycardia 2013    Trigger finger of left thumb 10/21/2019    Urge incontinence     Urinary frequency     Vaginal atrophy          Past Surgical History:   Procedure Laterality Date    ANTERIOR AND POSTERIOR VAGINAL REPAIR N/A     BACK SURGERY      BILATERAL SALPINGO OOPHORECTOMY Bilateral     BLADDER SURGERY N/A     BLADDER SLING    YADI HOLE Right 2022    Procedure: YADI HOLE;  Surgeon: Austin Hilario MD;  Location: [facility] OR;  Service: Neurosurgery;  Laterality: Right;    CARDIAC CATHETERIZATION N/A 2019    Procedure: Left Heart Cath;  Surgeon: Christopher Rosenbaum MD;  [facility];  Service: Cardiology    CARDIAC CATHETERIZATION N/A 2019    Procedure: Coronary angiography;  Surgeon: Christopher Rosenbaum MD;  Location: [facility] CATH INVASIVE LOCATION;  Service: Cardiology    CARDIAC CATHETERIZATION N/A 2019    Procedure: Left ventriculography;  Surgeon: Christopher Rosenbaum MD;  Location: [facility] CATH  INVASIVE LOCATION;  Service: Cardiology    CATARACT EXTRACTION Left 12/15/2015    WITH LENS IMPLANT, DR. AMRIT DIAZ AT [facility]    CATARACT EXTRACTION Right 12/01/2015     WITH LENS IMPLANT, DR. AMRIT DIAZ AT [facility]    COLONOSCOPY N/A 10/2/2020    2 TUBULAR ADENOMA POLYPS IN TRANSVERSE, HEMORRHOIDS, DIVERTICULOSIS, 5 MM ULCER IN RECTUM, OTHER BIOPSIES BENIGN, DR. NOLBERTO REYNOSO AT [facility]    COLONOSCOPY W/ POLYPECTOMY N/A 08/24/2015    1 CM TUBULAR ADENOMA POLYP IN TRANSVERSE, MODERATE DIVERTICULOSIS, HEMORRHOIDS, RESCOPE IN 5 YRS, DR. MAYA SAHU AT [facility]    HEMORROIDECTOMY N/A     HERNIA REPAIR      HYSTERECTOMY N/A 1970    OVARIES IN TACT       Current Outpatient Medications on File Prior to Visit   Medication Sig Dispense Refill    amLODIPine (NORVASC) 2.5 MG tablet Take 0.5 tablets by mouth Every Night. 45 tablet 3    aspirin 81 MG EC tablet Take 1 tablet by mouth Daily.      atorvastatin (LIPITOR) 20 MG tablet Take 1 tablet by mouth Daily. 90 tablet 3    DULoxetine (CYMBALTA) 30 MG capsule Take 1 capsule by mouth Daily.      hydroCHLOROthiazide (HYDRODIURIL) 25 MG tablet Take 1 tablet by mouth Daily.      losartan (COZAAR) 100 MG tablet Take 1 tablet by mouth Daily.      metoprolol succinate XL (TOPROL-XL) 25 MG 24 hr tablet Take 1 tablet by mouth Every Night. 90 tablet 3    alendronate (FOSAMAX) 70 MG tablet Take 1 tablet by mouth Every 7 (Seven) Days.      pramipexole (MIRAPEX) 0.25 MG tablet Take 2 tablets by mouth Every Night for 90 days. 180 tablet 0     No current facility-administered medications on file prior to visit.       Social History     Socioeconomic History    Marital status:    Tobacco Use    Smoking status: Never     Passive exposure: Never    Smokeless tobacco: Never   Vaping Use    Vaping status: Never Used   Substance and Sexual Activity    Alcohol use: No     Comment: caffeine use: 1 cup daily    Drug use: Never    Sexual activity: Not Currently     Birth control/protection:  "Post-menopausal, Surgical     Comment: .             Procedures    ECG 12 Lead    Date/Time: 7/23/2024 10:46 AM  Performed by: Sandra Winters MD    Authorized by: Sandra Winters MD  Comparison: compared with previous ECG   Similar to previous ECG  Rhythm: sinus rhythm  Conduction: right bundle branch block    Clinical impression: abnormal EKG              Objective:      Vitals:    07/23/24 1002   BP: 142/78   Pulse: (!) 49   SpO2: 98%   Weight: 64.4 kg (142 lb)   Height: 152.4 cm (60\")     Body mass index is 27.73 kg/m².    Vitals reviewed.   Constitutional:       General: Not in acute distress.     Appearance: Not diaphoretic.   Neck:      Vascular: No carotid bruit or JVD.   Pulmonary:      Effort: Pulmonary effort is normal.      Breath sounds: Normal breath sounds.   Cardiovascular:      Normal rate. Regular rhythm.      Murmurs: There is no murmur.      No gallop.  No rub.   Pulses:     Intact distal pulses.      Carotid: 2+ bilaterally.     Radial: 2+ bilaterally.     Dorsalis pedis: 2+ bilaterally.     Posterior tibial: 2+ bilaterally.  Edema:     Peripheral edema absent.   Neurological:      Cranial Nerves: No cranial nerve deficit.         Lab Review:       Assessment:      Diagnosis Plan   1. Nonrheumatic mitral valve regurgitation        2. Mixed hyperlipidemia        3. Primary hypertension        4. JHOANA on CPAP          Hypertension, goal <120/80.  Remain on amlodipine, losartan and hydrochlorothiazide.  JHOANA-stopped using her BiPAP 7/2024 and does not want to go back on this.  She says she feels better off of it.  PACs, frequent, on metoprolol succinate 25 mg nightly.  pSVT, well treated with metoprolol  Hyperlipidemia, on atorvastatin  CKD 3b.   History of traumatic subdural hematoma 2021 after a fall.  Right lower extremity neuropathy.  Family history of CAD  LE edema,-due to warm weather and amlodipine, much better with amlodipine decreased to 1.25mg daily.      Plan:       See " Barbara in 6 months, overall doing well.  No medication changes, nova testing this time.  Her lower extremity edema is better on the decreased dose of amlodipine.  Advised continued salt restriction and exercise.

## 2024-08-09 NOTE — PROGRESS NOTES
Date of Office Visit: 2019  Encounter Provider: Sandra Winters MD  Place of Service: [facility] CARDIOLOGY  Patient Name: Itzel Edmond  :1940      Patient ID:  Itzel Edmond is a 78 y.o. female is here for  followup for         History of Present Illness    She had a complete cardiovascular evaluation 2010 at [facility] including  a treadmill stress nuclear study showing no ischemia and an ejection fraction of 70%. She  had mild hypertension with exercise. She had a normal abdominal ultrasound showing no  evidence of abdominal aortic aneurysm. Her carotid duplex study done 2010 showed  moderate bilateral internal carotid artery disease. She had an echocardiogram done at  [facility] 2010 showing ejection fraction of 60 to 65% with mild  diastolic dysfunction, mild mitral and tricuspid insufficiency. She had a history of a  normal venous duplex study done for swelling of the right leg done 2009.      She has obstructive sleep apnea, was placed on a  CPAP machine, and has done well with this.      She has a history of a dry, hacking cough with lisinopril and was switched to losartan and  has tolerated this well.    She had a Holter recording in , which was fairly unremarkable.  It showed sinus rhythm, rare premature ventricular complexes, and a short run of  nonsustained supraventricular tachycardia.      I saw on 2014. At that time she was having chest tightness and short-windedness  with activity which sounded anginal in nature. We set her up for a PET stress study which  was done in 2014 which showed no evidence of ischemia. She was also having a lot of  lower extremity pain which was worse in the right than that left and she had normal  bilateral arterial duplex studies done.      She had a cardiac catheterization done 19, showing minimal CAD. She had an echocardiogram done 2019 showing  ejection fraction 64% with grade 1 diastolic dysfunction, mild mitral insufficiency, trace aortic insufficiency.    She has no chest pain or difficulty breathing.  She has palpitations again.  She has had no dizziness or syncope.  She is no longer seeing her sleep medicine physician at [facility] because he aggravated her.  She wants a new physician.  She had no orthopnea or PND.  Her energy level is great.  She is taking her medications as directed.    Past Medical History:   Diagnosis Date   • Atrial premature complex    • Carotid stenosis    • Claudication of lower extremity (CMS/HCC)    • GERD (gastroesophageal reflux disease)    • Heart murmur    • Hyperlipidemia    • Hypertension    • JHOANA (obstructive sleep apnea)     BiPap   • PAC (premature atrial contraction)    • Precordial pain    • Premature atrial complexes    • Renal insufficiency    • Tachycardia          Past Surgical History:   Procedure Laterality Date   • BACK SURGERY     • BLADDER SURGERY     • CARDIAC CATHETERIZATION N/A 1/6/2019    Procedure: Left Heart Cath;  Surgeon: Christopher Rosenbaum MD;  Location:  [facility];  Service: Cardiology   • CARDIAC CATHETERIZATION N/A 1/6/2019    Procedure: Coronary angiography;  Surgeon: Christopher Rosenbaum MD;  Location:  [facility];  Service: Cardiology   • CARDIAC CATHETERIZATION N/A 1/6/2019    Procedure: Left ventriculography;  Surgeon: Christopher Rosenbaum MD;  Location:  [facility];  Service: Cardiology   • HEMORROIDECTOMY     • HERNIA REPAIR     • HYSTERECTOMY     • OOPHORECTOMY         Current Outpatient Medications on File Prior to Visit   Medication Sig Dispense Refill   • amLODIPine (NORVASC) 5 MG tablet TAKE 1 TABLET EVERY DAY 90 tablet 1   • Coenzyme Q10 (COQ-10 PO) Take 100 mg by mouth Daily.     • doxycycline (MONODOX) 100 MG capsule Take 1 capsule by mouth Daily.  0   • DULoxetine (CYMBALTA) 30 MG capsule Take 30 mg by mouth Daily.      • losartan (COZAAR) 50 MG tablet Take 1 tablet by mouth Daily. 90 tablet 3   • Misc Natural Products (OSTEO BI-FLEX JOINT SHIELD PO) Take 1 capsule by mouth 2 (Two) Times a Day.     • Pumpkin Seed-Soy Germ (AZO BLADDER CONTROL/GO-LESS) capsule Take 1 capsule by mouth 2 (Two) Times a Day.     • spironolactone-hydrochlorothiazide (ALDACTAZIDE) 25-25 MG tablet TAKE 1/2 TABLET EVERY DAY 45 tablet 1   • zaleplon (SONATA) 10 MG capsule Take 1 capsule by mouth Every Night.     • aspirin 81 MG tablet Take 1 tablet by mouth daily. 90 tablet 3     No current facility-administered medications on file prior to visit.        Social History     Socioeconomic History   • Marital status:      Spouse name: Not on file   • Number of children: Not on file   • Years of education: Not on file   • Highest education level: Not on file   Tobacco Use   • Smoking status: Never Smoker   • Smokeless tobacco: Never Used   Substance and Sexual Activity   • Alcohol use: No     Comment: caffeine use           Review of Systems   Constitution: Positive for malaise/fatigue.   HENT: Negative for congestion.    Eyes: Negative for vision loss in left eye and vision loss in right eye.   Respiratory: Positive for cough and snoring. Negative for hemoptysis, shortness of breath, sleep disturbances due to breathing, sputum production and wheezing.    Endocrine: Negative.    Hematologic/Lymphatic: Negative.    Skin: Negative for poor wound healing and rash.   Musculoskeletal: Negative for falls, gout, muscle cramps and myalgias.   Gastrointestinal: Negative for abdominal pain, diarrhea, dysphagia, hematemesis, melena, nausea and vomiting.   Neurological: Negative for excessive daytime sleepiness, dizziness, headaches, light-headedness, loss of balance, seizures and vertigo.   Psychiatric/Behavioral: Positive for depression. Negative for substance abuse. The patient is not nervous/anxious.        Procedures    ECG 12 Lead  Date/Time: 4/30/2019 12:25  "PM  Performed by: Sandra Winters MD  Authorized by: Sandra Winters MD   Comparison: compared with previous ECG   Similar to previous ECG  Rhythm: sinus rhythm    Clinical impression: normal ECG                Objective:      Vitals:    04/30/19 1210   BP: 130/70   BP Location: Right arm   Patient Position: Sitting   Pulse: 74   Weight: 62.9 kg (138 lb 9.6 oz)   Height: 160 cm (63\")     Body mass index is 24.55 kg/m².    Physical Exam   Constitutional: She is oriented to person, place, and time. She appears well-developed and well-nourished. No distress.   HENT:   Head: Normocephalic and atraumatic.   Eyes: Conjunctivae are normal. No scleral icterus.   Neck: Neck supple. No JVD present. Carotid bruit is not present. No thyromegaly present.   Cardiovascular: Normal rate, regular rhythm, S1 normal, S2 normal, normal heart sounds and intact distal pulses.  No extrasystoles are present. PMI is not displaced. Exam reveals no gallop.   No murmur heard.  Pulses:       Carotid pulses are 2+ on the right side, and 2+ on the left side.       Radial pulses are 2+ on the right side, and 2+ on the left side.        Dorsalis pedis pulses are 2+ on the right side, and 2+ on the left side.        Posterior tibial pulses are 2+ on the right side, and 2+ on the left side.   Pulmonary/Chest: Effort normal and breath sounds normal. No respiratory distress. She has no wheezes. She has no rhonchi. She has no rales. She exhibits no tenderness.   Abdominal: Soft. Bowel sounds are normal. She exhibits no distension, no abdominal bruit and no mass. There is no tenderness.   Musculoskeletal: She exhibits no edema or deformity.   Lymphadenopathy:     She has no cervical adenopathy.   Neurological: She is alert and oriented to person, place, and time. No cranial nerve deficit.   Skin: Skin is warm and dry. No rash noted. She is not diaphoretic. No cyanosis. No pallor. Nails show no clubbing.   Psychiatric: She has a normal mood " and affect. Judgment normal.   Vitals reviewed.      Lab Review:       Assessment:      Diagnosis Plan   1. Essential hypertension     2. Hyperlipidemia, unspecified hyperlipidemia type     3. Obstructive sleep apnea syndrome       1. Hypertension. Well controlled on current medical regimen.   2. Obstructive sleep apnea. Refer to sleep medicine.   3. Premature atrial complexes. Controlled with medications.  4. Hyperlipidemia on simvastatin.   5. Strong family history of coronary disease.  6. Gastroesophageal reflux disease, stable.  7. Moderate bilateral carotid artery stenosis. Normal carotid duplex study 12/2012.  8. Right leg pain, normal LE duplex for claudication done 6/2014. She has neuropathy  of her right leg causing pain.       Plan:       See katerina in 1 year.  No med changes.  Doing well.    No

## 2024-11-13 RX ORDER — METOPROLOL SUCCINATE 25 MG/1
25 TABLET, EXTENDED RELEASE ORAL NIGHTLY
Qty: 90 TABLET | Refills: 0 | Status: SHIPPED | OUTPATIENT
Start: 2024-11-13

## 2024-11-18 ENCOUNTER — TELEPHONE (OUTPATIENT)
Age: 84
End: 2024-11-18
Payer: MEDICARE

## 2024-11-18 RX ORDER — AMLODIPINE BESYLATE 2.5 MG/1
1.25 TABLET ORAL NIGHTLY
Qty: 45 TABLET | Refills: 3 | Status: SHIPPED | OUTPATIENT
Start: 2024-11-18

## 2024-11-18 NOTE — TELEPHONE ENCOUNTER
Recd fax to refill Amlodipine 5mg qd  [facility] - dose is 5mg on the fax  Qty 90 w/ refills    Jg 11/18/24

## 2025-01-31 RX ORDER — METOPROLOL SUCCINATE 25 MG/1
25 TABLET, EXTENDED RELEASE ORAL NIGHTLY
Qty: 90 TABLET | Refills: 1 | Status: SHIPPED | OUTPATIENT
Start: 2025-01-31

## 2025-03-18 ENCOUNTER — OFFICE VISIT (OUTPATIENT)
Dept: CARDIOLOGY | Facility: CLINIC | Age: 85
End: 2025-03-18
Payer: MEDICARE

## 2025-03-18 VITALS
WEIGHT: 136 LBS | DIASTOLIC BLOOD PRESSURE: 70 MMHG | BODY MASS INDEX: 26.7 KG/M2 | HEIGHT: 60 IN | SYSTOLIC BLOOD PRESSURE: 124 MMHG | HEART RATE: 61 BPM

## 2025-03-18 DIAGNOSIS — I49.1 PREMATURE ATRIAL COMPLEXES: ICD-10-CM

## 2025-03-18 DIAGNOSIS — R06.09 DOE (DYSPNEA ON EXERTION): Primary | ICD-10-CM

## 2025-03-18 DIAGNOSIS — E78.2 MIXED HYPERLIPIDEMIA: ICD-10-CM

## 2025-03-18 DIAGNOSIS — I10 PRIMARY HYPERTENSION: ICD-10-CM

## 2025-03-18 PROCEDURE — 93000 ELECTROCARDIOGRAM COMPLETE: CPT | Performed by: FAMILY MEDICINE

## 2025-03-18 PROCEDURE — 99214 OFFICE O/P EST MOD 30 MIN: CPT | Performed by: FAMILY MEDICINE

## 2025-03-18 NOTE — PROGRESS NOTES
Date of Office Visit: 2025  Encounter Provider: ARABELLA Lee  Place of Service: [facility] CARDIOLOGY  Established cardiologist: Sandra Winters MD  Patient Name: Itzel Edmond  :1940      Patient ID:  Itzel Edmond is a 84 y.o. female is here for  followup    With a pertinent medical history of hypertension, hyperlipidemia, CKD, GERD, right lower extremity neuropathy, JHOANA does not tolerate cpap.     History of Present Illness   completed treadmill stress study at [facility] which was nonischemic.  EF 70%.  Carotid duplex at this time showed moderate bilateral internal carotid artery disease.  Echo EF 60 to 65% with mild diastolic dysfunction, mild mitral tricuspid insufficiency.     Holter  was unremarkable sinus rhythm rare PVCs Short run of NSVT.     2014 presented with chest tightness short windedness nonischemic PET stress was completed at this time.    [facility]  showed minimal CAD echo done with EF 64%, G1 DD, mild mitral insufficiency, trace aortic insufficiency.    Echocardiogram  with EF 65%, G1 DD, normal left atrial size, normal saline study, calcified mitral valve with mild mitral insufficiency.     suffered a fall and subdural hematoma which increased in size and she ultimately had bur holes done 2022.     Nonischemic PET stress 2023. She wore a 13-day monitor 2023 which showed 22 episodes of SVT, the fastest SVT was 7 beats at 173 with the longest SVT 19 beats with a heart rate of 101 bpm. She had frequent PACs at 4.9% in singles and couplets. She did report palpitations while wearing the monitor and the palpitations correlated with PACs.     Echocardiogram 2024 normal diastolic function, EF 61%, calcified aortic valve without stenosis, severe mitral annular calcification, trace to mild mitral insufficiency.    She last saw Dr. Winters in the office 2024 she was overall stable and no  "changes were made.     Today Itzel presents for follow-up.  She tells me she is very active she volunteers and is a part of several different [employer] groups and has remained very independent.  She has noticed over the past few months there has been some increased dyspnea with her activity.  No chest pain or pressure.  No heart racing or palpitations.  No lightheadedness, dizziness, presyncope/syncope.  No leg swelling.       Current Outpatient Medications on File Prior to Visit   Medication Sig Dispense Refill    amLODIPine (NORVASC) 2.5 MG tablet Take 0.5 tablets by mouth Every Night. 45 tablet 3    atorvastatin (LIPITOR) 20 MG tablet Take 1 tablet by mouth Daily. 90 tablet 3    DULoxetine (CYMBALTA) 30 MG capsule Take 1 capsule by mouth Daily.      hydroCHLOROthiazide (HYDRODIURIL) 25 MG tablet Take 1 tablet by mouth Daily.      losartan (COZAAR) 100 MG tablet Take 1 tablet by mouth Daily.      metoprolol succinate XL (TOPROL-XL) 25 MG 24 hr tablet Take 1 tablet by mouth Every Night. 90 tablet 1    pramipexole (MIRAPEX) 0.25 MG tablet Take 2 tablets by mouth Every Night for 90 days. 180 tablet 0     No current facility-administered medications on file prior to visit.         Procedures    ECG 12 Lead    Date/Time: 3/18/2025 2:51 PM  Performed by: Barbara Leigh APRN    Authorized by: Barbara Leigh APRN  Comparison: compared with previous ECG from 7/23/2024  Comparison to previous ECG: Sinus rhythm, RBBB, new T wave inversion is seen in leads III, V2, V3, and new ST segment abnormalities are seen in aVL, V4.              Objective:      Vitals:    03/18/25 1408   BP: 124/70   Pulse: 61   Weight: 61.7 kg (136 lb)   Height: 152.4 cm (60\")     Body mass index is 26.56 kg/m².  Wt Readings from Last 3 Encounters:   03/18/25 61.7 kg (136 lb)   07/23/24 64.4 kg (142 lb)   04/22/24 68 kg (150 lb)         Constitutional:       Appearance: Not in distress.   Eyes:      Pupils: Pupils are equal, round, and reactive " to light.   Neck:      Vascular: No JVD.   Pulmonary:      Effort: Pulmonary effort is normal.      Breath sounds: Normal breath sounds.   Cardiovascular:      Normal rate. Regular rhythm.      Murmurs: There is no murmur.   Pulses:     Intact distal pulses.   Edema:     Peripheral edema absent.   Abdominal:      General: Bowel sounds are normal.      Palpations: Abdomen is soft.   Musculoskeletal:      Cervical back: Normal range of motion. Skin:     General: Skin is warm and dry.   Neurological:      Mental Status: Alert, oriented to person, place, and time and oriented to person, place and time.   Psychiatric:         Speech: Speech normal.         Lab Review:   11/20/24 BMP creatinine 1.57, bun 26, , GFR 32. T chol 111, , HDL 41, LDL 39.  CBC unremarkable.     Assessment:     1. WOODARD (dyspnea on exertion)    2. Primary hypertension    3. Mixed hyperlipidemia    4. Premature atrial complexes      WOODARD, progressive. EKG changes noted today. No chest pain or pressure.   Hypertension, goal <120/80.  Remain on amlodipine, losartan and hydrochlorothiazide.  JHOANA-stopped using her BiPAP 7/2024 and does not want to go back on this.  She says she feels better off of it.  PACs, frequent, on metoprolol succinate 25 mg nightly.  pSVT, well treated with metoprolol  Hyperlipidemia, on atorvastatin  CKD 3b.   History of traumatic subdural hematoma 2021 after a fall.  Right lower extremity neuropathy.  Family history of CAD  LE edema,-due to warm weather and amlodipine, much better with amlodipine decreased to 1.25mg daily.   Mitral valve calcification     Plan:   Increased exertional dyspnea with new EKG changes, reviewed with Dr. Winters, echocardiogram and stress test is recommended and I have ordered these for her.        Thank you for allowing me to participate in this patient's care. Please call with any questions or concerns.          Dragon dictation device was utilized in this note.

## 2025-06-27 RX ORDER — METOPROLOL SUCCINATE 25 MG/1
25 TABLET, EXTENDED RELEASE ORAL NIGHTLY
Qty: 90 TABLET | Refills: 3 | Status: SHIPPED | OUTPATIENT
Start: 2025-06-27

## 2025-06-27 NOTE — TELEPHONE ENCOUNTER
NOV:None scheduled   LOV: 3/18/25 EE     Plan:   Increased exertional dyspnea with new EKG changes, reviewed with Dr. Winters, echocardiogram and stress test is recommended and I have ordered these for her.           Thank you for allowing me to participate in this patient's care. Please call with any questions or concerns.

## 2025-06-30 ENCOUNTER — TELEPHONE (OUTPATIENT)
Dept: CARDIOLOGY | Age: 85
End: 2025-06-30

## 2025-06-30 NOTE — TELEPHONE ENCOUNTER
Caller: Itzel Edmond    Relationship to patient: Self    Best call back number: [phone]    Patient is needing: PT REQUESTING TO CANCEL STRESS AND ECHO ON 07.03.2025 AT THIS TIME. WILL CALL BACK WHEN NEEDS TO RSD.

## 2025-07-19 ENCOUNTER — APPOINTMENT (OUTPATIENT)
Dept: CT IMAGING | Facility: HOSPITAL | Age: 85
End: 2025-07-19
Payer: MEDICARE

## 2025-07-19 ENCOUNTER — ANESTHESIA (OUTPATIENT)
Dept: PERIOP | Facility: HOSPITAL | Age: 85
End: 2025-07-19
Payer: MEDICARE

## 2025-07-19 ENCOUNTER — HOSPITAL ENCOUNTER (INPATIENT)
Facility: HOSPITAL | Age: 85
LOS: 11 days | Discharge: HOME | End: 2025-07-30
Attending: EMERGENCY MEDICINE | Admitting: INTERNAL MEDICINE
Payer: MEDICARE

## 2025-07-19 ENCOUNTER — APPOINTMENT (OUTPATIENT)
Dept: GENERAL RADIOLOGY | Facility: HOSPITAL | Age: 85
End: 2025-07-19
Payer: MEDICARE

## 2025-07-19 ENCOUNTER — ANESTHESIA EVENT (OUTPATIENT)
Dept: PERIOP | Facility: HOSPITAL | Age: 85
End: 2025-07-19
Payer: MEDICARE

## 2025-07-19 DIAGNOSIS — R93.89 ABNORMAL CT SCAN: ICD-10-CM

## 2025-07-19 DIAGNOSIS — K56.609 SBO (SMALL BOWEL OBSTRUCTION): Primary | ICD-10-CM

## 2025-07-19 PROBLEM — N17.9 AKI (ACUTE KIDNEY INJURY): Status: ACTIVE | Noted: 2025-07-19

## 2025-07-19 LAB
ALBUMIN SERPL-MCNC: 4.4 G/DL (ref 3.5–5.2)
ALBUMIN/GLOB SERPL: 1.6 G/DL
ALP SERPL-CCNC: 54 U/L (ref 39–117)
ALT SERPL W P-5'-P-CCNC: 12 U/L (ref 1–33)
ANION GAP SERPL CALCULATED.3IONS-SCNC: 15 MMOL/L (ref 5–15)
AST SERPL-CCNC: 30 U/L (ref 1–32)
BASOPHILS # BLD AUTO: 0.02 10*3/MM3 (ref 0–0.2)
BASOPHILS NFR BLD AUTO: 0.2 % (ref 0–1.5)
BILIRUB SERPL-MCNC: 0.5 MG/DL (ref 0–1.2)
BILIRUB UR QL STRIP: NEGATIVE
BUN SERPL-MCNC: 40 MG/DL (ref 8–23)
BUN/CREAT SERPL: 25.3 (ref 7–25)
CALCIUM SPEC-SCNC: 9.4 MG/DL (ref 8.6–10.5)
CHLORIDE SERPL-SCNC: 101 MMOL/L (ref 98–107)
CLARITY UR: CLEAR
CO2 SERPL-SCNC: 21 MMOL/L (ref 22–29)
COLOR UR: YELLOW
CREAT SERPL-MCNC: 1.58 MG/DL (ref 0.57–1)
DEPRECATED RDW RBC AUTO: 40.1 FL (ref 37–54)
EGFRCR SERPLBLD CKD-EPI 2021: 31.6 ML/MIN/1.73
EOSINOPHIL # BLD AUTO: 0.03 10*3/MM3 (ref 0–0.4)
EOSINOPHIL NFR BLD AUTO: 0.2 % (ref 0.3–6.2)
ERYTHROCYTE [DISTWIDTH] IN BLOOD BY AUTOMATED COUNT: 12.2 % (ref 12.3–15.4)
GEN 5 1HR TROPONIN T REFLEX: 22 NG/L
GLOBULIN UR ELPH-MCNC: 2.8 GM/DL
GLUCOSE SERPL-MCNC: 146 MG/DL (ref 65–99)
GLUCOSE UR STRIP-MCNC: NEGATIVE MG/DL
HCT VFR BLD AUTO: 38.7 % (ref 34–46.6)
HGB BLD-MCNC: 12.9 G/DL (ref 12–15.9)
HGB UR QL STRIP.AUTO: NEGATIVE
HOLD SPECIMEN: NORMAL
HOLD SPECIMEN: NORMAL
IMM GRANULOCYTES # BLD AUTO: 0.06 10*3/MM3 (ref 0–0.05)
IMM GRANULOCYTES NFR BLD AUTO: 0.5 % (ref 0–0.5)
KETONES UR QL STRIP: NEGATIVE
LEUKOCYTE ESTERASE UR QL STRIP.AUTO: NEGATIVE
LIPASE SERPL-CCNC: 64 U/L (ref 13–60)
LYMPHOCYTES # BLD AUTO: 0.96 10*3/MM3 (ref 0.7–3.1)
LYMPHOCYTES NFR BLD AUTO: 7.4 % (ref 19.6–45.3)
MCH RBC QN AUTO: 30.6 PG (ref 26.6–33)
MCHC RBC AUTO-ENTMCNC: 33.3 G/DL (ref 31.5–35.7)
MCV RBC AUTO: 91.7 FL (ref 79–97)
MONOCYTES # BLD AUTO: 0.6 10*3/MM3 (ref 0.1–0.9)
MONOCYTES NFR BLD AUTO: 4.6 % (ref 5–12)
NEUTROPHILS NFR BLD AUTO: 11.34 10*3/MM3 (ref 1.7–7)
NEUTROPHILS NFR BLD AUTO: 87.1 % (ref 42.7–76)
NITRITE UR QL STRIP: NEGATIVE
NRBC BLD AUTO-RTO: 0 /100 WBC (ref 0–0.2)
NT-PROBNP SERPL-MCNC: 536 PG/ML (ref 0–1800)
PH UR STRIP.AUTO: 6.5 [PH] (ref 5–8)
PLATELET # BLD AUTO: 247 10*3/MM3 (ref 140–450)
PMV BLD AUTO: 10.4 FL (ref 6–12)
POTASSIUM SERPL-SCNC: 4.7 MMOL/L (ref 3.5–5.2)
PROT SERPL-MCNC: 7.2 G/DL (ref 6–8.5)
PROT UR QL STRIP: NEGATIVE
QT INTERVAL: 459 MS
QTC INTERVAL: 433 MS
RBC # BLD AUTO: 4.22 10*6/MM3 (ref 3.77–5.28)
SODIUM SERPL-SCNC: 137 MMOL/L (ref 136–145)
SP GR UR STRIP: >1.03 (ref 1–1.03)
TROPONIN T % DELTA: 16
TROPONIN T NUMERIC DELTA: 3 NG/L
TROPONIN T SERPL HS-MCNC: 19 NG/L
UROBILINOGEN UR QL STRIP: ABNORMAL
WBC NRBC COR # BLD AUTO: 13.01 10*3/MM3 (ref 3.4–10.8)
WHOLE BLOOD HOLD COAG: NORMAL
WHOLE BLOOD HOLD SPECIMEN: NORMAL

## 2025-07-19 PROCEDURE — 25510000001 IOPAMIDOL 61 % SOLUTION: Performed by: EMERGENCY MEDICINE

## 2025-07-19 PROCEDURE — 25010000002 SUGAMMADEX 200 MG/2ML SOLUTION

## 2025-07-19 PROCEDURE — 99285 EMERGENCY DEPT VISIT HI MDM: CPT

## 2025-07-19 PROCEDURE — 85025 COMPLETE CBC W/AUTO DIFF WBC: CPT | Performed by: PHYSICIAN ASSISTANT

## 2025-07-19 PROCEDURE — 02HV33Z INSERTION OF INFUSION DEVICE INTO SUPERIOR VENA CAVA, PERCUTANEOUS APPROACH: ICD-10-PCS | Performed by: INTERNAL MEDICINE

## 2025-07-19 PROCEDURE — 25010000002 MORPHINE PER 10 MG: Performed by: SURGERY

## 2025-07-19 PROCEDURE — 44005 FREEING OF BOWEL ADHESION: CPT | Performed by: SURGERY

## 2025-07-19 PROCEDURE — 3E0336Z INTRODUCTION OF NUTRITIONAL SUBSTANCE INTO PERIPHERAL VEIN, PERCUTANEOUS APPROACH: ICD-10-PCS | Performed by: INTERNAL MEDICINE

## 2025-07-19 PROCEDURE — 25010000002 ONDANSETRON PER 1 MG

## 2025-07-19 PROCEDURE — 25010000002 FENTANYL CITRATE (PF) 50 MCG/ML SOLUTION

## 2025-07-19 PROCEDURE — 25010000002 LIDOCAINE 2% SOLUTION

## 2025-07-19 PROCEDURE — 44005 FREEING OF BOWEL ADHESION: CPT | Performed by: PHYSICIAN ASSISTANT

## 2025-07-19 PROCEDURE — 80053 COMPREHEN METABOLIC PANEL: CPT | Performed by: PHYSICIAN ASSISTANT

## 2025-07-19 PROCEDURE — 25010000002 DEXAMETHASONE SODIUM PHOSPHATE 20 MG/5ML SOLUTION

## 2025-07-19 PROCEDURE — 25010000002 HYDROMORPHONE PER 4 MG

## 2025-07-19 PROCEDURE — 93010 ELECTROCARDIOGRAM REPORT: CPT | Performed by: INTERNAL MEDICINE

## 2025-07-19 PROCEDURE — 0DN80ZZ RELEASE SMALL INTESTINE, OPEN APPROACH: ICD-10-PCS | Performed by: SURGERY

## 2025-07-19 PROCEDURE — 25810000003 LACTATED RINGERS PER 1000 ML: Performed by: STUDENT IN AN ORGANIZED HEALTH CARE EDUCATION/TRAINING PROGRAM

## 2025-07-19 PROCEDURE — 81003 URINALYSIS AUTO W/O SCOPE: CPT | Performed by: PHYSICIAN ASSISTANT

## 2025-07-19 PROCEDURE — 25010000002 HYDRALAZINE PER 20 MG

## 2025-07-19 PROCEDURE — 25810000003 SODIUM CHLORIDE 0.9 % SOLUTION: Performed by: PHYSICIAN ASSISTANT

## 2025-07-19 PROCEDURE — 99222 1ST HOSP IP/OBS MODERATE 55: CPT | Performed by: SURGERY

## 2025-07-19 PROCEDURE — 83690 ASSAY OF LIPASE: CPT | Performed by: PHYSICIAN ASSISTANT

## 2025-07-19 PROCEDURE — 25010000002 CEFAZOLIN PER 500 MG: Performed by: SURGERY

## 2025-07-19 PROCEDURE — 93005 ELECTROCARDIOGRAM TRACING: CPT | Performed by: PHYSICIAN ASSISTANT

## 2025-07-19 PROCEDURE — 84484 ASSAY OF TROPONIN QUANT: CPT | Performed by: PHYSICIAN ASSISTANT

## 2025-07-19 PROCEDURE — 71045 X-RAY EXAM CHEST 1 VIEW: CPT

## 2025-07-19 PROCEDURE — 83880 ASSAY OF NATRIURETIC PEPTIDE: CPT | Performed by: PHYSICIAN ASSISTANT

## 2025-07-19 PROCEDURE — 36415 COLL VENOUS BLD VENIPUNCTURE: CPT

## 2025-07-19 PROCEDURE — 25010000002 PROPOFOL 10 MG/ML EMULSION

## 2025-07-19 PROCEDURE — 25010000002 LABETALOL 5 MG/ML SOLUTION

## 2025-07-19 PROCEDURE — 74177 CT ABD & PELVIS W/CONTRAST: CPT

## 2025-07-19 DEVICE — HORIZON TI ML 6 CLIPS/CART
Type: IMPLANTABLE DEVICE | Site: ABDOMEN | Status: FUNCTIONAL
Brand: WECK

## 2025-07-19 DEVICE — HORIZON TI LG 6 CLIPS/CART
Type: IMPLANTABLE DEVICE | Site: ABDOMEN | Status: FUNCTIONAL
Brand: WECK

## 2025-07-19 RX ORDER — NALOXONE HCL 0.4 MG/ML
0.2 VIAL (ML) INJECTION AS NEEDED
Status: DISCONTINUED | OUTPATIENT
Start: 2025-07-19 | End: 2025-07-19 | Stop reason: HOSPADM

## 2025-07-19 RX ORDER — ATROPINE SULFATE 0.4 MG/ML
0.4 INJECTION, SOLUTION INTRAMUSCULAR; INTRAVENOUS; SUBCUTANEOUS ONCE AS NEEDED
Status: DISCONTINUED | OUTPATIENT
Start: 2025-07-19 | End: 2025-07-19 | Stop reason: HOSPADM

## 2025-07-19 RX ORDER — NITROGLYCERIN 0.4 MG/1
0.4 TABLET SUBLINGUAL
Status: DISCONTINUED | OUTPATIENT
Start: 2025-07-19 | End: 2025-07-30 | Stop reason: HOSPADM

## 2025-07-19 RX ORDER — ACETAMINOPHEN 160 MG/5ML
650 SOLUTION ORAL EVERY 4 HOURS PRN
Status: DISCONTINUED | OUTPATIENT
Start: 2025-07-19 | End: 2025-07-30 | Stop reason: HOSPADM

## 2025-07-19 RX ORDER — PHENYLEPHRINE HCL IN 0.9% NACL 1 MG/10 ML
SYRINGE (ML) INTRAVENOUS AS NEEDED
Status: DISCONTINUED | OUTPATIENT
Start: 2025-07-19 | End: 2025-07-19 | Stop reason: SURG

## 2025-07-19 RX ORDER — ONDANSETRON 2 MG/ML
4 INJECTION INTRAMUSCULAR; INTRAVENOUS ONCE
Status: DISCONTINUED | OUTPATIENT
Start: 2025-07-19 | End: 2025-07-30 | Stop reason: HOSPADM

## 2025-07-19 RX ORDER — ONDANSETRON 4 MG/1
4 TABLET, ORALLY DISINTEGRATING ORAL EVERY 6 HOURS PRN
Status: DISCONTINUED | OUTPATIENT
Start: 2025-07-19 | End: 2025-07-30 | Stop reason: HOSPADM

## 2025-07-19 RX ORDER — DROPERIDOL 2.5 MG/ML
0.62 INJECTION, SOLUTION INTRAMUSCULAR; INTRAVENOUS
Status: DISCONTINUED | OUTPATIENT
Start: 2025-07-19 | End: 2025-07-19 | Stop reason: HOSPADM

## 2025-07-19 RX ORDER — PROPOFOL 10 MG/ML
VIAL (ML) INTRAVENOUS AS NEEDED
Status: DISCONTINUED | OUTPATIENT
Start: 2025-07-19 | End: 2025-07-19 | Stop reason: SURG

## 2025-07-19 RX ORDER — FENTANYL CITRATE 50 UG/ML
25 INJECTION, SOLUTION INTRAMUSCULAR; INTRAVENOUS
Status: DISCONTINUED | OUTPATIENT
Start: 2025-07-19 | End: 2025-07-19 | Stop reason: HOSPADM

## 2025-07-19 RX ORDER — SODIUM CHLORIDE 0.9 % (FLUSH) 0.9 %
3-10 SYRINGE (ML) INJECTION AS NEEDED
Status: DISCONTINUED | OUTPATIENT
Start: 2025-07-19 | End: 2025-07-19 | Stop reason: HOSPADM

## 2025-07-19 RX ORDER — EPHEDRINE SULFATE 50 MG/ML
5 INJECTION, SOLUTION INTRAVENOUS ONCE AS NEEDED
Status: DISCONTINUED | OUTPATIENT
Start: 2025-07-19 | End: 2025-07-19 | Stop reason: HOSPADM

## 2025-07-19 RX ORDER — LIDOCAINE HYDROCHLORIDE 20 MG/ML
INJECTION, SOLUTION INFILTRATION; PERINEURAL AS NEEDED
Status: DISCONTINUED | OUTPATIENT
Start: 2025-07-19 | End: 2025-07-19 | Stop reason: SURG

## 2025-07-19 RX ORDER — HYDRALAZINE HYDROCHLORIDE 20 MG/ML
INJECTION INTRAMUSCULAR; INTRAVENOUS AS NEEDED
Status: DISCONTINUED | OUTPATIENT
Start: 2025-07-19 | End: 2025-07-19 | Stop reason: SURG

## 2025-07-19 RX ORDER — FENTANYL CITRATE 50 UG/ML
INJECTION, SOLUTION INTRAMUSCULAR; INTRAVENOUS AS NEEDED
Status: DISCONTINUED | OUTPATIENT
Start: 2025-07-19 | End: 2025-07-19 | Stop reason: SURG

## 2025-07-19 RX ORDER — SODIUM CHLORIDE 0.9 % (FLUSH) 0.9 %
10 SYRINGE (ML) INJECTION AS NEEDED
Status: DISCONTINUED | OUTPATIENT
Start: 2025-07-19 | End: 2025-07-30 | Stop reason: HOSPADM

## 2025-07-19 RX ORDER — SODIUM CHLORIDE 0.9 % (FLUSH) 0.9 %
10 SYRINGE (ML) INJECTION EVERY 12 HOURS SCHEDULED
Status: DISCONTINUED | OUTPATIENT
Start: 2025-07-19 | End: 2025-07-30 | Stop reason: HOSPADM

## 2025-07-19 RX ORDER — PROMETHAZINE HYDROCHLORIDE 25 MG/1
25 TABLET ORAL ONCE AS NEEDED
Status: DISCONTINUED | OUTPATIENT
Start: 2025-07-19 | End: 2025-07-19 | Stop reason: HOSPADM

## 2025-07-19 RX ORDER — SODIUM CHLORIDE 9 MG/ML
100 INJECTION, SOLUTION INTRAVENOUS CONTINUOUS
Status: ACTIVE | OUTPATIENT
Start: 2025-07-19 | End: 2025-07-20

## 2025-07-19 RX ORDER — PROMETHAZINE HYDROCHLORIDE 25 MG/1
25 SUPPOSITORY RECTAL ONCE AS NEEDED
Status: DISCONTINUED | OUTPATIENT
Start: 2025-07-19 | End: 2025-07-19 | Stop reason: HOSPADM

## 2025-07-19 RX ORDER — MAGNESIUM HYDROXIDE 1200 MG/15ML
LIQUID ORAL AS NEEDED
Status: DISCONTINUED | OUTPATIENT
Start: 2025-07-19 | End: 2025-07-19 | Stop reason: HOSPADM

## 2025-07-19 RX ORDER — SODIUM CHLORIDE 0.9 % (FLUSH) 0.9 %
3 SYRINGE (ML) INJECTION EVERY 12 HOURS SCHEDULED
Status: DISCONTINUED | OUTPATIENT
Start: 2025-07-19 | End: 2025-07-19 | Stop reason: HOSPADM

## 2025-07-19 RX ORDER — DEXAMETHASONE SODIUM PHOSPHATE 4 MG/ML
INJECTION, SOLUTION INTRA-ARTICULAR; INTRALESIONAL; INTRAMUSCULAR; INTRAVENOUS; SOFT TISSUE AS NEEDED
Status: DISCONTINUED | OUTPATIENT
Start: 2025-07-19 | End: 2025-07-19 | Stop reason: SURG

## 2025-07-19 RX ORDER — ACETAMINOPHEN 650 MG/1
650 SUPPOSITORY RECTAL EVERY 4 HOURS PRN
Status: DISCONTINUED | OUTPATIENT
Start: 2025-07-19 | End: 2025-07-30 | Stop reason: HOSPADM

## 2025-07-19 RX ORDER — ONDANSETRON 2 MG/ML
4 INJECTION INTRAMUSCULAR; INTRAVENOUS ONCE AS NEEDED
Status: DISCONTINUED | OUTPATIENT
Start: 2025-07-19 | End: 2025-07-19 | Stop reason: HOSPADM

## 2025-07-19 RX ORDER — LABETALOL HYDROCHLORIDE 5 MG/ML
5 INJECTION, SOLUTION INTRAVENOUS
Status: DISCONTINUED | OUTPATIENT
Start: 2025-07-19 | End: 2025-07-19 | Stop reason: HOSPADM

## 2025-07-19 RX ORDER — LABETALOL HYDROCHLORIDE 5 MG/ML
INJECTION, SOLUTION INTRAVENOUS AS NEEDED
Status: DISCONTINUED | OUTPATIENT
Start: 2025-07-19 | End: 2025-07-19 | Stop reason: SURG

## 2025-07-19 RX ORDER — DULOXETIN HYDROCHLORIDE 30 MG/1
30 CAPSULE, DELAYED RELEASE ORAL DAILY
Status: DISCONTINUED | OUTPATIENT
Start: 2025-07-19 | End: 2025-07-30 | Stop reason: HOSPADM

## 2025-07-19 RX ORDER — ONDANSETRON 2 MG/ML
4 INJECTION INTRAMUSCULAR; INTRAVENOUS EVERY 6 HOURS PRN
Status: DISCONTINUED | OUTPATIENT
Start: 2025-07-19 | End: 2025-07-30 | Stop reason: HOSPADM

## 2025-07-19 RX ORDER — ATORVASTATIN CALCIUM 20 MG/1
20 TABLET, FILM COATED ORAL DAILY
Status: DISCONTINUED | OUTPATIENT
Start: 2025-07-19 | End: 2025-07-30 | Stop reason: HOSPADM

## 2025-07-19 RX ORDER — SUCCINYLCHOLINE/SOD CL,ISO/PF 200MG/10ML
SYRINGE (ML) INTRAVENOUS AS NEEDED
Status: DISCONTINUED | OUTPATIENT
Start: 2025-07-19 | End: 2025-07-19 | Stop reason: SURG

## 2025-07-19 RX ORDER — SODIUM CHLORIDE, SODIUM LACTATE, POTASSIUM CHLORIDE, CALCIUM CHLORIDE 600; 310; 30; 20 MG/100ML; MG/100ML; MG/100ML; MG/100ML
9 INJECTION, SOLUTION INTRAVENOUS CONTINUOUS
Status: DISCONTINUED | OUTPATIENT
Start: 2025-07-19 | End: 2025-07-19

## 2025-07-19 RX ORDER — MORPHINE SULFATE 2 MG/ML
4 INJECTION, SOLUTION INTRAMUSCULAR; INTRAVENOUS ONCE
Status: DISCONTINUED | OUTPATIENT
Start: 2025-07-19 | End: 2025-07-26

## 2025-07-19 RX ORDER — IOPAMIDOL 612 MG/ML
100 INJECTION, SOLUTION INTRAVASCULAR
Status: COMPLETED | OUTPATIENT
Start: 2025-07-19 | End: 2025-07-19

## 2025-07-19 RX ORDER — DEXTROSE MONOHYDRATE, SODIUM CHLORIDE, AND POTASSIUM CHLORIDE 50; 1.49; 4.5 G/1000ML; G/1000ML; G/1000ML
75 INJECTION, SOLUTION INTRAVENOUS CONTINUOUS
Status: DISPENSED | OUTPATIENT
Start: 2025-07-19 | End: 2025-07-22

## 2025-07-19 RX ORDER — FLUMAZENIL 0.1 MG/ML
0.2 INJECTION INTRAVENOUS AS NEEDED
Status: DISCONTINUED | OUTPATIENT
Start: 2025-07-19 | End: 2025-07-19 | Stop reason: HOSPADM

## 2025-07-19 RX ORDER — MORPHINE SULFATE 2 MG/ML
2 INJECTION, SOLUTION INTRAMUSCULAR; INTRAVENOUS
Status: DISPENSED | OUTPATIENT
Start: 2025-07-19 | End: 2025-07-29

## 2025-07-19 RX ORDER — ROCURONIUM BROMIDE 10 MG/ML
INJECTION, SOLUTION INTRAVENOUS AS NEEDED
Status: DISCONTINUED | OUTPATIENT
Start: 2025-07-19 | End: 2025-07-19 | Stop reason: SURG

## 2025-07-19 RX ORDER — ONDANSETRON 2 MG/ML
INJECTION INTRAMUSCULAR; INTRAVENOUS AS NEEDED
Status: DISCONTINUED | OUTPATIENT
Start: 2025-07-19 | End: 2025-07-19 | Stop reason: SURG

## 2025-07-19 RX ORDER — HYDROCODONE BITARTRATE AND ACETAMINOPHEN 5; 325 MG/1; MG/1
1 TABLET ORAL ONCE AS NEEDED
Status: DISCONTINUED | OUTPATIENT
Start: 2025-07-19 | End: 2025-07-19 | Stop reason: HOSPADM

## 2025-07-19 RX ORDER — HYDROMORPHONE HYDROCHLORIDE 1 MG/ML
0.25 INJECTION, SOLUTION INTRAMUSCULAR; INTRAVENOUS; SUBCUTANEOUS
Status: DISCONTINUED | OUTPATIENT
Start: 2025-07-19 | End: 2025-07-19 | Stop reason: HOSPADM

## 2025-07-19 RX ORDER — DIPHENHYDRAMINE HYDROCHLORIDE 50 MG/ML
12.5 INJECTION, SOLUTION INTRAMUSCULAR; INTRAVENOUS
Status: DISCONTINUED | OUTPATIENT
Start: 2025-07-19 | End: 2025-07-19 | Stop reason: HOSPADM

## 2025-07-19 RX ORDER — HYDROCODONE BITARTRATE AND ACETAMINOPHEN 7.5; 325 MG/1; MG/1
1 TABLET ORAL EVERY 4 HOURS PRN
Status: DISCONTINUED | OUTPATIENT
Start: 2025-07-19 | End: 2025-07-19 | Stop reason: HOSPADM

## 2025-07-19 RX ORDER — AMLODIPINE BESYLATE 2.5 MG/1
1.25 TABLET ORAL NIGHTLY
Status: DISCONTINUED | OUTPATIENT
Start: 2025-07-19 | End: 2025-07-24

## 2025-07-19 RX ORDER — HYDRALAZINE HYDROCHLORIDE 20 MG/ML
5 INJECTION INTRAMUSCULAR; INTRAVENOUS
Status: DISCONTINUED | OUTPATIENT
Start: 2025-07-19 | End: 2025-07-19 | Stop reason: HOSPADM

## 2025-07-19 RX ORDER — ACETAMINOPHEN 325 MG/1
650 TABLET ORAL EVERY 4 HOURS PRN
Status: DISCONTINUED | OUTPATIENT
Start: 2025-07-19 | End: 2025-07-30 | Stop reason: HOSPADM

## 2025-07-19 RX ORDER — LIDOCAINE HYDROCHLORIDE 10 MG/ML
0.5 INJECTION, SOLUTION INFILTRATION; PERINEURAL ONCE AS NEEDED
Status: DISCONTINUED | OUTPATIENT
Start: 2025-07-19 | End: 2025-07-19 | Stop reason: HOSPADM

## 2025-07-19 RX ORDER — IPRATROPIUM BROMIDE AND ALBUTEROL SULFATE 2.5; .5 MG/3ML; MG/3ML
3 SOLUTION RESPIRATORY (INHALATION) ONCE AS NEEDED
Status: DISCONTINUED | OUTPATIENT
Start: 2025-07-19 | End: 2025-07-19 | Stop reason: HOSPADM

## 2025-07-19 RX ORDER — SODIUM CHLORIDE 9 MG/ML
40 INJECTION, SOLUTION INTRAVENOUS AS NEEDED
Status: DISCONTINUED | OUTPATIENT
Start: 2025-07-19 | End: 2025-07-30 | Stop reason: HOSPADM

## 2025-07-19 RX ORDER — METOPROLOL SUCCINATE 25 MG/1
25 TABLET, EXTENDED RELEASE ORAL NIGHTLY
Status: DISCONTINUED | OUTPATIENT
Start: 2025-07-19 | End: 2025-07-30 | Stop reason: HOSPADM

## 2025-07-19 RX ORDER — BUPIVACAINE HYDROCHLORIDE AND EPINEPHRINE 2.5; 5 MG/ML; UG/ML
INJECTION, SOLUTION EPIDURAL; INFILTRATION; INTRACAUDAL; PERINEURAL AS NEEDED
Status: DISCONTINUED | OUTPATIENT
Start: 2025-07-19 | End: 2025-07-19 | Stop reason: HOSPADM

## 2025-07-19 RX ADMIN — PROPOFOL 25 MCG/KG/MIN: 10 INJECTION, EMULSION INTRAVENOUS at 18:14

## 2025-07-19 RX ADMIN — DEXAMETHASONE SODIUM PHOSPHATE 6 MG: 4 INJECTION, SOLUTION INTRAMUSCULAR; INTRAVENOUS at 17:38

## 2025-07-19 RX ADMIN — PROPOFOL 100 MG: 10 INJECTION, EMULSION INTRAVENOUS at 17:34

## 2025-07-19 RX ADMIN — LIDOCAINE HYDROCHLORIDE 60 MG: 20 INJECTION, SOLUTION INFILTRATION; PERINEURAL at 17:34

## 2025-07-19 RX ADMIN — FENTANYL CITRATE 25 MCG: 50 INJECTION, SOLUTION INTRAMUSCULAR; INTRAVENOUS at 19:27

## 2025-07-19 RX ADMIN — Medication 80 MG: at 17:34

## 2025-07-19 RX ADMIN — LABETALOL HYDROCHLORIDE 5 MG: 5 INJECTION, SOLUTION INTRAVENOUS at 17:56

## 2025-07-19 RX ADMIN — IOPAMIDOL 85 ML: 612 INJECTION, SOLUTION INTRAVENOUS at 13:24

## 2025-07-19 RX ADMIN — DEXTROSE MONOHYDRATE, SODIUM CHLORIDE, AND POTASSIUM CHLORIDE 100 ML/HR: 50; 4.5; 1.49 INJECTION, SOLUTION INTRAVENOUS at 22:40

## 2025-07-19 RX ADMIN — ROCURONIUM BROMIDE 30 MG: 10 INJECTION, SOLUTION INTRAVENOUS at 17:43

## 2025-07-19 RX ADMIN — FENTANYL CITRATE 25 MCG: 50 INJECTION, SOLUTION INTRAMUSCULAR; INTRAVENOUS at 17:52

## 2025-07-19 RX ADMIN — SODIUM CHLORIDE 500 ML: 9 INJECTION, SOLUTION INTRAVENOUS at 14:34

## 2025-07-19 RX ADMIN — Medication 100 MCG: at 17:46

## 2025-07-19 RX ADMIN — LABETALOL HYDROCHLORIDE 5 MG: 5 INJECTION, SOLUTION INTRAVENOUS at 18:04

## 2025-07-19 RX ADMIN — MORPHINE SULFATE 2 MG: 2 INJECTION, SOLUTION INTRAMUSCULAR; INTRAVENOUS at 21:19

## 2025-07-19 RX ADMIN — FENTANYL CITRATE 25 MCG: 50 INJECTION, SOLUTION INTRAMUSCULAR; INTRAVENOUS at 17:34

## 2025-07-19 RX ADMIN — HYDROMORPHONE HYDROCHLORIDE 0.25 MG: 1 INJECTION, SOLUTION INTRAMUSCULAR; INTRAVENOUS; SUBCUTANEOUS at 19:25

## 2025-07-19 RX ADMIN — METOPROLOL SUCCINATE 25 MG: 25 TABLET, EXTENDED RELEASE ORAL at 21:19

## 2025-07-19 RX ADMIN — ONDANSETRON 4 MG: 2 INJECTION, SOLUTION INTRAMUSCULAR; INTRAVENOUS at 18:31

## 2025-07-19 RX ADMIN — LABETALOL HYDROCHLORIDE 5 MG: 5 INJECTION, SOLUTION INTRAVENOUS at 18:49

## 2025-07-19 RX ADMIN — HYDROMORPHONE HYDROCHLORIDE 0.25 MG: 1 INJECTION, SOLUTION INTRAMUSCULAR; INTRAVENOUS; SUBCUTANEOUS at 19:11

## 2025-07-19 RX ADMIN — AMLODIPINE BESYLATE 1.25 MG: 2.5 TABLET ORAL at 21:18

## 2025-07-19 RX ADMIN — PROPOFOL 40 MG: 10 INJECTION, EMULSION INTRAVENOUS at 17:37

## 2025-07-19 RX ADMIN — SUGAMMADEX 200 MG: 100 INJECTION, SOLUTION INTRAVENOUS at 18:35

## 2025-07-19 RX ADMIN — SODIUM CHLORIDE 100 ML/HR: 9 INJECTION, SOLUTION INTRAVENOUS at 20:34

## 2025-07-19 RX ADMIN — ATORVASTATIN CALCIUM 20 MG: 20 TABLET, FILM COATED ORAL at 21:19

## 2025-07-19 RX ADMIN — Medication 10 ML: at 21:19

## 2025-07-19 RX ADMIN — HYDRALAZINE HYDROCHLORIDE 5 MG: 20 INJECTION INTRAMUSCULAR; INTRAVENOUS at 18:42

## 2025-07-19 RX ADMIN — SODIUM CHLORIDE, POTASSIUM CHLORIDE, SODIUM LACTATE AND CALCIUM CHLORIDE 9 ML/HR: 600; 310; 30; 20 INJECTION, SOLUTION INTRAVENOUS at 16:49

## 2025-07-19 RX ADMIN — ROCURONIUM BROMIDE 10 MG: 10 INJECTION, SOLUTION INTRAVENOUS at 17:34

## 2025-07-19 RX ADMIN — CEFAZOLIN 2000 MG: 2 INJECTION, POWDER, FOR SOLUTION INTRAMUSCULAR; INTRAVENOUS at 17:21

## 2025-07-19 RX ADMIN — FENTANYL CITRATE 25 MCG: 50 INJECTION, SOLUTION INTRAMUSCULAR; INTRAVENOUS at 19:40

## 2025-07-19 RX ADMIN — DULOXETINE HYDROCHLORIDE 30 MG: 30 CAPSULE, DELAYED RELEASE ORAL at 21:19

## 2025-07-19 NOTE — ED PROVIDER NOTES
MD ATTESTATION NOTE    The MISBAH and I have discussed this patient's history, physical exam, and treatment plan.  I have reviewed the documentation and personally had a face to face interaction with the patient. I affirm the documentation and agree with the treatment and plan.  The attached note describes my personal findings.      I provided a substantive portion of the care of the patient.  I personally performed the physical exam in its entirety, and below are my findings.        Brief HPI: This patient is an 85-year-old female presenting to the emergency room today with right lower quadrant abdominal discomfort that has been worsening over the last few days.  She reports that the pain became quite a bit worse last night affecting her sleep.  She does report some associated nausea and vomiting but denies any associated diarrhea or constipation.  She denies fever/chills, back pain, chest pain, or shortness of breath.      PHYSICAL EXAM  ED Triage Vitals   Temp Heart Rate Resp BP SpO2   07/19/25 1201 07/19/25 1300 -- 07/19/25 1300 07/19/25 1300   97.8 °F (36.6 °C) 58  170/83 96 %      Temp src Heart Rate Source Patient Position BP Location FiO2 (%)   07/19/25 1201 -- -- -- --   Oral             GENERAL: Resting comfortably and in no acute distress, nontoxic in appearance  HENT: nares patent  EYES: no scleral icterus  CV: regular rhythm, normal rate, no M/R/G  RESPIRATORY: normal effort, lungs clear bilaterally  ABDOMEN: soft, mild right lower quadrant tenderness, no rebound or guarding  MUSCULOSKELETAL: no deformity, no edema  NEURO: alert, moves all extremities, follows commands  PSYCH:  calm, cooperative  SKIN: warm, dry    Vital signs and nursing notes reviewed.      Differential diagnosis includes but is not limited to acute appendicitis, musculoskeletal pain, small bowel obstruction, intraperitoneal free air, acute diverticulitis, acute colitis, or ureterolithiasis.      Plan: We will treat the patient's  discomfort as we obtain labs, urinalysis, as well as a CT scan of the abdomen and pelvis.  Further plan to follow.      CT scan of the abdomen and pelvis independently interpreted by myself with my interpretation showing multiple dilated loops of small bowel with air-fluid levels all consistent with an SBO.  No free air seen.       Ashvin Estrada MD  07/19/25 7215

## 2025-07-19 NOTE — ED NOTES
"Nursing report ED to floor  Itzel Edmond  85 y.o.  female    HPI :  HPI  Stated Reason for Visit: chest pain last night, right lower abd. pain \"like labor pains\"  History Obtained From: patient, EMS  Associated Signs/Symptoms: abdominal distension    Chief Complaint  Chief Complaint   Patient presents with    Abdominal Pain       Admitting doctor:   Mookie Patel MD    Admitting diagnosis:   The primary encounter diagnosis was SBO (small bowel obstruction). A diagnosis of Abnormal CT scan was also pertinent to this visit.    Code status:   Current Code Status       Date Active Code Status Order ID Comments User Context       Prior            Allergies:   Gabapentin, Pregabalin, Sulfamethoxazole-trimethoprim, Trazodone, Ace inhibitors, and Lisinopril    Isolation:   No active isolations    Intake and Output  No intake or output data in the 24 hours ending 07/19/25 1617    Weight:       07/19/25  1200   Weight: 59.9 kg (132 lb)       Most recent vitals:   Vitals:    07/19/25 1201 07/19/25 1300 07/19/25 1500 07/19/25 1600   BP:  170/83 162/59 175/66   Pulse:  58 59 69   Temp: 97.8 °F (36.6 °C)      TempSrc: Oral      SpO2:  96% 97% 100%   Weight:       Height:           Active LDAs/IV Access:   Lines, Drains & Airways       Active LDAs       Name Placement date Placement time Site Days    Peripheral IV 07/19/25 1156 20 G Anterior;Left Forearm 07/19/25  1156  Forearm  less than 1                    Labs (abnormal labs have a star):   Labs Reviewed   COMPREHENSIVE METABOLIC PANEL - Abnormal; Notable for the following components:       Result Value    Glucose 146 (*)     BUN 40.0 (*)     Creatinine 1.58 (*)     CO2 21.0 (*)     BUN/Creatinine Ratio 25.3 (*)     eGFR 31.6 (*)     All other components within normal limits    Narrative:     GFR Categories in Chronic Kidney Disease (CKD)              GFR Category          GFR (mL/min/1.73)    Interpretation  G1                    90 or greater        Normal or high (1)  G2  "                   60-89                Mild decrease (1)  G3a                   45-59                Mild to moderate decrease  G3b                   30-44                Moderate to severe decrease  G4                    15-29                Severe decrease  G5                    14 or less           Kidney failure    (1)In the absence of evidence of kidney disease, neither GFR category G1 or G2 fulfill the criteria for CKD.    eGFR calculation 2021 CKD-EPI creatinine equation, which does not include race as a factor   LIPASE - Abnormal; Notable for the following components:    Lipase 64 (*)     All other components within normal limits   URINALYSIS W/ MICROSCOPIC IF INDICATED (NO CULTURE) - Abnormal; Notable for the following components:    Specific Gravity, UA >1.030 (*)     All other components within normal limits    Narrative:     Urine microscopic not indicated.   TROPONIN - Abnormal; Notable for the following components:    HS Troponin T 19 (*)     All other components within normal limits    Narrative:     High Sensitive Troponin T Reference Range:  <14.0 ng/L- Negative Female for AMI  <22.0 ng/L- Negative Male for AMI  >=14 - Abnormal Female indicating possible myocardial injury.  >=22 - Abnormal Male indicating possible myocardial injury.   Clinicians would have to utilize clinical acumen, EKG, Troponin, and serial changes to determine if it is an Acute Myocardial Infarction or myocardial injury due to an underlying chronic condition.        CBC WITH AUTO DIFFERENTIAL - Abnormal; Notable for the following components:    WBC 13.01 (*)     RDW 12.2 (*)     Neutrophil % 87.1 (*)     Lymphocyte % 7.4 (*)     Monocyte % 4.6 (*)     Eosinophil % 0.2 (*)     Neutrophils, Absolute 11.34 (*)     Immature Grans, Absolute 0.06 (*)     All other components within normal limits   HIGH SENSITIVITIY TROPONIN T 1HR - Abnormal; Notable for the following components:    HS Troponin T 22 (*)     All other components within  normal limits    Narrative:     High Sensitive Troponin T Reference Range:  <14.0 ng/L- Negative Female for AMI  <22.0 ng/L- Negative Male for AMI  >=14 - Abnormal Female indicating possible myocardial injury.  >=22 - Abnormal Male indicating possible myocardial injury.   Clinicians would have to utilize clinical acumen, EKG, Troponin, and serial changes to determine if it is an Acute Myocardial Infarction or myocardial injury due to an underlying chronic condition.        BNP (IN-HOUSE) - Normal    Narrative:     This assay is used as an aid in the diagnosis of individuals suspected of having heart failure. It can be used as an aid in the diagnosis of acute decompensated heart failure (ADHF) in patients presenting with signs and symptoms of ADHF to the emergency department (ED). In addition, NT-proBNP of <300 pg/mL indicates ADHF is not likely.    Age Range Result Interpretation  NT-proBNP Concentration (pg/mL:      <50             Positive            >450                   Gray                 300-450                    Negative             <300    50-75           Positive            >900                  Gray                300-900                  Negative            <300      >75             Positive            >1800                  Gray                300-1800                  Negative            <300   RAINBOW DRAW    Narrative:     The following orders were created for panel order [place] Draw.  Procedure                               Abnormality         Status                     ---------                               -----------         ------                     Green Top (Gel)[[id number]]                                  Final result               Lavender Top[[id number]]                                     Final result               Gold Top - SST[[id number]]                                   Final result               Light Blue Top[[id number]]                                   Final result                  Please view results for these tests on the individual orders.   CBC AND DIFFERENTIAL    Narrative:     The following orders were created for panel order CBC & Differential.  Procedure                               Abnormality         Status                     ---------                               -----------         ------                     CBC Auto Differential[[id number]]        Abnormal            Final result                 Please view results for these tests on the individual orders.   GREEN TOP   LAVENDER TOP   GOLD TOP - SST   LIGHT BLUE TOP       EKG:   ECG 12 Lead Chest Pain   Final Result   HEART RATE=53  bpm   RR Fvphsqnk=8443  ms   ME Jfmxqwif=273  ms   P Horizontal Axis=-8  deg   P Front Axis=30  deg   QRSD Whfxotjh=194  ms   QT Lnffmcgv=975  ms   IIsK=792  ms   QRS Axis=-125  deg   T Wave Axis=11  deg   - ABNORMAL ECG -   Sinus rhythm   IVCD, consider RBBB   Inferior  infarct, old   No change from prior tracing   Electronically Signed By: Jeanne Melendrez ([facility]) 2025-07-19 15:51:59   Date and Time of Study:2025-07-19 12:01:55          Meds given in ED:   Medications   sodium chloride 0.9 % flush 10 mL (has no administration in time range)   morphine injection 4 mg (0 mg Intravenous Hold 7/19/25 1539)   ondansetron (ZOFRAN) injection 4 mg (0 mg Intravenous Hold 7/19/25 1539)   sodium chloride 0.9 % bolus 500 mL (500 mL Intravenous New Bag 7/19/25 1434)   iopamidol (ISOVUE-300) 61 % injection 100 mL (85 mL Intravenous Given 7/19/25 1324)       Imaging results:  CT Abdomen Pelvis With Contrast  Result Date: 7/19/2025  FINDINGS AND IMPRESSION: No free intraperitoneal air is seen. THE MAJORITY OF THE SMALL BOWEL IS DILATED WITH AIR-FLUID LEVELS. TRANSITION POINT DISTALLY IS PRESENT WITHIN THE RIGHT LOWER QUADRANT (AXIAL IMAGE 102 AND 101WITH DECOMPRESSION OF THE AND TERMINAL ILEUM. THERE IS ALSO RELATIVE TRANSITION POINT PROXIMALLY ON AXIAL IMAGE 89 WITH MORE PROXIMAL SMALL BOWEL LOOPS NOT  SIGNIFICANTLY DISTENDED. FINDINGS ARE MOST SUGGESTIVE OF SMALL BOWEL OBSTRUCTION AND UNFORTUNATELY CLOSED-LOOP CONFIGURATION CANNOT BE EXCLUDED. GENERAL SURGERY CONSULTATION IS RECOMMENDED.  Small amount of free fluid is present. No abdominal pelvic adenopathy by size criteria. Small hiatal hernia. Stomach has a mildly thickened appearance can be seen in setting of gastritis in the appropriate context. Correlation is recommended with follow-up endoscopy if clinically indicated.  There is colonic diverticulosis. Free fluid layering in the pelvis contacts a long segment of the sigmoid colon with suggestion of associated fat stranding in this area. Diverticulitis cannot be excluded in the appropriate context correlation with patient history is recommended.  The appendix is not seen. Subcentimeter hepatic lesions are too small to characterize. Larger cystic density lesion within the right hepatic lobe likely benign. The gallbladder, pancreas, spleen and adrenal glands have an unremarkable postcontrast CT appearance. Subcentimeter renal lesions are too small to characterize. Significant asymmetric atrophy of the left kidney. No hydronephrosis.  Uterus surgically absent. No suspicious lytic or blastic osseous lesions.  This report was finalized on 7/19/2025 2:13 PM by Dr. Mateo Vaz M.D on Workstation: [id number]      XR Chest 1 View  Result Date: 7/19/2025  FINDINGS AND IMPRESSION: Asymmetric pulm opacification is present within the left lung base suggestive atelectasis versus pneumonia in the appropriate context. Correlation with patient history is recommended with follow-up chest CT if indicated. Given the asymmetry, least continued tension follow-up chest radiographs in 6 to 8 weeks recommended to ensure resolution exclude the possibility malignancy.  No pneumothorax is seen. Cardiac silhouette is at the upper limits of normal in size..  This report was finalized on 7/19/2025 1:57 PM by Dr. Mateo Vaz M.D on  Workstation: [id number]        Ambulatory status:   - adlib    Social issues:   Social History     Socioeconomic History    Marital status:    Tobacco Use    Smoking status: Never     Passive exposure: Never    Smokeless tobacco: Never   Vaping Use    Vaping status: Never Used   Substance and Sexual Activity    Alcohol use: No     Comment: caffeine use: 1 cup daily    Drug use: Never    Sexual activity: Not Currently     Birth control/protection: Post-menopausal, Surgical     Comment: .       Peripheral Neurovascular       Neuro Cognitive       Learning  Learning Assessment  Learning Readiness and Ability: no barriers identified  Education Provided  Person Taught: patient    Respiratory       Abdominal Pain       Pain Assessments  Pain (Adult)  (0-10) Pain Rating: Rest: 3  (0-10) Pain Rating: Activity: 3  Pain Location: abdomen  Pain Side/Orientation: right    NIH Stroke Scale       Rosemary Greco RN  07/19/25 16:17 EDT

## 2025-07-19 NOTE — ANESTHESIA PROCEDURE NOTES
Airway  Reason: emergent    Date/Time: 7/19/2025 5:36 PM  Airway not difficult    General Information and Staff    Patient location during procedure: OR  Anesthesiologist: Kuldip Ramirez MD  CRNA/CAA: Alva Jackson CRNA    Consent for Airway (if performed for an anesthetic, see related documentation for consents)   Patient identity confirmed: verbally with patient, arm band, provided demographic data and hospital-assigned identification number  Consent: Verbal consent obtained. Written consent obtained  Risks and benefits: risks, benefits and alternatives were discussed  Consent given by: patient    Indications and Patient Condition  Indications for airway management: airway protection    Preoxygenated: yes    Mask difficulty assessment: 0 - not attempted    Final Airway Details    Final airway type: endotracheal airway      Successful airway: ETT  Cuffed: yes   Successful intubation technique: direct laryngoscopy and RSI  Adjuncts used in placement: cricoid pressure and intubating stylet  Endotracheal tube insertion site: oral  Blade: Cheryl  Blade size: 3  ETT size (mm): 7.0  Cormack-Lehane Classification: grade I - full view of glottis  Placement verified by: chest auscultation and capnometry   Cuff volume (mL): 6  Measured from: gums  ETT/EBT to gums (cm): 20  Number of attempts at approach: 1  Assessment: lips, teeth, and gum same as pre-op and atraumatic intubation

## 2025-07-19 NOTE — OP NOTE
Operative Note :   Terry Rios MD    Itzel BEAR Mayito  1940    Procedure Date: 07/19/25    Pre-op Diagnosis:  SBO (small bowel obstruction) [K56.609]    Post-Op Diagnosis:  Same    Procedure:   Exploratory laparotomy with small bowel enterolysis    Surgeon: Terry Rios MD    Assistant: Ray Iverson PA-C was responsible for performing the following activities: Retraction, Suction, Irrigation, Suturing, Closing, and Placing Dressing and their skilled assistance was necessary for the success of this case.      Anesthesia:  General (general endotracheal tube)    EBL:   minimal    Specimens:   None    Indications:  This is a nice 85-year-old lady presenting with acute abdominal pain and a CT concerning for closed-loop obstruction    Findings:   No clear evidence of closed-loop obstruction.  There is a segment of the ileum, from approximately 20 cm proximal to the ileocecal valve extending approximately 50 cm proximally where there was some chronic appearing adhesions, almost encasing the small bowel.  The remainder of the proximal small bowel was entirely normal.    Recommendations:   Await return of GI function    Description of procedure:    After obtaining informed consent, the patient was brought to the operating room and placed under a general anesthetic.  Cason catheter was placed.  The patient's abdomen was sterilely prepped and draped.  Orogastric tube placed.  Lower midline incision made and dissected through the skin and subcutaneous tissues.  Fascia was incised in the midline.  Preperitoneal tissues were  and the abdomen was entered just below the umbilicus.  There was some omentum adherent here, but the midline was relatively free and I was able to open the midline Miracle essentially from below the umbilicus to just above the pubic brim.  The omentum was taken down off of the right side of the abdominal wall and a large sized [place] wound protector was introduced.  The visualized  small bowel loops were viable.  I was able to enter eviscerate the small bowel and followed this proximally and all the small bowel proximally had a normal appearance which was only minimally distended.  I then followed the small bowel distally and in the distal ileum there was a segment of around 50 cm which had some chronic appearing scar.  Just proximal to this there was some thicker material within the lumen of the small bowel which was likely intermittently obstructing given the fact that it did not pass easily through this chronically scarred bowel.  My initial impression was that this section would need to be resected, but we began doing interval lysis and once we got the almost capsule like chronic appearing scar off of this bowel we were able to straighten it out and it appeared fairly healthy.  Interval lysis was carried out with Metzenbaum scissors and Bovie electrocautery.  Eventually we are able to run the entirety of the small bowel.  At this point I was able to milk this thicker material through the small bowel towards the ileocecal valve.  I ran more succus through this and it appeared to float without resistance.  There was no evidence of serosal injury or enterotomy.  The abdomen was then irrigated.  The small bowel was replaced.  The omentum was placed over the small bowel.  The midline fascia was reapproximated with 0 PDS.  Skin was closed with micheline.    Terry Rios MD  General and Endoscopic Surgery  [facility]    [address], [address]  [place], [place], [address]  P: [phone]  F: [phone]

## 2025-07-19 NOTE — ED PROVIDER NOTES
" EMERGENCY DEPARTMENT ENCOUNTER      Room Number:  05/05  PCP: Rhoda Luther MD  Independent Historians: Historian: Patient  Patient Care Team:  Rhoda Luther MD as PCP - General (Internal Medicine)  Sandra Winters MD as Consulting Physician (Cardiology)  Adeel Junior MD as Consulting Physician (Nephrology)       HPI:  Chief Complaint: Abdominal pain    A complete HPI/ROS/PMH/PSH/SH/FH are unobtainable due to: EM_Unobtainable History : None    Chronic or social conditions impacting patient care (Social Determinants of Health): None      Context: Itzel Edmond is a 85 y.o. female with a PMH significant for hypertension, hyperlipidemia, anxiety, chronic kidney disease, mitral valve regurgitation, sleep apnea, CAD who presents to the ED c/o acute right lower quadrant abdominal pain.  The patient started feeling uncomfortable last night with some mild discomfort to the lower abdomen.  Her symptoms localized to the right lower quadrant overnight and with worsening symptoms this morning she called EMS.  She denies changes to bowel habits or stool.  Admits to some nausea without vomiting.  EMS was asking through their review of systems with the patient did disclose that she has been having some chest discomfort intermittently over the past few weeks.  Her most recent episode was yesterday afternoon and she describes a slight \"pinching and burning\" sensation that is short-lived and abates without intervention.  Asymptomatic in regard to chest pain at this time.      Upon review of prior external notes (non-ED) -and- Review of prior external test results outside of this encounter it appears the patient was evaluated in the office with cardiology for dyspnea on exertion and hypertension on March 18, 2025.  The patient had a normal glucose on 1/21/2022 and a normal INR on 1/22/2022.      PAST MEDICAL HISTORY  Active Ambulatory Problems     Diagnosis Date Noted    Hypertension 07/05/2016    Premature atrial " complexes 2016    Hyperlipidemia 10/02/2018    Restless leg syndrome 10/02/2018    Heart murmur 01/15/2019    JAMES (generalized anxiety disorder) 2020    Slow transit constipation 2020    TIA (transient ischemic attack) 2021    Subdural hemorrhage following injury 2021    Subdural hematoma 2022    Stage 3a chronic kidney disease 2022    Overactive bladder 2013    JHOANA on CPAP 2022    Nonrheumatic mitral valve regurgitation 2023     Resolved Ambulatory Problems     Diagnosis Date Noted    Obstructive sleep apnea syndrome 2016    Chest pain with high risk for cardiac etiology 2019    Normal cardiac stress test 2022    CAD (coronary artery disease) 2019     Past Medical History:   Diagnosis Date    Allergic rhinitis     Atherosclerosis     Carotid stenosis     Cataract     Cellulitis of right leg 2016    Chest discomfort 2019    Claudication of lower extremity     CMC arthritis     Colon polyps     Cystitis 2019    WOODARD (dyspnea on exertion) 10/2020    Dysphagia     Elevated serum creatinine 2021    Environmental allergies     Exercise intolerance     GERD (gastroesophageal reflux disease)     Hemorrhoids     Hyperglycemia     Hyperkalemia 2020    Influenza 2018    Insomnia     Myalgia 2016    Neuropathy 2017    OAB (overactive bladder)     JHOANA (obstructive sleep apnea)     PLMD (periodic limb movement disorder)     Precordial pain 2019    Rectal bleeding     Rectal prolapse 10/2020    Rectal ulcer 10/02/2020    Rectocele 2019    Renal insufficiency     SAB (spontaneous )     Serum potassium elevated 2016    Tachycardia 2013    Trigger finger of left thumb 10/21/2019    Urge incontinence     Urinary frequency     Vaginal atrophy          PAST SURGICAL HISTORY  Past Surgical History:   Procedure Laterality Date    ANTERIOR AND POSTERIOR VAGINAL REPAIR N/A     BACK SURGERY      BILATERAL  SALPINGO OOPHORECTOMY Bilateral 2002    BLADDER SURGERY N/A 2002    BLADDER SLING    YADI HOLE Right 1/21/2022    Procedure: YADI HOLE;  Surgeon: Austin Hilario MD;  Location:  [facility];  Service: Neurosurgery;  Laterality: Right;    CARDIAC CATHETERIZATION N/A 1/6/2019    Procedure: Left Heart Cath;  Surgeon: Christopher Rosenbaum MD;  Location:  [facility];  Service: Cardiology    CARDIAC CATHETERIZATION N/A 1/6/2019    Procedure: Coronary angiography;  Surgeon: Christopher Rosenbaum MD;  Location:  [facility];  Service: Cardiology    CARDIAC CATHETERIZATION N/A 1/6/2019    Procedure: Left ventriculography;  Surgeon: Christopher Rosenbaum MD;  Location:  [facility];  Service: Cardiology    CATARACT EXTRACTION Left 12/15/2015    WITH LENS IMPLANT, DR. AMRIT DIAZ AT [facility]    CATARACT EXTRACTION Right 12/01/2015     WITH LENS IMPLANT, DR. AMRIT DIAZ AT [facility]    COLONOSCOPY N/A 10/2/2020    2 TUBULAR ADENOMA POLYPS IN TRANSVERSE, HEMORRHOIDS, DIVERTICULOSIS, 5 MM ULCER IN RECTUM, OTHER BIOPSIES BENIGN, DR. NOLBERTO REYNOSO AT [facility]    COLONOSCOPY W/ POLYPECTOMY N/A 08/24/2015    1 CM TUBULAR ADENOMA POLYP IN TRANSVERSE, MODERATE DIVERTICULOSIS, HEMORRHOIDS, RESCOPE IN 5 YRS, DR. MAYA SAHU AT [facility]    HEMORROIDECTOMY N/A     HERNIA REPAIR      HYSTERECTOMY N/A 1970    OVARIES IN TACT         FAMILY HISTORY  Family History   Problem Relation Age of Onset    Heart disease Mother     Stroke Mother     Heart attack Father     Heart disease Father     Heart disease Sister         s/p CABG    Heart attack Brother     Heart disease Brother         s/p CABG    Hypertension Other     Heart disease Maternal Grandmother     Stroke Maternal Grandmother          SOCIAL HISTORY  Social History     Socioeconomic History    Marital status:    Tobacco Use    Smoking status: Never     Passive exposure: Never    Smokeless tobacco: Never   Vaping Use     Vaping status: Never Used   Substance and Sexual Activity    Alcohol use: No     Comment: caffeine use: 1 cup daily    Drug use: Never    Sexual activity: Not Currently     Birth control/protection: Post-menopausal, Surgical     Comment: .         ALLERGIES  Gabapentin, Pregabalin, Sulfamethoxazole-trimethoprim, Trazodone, Ace inhibitors, and Lisinopril      REVIEW OF SYSTEMS  Included in HPI  All systems reviewed and negative except for those discussed in HPI.      PHYSICAL EXAM    I have reviewed the triage vital signs and nursing notes.    ED Triage Vitals   Temp Pulse Resp BP SpO2   -- -- -- -- --      Temp src Heart Rate Source Patient Position BP Location FiO2 (%)   -- -- -- -- --       Physical Exam  Constitutional:       General: She is not in acute distress.     Appearance: She is well-developed.   HENT:      Head: Normocephalic and atraumatic.   Eyes:      General: No scleral icterus.     Conjunctiva/sclera: Conjunctivae normal.   Neck:      Trachea: No tracheal deviation.   Cardiovascular:      Rate and Rhythm: Normal rate and regular rhythm.   Pulmonary:      Effort: Pulmonary effort is normal.      Breath sounds: Normal breath sounds.   Abdominal:      Palpations: Abdomen is soft.      Tenderness: There is abdominal tenderness in the right lower quadrant. There is no guarding or rebound.   Musculoskeletal:         General: No deformity.      Cervical back: Normal range of motion.   Lymphadenopathy:      Cervical: No cervical adenopathy.   Skin:     General: Skin is warm and dry.   Neurological:      Mental Status: She is alert and oriented to person, place, and time.   Psychiatric:         Behavior: Behavior normal.         Vital signs and nursing notes reviewed.      PPE: I wore a surgical mask throughout my encounters with the pt. I performed hand hygiene on entry into the pt room and upon exit.     LAB RESULTS  Recent Results (from the past 24 hours)   ECG 12 Lead Chest Pain    Collection Time:  07/19/25 12:01 PM   Result Value Ref Range    QT Interval 459 ms    QTC Interval 433 ms   Comprehensive Metabolic Panel    Collection Time: 07/19/25 12:02 PM    Specimen: Blood   Result Value Ref Range    Glucose 146 (H) 65 - 99 mg/dL    BUN 40.0 (H) 8.0 - 23.0 mg/dL    Creatinine 1.58 (H) 0.57 - 1.00 mg/dL    Sodium 137 136 - 145 mmol/L    Potassium 4.7 3.5 - 5.2 mmol/L    Chloride 101 98 - 107 mmol/L    CO2 21.0 (L) 22.0 - 29.0 mmol/L    Calcium 9.4 8.6 - 10.5 mg/dL    Total Protein 7.2 6.0 - 8.5 g/dL    Albumin 4.4 3.5 - 5.2 g/dL    ALT (SGPT) 12 1 - 33 U/L    AST (SGOT) 30 1 - 32 U/L    Alkaline Phosphatase 54 39 - 117 U/L    Total Bilirubin 0.5 0.0 - 1.2 mg/dL    Globulin 2.8 gm/dL    A/G Ratio 1.6 g/dL    BUN/Creatinine Ratio 25.3 (H) 7.0 - 25.0    Anion Gap 15.0 5.0 - 15.0 mmol/L    eGFR 31.6 (L) >60.0 mL/min/1.73   Lipase    Collection Time: 07/19/25 12:02 PM    Specimen: Blood   Result Value Ref Range    Lipase 64 (H) 13 - 60 U/L   High Sensitivity Troponin T    Collection Time: 07/19/25 12:02 PM    Specimen: Blood   Result Value Ref Range    HS Troponin T 19 (H) <14 ng/L   BNP    Collection Time: 07/19/25 12:02 PM    Specimen: Blood   Result Value Ref Range    proBNP 536.0 0.0 - 1,800.0 pg/mL   Green Top (Gel)    Collection Time: 07/19/25 12:02 PM   Result Value Ref Range    Extra Tube Hold for add-ons.    Lavender Top    Collection Time: 07/19/25 12:02 PM   Result Value Ref Range    Extra Tube hold for add-on    Gold Top - SST    Collection Time: 07/19/25 12:02 PM   Result Value Ref Range    Extra Tube Hold for add-ons.    Light Blue Top    Collection Time: 07/19/25 12:02 PM   Result Value Ref Range    Extra Tube Hold for add-ons.    CBC Auto Differential    Collection Time: 07/19/25 12:02 PM    Specimen: Blood   Result Value Ref Range    WBC 13.01 (H) 3.40 - 10.80 10*3/mm3    RBC 4.22 3.77 - 5.28 10*6/mm3    Hemoglobin 12.9 12.0 - 15.9 g/dL    Hematocrit 38.7 34.0 - 46.6 %    MCV 91.7 79.0 - 97.0 fL     MCH 30.6 26.6 - 33.0 pg    MCHC 33.3 31.5 - 35.7 g/dL    RDW 12.2 (L) 12.3 - 15.4 %    RDW-SD 40.1 37.0 - 54.0 fl    MPV 10.4 6.0 - 12.0 fL    Platelets 247 140 - 450 10*3/mm3    Neutrophil % 87.1 (H) 42.7 - 76.0 %    Lymphocyte % 7.4 (L) 19.6 - 45.3 %    Monocyte % 4.6 (L) 5.0 - 12.0 %    Eosinophil % 0.2 (L) 0.3 - 6.2 %    Basophil % 0.2 0.0 - 1.5 %    Immature Grans % 0.5 0.0 - 0.5 %    Neutrophils, Absolute 11.34 (H) 1.70 - 7.00 10*3/mm3    Lymphocytes, Absolute 0.96 0.70 - 3.10 10*3/mm3    Monocytes, Absolute 0.60 0.10 - 0.90 10*3/mm3    Eosinophils, Absolute 0.03 0.00 - 0.40 10*3/mm3    Basophils, Absolute 0.02 0.00 - 0.20 10*3/mm3    Immature Grans, Absolute 0.06 (H) 0.00 - 0.05 10*3/mm3    nRBC 0.0 0.0 - 0.2 /100 WBC   Urinalysis With Microscopic If Indicated (No Culture) - Urine, Clean Catch    Collection Time: 07/19/25  2:25 PM    Specimen: Urine, Clean Catch   Result Value Ref Range    Color, UA Yellow Yellow, Straw    Appearance, UA Clear Clear    pH, UA 6.5 5.0 - 8.0    Specific Gravity, UA >1.030 (H) 1.005 - 1.030    Glucose, UA Negative Negative    Ketones, UA Negative Negative    Bilirubin, UA Negative Negative    Blood, UA Negative Negative    Protein, UA Negative Negative    Leuk Esterase, UA Negative Negative    Nitrite, UA Negative Negative    Urobilinogen, UA 1.0 E.U./dL 0.2 - 1.0 E.U./dL   High Sensitivity Troponin T 1Hr    Collection Time: 07/19/25  2:31 PM    Specimen: Blood   Result Value Ref Range    HS Troponin T 22 (H) <14 ng/L    Troponin T Numeric Delta 3 ng/L    Troponin T % Delta 16 Abnormal if >/= 20%         RADIOLOGY  CT Abdomen Pelvis With Contrast  Result Date: 7/19/2025  CT ABDOMEN AND PELVIS WITH IV CONTRAST  HISTORY:    Left lower quadrant pain and tenderness  TECHNIQUE: Radiation dose reduction techniques were utilized, including automated exposure control and exposure modulation based on body size. 3 mm images were obtained through the abdomen and pelvis with IV contrast.   COMPARISON:    None      FINDINGS AND IMPRESSION: No free intraperitoneal air is seen. THE MAJORITY OF THE SMALL BOWEL IS DILATED WITH AIR-FLUID LEVELS. TRANSITION POINT DISTALLY IS PRESENT WITHIN THE RIGHT LOWER QUADRANT (AXIAL IMAGE 102 AND 101WITH DECOMPRESSION OF THE AND TERMINAL ILEUM. THERE IS ALSO RELATIVE TRANSITION POINT PROXIMALLY ON AXIAL IMAGE 89 WITH MORE PROXIMAL SMALL BOWEL LOOPS NOT SIGNIFICANTLY DISTENDED. FINDINGS ARE MOST SUGGESTIVE OF SMALL BOWEL OBSTRUCTION AND UNFORTUNATELY CLOSED-LOOP CONFIGURATION CANNOT BE EXCLUDED. GENERAL SURGERY CONSULTATION IS RECOMMENDED.  Small amount of free fluid is present. No abdominal pelvic adenopathy by size criteria. Small hiatal hernia. Stomach has a mildly thickened appearance can be seen in setting of gastritis in the appropriate context. Correlation is recommended with follow-up endoscopy if clinically indicated.  There is colonic diverticulosis. Free fluid layering in the pelvis contacts a long segment of the sigmoid colon with suggestion of associated fat stranding in this area. Diverticulitis cannot be excluded in the appropriate context correlation with patient history is recommended.  The appendix is not seen. Subcentimeter hepatic lesions are too small to characterize. Larger cystic density lesion within the right hepatic lobe likely benign. The gallbladder, pancreas, spleen and adrenal glands have an unremarkable postcontrast CT appearance. Subcentimeter renal lesions are too small to characterize. Significant asymmetric atrophy of the left kidney. No hydronephrosis.  Uterus surgically absent. No suspicious lytic or blastic osseous lesions.  This report was finalized on 7/19/2025 2:13 PM by Dr. Mateo Vaz M.D on Workstation: [id number]      XR Chest 1 View  Result Date: 7/19/2025  Portable chest radiograph  HISTORY: Chest pain  TECHNIQUE: Single AP portable radiograph of the chest  COMPARISON: Chest radiograph 11/28/2021      FINDINGS AND  IMPRESSION: Asymmetric pulm opacification is present within the left lung base suggestive atelectasis versus pneumonia in the appropriate context. Correlation with patient history is recommended with follow-up chest CT if indicated. Given the asymmetry, least continued tension follow-up chest radiographs in 6 to 8 weeks recommended to ensure resolution exclude the possibility malignancy.  No pneumothorax is seen. Cardiac silhouette is at the upper limits of normal in size..  This report was finalized on 7/19/2025 1:57 PM by Dr. Mateo Vaz M.D on Workstation: [id number]          MEDICATIONS GIVEN IN ER  Medications   sodium chloride 0.9 % flush 10 mL (has no administration in time range)   morphine injection 4 mg (has no administration in time range)   ondansetron (ZOFRAN) injection 4 mg (has no administration in time range)   sodium chloride 0.9 % bolus 500 mL (500 mL Intravenous New Bag 7/19/25 1434)   iopamidol (ISOVUE-300) 61 % injection 100 mL (85 mL Intravenous Given 7/19/25 1324)         ORDERS PLACED DURING THIS VISIT:  Orders Placed This Encounter   Procedures    XR Chest 1 View    CT Abdomen Pelvis With Contrast    [facility] Draw    Comprehensive Metabolic Panel    Lipase    Urinalysis With Microscopic If Indicated (No Culture) - Urine, Clean Catch    High Sensitivity Troponin T    BNP    CBC Auto Differential    High Sensitivity Troponin T 1Hr    Surgery (on-call MD unless specified)    [facility] (on-call MD unless specified) Details    ECG 12 Lead Chest Pain    Insert Peripheral IV    Inpatient Admission    CBC & Differential    Green Top (Gel)    Lavender Top    Gold Top - SST    Light Blue Top         OUTPATIENT MEDICATION MANAGEMENT:  Current Facility-Administered Medications Ordered in Epic   Medication Dose Route Frequency Provider Last Rate Last Admin    morphine injection 4 mg  4 mg Intravenous Once Ashvin Estrada MD        ondansetron (ZOFRAN) injection 4 mg  4 mg Intravenous Once Ashvin Estrada  MD Kenan        sodium chloride 0.9 % flush 10 mL  10 mL Intravenous PRN Diego Stewart PA         Current Outpatient Medications Ordered in Epic   Medication Sig Dispense Refill    amLODIPine (NORVASC) 2.5 MG tablet Take 0.5 tablets by mouth Every Night. 45 tablet 3    atorvastatin (LIPITOR) 20 MG tablet Take 1 tablet by mouth Daily. 90 tablet 3    DULoxetine (CYMBALTA) 30 MG capsule Take 1 capsule by mouth Daily.      hydroCHLOROthiazide (HYDRODIURIL) 25 MG tablet Take 1 tablet by mouth Daily.      losartan (COZAAR) 100 MG tablet Take 1 tablet by mouth Daily.      metoprolol succinate XL (TOPROL-XL) 25 MG 24 hr tablet TAKE 1 TABLET EVERY NIGHT 90 tablet 3    pramipexole (MIRAPEX) 0.25 MG tablet Take 2 tablets by mouth Every Night for 90 days. 180 tablet 0             PROGRESS, DATA ANALYSIS, CONSULTS, AND MEDICAL DECISION MAKING  All labs have been independently interpreted by me.  All radiology studies have been reviewed by me. All EKG's have been independently viewed and interpreted by me.  Discussion below represents my analysis of pertinent findings related to patient's condition, differential diagnosis, treatment plan and final disposition.      DIFFERENTIAL DIAGNOSIS INCLUDE BUT NOT LIMITED TO:     Differential diagnosis includes but is not limited to:  - Hepatobiliary pathology such as cholecystitis, cholangitis, and symptomatic cholelithiasis  - Pancreatitis  - Dyspepsia  - Small bowel obstruction  - Appendicitis  - Diverticulitis  - UTI including pyelonephritis  - Ureteral stone  - Zoster  - Colitis, including infectious and ischemic  - Atypical ACS      Clinical Scores: N/A      ED Course as of 07/19/25 1504   Sat Jul 19, 2025   1221 WBC(!): 13.01 [DC]   1434 Patient presentation and evaluation consistent with small bowel obstruction requiring admission for surgical consultation and further management.  Patient agreeable. [DC]   1501 I discussed the case with MD Blanca with Gen Surgery at this time  regarding the patient.  I discussed work-up, results, concerns.  I discussed the consulting provider's desire for consulting on admission.    [DC]      ED Course User Index  [DC] Diego Stewart, PA           4484 I rechecked the patient.  I discussed the patient's labs, radiology findings (including all incidental findings), diagnosis, and plan for admission. The patient understands and agrees with the plan.      AS OF 15:04 EDT VITALS:    BP - 170/83  HR - 58  TEMP - 97.8 °F (36.6 °C) (Oral)  O2 SATS - 96%    COMPLEXITY OF CARE  The patient requires admission.      DIAGNOSIS  Final diagnoses:   SBO (small bowel obstruction)   Abnormal CT scan         DISPOSITION  ED Disposition       ED Disposition   Decision to Admit    Condition   --    Comment   Level of Care: Med/Surg [1]   Diagnosis: SBO (small bowel obstruction) [501074]   Admitting Physician: MACIE RASHID [[id number]]   Attending Physician: MACIE RASHID [[id number]]   Certification: I Certify That Inpatient Hospital Services Are Medically Necessary For Greater Than 2 Midnights                  Please note that portions of this document were completed with a voice recognition program.    Note Disclaimer: At [facility], we believe that sharing information builds trust and better relationships. You are receiving this note because you recently visited [facility]. It is possible you will see health information before a provider has talked with you about it. This kind of information can be easy to misunderstand. To help you fully understand what it means for your health, we urge you to discuss this note with your provider.         Diego Stewart PA  07/21/25 9437

## 2025-07-19 NOTE — ANESTHESIA PREPROCEDURE EVALUATION
Anesthesia Evaluation     Patient summary reviewed and Nursing notes reviewed   NPO Solid Status: Waived due to emergency  NPO Liquid Status: Waived due to emergency           Airway   Mallampati: II  TM distance: >3 FB  Neck ROM: full  Dental    (+) upper dentures and lower dentures    Pulmonary    (+) ,shortness of breath, sleep apnea (BIPAP)  Cardiovascular     ECG reviewed    (+) hypertension, CAD, hyperlipidemia,  carotid artery disease  (-) angina, CHF, cardiac stents    ROS comment: TTE  ·  Left ventricular systolic function is normal. Calculated left ventricular EF = 60.7%  ·  Left ventricular diastolic function was normal.      Neuro/Psych  (+) TIA, psychiatric history Anxiety  GI/Hepatic/Renal/Endo    (+) GERD, renal disease- CRI    ROS Comment: -Small bowel obstruction, suspected closed-loop type    Musculoskeletal     Abdominal    Substance History      OB/GYN          Other                      Anesthesia Plan    ASA 3 - emergent     general   Rapid sequence  (Previous grade 1 view with mac 3)  intravenous induction     Anesthetic plan, risks, benefits, and alternatives have been provided, discussed and informed consent has been obtained with: patient.      CODE STATUS:

## 2025-07-19 NOTE — CONSULTS
Cc: Abdominal pain, small bowel obstruction    History of presenting illness:   This is a quite nice and reasonably healthy (for her age) 85-year-old functional female presenting with acute abdominal pain.  She has had a couple of episodes over the last several weeks of pain which seem to resolve on its own.  She had quite a severe episode which was associated with nausea and vomiting overnight.  The pain is somewhat better currently but persists and is more pronounced with movement.  She had some diarrhea preceding this.  She denies any prior history of small bowel obstruction.    Past Medical History: Hypertension, hyperlipidemia, history of subdural hemorrhage, chronic kidney disease, sleep apnea, mitral valve regurgitation, history of rectocele, rectal prolapse    Past Surgical History: Relevant for remote hysterectomy done around 40 years ago, via open low transverse incision.  She had an anterior and posterior vaginal repair and bladder sling sometime around 2002 done via open low transverse incision.  No additional abdominal surgical history.    Medications: Metoprolol, amlodipine, Lipitor    Allergies: Bactrim, ACE inhibitors, gabapentin    Social History: She is a non-smoker    Family History: Negative for colon cancer, positive for coronary artery disease    Review of Systems:  Constitutional: Denies fever or chills, positive for decreased appetite  Neck: no swollen glands or dysphagia or odynophagia  Respiratory: negative for SOB, cough, hemoptysis or wheezing  Cardiovascular: negative for chest pain, palpitations or peripheral edema  Gastrointestinal: Positive for diarrhea, abdominal pain, vomiting      Physical Exam:  BMI: 25.7  Temperature 97.8 heart rate 58 blood pressure 170/83  General: alert and oriented, appropriate, no acute distress  Eyes: No scleral icterus, extraocular movements are intact  Neck: Supple without lymphadenopathy or thyromegaly, trachea is in the midline  Respiratory: There is  good bilateral chest expansion, no use of accessory muscles is noted  Cardiovascular: No jugular venous distention or peripheral edema is seen  Gastrointestinal: Tender with guarding in the right lower quadrant, no peritonitis.  No hernias detected    Laboratory data: Chemistry shows a creatinine of 1.58, baseline seems to be more around 1.2.  Current GFR 31.6.  White blood cell count mildly elevated at 13.0, hemoglobin 12.9, platelets in the normal range.  Lipase is very mildly elevated at 64.    Imaging data: CT images are reviewed by me.  Significant portion of the small bowel is at least moderately distended.  This extends towards the right lower quadrant where there is an abnormal swirling of the small bowel and a significant decompression both proximally and distally.  Overall picture is consistent with that of closed-loop obstruction.      Assessment and plan:   -Small bowel obstruction, suspected closed-loop type  - Clinically she looks fairly good and does not have an acute abdomen but is tender.  - I have told her that in my view, given these CT findings I do not think this is going to improve on its own.  I have recommended proceeding with exploratory laparotomy, lysis of adhesions and possible bowel resection.  - We discussed the risks including, but not limited to, bleeding, infection, fascial dehiscence, possible need for further revisional procedures, potential for anastomotic leak, bowel injury, injury to surrounding structures, possible need for diversion and general anesthesia risks given her age.  She is agreeable to proceed as outlined.      Terry Rios MD  General, Minimally Invasive and Endoscopic Surgery  [facility]    [address], [address]  [place], [place], [address]  P: [phone]  F: [phone]

## 2025-07-19 NOTE — H&P
Internal medicine history and physical  INTERNAL MEDICINE   [facility]       Patient Identification:  Name: Itzel Edmond  Age: 85 y.o.  Sex: female  :  1940  MRN: [id number]                   Primary Care Physician: Rhoda Luther MD                               Date of admission:2025    Chief Complaint: Worsening right lower abdominal pain over the course of the last few days in the background of off-and-on lower abdominal discomfort with resolution for the last few weeks.    History of Present Illness:   Patient is a 85-year-old female who has past medical history remarkable for hypertension, anxiety and depression, history of sleep apnea on BiPAP, gastroesophageal reflux disease, history of traumatic subdural hematoma, chronic kidney disease and prior history of hysterectomy and nonobstructive coronary artery disease and carotid stenosis and peripheral vascular disease was in her usual state of health until few weeks ago when she started having off-and-on right lower abdominal discomfort that comes and goes without any intervention and resolve on its own.  Few days ago she started having worsening abdominal discomfort in the right lower abdominal area and this time it did not improve and last night she had episode of nausea and vomiting.  Because of her persistent discomfort she was brought to the emergency room for further evaluation and was noted to have small bowel obstruction of closed-loop thigh for which general surgery service consulted.  Patient was taken to the OR emergently from ER and was seen postoperatively with the family members at the bedside.  Patient has midline incision after having undergone exploratory laparotomy and small bowel enterolysis.  Operative report suggest no evidence of closed-loop obstruction and no enterotomy noted.  Patient is feeling overall better except for surgical incision.      Past Medical History:  Past Medical History:   Diagnosis  Date    Allergic rhinitis     Atherosclerosis     CAD (coronary artery disease) 2019    Overview:  Mild non obstructive CAD on cath 2019, medical mgmt recommended    Carotid stenosis     Cataract     Cellulitis of right leg 2016    Chest discomfort 2019    Claudication of lower extremity     CMC arthritis     Colon polyps     FOLLOWED BY DR. NOLBERTO REYNOSO    Cystitis 2019    WOODARD (dyspnea on exertion) 10/2020    Dysphagia     Elevated serum creatinine 2021    Environmental allergies     Exercise intolerance     JAMES (generalized anxiety disorder) 2020    GERD (gastroesophageal reflux disease)     Heart murmur     Hemorrhoids     Hyperglycemia     Hyperkalemia 2020    Hyperlipidemia     MIXED    Hypertension     Influenza 2018    Insomnia     Myalgia 2016    Neuropathy 2017    OAB (overactive bladder)     JHOANA (obstructive sleep apnea)     BiPap    PLMD (periodic limb movement disorder)     Precordial pain 2019    Premature atrial complexes     Rectal bleeding     Rectal prolapse 10/2020    Rectal ulcer 10/02/2020    5 MM SINGLE ULCER ON CY    Rectocele 2019    Renal insufficiency     Restless leg syndrome 10/2/2018    SAB (spontaneous )      A1    Serum potassium elevated 2016    Slow transit constipation 2020    Tachycardia 2013    Trigger finger of left thumb 10/21/2019    Urge incontinence     Urinary frequency     Vaginal atrophy      Past Surgical History:  Past Surgical History:   Procedure Laterality Date    ANTERIOR AND POSTERIOR VAGINAL REPAIR N/A     BACK SURGERY      BILATERAL SALPINGO OOPHORECTOMY Bilateral     BLADDER SURGERY N/A     BLADDER SLING    YADI HOLE Right 2022    Procedure: YADI HOLE;  Surgeon: Austin Hilario MD;  Location: [facility] OR;  Service: Neurosurgery;  Laterality: Right;    CARDIAC CATHETERIZATION N/A 2019    Procedure: Left Heart Cath;  Surgeon: Christopher Rosenbaum MD;  Location: [facility]  CATH INVASIVE LOCATION;  Service: Cardiology    CARDIAC CATHETERIZATION N/A 1/6/2019    Procedure: Coronary angiography;  Surgeon: Christopher Rosenbaum MD;  Location: [facility] CATH INVASIVE LOCATION;  Service: Cardiology    CARDIAC CATHETERIZATION N/A 1/6/2019    Procedure: Left ventriculography;  Surgeon: Christopher Rosenbaum MD;  Location: [facility] CATH INVASIVE LOCATION;  Service: Cardiology    CATARACT EXTRACTION Left 12/15/2015    WITH LENS IMPLANT, DR. AMRIT DIAZ AT [facility]    CATARACT EXTRACTION Right 12/01/2015     WITH LENS IMPLANT, DR. AMRIT DIAZ AT [facility]    COLONOSCOPY N/A 10/2/2020    2 TUBULAR ADENOMA POLYPS IN TRANSVERSE, HEMORRHOIDS, DIVERTICULOSIS, 5 MM ULCER IN RECTUM, OTHER BIOPSIES BENIGN, DR. NOLBERTO REYNOSO AT [facility]    COLONOSCOPY W/ POLYPECTOMY N/A 08/24/2015    1 CM TUBULAR ADENOMA POLYP IN TRANSVERSE, MODERATE DIVERTICULOSIS, HEMORRHOIDS, RESCOPE IN 5 YRS, DR. MAYA SAHU AT [facility]    HEMORROIDECTOMY N/A     HERNIA REPAIR      HYSTERECTOMY N/A 1970    [facility]      Home Meds:  Medications Prior to Admission   Medication Sig Dispense Refill Last Dose/Taking    amLODIPine (NORVASC) 2.5 MG tablet Take 0.5 tablets by mouth Every Night. 45 tablet 3 7/18/2025 Evening    DULoxetine (CYMBALTA) 30 MG capsule Take 1 capsule by mouth Daily.   Past Week    hydroCHLOROthiazide (HYDRODIURIL) 25 MG tablet Take 1 tablet by mouth Daily.   Past Week    metoprolol succinate XL (TOPROL-XL) 25 MG 24 hr tablet TAKE 1 TABLET EVERY NIGHT 90 tablet 3 7/18/2025 at  9:00 PM    atorvastatin (LIPITOR) 20 MG tablet Take 1 tablet by mouth Daily. 90 tablet 3 Unknown    losartan (COZAAR) 100 MG tablet Take 1 tablet by mouth Daily.       pramipexole (MIRAPEX) 0.25 MG tablet Take 2 tablets by mouth Every Night for 90 days. 180 tablet 0      Current Meds:     Current Facility-Administered Medications:     ceFAZolin 2000 mg IVPB in 100 mL NS (MBP), 2,000 mg, Intravenous, Once, Terry Rios MD, 2,000 mg at  "07/19/25 1721    lactated ringers infusion, 9 mL/hr, Intravenous, Continuous, Kuldip Ramirez MD, Last Rate: 9 mL/hr at 07/19/25 1649, 9 mL/hr at 07/19/25 1649    lidocaine (XYLOCAINE) 1 % injection 0.5 mL, 0.5 mL, Intradermal, Once PRN, Kuldip Ramirez MD    [Transfer Hold] morphine injection 4 mg, 4 mg, Intravenous, Once, Ashvin Estrada MD    [Transfer Hold] ondansetron (ZOFRAN) injection 4 mg, 4 mg, Intravenous, Once, Ashvin Estrada MD    [Transfer Hold] sodium chloride 0.9 % flush 10 mL, 10 mL, Intravenous, PRN, Diego Stewart PA    sodium chloride 0.9 % flush 3 mL, 3 mL, Intravenous, Q12H, Kuldip Ramirez MD    sodium chloride 0.9 % flush 3-10 mL, 3-10 mL, Intravenous, PRN, Kuldip Ramirez MD  Allergies:  Allergies   Allergen Reactions    Gabapentin Dizziness     Other reaction(s): Dizziness    Pregabalin Dizziness     Vision problems, dizziness    Sulfamethoxazole-Trimethoprim Itching and Nausea And Vomiting    Trazodone Unknown - High Severity    Ace Inhibitors Cough     Cough    Lisinopril Cough     Social History:   Social History     Tobacco Use    Smoking status: Never     Passive exposure: Never    Smokeless tobacco: Never   Substance Use Topics    Alcohol use: No     Comment: caffeine use: 1 cup daily      Family History:  Family History   Problem Relation Age of Onset    Heart disease Mother     Stroke Mother     Heart attack Father     Heart disease Father     Heart disease Sister         s/p CABG    Heart attack Brother     Heart disease Brother         s/p CABG    Heart disease Maternal Grandmother     Stroke Maternal Grandmother     Hypertension Other     Malig Hyperthermia Neg Hx           Review of Systems  See history of present illness and past medical history.      Vitals:   /73 (BP Location: Right arm, Patient Position: Lying)   Pulse 64   Temp 98.4 °F (36.9 °C) (Oral)   Resp 17   Ht 152.4 cm (60\")   Wt 59.9 kg (132 lb)   SpO2 99%   BMI 25.78 kg/m² "   I/O: No intake or output data in the 24 hours ending 07/19/25 1750  Exam:  Patient is examined using the personal protective equipment as per guidelines from infection control for this particular patient as enacted.  Hand washing was performed before and after patient interaction.  General Appearance:    Alert, cooperative, no distress, appears stated age   Head:    Normocephalic, without obvious abnormality, atraumatic   Eyes:    PERRL, conjunctiva/corneas clear, EOM's intact, both eyes   Ears:    Normal external ear canals, both ears   Nose:   Nares normal, septum midline, mucosa normal, no drainage    or sinus tenderness   Throat:   Lips, tongue, gums normal; oral mucosa pink and moist   Neck:   Supple, symmetrical, trachea midline, no adenopathy;     thyroid:  no enlargement/tenderness/nodules; no carotid    bruit or JVD   Back:     Symmetric, no curvature, ROM normal, no CVA tenderness   Lungs:     Clear to auscultation bilaterally, respirations unlabored   Chest Wall:    No tenderness or deformity    Heart:  S1-S2 regular   Abdomen:   Midline surgical incision dressed.   Extremities:   Extremities normal, atraumatic, no cyanosis or edema   Pulses:   Pulses palpable in all extremities; symmetric all extremities   Skin:   Skin color normal, Skin is warm and dry,  no rashes or palpable lesions   Neurologic:   CNII-XII intact, motor strength grossly intact, sensation grossly intact to light touch, no focal deficits noted       Data Review:      I reviewed the patient's new clinical results.  Results from last 7 days   Lab Units 07/19/25  1202   WBC 10*3/mm3 13.01*   HEMOGLOBIN g/dL 12.9   PLATELETS 10*3/mm3 247     Results from last 7 days   Lab Units 07/19/25  1202   SODIUM mmol/L 137   POTASSIUM mmol/L 4.7   CHLORIDE mmol/L 101   CO2 mmol/L 21.0*   BUN mg/dL 40.0*   CREATININE mg/dL 1.58*   CALCIUM mg/dL 9.4   GLUCOSE mg/dL 146*     CT Abdomen Pelvis With Contrast  Result Date: 7/19/2025  FINDINGS AND  IMPRESSION: No free intraperitoneal air is seen. THE MAJORITY OF THE SMALL BOWEL IS DILATED WITH AIR-FLUID LEVELS. TRANSITION POINT DISTALLY IS PRESENT WITHIN THE RIGHT LOWER QUADRANT (AXIAL IMAGE 102 AND 101WITH DECOMPRESSION OF THE AND TERMINAL ILEUM. THERE IS ALSO RELATIVE TRANSITION POINT PROXIMALLY ON AXIAL IMAGE 89 WITH MORE PROXIMAL SMALL BOWEL LOOPS NOT SIGNIFICANTLY DISTENDED. FINDINGS ARE MOST SUGGESTIVE OF SMALL BOWEL OBSTRUCTION AND UNFORTUNATELY CLOSED-LOOP CONFIGURATION CANNOT BE EXCLUDED. GENERAL SURGERY CONSULTATION IS RECOMMENDED.  Small amount of free fluid is present. No abdominal pelvic adenopathy by size criteria. Small hiatal hernia. Stomach has a mildly thickened appearance can be seen in setting of gastritis in the appropriate context. Correlation is recommended with follow-up endoscopy if clinically indicated.  There is colonic diverticulosis. Free fluid layering in the pelvis contacts a long segment of the sigmoid colon with suggestion of associated fat stranding in this area. Diverticulitis cannot be excluded in the appropriate context correlation with patient history is recommended.  The appendix is not seen. Subcentimeter hepatic lesions are too small to characterize. Larger cystic density lesion within the right hepatic lobe likely benign. The gallbladder, pancreas, spleen and adrenal glands have an unremarkable postcontrast CT appearance. Subcentimeter renal lesions are too small to characterize. Significant asymmetric atrophy of the left kidney. No hydronephrosis.  Uterus surgically absent. No suspicious lytic or blastic osseous lesions.  This report was finalized on 7/19/2025 2:13 PM by Dr. Mateo Vaz M.D on Workstation: [id number]      XR Chest 1 View  Result Date: 7/19/2025  FINDINGS AND IMPRESSION: Asymmetric pulm opacification is present within the left lung base suggestive atelectasis versus pneumonia in the appropriate context. Correlation with patient history is recommended  with follow-up chest CT if indicated. Given the asymmetry, least continued tension follow-up chest radiographs in 6 to 8 weeks recommended to ensure resolution exclude the possibility malignancy.  No pneumothorax is seen. Cardiac silhouette is at the upper limits of normal in size..  This report was finalized on 7/19/2025 1:57 PM by Dr. Mateo Vaz M.D on Workstation: [id number]      Microbiology Results (last 10 days)       ** No results found for the last 240 hours. **          Brief Urine Lab Results  (Last result in the past 365 days)        Color   Clarity   Blood   Leuk Est   Nitrite   Protein   CREAT   Urine HCG        07/19/25 1425 Yellow   Clear   Negative   Negative   Negative   Negative                 ECG 12 Lead Chest Pain   Final Result   HEART RATE=53  bpm   RR Pfegkfav=9269  ms   AR Ljdjtgxo=964  ms   P Horizontal Axis=-8  deg   P Front Axis=30  deg   QRSD Enmzvnxj=322  ms   QT Ixwxwmqk=434  ms   SDfH=553  ms   QRS Axis=-125  deg   T Wave Axis=11  deg   - ABNORMAL ECG -   Sinus rhythm   IVCD, consider RBBB   Inferior  infarct, old   No change from prior tracing   Electronically Signed By: Jeanne Melendrez ([facility]) 2025-07-19 15:51:59   Date and Time of Study:2025-07-19 12:01:55          Assessment:  Active Hospital Problems    Diagnosis  POA    **SBO (small bowel obstruction) [K56.609]  Yes    ROSALIA (acute kidney injury) [N17.9]  Unknown    Nonrheumatic mitral valve regurgitation [I34.0]  Yes    JHOANA on CPAP [G47.33]  Yes     Formatting of this note might be different from the original.  On BIPAP per Dr. Harvey      Stage 3a chronic kidney disease [N18.31]  Yes    JAMES (generalized anxiety disorder) [F41.1]  Yes    Restless leg syndrome [G25.81]  Yes    Hypertension [I10]  Yes       Medical decision making/care plan: See admitting orders  Small bowel obstruction-patient is status post exploratory laparotomy and lysis of adhesions-clinically doing well plan is to continue with n.p.o. status except for ice  chips and diet advancement as per surgery service.  Provided with incentive spirometry continuous pulse ox monitoring and pain control and nausea control as needed.  Continue with IV fluids and monitor her hemodynamics and renal function and evidence of volume overload.  Acute kidney injury on chronic kidney disease-due to decreased intake and dehydration-continue with IV fluids: Hydrochlorothiazide.  Hypertension-continue with metoprolol and monitor her blood pressure.  History of sleep apnea-provide her with continuous pulse ox monitoring as she may be needing narcotic pain medication postoperatively and allow her to use her BiPAP device if available.  Restless leg syndrome-continue her home regimen  Anxiety and depression-continue her Cymbalta.  Monitor closely for sundowning and family member suggested that they have somebody stay with her at night.  Provide with fall precautions.    Mookie Patel MD   7/19/2025  17:50 EDT    Parts of this note may be an electronic transcription/translation of spoken language to printed text using the Dragon dictation system.

## 2025-07-20 LAB
ALBUMIN SERPL-MCNC: 4 G/DL (ref 3.5–5.2)
ALBUMIN/GLOB SERPL: 1.7 G/DL
ALP SERPL-CCNC: 45 U/L (ref 39–117)
ALT SERPL W P-5'-P-CCNC: 13 U/L (ref 1–33)
ANION GAP SERPL CALCULATED.3IONS-SCNC: 11.9 MMOL/L (ref 5–15)
AST SERPL-CCNC: 28 U/L (ref 1–32)
BASOPHILS # BLD AUTO: 0.01 10*3/MM3 (ref 0–0.2)
BASOPHILS NFR BLD AUTO: 0.1 % (ref 0–1.5)
BILIRUB SERPL-MCNC: 0.4 MG/DL (ref 0–1.2)
BUN SERPL-MCNC: 25 MG/DL (ref 8–23)
BUN/CREAT SERPL: 22.9 (ref 7–25)
CALCIUM SPEC-SCNC: 8.3 MG/DL (ref 8.6–10.5)
CHLORIDE SERPL-SCNC: 103 MMOL/L (ref 98–107)
CO2 SERPL-SCNC: 21.1 MMOL/L (ref 22–29)
CREAT SERPL-MCNC: 1.09 MG/DL (ref 0.57–1)
DEPRECATED RDW RBC AUTO: 39.7 FL (ref 37–54)
EGFRCR SERPLBLD CKD-EPI 2021: 49.3 ML/MIN/1.73
EOSINOPHIL # BLD AUTO: 0 10*3/MM3 (ref 0–0.4)
EOSINOPHIL NFR BLD AUTO: 0 % (ref 0.3–6.2)
ERYTHROCYTE [DISTWIDTH] IN BLOOD BY AUTOMATED COUNT: 12 % (ref 12.3–15.4)
GLOBULIN UR ELPH-MCNC: 2.3 GM/DL
GLUCOSE SERPL-MCNC: 179 MG/DL (ref 65–99)
HCT VFR BLD AUTO: 33.3 % (ref 34–46.6)
HGB BLD-MCNC: 11.2 G/DL (ref 12–15.9)
IMM GRANULOCYTES # BLD AUTO: 0.07 10*3/MM3 (ref 0–0.05)
IMM GRANULOCYTES NFR BLD AUTO: 0.7 % (ref 0–0.5)
LYMPHOCYTES # BLD AUTO: 0.65 10*3/MM3 (ref 0.7–3.1)
LYMPHOCYTES NFR BLD AUTO: 6.3 % (ref 19.6–45.3)
MCH RBC QN AUTO: 30.4 PG (ref 26.6–33)
MCHC RBC AUTO-ENTMCNC: 33.6 G/DL (ref 31.5–35.7)
MCV RBC AUTO: 90.2 FL (ref 79–97)
MONOCYTES # BLD AUTO: 0.71 10*3/MM3 (ref 0.1–0.9)
MONOCYTES NFR BLD AUTO: 6.9 % (ref 5–12)
NEUTROPHILS NFR BLD AUTO: 8.92 10*3/MM3 (ref 1.7–7)
NEUTROPHILS NFR BLD AUTO: 86 % (ref 42.7–76)
NRBC BLD AUTO-RTO: 0 /100 WBC (ref 0–0.2)
PLATELET # BLD AUTO: 197 10*3/MM3 (ref 140–450)
PMV BLD AUTO: 10.2 FL (ref 6–12)
POTASSIUM SERPL-SCNC: 4.7 MMOL/L (ref 3.5–5.2)
PROT SERPL-MCNC: 6.3 G/DL (ref 6–8.5)
RBC # BLD AUTO: 3.69 10*6/MM3 (ref 3.77–5.28)
SODIUM SERPL-SCNC: 136 MMOL/L (ref 136–145)
WBC NRBC COR # BLD AUTO: 10.36 10*3/MM3 (ref 3.4–10.8)

## 2025-07-20 PROCEDURE — 63710000001 ONDANSETRON ODT 4 MG TABLET DISPERSIBLE: Performed by: INTERNAL MEDICINE

## 2025-07-20 PROCEDURE — 25010000002 ENOXAPARIN PER 10 MG: Performed by: SURGERY

## 2025-07-20 PROCEDURE — 25010000002 MORPHINE PER 10 MG: Performed by: SURGERY

## 2025-07-20 PROCEDURE — 99024 POSTOP FOLLOW-UP VISIT: CPT | Performed by: SURGERY

## 2025-07-20 PROCEDURE — 80053 COMPREHEN METABOLIC PANEL: CPT | Performed by: INTERNAL MEDICINE

## 2025-07-20 PROCEDURE — 85025 COMPLETE CBC W/AUTO DIFF WBC: CPT | Performed by: INTERNAL MEDICINE

## 2025-07-20 RX ORDER — ENOXAPARIN SODIUM 100 MG/ML
40 INJECTION SUBCUTANEOUS EVERY 24 HOURS
Status: DISCONTINUED | OUTPATIENT
Start: 2025-07-20 | End: 2025-07-30 | Stop reason: HOSPADM

## 2025-07-20 RX ORDER — CLONIDINE HYDROCHLORIDE 0.1 MG/1
0.1 TABLET ORAL ONCE
Status: COMPLETED | OUTPATIENT
Start: 2025-07-21 | End: 2025-07-20

## 2025-07-20 RX ADMIN — ENOXAPARIN SODIUM 40 MG: 100 INJECTION SUBCUTANEOUS at 11:11

## 2025-07-20 RX ADMIN — MORPHINE SULFATE 2 MG: 2 INJECTION, SOLUTION INTRAMUSCULAR; INTRAVENOUS at 01:06

## 2025-07-20 RX ADMIN — Medication 10 ML: at 20:24

## 2025-07-20 RX ADMIN — Medication 10 ML: at 09:31

## 2025-07-20 RX ADMIN — CLONIDINE HYDROCHLORIDE 0.1 MG: 0.1 TABLET ORAL at 23:38

## 2025-07-20 RX ADMIN — ATORVASTATIN CALCIUM 20 MG: 20 TABLET, FILM COATED ORAL at 09:31

## 2025-07-20 RX ADMIN — ACETAMINOPHEN 325MG 650 MG: 325 TABLET ORAL at 23:08

## 2025-07-20 RX ADMIN — DEXTROSE MONOHYDRATE, SODIUM CHLORIDE, AND POTASSIUM CHLORIDE 75 ML/HR: 50; 4.5; 1.49 INJECTION, SOLUTION INTRAVENOUS at 23:15

## 2025-07-20 RX ADMIN — AMLODIPINE BESYLATE 1.25 MG: 2.5 TABLET ORAL at 20:24

## 2025-07-20 RX ADMIN — ACETAMINOPHEN 325MG 650 MG: 325 TABLET ORAL at 18:18

## 2025-07-20 RX ADMIN — ACETAMINOPHEN 325MG 650 MG: 325 TABLET ORAL at 11:13

## 2025-07-20 RX ADMIN — DEXTROSE MONOHYDRATE, SODIUM CHLORIDE, AND POTASSIUM CHLORIDE 100 ML/HR: 50; 4.5; 1.49 INJECTION, SOLUTION INTRAVENOUS at 09:31

## 2025-07-20 RX ADMIN — ONDANSETRON 4 MG: 4 TABLET, ORALLY DISINTEGRATING ORAL at 18:21

## 2025-07-20 RX ADMIN — DULOXETINE HYDROCHLORIDE 30 MG: 30 CAPSULE, DELAYED RELEASE ORAL at 09:31

## 2025-07-20 RX ADMIN — Medication 2.5 MG: at 23:38

## 2025-07-20 RX ADMIN — METOPROLOL SUCCINATE 25 MG: 25 TABLET, EXTENDED RELEASE ORAL at 20:24

## 2025-07-20 NOTE — ANESTHESIA POSTPROCEDURE EVALUATION
Patient: Itzel Edmond    Procedure Summary       Date: 07/19/25 Room / Location: [facility] OR  / [facility] MAIN OR    Anesthesia Start: 1726 Anesthesia Stop: 1851    Procedure: exploratory laparotomy, lysis of adhesions (Abdomen) Diagnosis:       SBO (small bowel obstruction)      (SBO (small bowel obstruction) [K56.609])    Surgeons: Terry Rios MD Provider: Kuldip Ramirez MD    Anesthesia Type: general ASA Status: 3 - Emergent            Anesthesia Type: general    Vitals  Vitals Value Taken Time   /62 07/19/25 20:00   Temp 37 °C (98.6 °F) 07/19/25 18:50   Pulse 65 07/19/25 20:00   Resp 14 07/19/25 19:00   SpO2 99 % 07/19/25 20:00   Vitals shown include unfiled device data.        Post Anesthesia Care and Evaluation    Patient location during evaluation: bedside  Patient participation: complete - patient participated  Level of consciousness: awake  Pain management: adequate    Airway patency: patent  Anesthetic complications: No anesthetic complications  PONV Status: controlled  Cardiovascular status: acceptable  Respiratory status: acceptable  Hydration status: acceptable    Comments: --------------------            07/19/25               1900     --------------------   BP:       167/60     Pulse:      67       Resp:       14       Temp:                SpO2:      100%     --------------------

## 2025-07-20 NOTE — PROGRESS NOTES
Postop day 1 exploratory laparotomy, lysis of adhesions for small bowel obstruction    Subjective:  Feels all right today.  Some expected pain.  Pain is different than preop.  No GI function yet but denies nausea.    Objective:  Afebrile, vital stable  General: Awake and alert, no distress  Abdomen: Soft, appropriate tenderness, no significant distention noted    Assessment and plan:  - Small bowel obstruction status post laparotomy with lysis of adhesions, recovering reasonably well  - Try clear liquids today  - Lovenox DVT prophylaxis  - Up to side of bed, will ask physical therapy to see, hopefully she can walk starting tomorrow  - Could progress to this point    Terry Rios MD  General and Endoscopic Surgery  [facility]    [address], [address]  [place], [place], [address]  P: [phone]  F: [phone]

## 2025-07-20 NOTE — PROGRESS NOTES
Dedicated to Hospital Care    [phone]   LOS: 1 day     Name: Itzel Edmond  Age/Sex: 85 y.o. female  :  1940        PCP: Rhoda Luther MD  Chief Complaint   Patient presents with    Abdominal Pain      Subjective   Only complaining of some pain today.  Still not passing gas and still no bowel movement.  Tolerated surgery last evening.  Denies other new issues or complaints right now  General: No Fever or Chills, Cardiac: No Chest Pain or Palpitations, Resp: No Cough or SOA, and Other: No bleeding    amLODIPine, 1.25 mg, Oral, Nightly  atorvastatin, 20 mg, Oral, Daily  DULoxetine, 30 mg, Oral, Daily  metoprolol succinate XL, 25 mg, Oral, Nightly  Morphine, 4 mg, Intravenous, Once  ondansetron, 4 mg, Intravenous, Once  sodium chloride, 10 mL, Intravenous, Q12H      dextrose 5 % and sodium chloride 0.45 % with KCl 20 mEq/L, 100 mL/hr, Last Rate: 100 mL/hr (25)  sodium chloride, 100 mL/hr, Last Rate: 100 mL/hr (25)        Objective   Vital Signs  Temp:  [97.5 °F (36.4 °C)-98.6 °F (37 °C)] 97.9 °F (36.6 °C)  Heart Rate:  [58-71] 64  Resp:  [14-18] 18  BP: (152-175)/(57-83) 152/59  Body mass index is 27.6 kg/m².    Intake/Output Summary (Last 24 hours) at 2025 1042  Last data filed at 2025 0430  Gross per 24 hour   Intake 850 ml   Output 455 ml   Net 395 ml       Physical Exam  Awake alert sitting up in the bed in no distress alert and oriented x 4  Daughters present at the bedside  Clear to auscultation anteriorly  Regular rate and rhythm  Abdomen soft sluggish bowel sounds expected postoperative tenderness  Noted midline incision healing well with no surrounding erythema staples in place  No edema    Results Review:       I reviewed the patient's new clinical results.  Results from last 7 days   Lab Units 25  0623 25  1202   WBC 10*3/mm3 10.36 13.01*   HEMOGLOBIN g/dL 11.2* 12.9   PLATELETS 10*3/mm3 197 247     Results from last 7 days   Lab Units  07/20/25  0623 07/19/25  1202   SODIUM mmol/L 136 137   POTASSIUM mmol/L 4.7 4.7   CHLORIDE mmol/L 103 101   CO2 mmol/L 21.1* 21.0*   BUN mg/dL 25.0* 40.0*   CREATININE mg/dL 1.09* 1.58*   CALCIUM mg/dL 8.3* 9.4   Estimated Creatinine Clearance: 30.4 mL/min (A) (by C-G formula based on SCr of 1.09 mg/dL (H)).      Assessment & Plan   Active Hospital Problems    Diagnosis  POA    **SBO (small bowel obstruction) [K56.609]  Yes    ROSALIA (acute kidney injury) [N17.9]  Unknown    Nonrheumatic mitral valve regurgitation [I34.0]  Yes    JHOANA on CPAP [G47.33]  Yes    Stage 3a chronic kidney disease [N18.31]  Yes    JAMES (generalized anxiety disorder) [F41.1]  Yes    Restless leg syndrome [G25.81]  Yes    Hypertension [I10]  Yes      Resolved Hospital Problems   No resolved problems to display.       ASSESSMENT  This is an 85-year-old lady with a history of obstructive sleep apnea chronic kidney disease anxiety hypertension and restless leg syndrome who presents to the hospital with abdominal pain and nausea and is found to have small bowel obstruction and taken by general surgery to the operating room for lysis of adhesions on 7/19.  Procedure tolerated well.  Edited by: Asael Warren MD at 7/20/2025 0849   PLAN  Tolerated lysis of adhesions yesterday with exploratory laparotomy.  Currently awaiting return of bowel function  Continue IV fluid support today electrolytes and renal function stable  Await return of bowel function remains n.p.o. for now  Continue as needed pain medications  Encourage out of bed activity and early ambulation but would recommend pharmacologic DVT prophylaxis if okay with general surgery this evening    VTE Prophylaxis:  Mechanical VTE prophylaxis orders are present.      Code Status and Medical Interventions: CPR (Attempt to Resuscitate); Full Support   Ordered at: 07/19/25 4442     Code Status (Patient has no pulse and is not breathing):    CPR (Attempt to Resuscitate)     Medical  Interventions (Patient has pulse or is breathing):    Full Support          Disposition  Expected discharge date/ time has not been documented.       Asael Warren MD  [facility]  07/20/25  10:42 EDT

## 2025-07-20 NOTE — PLAN OF CARE
Goal Outcome Evaluation:  Plan of Care Reviewed With: patient           Outcome Evaluation: advanced to clear liq diet, ivf decreased to 75ml/hr, started lovenox, instructed pt on IS , alert and oriented X4, RA, daughter at the bedside, will cont to monitor                             Problem: Adult Inpatient Plan of Care  Goal: Plan of Care Review  Outcome: Progressing  Flowsheets (Taken 7/20/2025 1419)  Outcome Evaluation: advanced to clear liq diet, ivf decreased to 75ml/hr, started lovenox, instructed pt on IS , alert and oriented X4, RA, daughter at the bedside, will cont to monitor  Plan of Care Reviewed With: patient  Goal: Patient-Specific Goal (Individualized)  Outcome: Progressing  Goal: Absence of Hospital-Acquired Illness or Injury  Outcome: Progressing  Intervention: Identify and Manage Fall Risk  Recent Flowsheet Documentation  Taken 7/20/2025 1417 by Chrystal Andino, RN  Safety Promotion/Fall Prevention:   activity supervised   assistive device/personal items within reach   clutter free environment maintained   fall prevention program maintained   nonskid shoes/slippers when out of bed   room organization consistent   safety round/check completed  Taken 7/20/2025 1241 by Chrystal Andino, RN  Safety Promotion/Fall Prevention:   activity supervised   assistive device/personal items within reach   clutter free environment maintained   fall prevention program maintained   nonskid shoes/slippers when out of bed   room organization consistent   safety round/check completed  Taken 7/20/2025 1055 by Chrystal Andino, RN  Safety Promotion/Fall Prevention:   activity supervised   assistive device/personal items within reach   clutter free environment maintained   fall prevention program maintained   nonskid shoes/slippers when out of bed   room organization consistent   safety round/check completed  Taken 7/20/2025 0800 by Chrystal Andino, RN  Safety Promotion/Fall Prevention:   activity supervised   assistive  device/personal items within reach   clutter free environment maintained   fall prevention program maintained   nonskid shoes/slippers when out of bed   room organization consistent   safety round/check completed  Intervention: Prevent and Manage VTE (Venous Thromboembolism) Risk  Recent Flowsheet Documentation  Taken 7/20/2025 0800 by Chrystal Andino RN  VTE Prevention/Management:   bilateral   SCDs (sequential compression devices) off  Intervention: Prevent Infection  Recent Flowsheet Documentation  Taken 7/20/2025 1417 by Chrystal Andino RN  Infection Prevention: single patient room provided  Taken 7/20/2025 1241 by Chrystal Andino RN  Infection Prevention: single patient room provided  Taken 7/20/2025 1055 by Chrystal Andino RN  Infection Prevention: single patient room provided  Taken 7/20/2025 0800 by Chrystal Andino RN  Infection Prevention: single patient room provided  Goal: Optimal Comfort and Wellbeing  Outcome: Progressing  Intervention: Provide Person-Centered Care  Recent Flowsheet Documentation  Taken 7/20/2025 0800 by Chrystal Andino RN  Trust Relationship/Rapport:   care explained   thoughts/feelings acknowledged  Goal: Readiness for Transition of Care  Outcome: Progressing     Problem: Skin Injury Risk Increased  Goal: Skin Health and Integrity  Outcome: Progressing  Intervention: Optimize Skin Protection  Recent Flowsheet Documentation  Taken 7/20/2025 1417 by Chrystal Andino RN  Activity Management: activity encouraged  Taken 7/20/2025 1241 by Chrystal Andino RN  Activity Management: activity encouraged  Taken 7/20/2025 1055 by Chrystal Andino RN  Activity Management: activity encouraged  Taken 7/20/2025 0800 by Chrystal Andino RN  Activity Management: activity encouraged  Pressure Reduction Techniques: frequent weight shift encouraged     Problem: Fall Injury Risk  Goal: Absence of Fall and Fall-Related Injury  Outcome: Progressing  Intervention: Identify and Manage  Contributors  Recent Flowsheet Documentation  Taken 7/20/2025 1417 by Chrystal Andino RN  Medication Review/Management: medications reviewed  Taken 7/20/2025 1241 by Chrystal Andnio RN  Medication Review/Management: medications reviewed  Taken 7/20/2025 1055 by Chrystal Andino RN  Medication Review/Management: medications reviewed  Taken 7/20/2025 0800 by Chrystal Andino RN  Medication Review/Management: medications reviewed  Intervention: Promote Injury-Free Environment  Recent Flowsheet Documentation  Taken 7/20/2025 1417 by Chrystal Andino RN  Safety Promotion/Fall Prevention:   activity supervised   assistive device/personal items within reach   clutter free environment maintained   fall prevention program maintained   nonskid shoes/slippers when out of bed   room organization consistent   safety round/check completed  Taken 7/20/2025 1241 by Chrystal Andino RN  Safety Promotion/Fall Prevention:   activity supervised   assistive device/personal items within reach   clutter free environment maintained   fall prevention program maintained   nonskid shoes/slippers when out of bed   room organization consistent   safety round/check completed  Taken 7/20/2025 1055 by Chrystal Andino RN  Safety Promotion/Fall Prevention:   activity supervised   assistive device/personal items within reach   clutter free environment maintained   fall prevention program maintained   nonskid shoes/slippers when out of bed   room organization consistent   safety round/check completed  Taken 7/20/2025 0800 by Chrystal Andino RN  Safety Promotion/Fall Prevention:   activity supervised   assistive device/personal items within reach   clutter free environment maintained   fall prevention program maintained   nonskid shoes/slippers when out of bed   room organization consistent   safety round/check completed

## 2025-07-20 NOTE — PLAN OF CARE
Goal Outcome Evaluation:              Outcome Evaluation: no acute changes for this shift. call light within reach. plan of care ongoing. daughter at bedside.

## 2025-07-21 PROCEDURE — 97530 THERAPEUTIC ACTIVITIES: CPT

## 2025-07-21 PROCEDURE — 97162 PT EVAL MOD COMPLEX 30 MIN: CPT

## 2025-07-21 PROCEDURE — 25010000002 PROCHLORPERAZINE 10 MG/2ML SOLUTION: Performed by: HOSPITALIST

## 2025-07-21 PROCEDURE — 99024 POSTOP FOLLOW-UP VISIT: CPT | Performed by: SURGERY

## 2025-07-21 PROCEDURE — 25010000002 ONDANSETRON PER 1 MG: Performed by: INTERNAL MEDICINE

## 2025-07-21 PROCEDURE — 25010000002 ENOXAPARIN PER 10 MG: Performed by: SURGERY

## 2025-07-21 RX ORDER — PROCHLORPERAZINE MALEATE 5 MG/1
5 TABLET ORAL EVERY 6 HOURS PRN
Status: DISCONTINUED | OUTPATIENT
Start: 2025-07-21 | End: 2025-07-30 | Stop reason: HOSPADM

## 2025-07-21 RX ORDER — PROCHLORPERAZINE 25 MG
25 SUPPOSITORY, RECTAL RECTAL EVERY 12 HOURS PRN
Status: DISCONTINUED | OUTPATIENT
Start: 2025-07-21 | End: 2025-07-30 | Stop reason: HOSPADM

## 2025-07-21 RX ORDER — PROCHLORPERAZINE EDISYLATE 5 MG/ML
5 INJECTION INTRAMUSCULAR; INTRAVENOUS EVERY 6 HOURS PRN
Status: DISCONTINUED | OUTPATIENT
Start: 2025-07-21 | End: 2025-07-30 | Stop reason: HOSPADM

## 2025-07-21 RX ADMIN — ATORVASTATIN CALCIUM 20 MG: 20 TABLET, FILM COATED ORAL at 08:21

## 2025-07-21 RX ADMIN — AMLODIPINE BESYLATE 1.25 MG: 2.5 TABLET ORAL at 19:49

## 2025-07-21 RX ADMIN — Medication 10 ML: at 08:22

## 2025-07-21 RX ADMIN — PROCHLORPERAZINE EDISYLATE 5 MG: 5 INJECTION INTRAMUSCULAR; INTRAVENOUS at 17:02

## 2025-07-21 RX ADMIN — ENOXAPARIN SODIUM 40 MG: 100 INJECTION SUBCUTANEOUS at 14:37

## 2025-07-21 RX ADMIN — DULOXETINE HYDROCHLORIDE 30 MG: 30 CAPSULE, DELAYED RELEASE ORAL at 08:21

## 2025-07-21 RX ADMIN — ONDANSETRON 4 MG: 2 INJECTION INTRAMUSCULAR; INTRAVENOUS at 04:55

## 2025-07-21 RX ADMIN — METOPROLOL SUCCINATE 25 MG: 25 TABLET, EXTENDED RELEASE ORAL at 19:48

## 2025-07-21 RX ADMIN — PROCHLORPERAZINE EDISYLATE 5 MG: 5 INJECTION INTRAMUSCULAR; INTRAVENOUS at 08:20

## 2025-07-21 RX ADMIN — ACETAMINOPHEN 325MG 650 MG: 325 TABLET ORAL at 19:48

## 2025-07-21 RX ADMIN — Medication 10 ML: at 19:48

## 2025-07-21 NOTE — PLAN OF CARE
Goal Outcome Evaluation:  Plan of Care Reviewed With: patient, child           Outcome Evaluation: Pt seen for PT this AM. She was admitted to [facility] on 7/19/25 w small bowel obstruction. Pt now s/p exploratory lap . She presents w expected post op pain and generalized weakness. At baseline, pt lives at home alone. SHe is independent w all mobility and ADLs and does not use AD. Today, pt able to transition to EOB SBA. She stood w SBA and then ambulated approx 150 ft w SBA/CGA. No unsteadiness noted. Pt initially trialed using Rwx, but actually ambulates better without AD. Pt agreeable to sit up in chair at end of session. Daughter present in room. Pt may ambulate w nsg staff as well. Anticipate pt to return home upon DC w family assist. She will continue to benefit from skilled PT to maximize safety, strength, and independence w mobility.    Anticipated Discharge Disposition (PT): home with assist

## 2025-07-21 NOTE — PLAN OF CARE
Goal Outcome Evaluation:           Progress: improving  Outcome Evaluation: still complaining of nausea, PRN meds given. BP elevated. [facility] notified. ordered stat dose of clonidine given. family at bedside. call light within reach. plan of care ongoing.

## 2025-07-21 NOTE — THERAPY EVALUATION
Patient Name: Itzel Edmond  : 1940    MRN: [id number]                              Today's Date: 2025       Admit Date: 2025    Visit Dx:     ICD-10-CM ICD-9-CM   1. SBO (small bowel obstruction)  K56.609 560.9   2. Abnormal CT scan  R93.89 793.99     Patient Active Problem List   Diagnosis    Hypertension    Premature atrial complexes    Hyperlipidemia    Restless leg syndrome    Heart murmur    JAMES (generalized anxiety disorder)    Slow transit constipation    TIA (transient ischemic attack)    Subdural hemorrhage following injury    Subdural hematoma    Stage 3a chronic kidney disease    Overactive bladder    JHOANA on CPAP    Nonrheumatic mitral valve regurgitation    SBO (small bowel obstruction)    ROSALIA (acute kidney injury)     Past Medical History:   Diagnosis Date    Allergic rhinitis     Atherosclerosis     CAD (coronary artery disease) 2019    Overview:  Mild non obstructive CAD on cath 2019, medical mgmt recommended    Carotid stenosis     Cataract     Cellulitis of right leg 2016    Chest discomfort 2019    Claudication of lower extremity     CMC arthritis     Colon polyps     FOLLOWED BY DR. NOLBERTO REYNOSO    Cystitis 2019    WOODARD (dyspnea on exertion) 10/2020    Dysphagia     Elevated serum creatinine 2021    Environmental allergies     Exercise intolerance     JAMES (generalized anxiety disorder) 2020    GERD (gastroesophageal reflux disease)     Heart murmur     Hemorrhoids     Hyperglycemia     Hyperkalemia 2020    Hyperlipidemia     MIXED    Hypertension     Influenza 2018    Insomnia     Myalgia 2016    Neuropathy 2017    OAB (overactive bladder)     JHOANA (obstructive sleep apnea)     BiPap    PLMD (periodic limb movement disorder)     Precordial pain 2019    Premature atrial complexes     Rectal bleeding     Rectal prolapse 10/2020    Rectal ulcer 10/02/2020    5 MM SINGLE ULCER ON CY    Rectocele 2019    Renal insufficiency     Restless  leg syndrome 10/2/2018    SAB (spontaneous )      A1    Serum potassium elevated 2016    Slow transit constipation 2020    Tachycardia 2013    Trigger finger of left thumb 10/21/2019    Urge incontinence     Urinary frequency     Vaginal atrophy      Past Surgical History:   Procedure Laterality Date    ANTERIOR AND POSTERIOR VAGINAL REPAIR N/A     BACK SURGERY      BILATERAL SALPINGO OOPHORECTOMY Bilateral     BLADDER SURGERY N/A     BLADDER SLING    YADI HOLE Right 2022    Procedure: YADI HOLE;  Surgeon: Austin Hilario MD;  Location[facility] MA[facility];  Service: Neurosurgery;  Laterality: Right;    CARDIAC CATHETERIZATION N/A 2019    Procedure: Left Heart Cath;  Surgeon: Christopher Rosenbaum MD;  Location:  [facility] CATH INVASIVE LOCATION;  Service: Cardiology    CARDIAC CATHETERIZATION N/A 2019    Procedure: Coronary angiography;  Surgeon: Christopher Rosenbaum MD;  Location:  [facility] CATH INVASIVE LOCATION;  Service: Cardiology    CARDIAC CATHETERIZATION N/A 2019    Procedure: Left ventriculography;  Surgeon: Christopher Rosenbaum MD;  Location: [facility] CATH [facility] LOC[facility];  Service: Cardiology    CATARACT EXTRACTION Left 12/15/2015    WITH LENS IMPLANT, DR. AMRIT DIAZ AT [facility]    CATARACT EXTRACTION Right 2015     WITH LENS IMPLANT, DR. AMRIT DIAZ AT [facility]    COLONOSCOPY N/A 10/2/2020    2 TUBULAR ADENOMA POLYPS IN TRANSVERSE, HEMORRHOIDS, DIVERTICULOSIS, 5 MM ULCER IN RECTUM, OTHER BIOPSIES BENIGN, DR. NOLBERTO REYNOSO AT [facility]    COLONOSCOPY W/ POLYPECTOMY N/A 2015    1 CM TUBULAR ADENOMA POLYP IN TRANSVERSE, MODERATE DIVERTICULOSIS, HEMORRHOIDS, RESCOPE IN 5 YRS, DR. MAYA SAHU AT [facility]    EXPLORATORY LAPAROTOMY N/A 2025    Procedure: exploratory laparotomy, lysis of adhesions;  Surgeon: Terry Rios MD;  Location: [facility] MAIN OR;  Service: General;  Laterality: N/A;    HEMORROIDECTOMY N/A     HERNIA REPAIR      HYSTERECTOMY  N/A 1970    OVARIES IN TACT      General Information       Row Name 07/21/25 1659          Physical Therapy Time and Intention    Document Type evaluation  -EJ     Mode of Treatment physical therapy  -EJ       Row Name 07/21/25 1659          General Information    Patient Profile Reviewed yes  -EJ     Prior Level of Function independent:;all household mobility;community mobility;ADL's  -EJ     Existing Precautions/Restrictions no known precautions/restrictions  -EJ     Barriers to Rehab none identified  -EJ       Row Name 07/21/25 1659          Living Environment    Current Living Arrangements home  -EJ     People in Home alone  family is able to stay w pt  -EJ       Row Name 07/21/25 1659          Cognition    Orientation Status (Cognition) oriented x 4  -EJ       Row Name 07/21/25 1659          Safety Issues/Impairments Affecting Functional Mobility    Impairments Affecting Function (Mobility) strength;pain;endurance/activity tolerance  -EJ               User Key  (r) = Recorded By, (t) = Taken By, (c) = Cosigned By      Initials Name Provider Type    EJ Marine Linares, PT Physical Therapist                   Mobility       Row Name 07/21/25 1700          Bed Mobility    Bed Mobility supine-sit  -EJ     Supine-Sit [place] (Bed Mobility) standby assist;verbal cues  -EJ     Assistive Device (Bed Mobility) head of bed elevated  -EJ       Row Name 07/21/25 1700          Sit-Stand Transfer    Sit-Stand [place] (Transfers) verbal cues;standby assist  -EJ       Row Name 07/21/25 1700          Gait/Stairs (Locomotion)    [place] Level (Gait) verbal cues;standby assist;contact guard  -EJ     Distance in Feet (Gait) 150  -EJ     Deviations/Abnormal Patterns (Gait) [place] decreased  -EJ     Comment, (Gait/Stairs) no unsteadiness noted, attempted using Rwx initially but pt ambulates better without AD. she does not use AD at baseline  -EJ               User Key  (r) = Recorded By, (t) = Taken By, (c) =  Cosigned By      Initials Name Provider Type     Marine Linares, PT Physical Therapist                   Obj/Interventions       Row Name 07/21/25 1702          Range of Motion Comprehensive    General Range of Motion no range of motion deficits identified  -EJ       Row Name 07/21/25 1702          Strength Comprehensive (MMT)    Comment, General Manual Muscle Testing (MMT) Assessment mild generalized weakness  -EJ       Row Name 07/21/25 1702          Balance    Balance Assessment sitting static balance;standing static balance;standing dynamic balance  -EJ     Static Sitting Balance independent  -EJ     Position, Sitting Balance sitting edge of bed  -EJ     Static Standing Balance standby assist  -EJ     Dynamic Standing Balance standby assist;contact guard  -EJ               User Key  (r) = Recorded By, (t) = Taken By, (c) = Cosigned By      Initials Name Provider Type     Marine Linares, PT Physical Therapist                   Goals/Plan       Row Name 07/21/25 1708          Bed Mobility Goal 1 (PT)    Activity/Assistive Device (Bed Mobility Goal 1, PT) bed mobility activities, all  -EJ     [place] Level/Cues Needed (Bed Mobility Goal 1, PT) supervision required  -EJ     Time Frame (Bed Mobility Goal 1, PT) 2 weeks  -EJ       Row Name 07/21/25 1708          Transfer Goal 1 (PT)    Activity/Assistive Device (Transfer Goal 1, PT) transfers, all  -EJ     [place] Level/Cues Needed (Transfer Goal 1, PT) supervision required  -EJ     Time Frame (Transfer Goal 1, PT) 2 weeks  -EJ       Row Name 07/21/25 1708          Gait Training Goal 1 (PT)    Activity/Assistive Device (Gait Training Goal 1, PT) gait (walking locomotion)  -EJ     [place] Level (Gait Training Goal 1, PT) standby assist  -EJ     Distance (Gait Training Goal 1, PT) 300  -EJ     Time Frame (Gait Training Goal 1, PT) 2 weeks  -EJ       Row Name 07/21/25 1708          Therapy Assessment/Plan (PT)    Planned Therapy Interventions  (PT) balance training;bed mobility training;gait training;home exercise program;stretching;strengthening;stair training;ROM (range of motion);patient/family education;transfer training  -EJ               User Key  (r) = Recorded By, (t) = Taken By, (c) = Cosigned By      Initials Name Provider Type    EJ Marine Linares, PT Physical Therapist                   Clinical Impression       Row Name 07/21/25 1702          Pain    Pretreatment Pain Rating 4/10  -EJ     Posttreatment Pain Rating 4/10  -EJ     Pain Location abdomen  -EJ     Pain Side/Orientation lower  -EJ     Pain Management Interventions exercise or physical activity utilized  -EJ       Row Name 07/21/25 1702          Plan of Care Review    Plan of Care Reviewed With patient;child  -EJ     Outcome Evaluation Pt seen for PT this AM. She was admitted to [facility] on 7/19/25 w small bowel obstruction. Pt now s/p exploratory lap . She presents w expected post op pain and generalized weakness. At baseline, pt lives at home alone. SHe is independent w all mobility and ADLs and does not use AD. Today, pt able to transition to EOB SBA. She stood w SBA and then ambulated approx 150 ft w SBA/CGA. No unsteadiness noted. Pt initially trialed using Rwx, but actually ambulates better without AD. Pt agreeable to sit up in chair at end of session. Daughter present in room. Pt may ambulate [place] [facility] [place] as well. Anticipate pt to return home upon DC w family assist. She will continue to benefit from skilled PT to maximize safety, strength, and independence w mobility.  -EJ       Row Name 07/21/25 1702          Therapy Assessment/Plan (PT)    Rehab Potential (PT) good  -EJ     Criteria for Skilled Interventions Met (PT) yes  -EJ     Therapy Frequency (PT) 5 times/wk  -EJ       Row Name 07/21/25 1702          Positioning and Restraints    Pre-Treatment Position in bed  -EJ     Post Treatment Position chair  -EJ     In Chair notified nsg;reclined;call light within reach;encouraged  to call for assist;exit alarm on;with family/caregiver  -LA               User Key  (r) = Recorded By, (t) = Taken By, (c) = Cosigned By      Initials Name Provider Type    Marine Castaneda, PT Physical Therapist                   Outcome Measures       Row Name 07/21/25 1709 07/21/25 0800       How much help from another person do you currently need...    Turning from your back to your side while in flat bed without using bedrails? 4  -EJ 3  -TB    Moving from lying on back to sitting on the side of a flat bed without bedrails? 3  -EJ 2  -TB    Moving to and from a bed to a chair (including a wheelchair)? 3  -EJ 2  -TB    Standing up from a chair using your arms (e.g., wheelchair, bedside chair)? 3  -EJ 3  -TB    Climbing 3-5 steps with a railing? 3  -EJ 2  -TB    To walk in hospital room? 3  -EJ 3  -TB    AM-PAC 6 Clicks Score (PT) 19  -EJ 15  -TB              User Key  (r) = Recorded By, (t) = Taken By, (c) = Cosigned By      Initials Name Provider Type    Marine Castaneda, PT Physical Therapist    TB Antonia Chadwick RN Registered Nurse                                   PT Recommendation and Plan  Planned Therapy Interventions (PT): balance training, bed mobility training, gait training, home exercise program, stretching, strengthening, stair training, ROM (range of motion), patient/family education, transfer training  Outcome Evaluation: Pt seen for PT this AM. She was admitted to [facility] on 7/19/25 w small bowel obstruction. Pt now s/p exploratory lap . She presents w expected post op pain and generalized weakness. At baseline, pt lives at home alone. SHe is independent w all mobility and ADLs and does not use AD. Today, pt able to transition to EOB SBA. She stood w SBA and then ambulated approx 150 ft w SBA/CGA. No unsteadiness noted. Pt initially trialed using Rwx, but actually ambulates better without AD. Pt agreeable to sit up in chair at end of session. Daughter present in room. Pt may ambulate w nsg  staff as well. Anticipate pt to return home upon DC w family assist. She will continue to benefit from skilled PT to maximize safety, strength, and independence w mobility.     Time Calculation:         PT Charges       Row Name 07/21/25 1711             Time Calculation    Start Time 1105  -EJ      Stop Time 1130  -EJ      Time Calculation (min) 25 min  -EJ      PT Received On 07/21/25  -EJ      PT - Next Appointment 07/22/25  -EJ      PT Goal Re-Cert Due Date 08/04/25  -EJ         Time Calculation- PT    Total Timed Code Minutes- PT 18 minute(s)  -EJ                User Key  (r) = Recorded By, (t) = Taken By, (c) = Cosigned By      Initials Name Provider Type    EJ Marine Linares, PT Physical Therapist                  Therapy Charges for Today       Code Description Service Date Service Provider Modifiers Qty    58047371245 HC PT EVAL MOD COMPLEXITY 3 7/21/2025 Marine Linares, PT GP 1    30388322816 HC PT THERAPEUTIC ACT EA 15 MIN 7/21/2025 Marine Linares, PT GP 1            PT G-Codes  AM-PAC 6 Clicks Score (PT): 19  PT Discharge Summary  Anticipated Discharge Disposition (PT): home with assist    Marine Linares PT  7/21/2025

## 2025-07-21 NOTE — PROGRESS NOTES
Postop day 2 exploratory laparotomy with lysis of adhesions    Subjective:  Feels poorly.  She is nauseated and has not had any bowel function.  No vomiting.  Pain is adequately controlled.    Objective:  Afebrile, vital stable  General: Awake and alert, no distress  Abdomen: Seems mildly distended, appropriate tenderness only, incision looks good    Assessment and plan:  - Small bowel obstruction, now 2 days out from laparotomy with adhesiolysis  - Awaiting return of GI function  - Will not advance diet today, recommended patient she backs down to just a bit of water and ice chips.  If she is feeling better she can return to taking more of the clear liquids  - Physical therapy to see to help mobilize patient  - Lovenox DVT prophylaxis    Terry Rios MD  General and Endoscopic Surgery  [facility]    [address], [address]  [place], [place], [address]  P: [phone]  F: [phone]

## 2025-07-21 NOTE — PROGRESS NOTES
Dedicated to Hospital Care    [phone]   LOS: 2 days     Name: Itzel Edmond  Age/Sex: 85 y.o. female  :  1940        PCP: Rhoda Luther MD  Chief Complaint   Patient presents with    Abdominal Pain      Subjective   Only complaining of some pain today right now she is working with physical therapy and ambulating with a walker.  She had some nausea this morning but responded well to Compazine.  Denies other new issues or complaints  General: No Fever or Chills, Cardiac: No Chest Pain or Palpitations, Resp: No Cough or SOA, and Other: No bleeding    amLODIPine, 1.25 mg, Oral, Nightly  atorvastatin, 20 mg, Oral, Daily  DULoxetine, 30 mg, Oral, Daily  enoxaparin sodium, 40 mg, Subcutaneous, Q24H  metoprolol succinate XL, 25 mg, Oral, Nightly  Morphine, 4 mg, Intravenous, Once  ondansetron, 4 mg, Intravenous, Once  sodium chloride, 10 mL, Intravenous, Q12H      dextrose 5 % and sodium chloride 0.45 % with KCl 20 mEq/L, 75 mL/hr, Last Rate: 75 mL/hr (252)        Objective   Vital Signs  Temp:  [97.4 °F (36.3 °C)-98.4 °F (36.9 °C)] 98.1 °F (36.7 °C)  Heart Rate:  [59-74] 59  Resp:  [18] 18  BP: (146-202)/(60-80) 146/64  Body mass index is 27.6 kg/m².    Intake/Output Summary (Last 24 hours) at 2025 1338  Last data filed at 2025 1101  Gross per 24 hour   Intake 320 ml   Output 1900 ml   Net -1580 ml       Physical Exam  Awake alert sitting up in the bed in no distress alert and oriented x 4  Daughters present at the bedside  Clear to auscultation anteriorly  Regular rate and rhythm  Abdomen soft sluggish bowel sounds expected postoperative tenderness  Noted midline incision healing well with no surrounding erythema staples in place  No edema    Results Review:       I reviewed the patient's new clinical results.  Results from last 7 days   Lab Units 25  0623 25  1202   WBC 10*3/mm3 10.36 13.01*   HEMOGLOBIN g/dL 11.2* 12.9   PLATELETS 10*3/mm3 197 247     Results from last 7  days   Lab Units 07/20/25  0623 07/19/25  1202   SODIUM mmol/L 136 137   POTASSIUM mmol/L 4.7 4.7   CHLORIDE mmol/L 103 101   CO2 mmol/L 21.1* 21.0*   BUN mg/dL 25.0* 40.0*   CREATININE mg/dL 1.09* 1.58*   CALCIUM mg/dL 8.3* 9.4   Estimated Creatinine Clearance: 30.4 mL/min (A) (by C-G formula based on SCr of 1.09 mg/dL (H)).      Assessment & Plan   Active Hospital Problems    Diagnosis  POA    **SBO (small bowel obstruction) [K56.609]  Yes    ROSALIA (acute kidney injury) [N17.9]  Unknown    Nonrheumatic mitral valve regurgitation [I34.0]  Yes    JHOANA on CPAP [G47.33]  Yes    Stage 3a chronic kidney disease [N18.31]  Yes    JAMES (generalized anxiety disorder) [F41.1]  Yes    Restless leg syndrome [G25.81]  Yes    Hypertension [I10]  Yes      Resolved Hospital Problems   No resolved problems to display.       ASSESSMENT  This is an 85-year-old lady with a history of obstructive sleep apnea chronic kidney disease anxiety hypertension and restless leg syndrome who presents to the hospital with abdominal pain and nausea and is found to have small bowel obstruction and taken by general surgery to the operating room for lysis of adhesions on 7/19.  Procedure tolerated well.  Edited by: Asael Warren MD at 7/20/2025 0849   PLAN  Tolerated lysis of adhesions yesterday with exploratory laparotomy.  Currently awaiting return of bowel function  Continue IV fluid support today electrolytes and renal function stable, awaiting better tolerance of p.o.  Await return of bowel function remains on sips with ice chips  Continue as needed pain medications  Encourage out of bed activity and early ambulation     VTE Prophylaxis:  Pharmacologic & mechanical VTE prophylaxis orders are present.      Code Status and Medical Interventions: CPR (Attempt to Resuscitate); Full Support   Ordered at: 07/19/25 6472     Code Status (Patient has no pulse and is not breathing):    CPR (Attempt to Resuscitate)     Medical Interventions (Patient has  pulse or is breathing):    Full Support          Disposition  Expected discharge date/ time has not been documented.       Asael Warren MD  [facility]  07/21/25  13:38 EDT

## 2025-07-22 PROCEDURE — 97530 THERAPEUTIC ACTIVITIES: CPT

## 2025-07-22 PROCEDURE — 25010000002 PROCHLORPERAZINE 10 MG/2ML SOLUTION: Performed by: HOSPITALIST

## 2025-07-22 PROCEDURE — 99024 POSTOP FOLLOW-UP VISIT: CPT | Performed by: SURGERY

## 2025-07-22 PROCEDURE — 25010000002 ENOXAPARIN PER 10 MG: Performed by: SURGERY

## 2025-07-22 RX ADMIN — METOPROLOL SUCCINATE 25 MG: 25 TABLET, EXTENDED RELEASE ORAL at 20:19

## 2025-07-22 RX ADMIN — ENOXAPARIN SODIUM 40 MG: 100 INJECTION SUBCUTANEOUS at 12:05

## 2025-07-22 RX ADMIN — ACETAMINOPHEN 325MG 650 MG: 325 TABLET ORAL at 01:07

## 2025-07-22 RX ADMIN — Medication 10 ML: at 08:42

## 2025-07-22 RX ADMIN — ACETAMINOPHEN 325MG 650 MG: 325 TABLET ORAL at 20:19

## 2025-07-22 RX ADMIN — AMLODIPINE BESYLATE 1.25 MG: 2.5 TABLET ORAL at 20:19

## 2025-07-22 RX ADMIN — DULOXETINE HYDROCHLORIDE 30 MG: 30 CAPSULE, DELAYED RELEASE ORAL at 08:27

## 2025-07-22 RX ADMIN — DEXTROSE MONOHYDRATE, SODIUM CHLORIDE, AND POTASSIUM CHLORIDE 75 ML/HR: 50; 4.5; 1.49 INJECTION, SOLUTION INTRAVENOUS at 14:57

## 2025-07-22 RX ADMIN — ATORVASTATIN CALCIUM 20 MG: 20 TABLET, FILM COATED ORAL at 08:27

## 2025-07-22 RX ADMIN — PROCHLORPERAZINE EDISYLATE 5 MG: 5 INJECTION INTRAMUSCULAR; INTRAVENOUS at 01:07

## 2025-07-22 RX ADMIN — Medication 10 ML: at 22:27

## 2025-07-22 NOTE — PROGRESS NOTES
Name: Itzel Edmond ADMIT: 2025   : 1940  PCP: Rhoda Luther MD    MRN: [id number] LOS: 3 days   AGE/SEX: 85 y.o. female  ROOM: [facility]   Subjective   Chief Complaint   Patient presents with    Abdominal Pain     Pain is better  On liquid diet  +flatus  No BM    ROS  No f/c  No n/v  No cp/palp  No soa/cough    Objective   Vital Signs  Temp:  [97.9 °F (36.6 °C)-98.1 °F (36.7 °C)] 97.9 °F (36.6 °C)  Heart Rate:  [64-79] 79  Resp:  [17-18] 17  BP: (133-168)/(55-74) 168/65  SpO2:  [93 %-100 %] 100 %  on   ;   Device (Oxygen Therapy): room air  Body mass index is 27.04 kg/m².    Physical Exam  Constitutional:       General: She is not in acute distress.  HENT:      Head: Normocephalic and atraumatic.   Eyes:      General: No scleral icterus.  Cardiovascular:      Rate and Rhythm: Regular rhythm.      Heart sounds: Normal heart sounds.   Pulmonary:      Effort: Pulmonary effort is normal. No respiratory distress.   Abdominal:      General: There is no distension.      Palpations: Abdomen is soft.   Musculoskeletal:      Cervical back: Neck supple.   Neurological:      Mental Status: She is alert.   Psychiatric:         Behavior: Behavior normal.         Results Review:       I reviewed the patient's new clinical results.  Results from last 7 days   Lab Units 25  0623 25  1202   WBC 10*3/mm3 10.36 13.01*   HEMOGLOBIN g/dL 11.2* 12.9   PLATELETS 10*3/mm3 197 247     Results from last 7 days   Lab Units 25  0623 25  1202   SODIUM mmol/L 136 137   POTASSIUM mmol/L 4.7 4.7   CHLORIDE mmol/L 103 101   CO2 mmol/L 21.1* 21.0*   BUN mg/dL 25.0* 40.0*   CREATININE mg/dL 1.09* 1.58*   GLUCOSE mg/dL 179* 146*   Estimated Creatinine Clearance: 30.1 mL/min (A) (by C-G formula based on SCr of 1.09 mg/dL (H)).  Results from last 7 days   Lab Units 25  0623 25  1202   ALBUMIN g/dL 4.0 4.4   BILIRUBIN mg/dL 0.4 0.5   ALK PHOS U/L 45 54   AST (SGOT) U/L 28 30   ALT (SGPT) U/L 13 12  "    Results from last 7 days   Lab Units 07/20/25  0623 07/19/25  1202   CALCIUM mg/dL 8.3* 9.4   ALBUMIN g/dL 4.0 4.4         Coag     HbA1C   Lab Results   Component Value Date    HGBA1C 5.55 11/29/2021     Infection     Radiology(recent) No radiology results for the last day  HS Troponin T   Date Value Ref Range Status   07/19/2025 22 (H) <14 ng/L Final     No components found for: \"TSH;2\"    amLODIPine, 1.25 mg, Oral, Nightly  atorvastatin, 20 mg, Oral, Daily  DULoxetine, 30 mg, Oral, Daily  enoxaparin sodium, 40 mg, Subcutaneous, Q24H  metoprolol succinate XL, 25 mg, Oral, Nightly  Morphine, 4 mg, Intravenous, Once  ondansetron, 4 mg, Intravenous, Once  sodium chloride, 10 mL, Intravenous, Q12H      dextrose 5 % and sodium chloride 0.45 % with KCl 20 mEq/L, 75 mL/hr, Last Rate: 75 mL/hr (07/22/25 0117)    Diet: Liquid; Clear Liquid; Fluid Consistency: Thin (IDDSI 0)      Assessment & Plan      Active Hospital Problems    Diagnosis  POA    **SBO (small bowel obstruction) [K56.609]  Yes    ROSALIA (acute kidney injury) [N17.9]  Unknown    Nonrheumatic mitral valve regurgitation [I34.0]  Yes    JHOANA on CPAP [G47.33]  Yes    Stage 3a chronic kidney disease [N18.31]  Yes    JAMES (generalized anxiety disorder) [F41.1]  Yes    Restless leg syndrome [G25.81]  Yes    Hypertension [I10]  Yes      Resolved Hospital Problems   No resolved problems to display.     This is an 85-year-old lady with a history of obstructive sleep apnea chronic kidney disease anxiety hypertension and restless leg syndrome who presents to the hospital with abdominal pain and nausea and is found to have small bowel obstruction and taken by general surgery to the operating room for lysis of adhesions on 7/19.     On IVFs, can stop these when po intake has increased  On liquid diet, awaiting return of bowel function. Surgery is following and monitoring  PRN agents for pain  Lovenox for dvt proph    Dispo- to home when tolerating regular diet in the " coming days    TRINH RN  TRINH family     Silverio Tapia MD  [facility]  07/22/25  14:29 EDT

## 2025-07-22 NOTE — CASE MANAGEMENT/SOCIAL WORK
Discharge Planning Assessment  [facility]     Patient Name: Itzel Edmond  MRN: [id number]  Today's Date: 7/22/2025    Admit Date: 7/19/2025    Plan: Home with family to transport   Discharge Needs Assessment       Row Name 07/22/25 1411       Living Environment    People in Home alone    Current Living Arrangements home    Potentially Unsafe Housing Conditions none    In the past 12 months has the electric, gas, oil, or water company threatened to shut off services in your home? No    Primary Care Provided by self    Provides Primary Care For no one    Family Caregiver if Needed child(emmanuel), adult    Quality of Family Relationships helpful;involved;supportive    Able to Return to Prior Arrangements yes       Resource/Environmental Concerns    Resource/Environmental Concerns none    Transportation Concerns none       Transportation Needs    In the past 12 months, has lack of transportation kept you from medical appointments or from getting medications? no    In the past 12 months, has lack of transportation kept you from meetings, work, or from getting things needed for daily living? No       Food Insecurity    Within the past 12 months, you worried that your food would run out before you got the money to buy more. Never true    Within the past 12 months, the food you bought just didn't last and you didn't have money to get more. Never true       Transition Planning    Patient/Family Anticipates Transition to home with family    Patient/Family Anticipated Services at Transition     Transportation Anticipated family or friend will provide       Discharge Needs Assessment    Readmission Within the Last 30 Days no previous admission in last 30 days    Equipment Currently Used at Home none    Do you want help finding or keeping work or a job? I do not need or want help    Do you want help with school or training? For example, starting or completing job training or getting a high school diploma, GED or  equivalent No    Anticipated Changes Related to Illness none    Equipment Needed After Discharge none    Provided Post Acute Provider List? N/A    N/A Provider List Comment Denies need    Provided Post Acute Provider Quality & Resource List? N/A    N/A Quality & Resource List Comment Denies need                   Discharge Plan       Row Name 07/22/25 1412       Plan    Plan Home with family to transport    Roadmap to Recovery Yes    Patient/Family in Agreement with Plan yes    Plan Comments Spoke with patient and daughter at bedside. Introduced self and explained CCP role. Verified face sheet and local pharmacy is [facility], patient choice to enroll in [facility]. Patient denies difficulty with medication cost/ management. Patient lives alone in a s/s home with 4 RODRÍGUEZ. Patient denies HH, SNF, and DME history. Patient states that she is IADL and still drives. Patient plans to return home with family to stay with her at discharge.                  Continued Care and Services - Admitted Since 7/19/2025    No active coordination exists.       Expected Discharge Date and Time       Expected Discharge Date Expected Discharge Time    Jul 24, 2025            Demographic Summary       Row Name 07/22/25 1408       General Information    Admission Type inpatient    Arrived From emergency department;home    Required Notices Provided Important Message from Medicare    Referral Source admission list    Reason for Consult discharge planning    Preferred Language English                   Functional Status       Row Name 07/22/25 1409       Functional Status    Usual Activity Tolerance good    Current Activity Tolerance good       Physical Activity    On average, how many days per week do you engage in moderate to strenuous exercise (like a brisk walk)? 7 days    On average, how many minutes do you engage in exercise at this level? 30 min    Number of minutes of exercise per week 210       Functional Status, IADL    Medications independent     Meal Preparation independent    Housekeeping independent    Laundry independent    Shopping independent    If for any reason you need help with day-to-day activities such as bathing, preparing meals, shopping, managing finances, etc., do you get the help you need? I get all the help I need       Mental Status    General Appearance WDL WDL       Mental Status Summary    Recent Changes in Mental Status/Cognitive Functioning no changes       Employment/    Employment Status retired                   Psychosocial       Row Name 07/22/25 0795       Values/Beliefs    Spiritual, Cultural Beliefs, Judaism Practices, Values that Affect Care no       Behavior WDL    Behavior WDL WDL       Emotion Mood WDL    Emotion/Mood/Affect WDL WDL       Mental Health    Little interest or pleasure in doing things Not at all    Feeling down, depressed, or hopeless Not at all       Speech WDL    Speech WDL WDL       Perceptual State WDL    Perceptual State WDL WDL       Thought Process WDL    Thought Process WDL WDL       Stress    Do you feel stress - tense, restless, nervous, or anxious, or unable to sleep at night because your mind is troubled all the time - these days? Not at all       Developmental Stage (Eriksson's Stages of Development)    Developmental Stage Stage 8 (65 years-death/Late Adulthood) Integrity vs. Despair                   Abuse/Neglect    No documentation.                  Legal       Row Name 07/22/25 3795       Financial Resource Strain    How hard is it for you to pay for the very basics like food, housing, medical care, and heating? Not hard       Financial/Legal    Financial/Environmental Concerns none       Legal    Criminal Activity/Legal Involvement none                   Substance Abuse    No documentation.                  Patient Forms    No documentation.                     Sandra Carvalho RN

## 2025-07-22 NOTE — THERAPY TREATMENT NOTE
Patient Name: Itzel Edmond  : 1940    MRN: [id number]                              Today's Date: 2025       Admit Date: 2025    Visit Dx:     ICD-10-CM ICD-9-CM   1. SBO (small bowel obstruction)  K56.609 560.9   2. Abnormal CT scan  R93.89 793.99     Patient Active Problem List   Diagnosis    Hypertension    Premature atrial complexes    Hyperlipidemia    Restless leg syndrome    Heart murmur    JAMES (generalized anxiety disorder)    Slow transit constipation    TIA (transient ischemic attack)    Subdural hemorrhage following injury    Subdural hematoma    Stage 3a chronic kidney disease    Overactive bladder    JHOANA on CPAP    Nonrheumatic mitral valve regurgitation    SBO (small bowel obstruction)    ROSALIA (acute kidney injury)     Past Medical History:   Diagnosis Date    Allergic rhinitis     Atherosclerosis     CAD (coronary artery disease) 2019    Overview:  Mild non obstructive CAD on cath 2019, medical mgmt recommended    Carotid stenosis     Cataract     Cellulitis of right leg 2016    Chest discomfort 2019    Claudication of lower extremity     CMC arthritis     Colon polyps     FOLLOWED BY DR. NOLBERTO REYNOSO    Cystitis 2019    WOODARD (dyspnea on exertion) 10/2020    Dysphagia     Elevated serum creatinine 2021    Environmental allergies     Exercise intolerance     JAMES (generalized anxiety disorder) 2020    GERD (gastroesophageal reflux disease)     Heart murmur     Hemorrhoids     Hyperglycemia     Hyperkalemia 2020    Hyperlipidemia     MIXED    Hypertension     Influenza 2018    Insomnia     Myalgia 2016    Neuropathy 2017    OAB (overactive bladder)     JHOANA (obstructive sleep apnea)     BiPap    PLMD (periodic limb movement disorder)     Precordial pain 2019    Premature atrial complexes     Rectal bleeding     Rectal prolapse 10/2020    Rectal ulcer 10/02/2020    5 MM SINGLE ULCER ON CY    Rectocele 2019    Renal insufficiency     Restless  leg syndrome 10/2/2018    SAB (spontaneous )      A1    Serum potassium elevated 2016    Slow transit constipation 2020    Tachycardia 2013    Trigger finger of left thumb 10/21/2019    Urge incontinence     Urinary frequency     Vaginal atrophy      Past Surgical History:   Procedure Laterality Date    ANTERIOR AND POSTERIOR VAGINAL REPAIR N/A     BACK SURGERY      BILATERAL SALPINGO OOPHORECTOMY Bilateral     BLADDER SURGERY N/A     BLADDER SLING    YADI HOLE Right 2022    Procedure: YADI HOLE;  Surgeon: Austin Hilario MD;  Location[facility];  Service: Neurosurgery;  Laterality: Right;    CARDIAC CATHETERIZATION N/A 2019    Procedure: Left Heart Cath;  Surgeon: Christopher Rosenbaum MD;  Location:  [facility] CATH INVASIVE LOCATION;  Service: Cardiology    CARDIAC CATHETERIZATION N/A 2019    Procedure: Coronary angiography;  Surgeon: Christopher Rosenbaum MD;  Location:  [facility] CATH INVASIVE LOCATION;  Service: Cardiology    CARDIAC CATHETERIZATION N/A 2019    Procedure: Left ventriculography;  Surgeon: Christopher Rosenbaum MD;  [facility];  Service: Cardiology    CATARACT EXTRACTION Left 12/15/2015    WITH LENS IMPLANT, DR. AMRIT DIAZ AT [facility]    CATARACT EXTRACTION Right 2015     WITH LENS IMPLANT, DR. AMRIT DIAZ AT [facility]    COLONOSCOPY N/A 10/2/2020    2 TUBULAR ADENOMA POLYPS IN TRANSVERSE, HEMORRHOIDS, DIVERTICULOSIS, 5 MM ULCER IN RECTUM, OTHER BIOPSIES BENIGN, DR. NOLBERTO REYNOSO AT [facility]    COLONOSCOPY W/ POLYPECTOMY N/A 2015    1 CM TUBULAR ADENOMA POLYP IN TRANSVERSE, MODERATE DIVERTICULOSIS, HEMORRHOIDS, RESCOPE IN 5 YRS, DR. MAYA SAHU AT [place]    EXPLORATORY LAPAROTOMY N/A 2025    Procedure: exploratory laparotomy, lysis of adhesions;  Surgeon: Terry Rios MD;  Location: [facility];  Service: General;  Laterality: N/A;    HEMORROIDECTOMY N/A     HERNIA REPAIR      HYSTERECTOMY  N/A 1970    OVARIES IN TACT      General Information       Row Name 07/22/25 1527          Physical Therapy Time and Intention    Document Type therapy note (daily note);discharge treatment  -EJ     Mode of Treatment physical therapy  -       Row Name 07/22/25 1527          General Information    Existing Precautions/Restrictions no known precautions/restrictions  -               User Key  (r) = Recorded By, (t) = Taken By, (c) = Cosigned By      Initials Name Provider Type     Marine Linares, PT Physical Therapist                   Mobility       Row Name 07/22/25 1527          Bed Mobility    Comment, (Bed Mobility) standing up in room upon entry  -       Row Name 07/22/25 1527          Sit-Stand Transfer    Sit-Stand [place] (Transfers) standby assist  -       Row Name 07/22/25 1527          Gait/Stairs (Locomotion)    [place] Level (Gait) standby assist;contact guard  -     Distance in Feet (Gait) 150  -EJ               User Key  (r) = Recorded By, (t) = Taken By, (c) = Cosigned By      Initials Name Provider Type     Marine Linares, PT Physical Therapist                   Obj/Interventions    No documentation.                  Goals/Plan    No documentation.                  Clinical Impression       Row Name 07/22/25 1527          Pain    Pretreatment Pain Rating 0/10 - no pain  -     Posttreatment Pain Rating 0/10 - no pain  -[facility] Name 07/22/25 1527          Plan of Care Review    Plan of Care Reviewed With patient;child  -     Progress improving  -     Outcome Evaluation Pt seen for PT this AM. She is doing well, standing up in BR upon entry to room w daughter present. Pt continues to be mobilizing well. She ambulated over 150 ft w SBA/CGA. No unsteadiness noted. Pt agreeable to sit up in chair at end of session. Pt doing well and may continue to ambulate w family or nsg. No further acute PT needs at this time. Will sign off.  -       Row Name 07/22/25 1527           Positioning and Restraints    Pre-Treatment Position standing in room  -EJ     Post Treatment Position chair  -EJ     In Chair notified nsg;reclined;call light within reach;encouraged to call for assist;exit alarm on;with family/caregiver  -EJ               User Key  (r) = Recorded By, (t) = Taken By, (c) = Cosigned By      Initials Name Provider Type    Marine Castaneda, PT Physical Therapist                   Outcome Measures       Row Name 07/22/25 1529 07/22/25 0827       How much help from another person do you currently need...    Turning from your back to your side while in flat bed without using bedrails? 4  -EJ 4  -EC    Moving from lying on back to sitting on the side of a flat bed without bedrails? 4  -EJ 3  -EC    Moving to and from a bed to a chair (including a wheelchair)? 4  -EJ 3  -EC    Standing up from a chair using your arms (e.g., wheelchair, bedside chair)? 4  -EJ 3  -EC    Climbing 3-5 steps with a railing? 3  -EJ 3  -EC    To walk in hospital room? 4  -EJ 3  -EC    AM-PAC 6 Clicks Score (PT) 23  -EJ 19  -EC              User Key  (r) = Recorded By, (t) = Taken By, (c) = Cosigned By      Initials Name Provider Type    Marine Castaneda, PT Physical Therapist    EC Ladan Colunga RN Registered Nurse                                   PT Recommendation and Plan  Planned Therapy Interventions (PT): balance training, bed mobility training, gait training, home exercise program, stretching, strengthening, stair training, ROM (range of motion), patient/family education, transfer training  Progress: improving  Outcome Evaluation: Pt seen for PT this AM. She is doing well, standing up in BR upon entry to room w daughter present. Pt continues to be mobilizing well. She ambulated over 150 ft w SBA/CGA. No unsteadiness noted. Pt agreeable to sit up in chair at end of session. Pt doing well and may continue to ambulate w family or nsg. No further acute PT needs at this time. Will sign  off.     Time Calculation:         PT Charges       Row Name 07/22/25 1529             Time Calculation    Start Time 1411  -EJ      Stop Time 1430  -EJ      Time Calculation (min) 19 min  -EJ      PT Received On 07/22/25  -EJ                User Key  (r) = Recorded By, (t) = Taken By, (c) = Cosigned By      Initials Name Provider Type    EJ Marine Linares, PT Physical Therapist                  Therapy Charges for Today       Code Description Service Date Service Provider Modifiers Qty    50079400456 HC PT EVAL MOD COMPLEXITY 3 7/21/2025 Marine Linares, PT GP 1    65139627206 HC PT THERAPEUTIC ACT EA 15 MIN 7/21/2025 Marine Linares, PT GP 1    23555617819 HC PT THERAPEUTIC ACT EA 15 MIN 7/22/2025 Marine Linares, PT GP 1            PT G-Codes  AM-PAC 6 Clicks Score (PT): 23  PT Discharge Summary  Anticipated Discharge Disposition (PT): home with assist    Marine Linares, PT  7/22/2025

## 2025-07-22 NOTE — PLAN OF CARE
Goal Outcome Evaluation:      Patient Aox4 , VSS, no acute changes. Reported no pain today, and only slight nausea after eating. Patient did not request anything for nausea. Midline incision open to air. No BM today.

## 2025-07-22 NOTE — PLAN OF CARE
Goal Outcome Evaluation:  Plan of Care Reviewed With: patient, child        Progress: improving  Outcome Evaluation: Pt seen for PT this AM. She is doing well, standing up in BR upon entry to room w daughter present. Pt continues to be mobilizing well. She ambulated over 150 ft w SBA/CGA. No unsteadiness noted. Pt agreeable to sit up in chair at end of session. Pt doing well and may continue to ambulate w family or nsg. No further acute PT needs at this time. Will sign off.    Anticipated Discharge Disposition (PT): home with assist

## 2025-07-22 NOTE — PROGRESS NOTES
"Patient Name: Itzel Edmond  YOB: 1940  MRN: [id number]  Admission date: 7/19/2025  Reason for Encounter: MST 2-3 or Nursing Admission Screen and NPO/CLD x 3 days+    [facility] Clinical Nutrition Assessment     Recommend:  Once bowel function returns post-op and ok with surgery team, advance diet as tolerates.     Subjective    Subjective Information     84 yo female with h/o JHOANA on BiPAP, CKD, HTN, anxiety, RLS, CAD, traumatic SDH, PVD here with worsening RLQ abdominal pain and found to have SBO. Taken to OR for exploratory laparotomy with lysis of adhesions.    7/22: POD#3 s/p ex lap with CLINTON. Awaiting return of bowel function. NPO/CLD x 3 days. Denies nausea today, + flatus, no BM yet. Meds/labs reviewed, D5W@75 ml/hr. MST 3 for \"unsure weight loss\" but pt has actually had weight gain per EMR. Will follow for return of bowel function post-op and ability to advance diet once post-op ileus resolves.      Assessment    H&P and Current Problems      H&P  Past Medical History:   Diagnosis Date    Allergic rhinitis     Atherosclerosis     CAD (coronary artery disease) 1/16/2019    Overview:  Mild non obstructive CAD on cath 1/2019, medical mgmt recommended    Carotid stenosis     Cataract     Cellulitis of right leg 06/2016    Chest discomfort 01/2019    Claudication of lower extremity     CMC arthritis     Colon polyps     FOLLOWED BY DR. NOLBERTO REYNOSO    Cystitis 11/19/2019    WOODARD (dyspnea on exertion) 10/2020    Dysphagia     Elevated serum creatinine 02/2021    Environmental allergies     Exercise intolerance     JAMES (generalized anxiety disorder) 1/20/2020    GERD (gastroesophageal reflux disease)     Heart murmur     Hemorrhoids     Hyperglycemia     Hyperkalemia 02/2020    Hyperlipidemia     MIXED    Hypertension     Influenza 02/14/2018    Insomnia     Myalgia 05/2016    Neuropathy 09/2017    OAB (overactive bladder)     JHOANA (obstructive sleep apnea)     BiPap    PLMD (periodic limb movement " disorder)     Precordial pain 2019    Premature atrial complexes     Rectal bleeding     Rectal prolapse 10/2020    Rectal ulcer 10/02/2020    5 MM SINGLE ULCER ON CY    Rectocele 2019    Renal insufficiency     Restless leg syndrome 10/2/2018    SAB (spontaneous )      A1    Serum potassium elevated 2016    Slow transit constipation 2020    Tachycardia 2013    Trigger finger of left thumb 10/21/2019    Urge incontinence     Urinary frequency     Vaginal atrophy       Past Surgical History:   Procedure Laterality Date    ANTERIOR AND POSTERIOR VAGINAL REPAIR N/A     BACK SURGERY      BILATERAL SALPINGO OOPHORECTOMY Bilateral     BLADDER SURGERY N/A     BLADDER SLING    YADI HOLE Right 2022    Procedure: YADI HOLE;  Surgeon: Austin Hilario MD;  Location:  [facility] MAIN OR;  Service: Neurosurgery;  Laterality: Right;    CARDIAC CATHETERIZATION N/A 2019    Procedure: Left Heart Cath;  Surgeon: Christopher Rosenbaum MD;  Location:  [facility] CATH INVASIVE LOCATION;  Service: Cardiology    CARDIAC CATHETERIZATION N/A 2019    Procedure: Coronary angiography;  Surgeon: Christopher Rosenbaum MD;  Location:  [facility] CATH INVASIVE LOCATION;  Service: Cardiology    CARDIAC CATHETERIZATION N/A 2019    Procedure: Left ventriculography;  Surgeon: Christopher Rosenbaum MD;  Location:  [facility] CATH INVASIVE LOCATION;  Service: Cardiology    CATARACT EXTRACTION Left 12/15/2015    WITH LENS IMPLANT, DR. AMRIT DIAZ AT [facility]    CATARACT EXTRACTION Right 2015     WITH LENS IMPLANT, DR. AMRIT DIAZ AT [facility]    COLONOSCOPY N/A 10/2/2020    2 TUBULAR ADENOMA POLYPS IN TRANSVERSE, HEMORRHOIDS, DIVERTICULOSIS, 5 MM ULCER IN RECTUM, OTHER BIOPSIES BENIGN, DR. NOLBERTO REYNOSO AT [facility]    COLONOSCOPY W/ POLYPECTOMY N/A 2015    1 CM TUBULAR ADENOMA POLYP IN TRANSVERSE, MODERATE DIVERTICULOSIS, HEMORRHOIDS, RESCOPE IN 5 YRS, DR. MAYA SAHU AT [place]    EXPLORATORY LAPAROTOMY  "N/A 7/19/2025    Procedure: exploratory laparotomy, lysis of adhesions;  Surgeon: Terry Rios MD;  Location: [facility] OR;  Service: General;  Laterality: N/A;    HEMORROIDECTOMY N/A     HERNIA REPAIR      HYSTERECTOMY N/A 1970    OVARIES IN TACT      Current Problems   Admission Diagnosis:  SBO (small bowel obstruction) [K56.609]  Abnormal CT scan [R93.89]    Problem List:    SBO (small bowel obstruction)    Hypertension    Restless leg syndrome    JAMES (generalized anxiety disorder)    Stage 3a chronic kidney disease    JHOANA on CPAP    Nonrheumatic mitral valve regurgitation    ROSALIA (acute kidney injury)       Anthropometrics      BMI, Height, Weight Estimated body mass index is 27.04 kg/m² as calculated from the following:    Height as of this encounter: 152.4 cm (60\").    Weight as of this encounter: 62.8 kg (138 lb 7.2 oz).    Weight Method: Bed scale       Trending Weight Changes Stable  No significant changes       Weight History  Wt Readings from Last 10 Encounters:   07/22/25 62.8 kg (138 lb 7.2 oz)   03/18/25 61.7 kg (136 lb)   07/23/24 64.4 kg (142 lb)   04/22/24 68 kg (150 lb)   04/16/24 68 kg (150 lb)   04/08/24 69.1 kg (152 lb 6.4 oz)   01/23/24 65.8 kg (145 lb)   08/29/23 65.4 kg (144 lb 1.6 oz)   04/11/23 64.9 kg (143 lb)   12/16/22 66.2 kg (146 lb)        Labs      Comment: Reviewed, Gluc 179     Results from last 7 days   Lab Units 07/20/25  0623 07/19/25  1202   SODIUM mmol/L 136 137   POTASSIUM mmol/L 4.7 4.7   GLUCOSE mg/dL 179* 146*   BUN mg/dL 25.0* 40.0*   CREATININE mg/dL 1.09* 1.58*   CALCIUM mg/dL 8.3* 9.4   ALBUMIN g/dL 4.0 4.4   BILIRUBIN mg/dL 0.4 0.5   ALK PHOS U/L 45 54   AST (SGOT) U/L 28 30   ALT (SGPT) U/L 13 12   PROBNP pg/mL  --  536.0     Results from last 7 days   Lab Units 07/20/25  0623 07/19/25  1202   PLATELETS 10*3/mm3 197 247   HEMOGLOBIN g/dL 11.2* 12.9   HEMATOCRIT % 33.3* 38.7     Lab Results   Component Value Date    HGBA1C 5.55 11/29/2021          Medications  "      Scheduled Medications amLODIPine, 1.25 mg, Oral, Nightly  atorvastatin, 20 mg, Oral, Daily  DULoxetine, 30 mg, Oral, Daily  enoxaparin sodium, 40 mg, Subcutaneous, Q24H  metoprolol succinate XL, 25 mg, Oral, Nightly  Morphine, 4 mg, Intravenous, Once  ondansetron, 4 mg, Intravenous, Once  sodium chloride, 10 mL, Intravenous, Q12H        Infusions dextrose 5 % and sodium chloride 0.45 % with KCl 20 mEq/L, 75 mL/hr, Last Rate: 75 mL/hr (07/22/25 1457)        PRN Medications   acetaminophen **OR** acetaminophen **OR** acetaminophen    melatonin    Morphine    nitroglycerin    ondansetron ODT **OR** ondansetron    prochlorperazine **OR** prochlorperazine **OR** prochlorperazine    sodium chloride    sodium chloride    sodium chloride     Physical Findings      Chewing/Swallowing    No issues identified at this time   Dentition Mouth/Teeth WDL: .WDL except, teeth   Teeth Symptoms: dental appliance present   Edema   no edema    Gastrointestinal passing flatus, last bowel movement: 7/19   Skin surgical incision: abdomen    Lines/Drains none   I/O reviewed      Nutrition Focused Physical Exam     NFPE Not indicated at this time  07/22/25       Malnutrition Severity Assessment            (1)   Current Nutrition Orders & Evaluation of Intake      Oral Nutrition     Food Allergies  and Intolerances NKFA   Current PO Diet Diet: Liquid; Clear Liquid; Fluid Consistency: Thin (IDDSI 0)   Oral Nutrition Supplement None     Trending % PO Intake 50%   (2)  Assessment & Plan   Nutrition Diagnosis and Goals       Nutrition Diagnosis Problem: Inadequate Protein Energy Intake  Etiology: Medical Diagnosis - SBO   Signs/Symptoms: Clear Liquid Diet        Goal(s) PO Diet Advances When Medically Appropriate  and Goals of Care are Established     Nutrition Intervention and Prescription       Intervention  Monitor for diet advancement and Continue to monitor for plan of care      Diet Prescription ADAT once bowel fxn returns post-op and  ok with surgery team    Supplement Prescription     Education Provided     (3)  Monitoring/Evaluation       Monitor/Evaluation Per Protocol, I&O, PO Intake, GI Status, and Symptoms      Electronically signed by:  Vee Benitez RD  07/22/25 15:21 EDT

## 2025-07-22 NOTE — PROGRESS NOTES
Postop day 3 after laparotomy, lysis of adhesions    Subjective:  Feels much better today.  Tolerating liquids and passing flatus.  Denies any nausea.  She has been up and walking.    Objective:  Afebrile with stable vitals  General: Awake and alert, appears comfortable  Abdomen: No significant distention, appropriate tenderness, incision is in good order    Assessment and plan:  - Small bowel obstruction, now 3 days out from laparotomy with lysis of adhesions  - GI function returning  - Try full liquids today  - Continue to mobilize  - Physical therapy following  - Lovenox DVT prophylaxis    Terry Rios MD  General and Endoscopic Surgery  [facility]    [address], [address]  [place], [place], [address]  P: [phone]  F: [phone]

## 2025-07-22 NOTE — PLAN OF CARE
Goal Outcome Evaluation:      Pt A/Ox4, RA, VSS- BP slightly elevated overnight. No acute changes overnight. Daughter at bedside. IVFs infusing. Midline incision approximated, open to air, no drainage. Some c/o mild pain and nausea- Pt reports PRN tylenol and compazine effective. No BM overnight. Call light within reach. Plan of care ongoing.

## 2025-07-23 LAB
ANION GAP SERPL CALCULATED.3IONS-SCNC: 9.8 MMOL/L (ref 5–15)
BUN SERPL-MCNC: 16 MG/DL (ref 8–23)
BUN/CREAT SERPL: 16.7 (ref 7–25)
CALCIUM SPEC-SCNC: 8 MG/DL (ref 8.6–10.5)
CHLORIDE SERPL-SCNC: 100 MMOL/L (ref 98–107)
CO2 SERPL-SCNC: 24.2 MMOL/L (ref 22–29)
CREAT SERPL-MCNC: 0.96 MG/DL (ref 0.57–1)
DEPRECATED RDW RBC AUTO: 39.3 FL (ref 37–54)
EGFRCR SERPLBLD CKD-EPI 2021: 57.4 ML/MIN/1.73
ERYTHROCYTE [DISTWIDTH] IN BLOOD BY AUTOMATED COUNT: 12.1 % (ref 12.3–15.4)
GEN 5 1HR TROPONIN T REFLEX: 28 NG/L
GLUCOSE SERPL-MCNC: 101 MG/DL (ref 65–99)
HCT VFR BLD AUTO: 32.9 % (ref 34–46.6)
HGB BLD-MCNC: 11.1 G/DL (ref 12–15.9)
MAGNESIUM SERPL-MCNC: 1.6 MG/DL (ref 1.6–2.4)
MCH RBC QN AUTO: 30.2 PG (ref 26.6–33)
MCHC RBC AUTO-ENTMCNC: 33.7 G/DL (ref 31.5–35.7)
MCV RBC AUTO: 89.6 FL (ref 79–97)
PHOSPHATE SERPL-MCNC: 1.7 MG/DL (ref 2.5–4.5)
PHOSPHATE SERPL-MCNC: 3.2 MG/DL (ref 2.5–4.5)
PLATELET # BLD AUTO: 191 10*3/MM3 (ref 140–450)
PMV BLD AUTO: 10.4 FL (ref 6–12)
POTASSIUM SERPL-SCNC: 4.3 MMOL/L (ref 3.5–5.2)
RBC # BLD AUTO: 3.67 10*6/MM3 (ref 3.77–5.28)
SODIUM SERPL-SCNC: 134 MMOL/L (ref 136–145)
TROPONIN T % DELTA: 0
TROPONIN T NUMERIC DELTA: 0 NG/L
TROPONIN T SERPL HS-MCNC: 28 NG/L
WBC NRBC COR # BLD AUTO: 8.77 10*3/MM3 (ref 3.4–10.8)

## 2025-07-23 PROCEDURE — 84484 ASSAY OF TROPONIN QUANT: CPT | Performed by: INTERNAL MEDICINE

## 2025-07-23 PROCEDURE — 80048 BASIC METABOLIC PNL TOTAL CA: CPT | Performed by: INTERNAL MEDICINE

## 2025-07-23 PROCEDURE — 25010000002 ENOXAPARIN PER 10 MG: Performed by: SURGERY

## 2025-07-23 PROCEDURE — 84100 ASSAY OF PHOSPHORUS: CPT | Performed by: INTERNAL MEDICINE

## 2025-07-23 PROCEDURE — 25010000002 ONDANSETRON PER 1 MG: Performed by: INTERNAL MEDICINE

## 2025-07-23 PROCEDURE — 85027 COMPLETE CBC AUTOMATED: CPT | Performed by: INTERNAL MEDICINE

## 2025-07-23 PROCEDURE — 83735 ASSAY OF MAGNESIUM: CPT | Performed by: INTERNAL MEDICINE

## 2025-07-23 PROCEDURE — 25810000003 SODIUM CHLORIDE 0.9 % SOLUTION: Performed by: INTERNAL MEDICINE

## 2025-07-23 PROCEDURE — 99024 POSTOP FOLLOW-UP VISIT: CPT | Performed by: SURGERY

## 2025-07-23 PROCEDURE — 93010 ELECTROCARDIOGRAM REPORT: CPT | Performed by: INTERNAL MEDICINE

## 2025-07-23 PROCEDURE — 25010000002 PROCHLORPERAZINE 10 MG/2ML SOLUTION: Performed by: HOSPITALIST

## 2025-07-23 PROCEDURE — 93005 ELECTROCARDIOGRAM TRACING: CPT | Performed by: INTERNAL MEDICINE

## 2025-07-23 RX ORDER — CALCIUM CARBONATE 500 MG/1
2 TABLET, CHEWABLE ORAL 3 TIMES DAILY PRN
Status: DISCONTINUED | OUTPATIENT
Start: 2025-07-23 | End: 2025-07-30 | Stop reason: HOSPADM

## 2025-07-23 RX ORDER — FAMOTIDINE 20 MG/1
20 TABLET, FILM COATED ORAL
Status: DISCONTINUED | OUTPATIENT
Start: 2025-07-23 | End: 2025-07-30 | Stop reason: HOSPADM

## 2025-07-23 RX ORDER — BISACODYL 10 MG
10 SUPPOSITORY, RECTAL RECTAL ONCE
Status: COMPLETED | OUTPATIENT
Start: 2025-07-23 | End: 2025-07-23

## 2025-07-23 RX ADMIN — PROCHLORPERAZINE EDISYLATE 5 MG: 5 INJECTION INTRAMUSCULAR; INTRAVENOUS at 12:36

## 2025-07-23 RX ADMIN — FAMOTIDINE 20 MG: 20 TABLET, FILM COATED ORAL at 22:41

## 2025-07-23 RX ADMIN — METOPROLOL SUCCINATE 25 MG: 25 TABLET, EXTENDED RELEASE ORAL at 20:34

## 2025-07-23 RX ADMIN — Medication 10 ML: at 08:39

## 2025-07-23 RX ADMIN — ANTACID TABLETS 2 TABLET: 500 TABLET, CHEWABLE ORAL at 22:44

## 2025-07-23 RX ADMIN — ACETAMINOPHEN 325MG 650 MG: 325 TABLET ORAL at 08:51

## 2025-07-23 RX ADMIN — BISACODYL 10 MG: 10 SUPPOSITORY RECTAL at 13:53

## 2025-07-23 RX ADMIN — SODIUM PHOSPHATE, MONOBASIC, MONOHYDRATE AND SODIUM PHOSPHATE, DIBASIC, ANHYDROUS 15 MMOL: 276; 142 INJECTION, SOLUTION INTRAVENOUS at 13:53

## 2025-07-23 RX ADMIN — DULOXETINE HYDROCHLORIDE 30 MG: 30 CAPSULE, DELAYED RELEASE ORAL at 08:38

## 2025-07-23 RX ADMIN — ANTACID TABLETS 2 TABLET: 500 TABLET, CHEWABLE ORAL at 16:20

## 2025-07-23 RX ADMIN — NITROGLYCERIN 0.4 MG: 0.4 TABLET SUBLINGUAL at 16:14

## 2025-07-23 RX ADMIN — NITROGLYCERIN 0.4 MG: 0.4 TABLET SUBLINGUAL at 16:06

## 2025-07-23 RX ADMIN — ONDANSETRON 4 MG: 2 INJECTION INTRAMUSCULAR; INTRAVENOUS at 16:48

## 2025-07-23 RX ADMIN — AMLODIPINE BESYLATE 1.25 MG: 2.5 TABLET ORAL at 20:33

## 2025-07-23 RX ADMIN — ATORVASTATIN CALCIUM 20 MG: 20 TABLET, FILM COATED ORAL at 08:38

## 2025-07-23 RX ADMIN — Medication 10 ML: at 20:34

## 2025-07-23 RX ADMIN — ENOXAPARIN SODIUM 40 MG: 100 INJECTION SUBCUTANEOUS at 12:36

## 2025-07-23 NOTE — PROGRESS NOTES
Name: Itzel Edmond ADMIT: 2025   : 1940  PCP: Rhoda Luther MD    MRN: [id number] LOS: 4 days   AGE/SEX: 85 y.o. female  ROOM: [facility]   Subjective   Chief Complaint   Patient presents with    Abdominal Pain     Pain is better  On liquid diet though some pains at times  Ambulating well     ROS  No f/c  No n/v  No cp/palp  No soa/cough    Objective   Vital Signs  Temp:  [97.5 °F (36.4 °C)-97.9 °F (36.6 °C)] 97.7 °F (36.5 °C)  Heart Rate:  [] 81  Resp:  [17-18] 18  BP: (156-171)/(65-89) 165/82  SpO2:  [96 %-100 %] 100 %  on   ;   Device (Oxygen Therapy): room air  Body mass index is 27.04 kg/m².    Physical Exam  Constitutional:       General: She is not in acute distress.  HENT:      Head: Normocephalic and atraumatic.   Eyes:      General: No scleral icterus.  Cardiovascular:      Rate and Rhythm: Regular rhythm.      Heart sounds: Normal heart sounds.   Pulmonary:      Effort: Pulmonary effort is normal. No respiratory distress.   Abdominal:      General: There is no distension.      Palpations: Abdomen is soft.   Musculoskeletal:      Cervical back: Neck supple.   Neurological:      Mental Status: She is alert.   Psychiatric:         Behavior: Behavior normal.         Results Review:       I reviewed the patient's new clinical results.  Results from last 7 days   Lab Units 25  0402 25  0623 25  1202   WBC 10*3/mm3 8.77 10.36 13.01*   HEMOGLOBIN g/dL 11.1* 11.2* 12.9   PLATELETS 10*3/mm3 191 197 247     Results from last 7 days   Lab Units 25  0402 25  0623 25  1202   SODIUM mmol/L 134* 136 137   POTASSIUM mmol/L 4.3 4.7 4.7   CHLORIDE mmol/L 100 103 101   CO2 mmol/L 24.2 21.1* 21.0*   BUN mg/dL 16.0 25.0* 40.0*   CREATININE mg/dL 0.96 1.09* 1.58*   GLUCOSE mg/dL 101* 179* 146*   Estimated Creatinine Clearance: 34.2 mL/min (by C-G formula based on SCr of 0.96 mg/dL).  Results from last 7 days   Lab Units 25  0623 25  1202   ALBUMIN g/dL 4.0  "4.4   BILIRUBIN mg/dL 0.4 0.5   ALK PHOS U/L 45 54   AST (SGOT) U/L 28 30   ALT (SGPT) U/L 13 12     Results from last 7 days   Lab Units 07/23/25  0402 07/20/25  0623 07/19/25  1202   CALCIUM mg/dL 8.0* 8.3* 9.4   ALBUMIN g/dL  --  4.0 4.4   MAGNESIUM mg/dL 1.6  --   --    PHOSPHORUS mg/dL 1.7*  --   --          Coag     HbA1C   Lab Results   Component Value Date    HGBA1C 5.55 11/29/2021     Infection     Radiology(recent) No radiology results for the last day  No results found for: \"TROPONINT\", \"TROPONINI\", \"BNP\"    No components found for: \"TSH;2\"    amLODIPine, 1.25 mg, Oral, Nightly  atorvastatin, 20 mg, Oral, Daily  DULoxetine, 30 mg, Oral, Daily  enoxaparin sodium, 40 mg, Subcutaneous, Q24H  metoprolol succinate XL, 25 mg, Oral, Nightly  Morphine, 4 mg, Intravenous, Once  ondansetron, 4 mg, Intravenous, Once  sodium chloride, 10 mL, Intravenous, Q12H  sodium phosphate, 15 mmol, Intravenous, Once         Diet: Liquid; Full Liquid; Fluid Consistency: Thin (IDDSI 0)      Assessment & Plan      Active Hospital Problems    Diagnosis  POA    **SBO (small bowel obstruction) [K56.609]  Yes    ROSALIA (acute kidney injury) [N17.9]  Unknown    Nonrheumatic mitral valve regurgitation [I34.0]  Yes    JHOANA on CPAP [G47.33]  Yes    Stage 3a chronic kidney disease [N18.31]  Yes    JAMES (generalized anxiety disorder) [F41.1]  Yes    Restless leg syndrome [G25.81]  Yes    Hypertension [I10]  Yes      Resolved Hospital Problems   No resolved problems to display.     This is an 85-year-old lady with a history of obstructive sleep apnea chronic kidney disease anxiety hypertension and restless leg syndrome who presents to the hospital with abdominal pain and nausea and is found to have small bowel obstruction and taken by general surgery to the operating room for lysis of adhesions on 7/19.     On full liquid diet, awaiting return of bowel function. Surgery is following and monitoring  PRN agents for pain  Replace critically low phos " and monitor   Lovenox for dvt proph    Dispo- to home when tolerating regular diet in the coming days    TRINH RN  DW family     Silverio Tapia MD  [facility]  07/23/25  14:29 EDT

## 2025-07-23 NOTE — PROGRESS NOTES
Postop day 4 after laparotomy and lysis of adhesions    Subjective:  Having some nausea today after trying full liquids.  Still passing some flatus but no bowel movement.  Minimal pain.  She has been up and walking.    Objective:  Afebrile, vital stable  General: Awake and alert, no distress  Abdomen: Soft, not particularly distended, minimal tenderness, incision is in good order.    Labs reviewed, unremarkable    Assessment and plan:  - Small bowel obstruction, now 4 days out from laparotomy and lysis of adhesions  - Still with some postop ileus.  Passing flatus but not able to take much by mouth.  Will try Dulcolax suppository.  Will not advance diet for now.  Continue to mobilize.    Terry Rios MD  General and Endoscopic Surgery  [facility]    [address], [address]  [place], [place], [address]  P: [phone]  F: [phone]

## 2025-07-23 NOTE — PLAN OF CARE
Goal Outcome Evaluation:   Patient Aox4, blood pressure elevated throughout the day. Patient started experiencing  chest pain around 1600. MD notified and stat EKG, and troponin completed along with two round of nitroglycerin given. Patient then began excessively vomiting. Surgery MD notified and patient changed to NPO sips with meds. Patient stopped vomiting around 1650 after Zofran. Has not vomited since 1700.  Patient did have small BM today. Patient medicated per MAR and will continue to provide care per care plan.

## 2025-07-23 NOTE — PLAN OF CARE
Goal Outcome Evaluation:      Pt A/Ox4, RA, VSS- BP remains slightly elevated overnight. No acute changes overnight. Daughter at bedside. IVFs discontinued. Midline incision approximated, open to air, no drainage. Critical Phosphorus 1.7 this AM- [facility] notified, no new orders. Some c/o mild pain with activity- PRN Tylenol given, pt reports PRN tylenol effective. No BM overnight- passing gas. Call light within reach. Plan of care ongoing.

## 2025-07-24 LAB
ANION GAP SERPL CALCULATED.3IONS-SCNC: 13.3 MMOL/L (ref 5–15)
BUN SERPL-MCNC: 17 MG/DL (ref 8–23)
BUN/CREAT SERPL: 16.7 (ref 7–25)
CALCIUM SPEC-SCNC: 8.4 MG/DL (ref 8.6–10.5)
CHLORIDE SERPL-SCNC: 100 MMOL/L (ref 98–107)
CO2 SERPL-SCNC: 20.7 MMOL/L (ref 22–29)
CREAT SERPL-MCNC: 1.02 MG/DL (ref 0.57–1)
EGFRCR SERPLBLD CKD-EPI 2021: 53.4 ML/MIN/1.73
GLUCOSE SERPL-MCNC: 84 MG/DL (ref 65–99)
MAGNESIUM SERPL-MCNC: 1.8 MG/DL (ref 1.6–2.4)
PHOSPHATE SERPL-MCNC: 2.7 MG/DL (ref 2.5–4.5)
POTASSIUM SERPL-SCNC: 4.2 MMOL/L (ref 3.5–5.2)
QT INTERVAL: 439 MS
QTC INTERVAL: 534 MS
SODIUM SERPL-SCNC: 134 MMOL/L (ref 136–145)

## 2025-07-24 PROCEDURE — 25010000002 PROCHLORPERAZINE 10 MG/2ML SOLUTION: Performed by: HOSPITALIST

## 2025-07-24 PROCEDURE — 84100 ASSAY OF PHOSPHORUS: CPT | Performed by: INTERNAL MEDICINE

## 2025-07-24 PROCEDURE — 99024 POSTOP FOLLOW-UP VISIT: CPT | Performed by: SURGERY

## 2025-07-24 PROCEDURE — 25010000002 ENOXAPARIN PER 10 MG: Performed by: SURGERY

## 2025-07-24 PROCEDURE — 83735 ASSAY OF MAGNESIUM: CPT | Performed by: INTERNAL MEDICINE

## 2025-07-24 PROCEDURE — 80048 BASIC METABOLIC PNL TOTAL CA: CPT | Performed by: INTERNAL MEDICINE

## 2025-07-24 RX ORDER — DEXTROSE MONOHYDRATE, SODIUM CHLORIDE, AND POTASSIUM CHLORIDE 50; 1.49; 4.5 G/1000ML; G/1000ML; G/1000ML
75 INJECTION, SOLUTION INTRAVENOUS CONTINUOUS
Status: DISCONTINUED | OUTPATIENT
Start: 2025-07-24 | End: 2025-07-25

## 2025-07-24 RX ORDER — AMLODIPINE BESYLATE 2.5 MG/1
2.5 TABLET ORAL NIGHTLY
Status: DISCONTINUED | OUTPATIENT
Start: 2025-07-24 | End: 2025-07-30 | Stop reason: HOSPADM

## 2025-07-24 RX ORDER — AMLODIPINE BESYLATE 2.5 MG/1
2.5 TABLET ORAL ONCE
Status: COMPLETED | OUTPATIENT
Start: 2025-07-24 | End: 2025-07-24

## 2025-07-24 RX ADMIN — Medication 10 ML: at 09:18

## 2025-07-24 RX ADMIN — DULOXETINE HYDROCHLORIDE 30 MG: 30 CAPSULE, DELAYED RELEASE ORAL at 09:18

## 2025-07-24 RX ADMIN — ATORVASTATIN CALCIUM 20 MG: 20 TABLET, FILM COATED ORAL at 09:18

## 2025-07-24 RX ADMIN — POTASSIUM CHLORIDE, DEXTROSE MONOHYDRATE AND SODIUM CHLORIDE 75 ML/HR: 150; 5; 450 INJECTION, SOLUTION INTRAVENOUS at 14:15

## 2025-07-24 RX ADMIN — AMLODIPINE BESYLATE 2.5 MG: 2.5 TABLET ORAL at 20:12

## 2025-07-24 RX ADMIN — FAMOTIDINE 20 MG: 20 TABLET, FILM COATED ORAL at 06:30

## 2025-07-24 RX ADMIN — ENOXAPARIN SODIUM 40 MG: 100 INJECTION SUBCUTANEOUS at 11:41

## 2025-07-24 RX ADMIN — FAMOTIDINE 20 MG: 20 TABLET, FILM COATED ORAL at 16:37

## 2025-07-24 RX ADMIN — AMLODIPINE BESYLATE 2.5 MG: 2.5 TABLET ORAL at 13:06

## 2025-07-24 RX ADMIN — METOPROLOL SUCCINATE 25 MG: 25 TABLET, EXTENDED RELEASE ORAL at 20:12

## 2025-07-24 RX ADMIN — PROCHLORPERAZINE EDISYLATE 5 MG: 5 INJECTION INTRAMUSCULAR; INTRAVENOUS at 10:20

## 2025-07-24 RX ADMIN — PROCHLORPERAZINE EDISYLATE 5 MG: 5 INJECTION INTRAMUSCULAR; INTRAVENOUS at 02:15

## 2025-07-24 RX ADMIN — Medication 10 ML: at 20:12

## 2025-07-24 NOTE — CASE MANAGEMENT/SOCIAL WORK
Continued Stay Note  [facility]     Patient Name: Itzel Edmond  MRN: [id number]  Today's Date: 7/24/2025    Admit Date: 7/19/2025    Plan: Home with family to transport   Discharge Plan       Row Name 07/24/25 2317       Plan    Plan Home with family to transport    Patient/Family in Agreement with Plan yes    Plan Comments Patient reviewed with rounding attending during rounds. Patient states plan is to return home with family.                   Discharge Codes    No documentation.                 Expected Discharge Date and Time       Expected Discharge Date Expected Discharge Time    Jul 24, 2025               Sandra Carvalho RN

## 2025-07-24 NOTE — PLAN OF CARE
Vss. Medicated for nausea today. Up with family today walking. Advanced to ice chips.   Problem: Adult Inpatient Plan of Care  Goal: Plan of Care Review  Outcome: Progressing  Goal: Patient-Specific Goal (Individualized)  Outcome: Progressing  Goal: Absence of Hospital-Acquired Illness or Injury  Outcome: Progressing  Intervention: Identify and Manage Fall Risk  Recent Flowsheet Documentation  Taken 7/24/2025 1637 by Sandra Morales RN  Safety Promotion/Fall Prevention:   safety round/check completed   nonskid shoes/slippers when out of bed   fall prevention program maintained  Taken 7/24/2025 1415 by Sandra Morales RN  Safety Promotion/Fall Prevention:   fall prevention program maintained   safety round/check completed   nonskid shoes/slippers when out of bed  Taken 7/24/2025 1200 by Sandra Morales RN  Safety Promotion/Fall Prevention:   safety round/check completed   nonskid shoes/slippers when out of bed   fall prevention program maintained  Taken 7/24/2025 1020 by Sandra Morales RN  Safety Promotion/Fall Prevention:   safety round/check completed   nonskid shoes/slippers when out of bed   fall prevention program maintained  Taken 7/24/2025 0918 by Sandra Morales RN  Safety Promotion/Fall Prevention:   fall prevention program maintained   safety round/check completed   nonskid shoes/slippers when out of bed  Taken 7/24/2025 0800 by Sandra Morales RN  Safety Promotion/Fall Prevention:   safety round/check completed   nonskid shoes/slippers when out of bed   fall prevention program maintained  Intervention: Prevent Skin Injury  Recent Flowsheet Documentation  Taken 7/24/2025 0918 by Sandra Morales RN  Body Position: position changed independently  Intervention: Prevent and Manage VTE (Venous Thromboembolism) Risk  Recent Flowsheet Documentation  Taken 7/24/2025 0918 by Sandra Morales RN  VTE Prevention/Management:   SCDs (sequential compression devices) off   patient refused  intervention  Goal: Optimal Comfort and Wellbeing  Outcome: Progressing  Intervention: Provide Person-Centered Care  Recent Flowsheet Documentation  Taken 7/24/2025 1415 by Sandra Morales, RN  Trust Relationship/Rapport: care explained  Taken 7/24/2025 0918 by Sandra Morales, RN  Trust Relationship/Rapport: care explained  Goal: Readiness for Transition of Care  Outcome: Progressing   Goal Outcome Evaluation:

## 2025-07-24 NOTE — PROGRESS NOTES
Patient Name: Itzel Edmond  YOB: 1940  MRN: [id number]  Admission date: 7/19/2025  Reason for Encounter: Follow-up/Progress Note      [facility] Nutrition Progress Note       Nutrition Intervention Updates: Continue to ADAT per MD. If pt is to remain NPO/Clears past 7 days then consider initiating TPN to aid with nutrition status.       Subjective: Pt on day 5 of NPO/CLD. Continuing to wait the return of bowel function.        PO Diet: NPO Diet NPO Type: Sips with Meds   PO Supplements: None    PO Intake:  NPO, 75% full liquid diet at breakfast       Current nutrition support: -   Nutrition support review: -       Labs: Reviewed        GI Function:  Nausea, hypoactive bowel sounds, last BM 7/23        Brief Weight Review:    Wt Readings from Last 1 Encounters:   07/22/25 0512 62.8 kg (138 lb 7.2 oz)   07/20/25 0502 64.1 kg (141 lb 5 oz)   07/19/25 2018 65.9 kg (145 lb 4.5 oz)   07/19/25 1200 59.9 kg (132 lb)        Results from last 7 days   Lab Units 07/24/25  0456 07/23/25  0402 07/20/25  0623 07/19/25  1202   SODIUM mmol/L 134* 134* 136 137   POTASSIUM mmol/L 4.2 4.3 4.7 4.7   CHLORIDE mmol/L 100 100 103 101   CO2 mmol/L 20.7* 24.2 21.1* 21.0*   BUN mg/dL 17.0 16.0 25.0* 40.0*   CREATININE mg/dL 1.02* 0.96 1.09* 1.58*   CALCIUM mg/dL 8.4* 8.0* 8.3* 9.4   BILIRUBIN mg/dL  --   --  0.4 0.5   ALK PHOS U/L  --   --  45 54   ALT (SGPT) U/L  --   --  13 12   AST (SGOT) U/L  --   --  28 30   GLUCOSE mg/dL 84 101* 179* 146*     Results from last 7 days   Lab Units 07/24/25  0456 07/23/25  2143 07/23/25  0402   MAGNESIUM mg/dL 1.8  --  1.6   PHOSPHORUS mg/dL 2.7   < > 1.7*   PLATELETS 10*3/mm3  --   --  191   HEMOGLOBIN g/dL  --   --  11.1*   HEMATOCRIT %  --   --  32.9*    < > = values in this interval not displayed.     Lab Results   Component Value Date    HGBA1C 5.55 11/29/2021       Electronically signed by:  Germaine Zarate RD  07/24/25 11:51 EDT

## 2025-07-24 NOTE — PROGRESS NOTES
Postop day 5 after laparotomy with lysis of adhesions    Subjective:  Had nausea and some emesis yesterday.  No emesis today but has had some intermittent nausea.  Passing a small amount of flatus.  She did have 2 stools after receiving a suppository yesterday, but none outside of that setting.    Objective:  Afebrile, vital stable  General: Awake and alert, appears comfortable  Abdomen: Soft, not particularly distended, incision in good order, minimal tenderness    Assessment and plan:  - Small bowel obstruction, now 5 days out after laparotomy with lysis of adhesions  - Postop ileus, keep n.p.o. with just ice chips for now  - Await return of better GI function  - Continue to mobilize  - Lovenox DVT prophylaxis  -Will add some IV fluids as she is not taking anything by mouth    Terry Rios MD  General and Endoscopic Surgery  [facility]    [address], [address]  [place], [place], [address]  P: [phone]  F: [phone]

## 2025-07-24 NOTE — PLAN OF CARE
Goal Outcome Evaluation:      Pt A/Ox4, RA, VSS- BP remains slightly elevated overnight. No acute changes overnight. Daughter at bedside. Upper quadrant bowel sounds more active than previous night. Midline incision approximated, open to air, no drainage. Some c/o indigestion- PRN Tums given. C/o nausea x1, PRN Compazine given. LBM 7/23. Call light within reach. Plan of care ongoing.

## 2025-07-24 NOTE — PROGRESS NOTES
Name: Itzel Edmond ADMIT: 2025   : 1940  PCP: Rhoda Luther MD    MRN: [id number] LOS: 5 days   AGE/SEX: 85 y.o. female  ROOM: [facility]   Subjective   Chief Complaint   Patient presents with    Abdominal Pain     Pain worse yesterday  Had epigastric/CP  Also with N/V  Given antiemetics  A little better today    ROS  No f/c  + n/v  No cp/palp  No soa/cough    Objective   Vital Signs  Temp:  [97.9 °F (36.6 °C)-98.4 °F (36.9 °C)] 98.2 °F (36.8 °C)  Heart Rate:  [79-86] 83  Resp:  [15-18] 18  BP: (129-189)/() 178/64  SpO2:  [66 %-98 %] 96 %  on   ;   Device (Oxygen Therapy): room air  Body mass index is 27.04 kg/m².    Physical Exam  Constitutional:       General: She is not in acute distress.  HENT:      Head: Normocephalic and atraumatic.   Eyes:      General: No scleral icterus.  Cardiovascular:      Rate and Rhythm: Regular rhythm.      Heart sounds: Normal heart sounds.   Pulmonary:      Effort: Pulmonary effort is normal. No respiratory distress.   Abdominal:      General: There is no distension.      Palpations: Abdomen is soft.   Musculoskeletal:      Cervical back: Neck supple.   Neurological:      Mental Status: She is alert.   Psychiatric:         Behavior: Behavior normal.         Results Review:       I reviewed the patient's new clinical results.  Results from last 7 days   Lab Units 25  0402 25  0623 25  1202   WBC 10*3/mm3 8.77 10.36 13.01*   HEMOGLOBIN g/dL 11.1* 11.2* 12.9   PLATELETS 10*3/mm3 191 197 247     Results from last 7 days   Lab Units 25  0456 25  0402 25  0623 25  1202   SODIUM mmol/L 134* 134* 136 137   POTASSIUM mmol/L 4.2 4.3 4.7 4.7   CHLORIDE mmol/L 100 100 103 101   CO2 mmol/L 20.7* 24.2 21.1* 21.0*   BUN mg/dL 17.0 16.0 25.0* 40.0*   CREATININE mg/dL 1.02* 0.96 1.09* 1.58*   GLUCOSE mg/dL 84 101* 179* 146*   Estimated Creatinine Clearance: 32.1 mL/min (A) (by C-G formula based on SCr of 1.02 mg/dL (H)).  Results from  "last 7 days   Lab Units 07/20/25  0623 07/19/25  1202   ALBUMIN g/dL 4.0 4.4   BILIRUBIN mg/dL 0.4 0.5   ALK PHOS U/L 45 54   AST (SGOT) U/L 28 30   ALT (SGPT) U/L 13 12     Results from last 7 days   Lab Units 07/24/25  0456 07/23/25  2143 07/23/25  0402 07/20/25  0623 07/19/25  1202   CALCIUM mg/dL 8.4*  --  8.0* 8.3* 9.4   ALBUMIN g/dL  --   --   --  4.0 4.4   MAGNESIUM mg/dL 1.8  --  1.6  --   --    PHOSPHORUS mg/dL 2.7 3.2 1.7*  --   --          Coag     HbA1C   Lab Results   Component Value Date    HGBA1C 5.55 11/29/2021     Infection     Radiology(recent) No radiology results for the last day  HS Troponin T   Date Value Ref Range Status   07/23/2025 28 (H) <14 ng/L Final   07/23/2025 28 (H) <14 ng/L Final       No components found for: \"TSH;2\"    amLODIPine, 1.25 mg, Oral, Nightly  atorvastatin, 20 mg, Oral, Daily  DULoxetine, 30 mg, Oral, Daily  enoxaparin sodium, 40 mg, Subcutaneous, Q24H  famotidine, 20 mg, Oral, BID AC  metoprolol succinate XL, 25 mg, Oral, Nightly  Morphine, 4 mg, Intravenous, Once  ondansetron, 4 mg, Intravenous, Once  sodium chloride, 10 mL, Intravenous, Q12H         NPO Diet NPO Type: Sips with Meds      Assessment & Plan      Active Hospital Problems    Diagnosis  POA    **SBO (small bowel obstruction) [K56.609]  Yes    ROSALIA (acute kidney injury) [N17.9]  Unknown    Nonrheumatic mitral valve regurgitation [I34.0]  Yes    JHOANA on CPAP [G47.33]  Yes    Stage 3a chronic kidney disease [N18.31]  Yes    JAMES (generalized anxiety disorder) [F41.1]  Yes    Restless leg syndrome [G25.81]  Yes    Hypertension [I10]  Yes      Resolved Hospital Problems   No resolved problems to display.     This is an 85-year-old lady with a history of obstructive sleep apnea chronic kidney disease anxiety hypertension and restless leg syndrome who presents to the hospital with abdominal pain and nausea and is found to have small bowel obstruction and taken by general surgery to the operating room for lysis of " adhesions on 7/19.     Worse symptoms 7/23 with epigastric/CP and n/v. EKG/trop obtained and now c/w ACS. Pepcid added and prn agents given. Surgery is following and monitoring and had made her NPO for repeat evaluation today  BP more elevated. Some may be related to her discomfort. Though will increase amlodine and treat her pain/nausea.   PRN agents for pain  Replace electrolytes as needed and monitor    Lovenox for dvt proph    Dispo- to home when tolerating regular diet in the coming days    TRINH RN  DW family     Silverio Tapia MD  [facility]  07/24/25  14:29 EDT

## 2025-07-25 PROCEDURE — 25810000003 DEXTROSE 5 % AND SODIUM CHLORIDE 0.9 % 5-0.9 % SOLUTION: Performed by: INTERNAL MEDICINE

## 2025-07-25 PROCEDURE — 25010000002 ENOXAPARIN PER 10 MG: Performed by: SURGERY

## 2025-07-25 PROCEDURE — 99024 POSTOP FOLLOW-UP VISIT: CPT | Performed by: SURGERY

## 2025-07-25 RX ORDER — DEXTROSE MONOHYDRATE AND SODIUM CHLORIDE 5; .9 G/100ML; G/100ML
75 INJECTION, SOLUTION INTRAVENOUS CONTINUOUS
Status: ACTIVE | OUTPATIENT
Start: 2025-07-25 | End: 2025-07-26

## 2025-07-25 RX ADMIN — POTASSIUM CHLORIDE, DEXTROSE MONOHYDRATE AND SODIUM CHLORIDE 75 ML/HR: 150; 5; 450 INJECTION, SOLUTION INTRAVENOUS at 02:30

## 2025-07-25 RX ADMIN — DEXTROSE AND SODIUM CHLORIDE 75 ML/HR: 5; 900 INJECTION, SOLUTION INTRAVENOUS at 15:38

## 2025-07-25 RX ADMIN — ATORVASTATIN CALCIUM 20 MG: 20 TABLET, FILM COATED ORAL at 09:17

## 2025-07-25 RX ADMIN — Medication 10 ML: at 20:17

## 2025-07-25 RX ADMIN — Medication 2.5 MG: at 21:25

## 2025-07-25 RX ADMIN — DULOXETINE HYDROCHLORIDE 30 MG: 30 CAPSULE, DELAYED RELEASE ORAL at 09:17

## 2025-07-25 RX ADMIN — METOPROLOL SUCCINATE 25 MG: 25 TABLET, EXTENDED RELEASE ORAL at 20:16

## 2025-07-25 RX ADMIN — AMLODIPINE BESYLATE 2.5 MG: 2.5 TABLET ORAL at 20:16

## 2025-07-25 RX ADMIN — ENOXAPARIN SODIUM 40 MG: 100 INJECTION SUBCUTANEOUS at 12:13

## 2025-07-25 RX ADMIN — ACETAMINOPHEN 325MG 650 MG: 325 TABLET ORAL at 21:25

## 2025-07-25 RX ADMIN — Medication 10 ML: at 09:18

## 2025-07-25 RX ADMIN — FAMOTIDINE 20 MG: 20 TABLET, FILM COATED ORAL at 17:02

## 2025-07-25 RX ADMIN — FAMOTIDINE 20 MG: 20 TABLET, FILM COATED ORAL at 06:36

## 2025-07-25 NOTE — PROGRESS NOTES
Name: Itzel Edmond ADMIT: 2025   : 1940  PCP: Rhoda Luther MD    MRN: [id number] LOS: 6 days   AGE/SEX: 85 y.o. female  ROOM: [facility]   Subjective   Chief Complaint   Patient presents with    Abdominal Pain     Pain is better  no N/V  Ambulating  On IVFs    ROS  No f/c  + n/v  No cp/palp  No soa/cough    Objective   Vital Signs  Temp:  [97.9 °F (36.6 °C)-98.4 °F (36.9 °C)] 98.4 °F (36.9 °C)  Heart Rate:  [76-88] 83  Resp:  [18] 18  BP: (146-162)/(63-70) 162/69  SpO2:  [95 %-98 %] 96 %  on   ;   Device (Oxygen Therapy): room air  Body mass index is 27.04 kg/m².    Physical Exam  Constitutional:       General: She is not in acute distress.  HENT:      Head: Normocephalic and atraumatic.   Eyes:      General: No scleral icterus.  Cardiovascular:      Rate and Rhythm: Regular rhythm.      Heart sounds: Normal heart sounds.   Pulmonary:      Effort: Pulmonary effort is normal. No respiratory distress.   Abdominal:      General: There is no distension.      Palpations: Abdomen is soft.   Musculoskeletal:      Cervical back: Neck supple.   Neurological:      Mental Status: She is alert.   Psychiatric:         Behavior: Behavior normal.         Results Review:       I reviewed the patient's new clinical results.  Results from last 7 days   Lab Units 25  0402 25  0623 25  1202   WBC 10*3/mm3 8.77 10.36 13.01*   HEMOGLOBIN g/dL 11.1* 11.2* 12.9   PLATELETS 10*3/mm3 191 197 247     Results from last 7 days   Lab Units 25  0456 25  0402 25  0623 25  1202   SODIUM mmol/L 134* 134* 136 137   POTASSIUM mmol/L 4.2 4.3 4.7 4.7   CHLORIDE mmol/L 100 100 103 101   CO2 mmol/L 20.7* 24.2 21.1* 21.0*   BUN mg/dL 17.0 16.0 25.0* 40.0*   CREATININE mg/dL 1.02* 0.96 1.09* 1.58*   GLUCOSE mg/dL 84 101* 179* 146*   Estimated Creatinine Clearance: 32.1 mL/min (A) (by C-G formula based on SCr of 1.02 mg/dL (H)).  Results from last 7 days   Lab Units 25  0623 25  120  "  ALBUMIN g/dL 4.0 4.4   BILIRUBIN mg/dL 0.4 0.5   ALK PHOS U/L 45 54   AST (SGOT) U/L 28 30   ALT (SGPT) U/L 13 12     Results from last 7 days   Lab Units 07/24/25  0456 07/23/25  2143 07/23/25  0402 07/20/25  0623 07/19/25  1202   CALCIUM mg/dL 8.4*  --  8.0* 8.3* 9.4   ALBUMIN g/dL  --   --   --  4.0 4.4   MAGNESIUM mg/dL 1.8  --  1.6  --   --    PHOSPHORUS mg/dL 2.7 3.2 1.7*  --   --          Coag     HbA1C   Lab Results   Component Value Date    HGBA1C 5.55 11/29/2021     Infection     Radiology(recent) No radiology results for the last day  HS Troponin T   Date Value Ref Range Status   07/23/2025 28 (H) <14 ng/L Final   07/23/2025 28 (H) <14 ng/L Final       No components found for: \"TSH;2\"    amLODIPine, 2.5 mg, Oral, Nightly  atorvastatin, 20 mg, Oral, Daily  DULoxetine, 30 mg, Oral, Daily  enoxaparin sodium, 40 mg, Subcutaneous, Q24H  famotidine, 20 mg, Oral, BID AC  metoprolol succinate XL, 25 mg, Oral, Nightly  Morphine, 4 mg, Intravenous, Once  ondansetron, 4 mg, Intravenous, Once  sodium chloride, 10 mL, Intravenous, Q12H      dextrose 5 % and sodium chloride 0.45 % with KCl 20 mEq/L, 75 mL/hr, Last Rate: 75 mL/hr (07/25/25 0920)      NPO Diet NPO Type: Sips with Meds, Ice Chips      Assessment & Plan      Active Hospital Problems    Diagnosis  POA    **SBO (small bowel obstruction) [K56.609]  Yes    ROSALIA (acute kidney injury) [N17.9]  Unknown    Nonrheumatic mitral valve regurgitation [I34.0]  Yes    JHOANA on CPAP [G47.33]  Yes    Stage 3a chronic kidney disease [N18.31]  Yes    JAMES (generalized anxiety disorder) [F41.1]  Yes    Restless leg syndrome [G25.81]  Yes    Hypertension [I10]  Yes      Resolved Hospital Problems   No resolved problems to display.     This is an 85-year-old lady with a history of obstructive sleep apnea chronic kidney disease anxiety hypertension and restless leg syndrome who presents to the hospital with abdominal pain and nausea and is found to have small bowel obstruction and " taken by general surgery to the operating room for lysis of adhesions on 7/19.     Symptoms up and down but better. NPO again but likely full liquid diet tomorrow per Surgery. Continue pepcid and prn agents   BP elevated but may be from GI symptoms. Amlodipine increased, monitor  PRN agents for pain  Replace electrolytes as needed and monitor    Change fluids to D5NS with hyponatremia.   Lovenox for dvt proph    Dispo- to home when tolerating regular diet in the coming days    TRINH RN  DW family     Silverio Tapia MD  [facility]  07/25/25  14:29 EDT

## 2025-07-25 NOTE — PROGRESS NOTES
Postop day 6 after laparotomy, lysis of adhesions    Subjective:  Much better day yesterday.  No further nausea or emesis.  Passing a small amount of flatus but no bowel movement.  Abdominal pain is minimal.  She is ambulating well.    Objective:  Afebrile with stable vitals  General: Awake alert, comfortable  Abdomen: Soft and benign, no significant distention, incision looks good    Assessment and plan:  - Small bowel obstruction, now 6 days out from laparotomy with adhesiolysis  - Postop ileus appears to be improving.  She does not want to try clear liquids which is reasonable since she did not do well with them last time.  She says she would prefer to wait until she has a bowel movement and then try full liquids.  - Continue to mobilize patient  - Continue IV fluids for the time being  - Lovenox DVT prophylaxis    Terry Rios MD  General and Endoscopic Surgery  [facility]    [address], [address]  [place], [address]  P: [phone]  F: [phone]

## 2025-07-25 NOTE — PLAN OF CARE
Goal Outcome Evaluation:  Plan of Care Reviewed With: patient, child        Progress: improving  Outcome Evaluation: no acute changes for this shift. call light within reach. daughter at bedside. plan of care ongoing. a/o x 4.R.A. made a few laps ambulating in the nurses station.

## 2025-07-25 NOTE — PROGRESS NOTES
Patient Name: Itzel Edmond  YOB: 1940  MRN: [id number]  Admission date: 7/19/2025  Reason for Encounter: Follow-up/Progress Note      [facility] Nutrition Progress Note       Nutrition Intervention Updates: NPO / Clears x 6 days:   Continue to ADAT per MD. If pt is to remain NPO/Clears past 7 days then consider initiating TPN to aid with nutrition status.       Subjective: Pt on day 6 of NPO/CLD. Continuing to wait the return of bowel function.        PO Diet: NPO Diet NPO Type: Sips with Meds, Ice Chips   PO Supplements: None    PO Intake:  NPO       Current nutrition support: -   Nutrition support review: -       Labs: Reviewed - sodium (134), Potassium, Mag, Phos (WNL)       GI Function:  Indigestion, hypoactive bowel sounds, last BM 7/23        Brief Weight Review:    Wt Readings from Last 1 Encounters:   07/22/25 0512 62.8 kg (138 lb 7.2 oz)   07/20/25 0502 64.1 kg (141 lb 5 oz)   07/19/25 2018 65.9 kg (145 lb 4.5 oz)   07/19/25 1200 59.9 kg (132 lb)        Results from last 7 days   Lab Units 07/24/25  0456 07/23/25  0402 07/20/25  0623 07/19/25  1202   SODIUM mmol/L 134* 134* 136 137   POTASSIUM mmol/L 4.2 4.3 4.7 4.7   CHLORIDE mmol/L 100 100 103 101   CO2 mmol/L 20.7* 24.2 21.1* 21.0*   BUN mg/dL 17.0 16.0 25.0* 40.0*   CREATININE mg/dL 1.02* 0.96 1.09* 1.58*   CALCIUM mg/dL 8.4* 8.0* 8.3* 9.4   BILIRUBIN mg/dL  --   --  0.4 0.5   ALK PHOS U/L  --   --  45 54   ALT (SGPT) U/L  --   --  13 12   AST (SGOT) U/L  --   --  28 30   GLUCOSE mg/dL 84 101* 179* 146*     Results from last 7 days   Lab Units 07/24/25  0456 07/23/25  2143 07/23/25  0402   MAGNESIUM mg/dL 1.8  --  1.6   PHOSPHORUS mg/dL 2.7   < > 1.7*   PLATELETS 10*3/mm3  --   --  191   HEMOGLOBIN g/dL  --   --  11.1*   HEMATOCRIT %  --   --  32.9*    < > = values in this interval not displayed.     Lab Results   Component Value Date    HGBA1C 5.55 11/29/2021       Electronically signed by:  Riddhi Nur,  RD  07/25/25 07:50 EDT

## 2025-07-26 LAB
ANION GAP SERPL CALCULATED.3IONS-SCNC: 9 MMOL/L (ref 5–15)
BUN SERPL-MCNC: 11 MG/DL (ref 8–23)
BUN/CREAT SERPL: 13.8 (ref 7–25)
CALCIUM SPEC-SCNC: 7.6 MG/DL (ref 8.6–10.5)
CHLORIDE SERPL-SCNC: 101 MMOL/L (ref 98–107)
CO2 SERPL-SCNC: 22 MMOL/L (ref 22–29)
CREAT SERPL-MCNC: 0.8 MG/DL (ref 0.57–1)
EGFRCR SERPLBLD CKD-EPI 2021: 71.4 ML/MIN/1.73
GLUCOSE SERPL-MCNC: 131 MG/DL (ref 65–99)
MAGNESIUM SERPL-MCNC: 1.5 MG/DL (ref 1.6–2.4)
PHOSPHATE SERPL-MCNC: 1.6 MG/DL (ref 2.5–4.5)
PHOSPHATE SERPL-MCNC: 3.7 MG/DL (ref 2.5–4.5)
POTASSIUM SERPL-SCNC: 4.2 MMOL/L (ref 3.5–5.2)
SODIUM SERPL-SCNC: 132 MMOL/L (ref 136–145)

## 2025-07-26 PROCEDURE — 25010000002 MAGNESIUM SULFATE 2 GM/50ML SOLUTION: Performed by: INTERNAL MEDICINE

## 2025-07-26 PROCEDURE — 25010000002 CALCIUM GLUCONATE PER 10 ML: Performed by: INTERNAL MEDICINE

## 2025-07-26 PROCEDURE — 25810000003 SODIUM CHLORIDE 0.9 % SOLUTION: Performed by: INTERNAL MEDICINE

## 2025-07-26 PROCEDURE — 84100 ASSAY OF PHOSPHORUS: CPT | Performed by: INTERNAL MEDICINE

## 2025-07-26 PROCEDURE — 25810000003 DEXTROSE 5 % AND SODIUM CHLORIDE 0.9 % 5-0.9 % SOLUTION: Performed by: SURGERY

## 2025-07-26 PROCEDURE — 25010000002 ENOXAPARIN PER 10 MG: Performed by: SURGERY

## 2025-07-26 PROCEDURE — 99024 POSTOP FOLLOW-UP VISIT: CPT | Performed by: SURGERY

## 2025-07-26 PROCEDURE — 25810000003 DEXTROSE 5 % AND SODIUM CHLORIDE 0.9 % 5-0.9 % SOLUTION: Performed by: INTERNAL MEDICINE

## 2025-07-26 PROCEDURE — 80048 BASIC METABOLIC PNL TOTAL CA: CPT | Performed by: INTERNAL MEDICINE

## 2025-07-26 PROCEDURE — 83735 ASSAY OF MAGNESIUM: CPT | Performed by: INTERNAL MEDICINE

## 2025-07-26 PROCEDURE — 25010000002 PROCHLORPERAZINE 10 MG/2ML SOLUTION: Performed by: HOSPITALIST

## 2025-07-26 RX ORDER — DEXTROSE MONOHYDRATE AND SODIUM CHLORIDE 5; .9 G/100ML; G/100ML
75 INJECTION, SOLUTION INTRAVENOUS CONTINUOUS
Status: ACTIVE | OUTPATIENT
Start: 2025-07-26 | End: 2025-07-27

## 2025-07-26 RX ORDER — MAGNESIUM SULFATE HEPTAHYDRATE 40 MG/ML
2 INJECTION, SOLUTION INTRAVENOUS
Status: COMPLETED | OUTPATIENT
Start: 2025-07-26 | End: 2025-07-26

## 2025-07-26 RX ORDER — CALCIUM GLUCONATE 98 MG/ML
2 INJECTION, SOLUTION INTRAVENOUS ONCE
Status: COMPLETED | OUTPATIENT
Start: 2025-07-26 | End: 2025-07-26

## 2025-07-26 RX ADMIN — SODIUM PHOSPHATE, MONOBASIC, MONOHYDRATE AND SODIUM PHOSPHATE, DIBASIC, ANHYDROUS 15 MMOL: 276; 142 INJECTION, SOLUTION INTRAVENOUS at 10:09

## 2025-07-26 RX ADMIN — MAGNESIUM SULFATE HEPTAHYDRATE 2 G: 40 INJECTION, SOLUTION INTRAVENOUS at 09:21

## 2025-07-26 RX ADMIN — DEXTROSE AND SODIUM CHLORIDE 75 ML/HR: 5; 900 INJECTION, SOLUTION INTRAVENOUS at 02:53

## 2025-07-26 RX ADMIN — PROCHLORPERAZINE EDISYLATE 5 MG: 5 INJECTION INTRAMUSCULAR; INTRAVENOUS at 18:28

## 2025-07-26 RX ADMIN — ENOXAPARIN SODIUM 40 MG: 100 INJECTION SUBCUTANEOUS at 11:17

## 2025-07-26 RX ADMIN — METOPROLOL SUCCINATE 25 MG: 25 TABLET, EXTENDED RELEASE ORAL at 20:35

## 2025-07-26 RX ADMIN — FAMOTIDINE 20 MG: 20 TABLET, FILM COATED ORAL at 16:50

## 2025-07-26 RX ADMIN — Medication 10 ML: at 20:35

## 2025-07-26 RX ADMIN — MAGNESIUM SULFATE HEPTAHYDRATE 2 G: 40 INJECTION, SOLUTION INTRAVENOUS at 13:22

## 2025-07-26 RX ADMIN — SODIUM PHOSPHATE, MONOBASIC, MONOHYDRATE AND SODIUM PHOSPHATE, DIBASIC, ANHYDROUS 15 MMOL: 276; 142 INJECTION, SOLUTION INTRAVENOUS at 13:22

## 2025-07-26 RX ADMIN — AMLODIPINE BESYLATE 2.5 MG: 2.5 TABLET ORAL at 20:35

## 2025-07-26 RX ADMIN — CALCIUM GLUCONATE 2 G: 98 INJECTION, SOLUTION INTRAVENOUS at 18:16

## 2025-07-26 RX ADMIN — FAMOTIDINE 20 MG: 20 TABLET, FILM COATED ORAL at 06:30

## 2025-07-26 RX ADMIN — ATORVASTATIN CALCIUM 20 MG: 20 TABLET, FILM COATED ORAL at 08:38

## 2025-07-26 RX ADMIN — DULOXETINE HYDROCHLORIDE 30 MG: 30 CAPSULE, DELAYED RELEASE ORAL at 08:38

## 2025-07-26 RX ADMIN — MAGNESIUM SULFATE HEPTAHYDRATE 2 G: 40 INJECTION, SOLUTION INTRAVENOUS at 11:17

## 2025-07-26 RX ADMIN — DEXTROSE AND SODIUM CHLORIDE 75 ML/HR: 5; 900 INJECTION, SOLUTION INTRAVENOUS at 15:58

## 2025-07-26 NOTE — PLAN OF CARE
Goal Outcome Evaluation:   Patient Aox4, VSS, no acute changes, Continues to be NPO. Compazine give for nausea.

## 2025-07-26 NOTE — PROGRESS NOTES
Patient Name: Itzel Edmond  YOB: 1940  MRN: [id number]  Admission date: 7/19/2025  Reason for Encounter: Follow-up/Progress Note      [facility] Nutrition Progress Note       Nutrition Intervention Updates: NPO / Clears x 7 days:   Continue to ADAT per MD. If pt is to remain NPO/Clears past 7 days then consider initiating TPN to aid with nutrition status.   Monitor and replace electrolytes PRN.      Subjective: Pt on day 7 of NPO/CLD. Continuing to wait the return of bowel function. Pt reporting not wanting to advance to CLD, would rather wait until she has a bowel movement and advance to fulls.       PO Diet: NPO Diet NPO Type: Sips with Meds, Ice Chips   PO Supplements: None    PO Intake:  NPO       Current nutrition support: -   Nutrition support review: -       Labs: Reviewed - sodium (132), Phos 1.6, Mg 1.5, Glu 101/84/131       GI Function:  Hypoactive bowel sounds, passing flatus, last BM 7/23        Brief Weight Review:    Wt Readings from Last 1 Encounters:   07/26/25 0510 61 kg (134 lb 6.4 oz)   07/22/25 0512 62.8 kg (138 lb 7.2 oz)   07/20/25 0502 64.1 kg (141 lb 5 oz)   07/19/25 2018 65.9 kg (145 lb 4.5 oz)   07/19/25 1200 59.9 kg (132 lb)        Results from last 7 days   Lab Units 07/26/25  0556 07/24/25  0456 07/23/25  0402 07/20/25  0623 07/19/25  1202   SODIUM mmol/L 132* 134* 134* 136 137   POTASSIUM mmol/L 4.2 4.2 4.3 4.7 4.7   CHLORIDE mmol/L 101 100 100 103 101   CO2 mmol/L 22.0 20.7* 24.2 21.1* 21.0*   BUN mg/dL 11.0 17.0 16.0 25.0* 40.0*   CREATININE mg/dL 0.80 1.02* 0.96 1.09* 1.58*   CALCIUM mg/dL 7.6* 8.4* 8.0* 8.3* 9.4   BILIRUBIN mg/dL  --   --   --  0.4 0.5   ALK PHOS U/L  --   --   --  45 54   ALT (SGPT) U/L  --   --   --  13 12   AST (SGOT) U/L  --   --   --  28 30   GLUCOSE mg/dL 131* 84 101* 179* 146*     Results from last 7 days   Lab Units 07/26/25  0556 07/24/25  0456 07/23/25  2143 07/23/25  0402   MAGNESIUM mg/dL 1.5* 1.8  --  1.6   PHOSPHORUS  mg/dL 1.6* 2.7   < > 1.7*   PLATELETS 10*3/mm3  --   --   --  191   HEMOGLOBIN g/dL  --   --   --  11.1*   HEMATOCRIT %  --   --   --  32.9*    < > = values in this interval not displayed.     Lab Results   Component Value Date    HGBA1C 5.55 11/29/2021       Electronically signed by:  Jodie Dozier RD  07/26/25 09:10 EDT

## 2025-07-26 NOTE — NURSING NOTE
IV therapy has been consulted to place a PICC line for TPN administration. I spoke with her nurse Ladan who states she will pass it along to night shift to get the consent and education sheets signed. IV therapy will follow up in the morning.   Trazodone 50m month supply sent to [facility] in [place] on 2024

## 2025-07-26 NOTE — PROGRESS NOTES
IMPRESSION & PLAN:  85-year-old lady postoperative day 7 from exploratory laparotomy with lysis of adhesions.  Ongoing postoperative ileus.  TPN and PICC line ordered.  Continue NPO except sips with meds and ice chips.    VTE Prophylaxis:  Pharmacologic & mechanical VTE prophylaxis orders are present.    CC:    Chief Complaint   Patient presents with    Abdominal Pain       HPI: She had some nausea and emesis while I was in her room.  Overall pain is reasonably well-controlled.      PE:    VS:   Vitals:    07/26/25 1315   BP: 168/60   Pulse: 76   Resp: 18   Temp: 97.7 °F (36.5 °C)   SpO2: 97%          Intake/Output Summary (Last 24 hours) at 7/26/2025 1553  Last data filed at 7/25/2025 2127  Gross per 24 hour   Intake 443.75 ml   Output --   Net 443.75 ml        CONST: Awake, alert  LUNGS: symmetric excursion, normal inspiratory effort  CV: regular rate, well perfused  Abdomen: Appropriately tender to palpation, lower midline incision well-approximated with staples      LABS:  Results from last 7 days   Lab Units 07/23/25  0402 07/20/25  0623   WBC 10*3/mm3 8.77 10.36   HEMOGLOBIN g/dL 11.1* 11.2*   HEMATOCRIT % 32.9* 33.3*   PLATELETS 10*3/mm3 191 197     Results from last 7 days   Lab Units 07/26/25  0556 07/24/25  0456 07/23/25  0402 07/20/25  0623   SODIUM mmol/L 132* 134* 134* 136   POTASSIUM mmol/L 4.2 4.2 4.3 4.7   CHLORIDE mmol/L 101 100 100 103   CO2 mmol/L 22.0 20.7* 24.2 21.1*   BUN mg/dL 11.0 17.0 16.0 25.0*   CREATININE mg/dL 0.80 1.02* 0.96 1.09*   CALCIUM mg/dL 7.6* 8.4* 8.0* 8.3*   BILIRUBIN mg/dL  --   --   --  0.4   ALK PHOS U/L  --   --   --  45   ALT (SGPT) U/L  --   --   --  13   AST (SGOT) U/L  --   --   --  28   GLUCOSE mg/dL 131* 84 101* 179*

## 2025-07-26 NOTE — PLAN OF CARE
Goal Outcome Evaluation:  Plan of Care Reviewed With: patient, child        Progress: improving  Outcome Evaluation: c/o moderate pain on L hip area. tylenol tab given x 1. ice packs applied. daughter at bedside. call light within reach. plan of care ongoing.

## 2025-07-26 NOTE — PROGRESS NOTES
Name: Itzel Edmond ADMIT: 2025   : 1940  PCP: Rhoda Luther MD    MRN: [id number] LOS: 7 days   AGE/SEX: 85 y.o. female  ROOM: [facility]   Subjective   Chief Complaint   Patient presents with    Abdominal Pain     Patient is sitting up in his chair and appears better clinically and is not in any distress.  Denies nausea, abdominal pain, shortness of breath, palpitations, dizziness.  Passing some gas.    Objective   Vital Signs  Temp:  [97.3 °F (36.3 °C)-98.8 °F (37.1 °C)] 97.7 °F (36.5 °C)  Heart Rate:  [74-86] 76  Resp:  [18] 18  BP: (132-175)/(57-70) 168/60  SpO2:  [97 %-100 %] 97 %  on   ;   Device (Oxygen Therapy): room air  Body mass index is 26.25 kg/m².    Physical Exam  Constitutional:       General: She is not in acute distress.  HENT:      Head: Normocephalic and atraumatic.      Mouth/Throat:      Mouth: Mucous membranes are dry.   Eyes:      General: No scleral icterus.     Extraocular Movements: Extraocular movements intact.      Pupils: Pupils are equal, round, and reactive to light.   Cardiovascular:      Rate and Rhythm: Normal rate and regular rhythm.      Heart sounds: Normal heart sounds.   Pulmonary:      Effort: Pulmonary effort is normal. No respiratory distress.   Abdominal:      General: There is no distension.      Palpations: Abdomen is soft.      Tenderness: There is no abdominal tenderness.      Comments: Diminished bowel sounds   Musculoskeletal:      Cervical back: Neck supple.      Right lower leg: No edema.      Left lower leg: No edema.   Neurological:      General: No focal deficit present.      Mental Status: She is alert and oriented to person, place, and time.   Psychiatric:         Behavior: Behavior normal.         Results Review:       I reviewed the patient's new clinical results.  Results from last 7 days   Lab Units 25  0402 25  0623   WBC 10*3/mm3 8.77 10.36   HEMOGLOBIN g/dL 11.1* 11.2*   PLATELETS 10*3/mm3 191 197     Results from last 7  "days   Lab Units 07/26/25  0556 07/24/25  0456 07/23/25  0402 07/20/25  0623   SODIUM mmol/L 132* 134* 134* 136   POTASSIUM mmol/L 4.2 4.2 4.3 4.7   CHLORIDE mmol/L 101 100 100 103   CO2 mmol/L 22.0 20.7* 24.2 21.1*   BUN mg/dL 11.0 17.0 16.0 25.0*   CREATININE mg/dL 0.80 1.02* 0.96 1.09*   GLUCOSE mg/dL 131* 84 101* 179*   Estimated Creatinine Clearance: 40.4 mL/min (by C-G formula based on SCr of 0.8 mg/dL).  Results from last 7 days   Lab Units 07/20/25  0623   ALBUMIN g/dL 4.0   BILIRUBIN mg/dL 0.4   ALK PHOS U/L 45   AST (SGOT) U/L 28   ALT (SGPT) U/L 13     Results from last 7 days   Lab Units 07/26/25  0556 07/24/25  0456 07/23/25  2143 07/23/25 0402 07/20/25  0623   CALCIUM mg/dL 7.6* 8.4*  --  8.0* 8.3*   ALBUMIN g/dL  --   --   --   --  4.0   MAGNESIUM mg/dL 1.5* 1.8  --  1.6  --    PHOSPHORUS mg/dL 1.6* 2.7 3.2 1.7*  --          Coag     HbA1C   Lab Results   Component Value Date    HGBA1C 5.55 11/29/2021     Infection     Radiology(recent) No radiology results for the last day  HS Troponin T   Date Value Ref Range Status   07/23/2025 28 (H) <14 ng/L Final       No components found for: \"TSH;2\"    amLODIPine, 2.5 mg, Oral, Nightly  atorvastatin, 20 mg, Oral, Daily  DULoxetine, 30 mg, Oral, Daily  enoxaparin sodium, 40 mg, Subcutaneous, Q24H  famotidine, 20 mg, Oral, BID AC  metoprolol succinate XL, 25 mg, Oral, Nightly  ondansetron, 4 mg, Intravenous, Once  sodium chloride, 10 mL, Intravenous, Q12H      dextrose 5 % and sodium chloride 0.9 %, 75 mL/hr, Last Rate: 75 mL/hr (07/26/25 1558)  Pharmacy to Dose TPN,       NPO Diet NPO Type: Sips with Meds, Ice Chips      Assessment & Plan      Active Hospital Problems    Diagnosis  POA    **SBO (small bowel obstruction) [K56.609]  Yes    ROSALIA (acute kidney injury) [N17.9]  Unknown    Nonrheumatic mitral valve regurgitation [I34.0]  Yes    JHOANA on CPAP [G47.33]  Yes    Stage 3a chronic kidney disease [N18.31]  Yes    JAMES (generalized anxiety disorder) [F41.1]  " Yes    Restless leg syndrome [G25.81]  Yes    Hypertension [I10]  Yes      Resolved Hospital Problems   No resolved problems to display.     This is an 85-year-old lady with a history of obstructive sleep apnea chronic kidney disease anxiety hypertension and restless leg syndrome who presents to the hospital with abdominal pain and nausea and is found to have small bowel obstruction and taken by general surgery to the operating room for lysis of adhesions on 7/19.     1. Small bowel obstruction, status post adhesion lysis on 7/19.  Still no return of bowel function therefore PICC line and TPN has been ordered by surgical team.  Encourage patient to ambulate and replace electrolytes as needed.  Surgical team following along as well.  2.  Hypomagnesium /hypophosphatemia, secondary to poor oral intake and being replaced per protocol as needed.  Monitor lites closely.  3.  Acute kidney injury/CKD stage II, creatinine has improved from 1.02-0.80.  Will continue to monitor closely.  4.  Hypertension, continue with home metoprolol and amlodipine.  5.  Hyperlipidemia, on statins.  6.  Anxiety/depression, on Cymbalta.  7.  Hyperlipidemia, on statins.  8.  On Lovenox for DVT prophylaxis.  9.  CODE STATUS is full code.        Shoaib Giraldo MD  [facility]  07/26/25  14:29 EDT

## 2025-07-27 LAB
ALBUMIN SERPL-MCNC: 3.3 G/DL (ref 3.5–5.2)
ALBUMIN/GLOB SERPL: 1.4 G/DL
ALP SERPL-CCNC: 79 U/L (ref 39–117)
ALT SERPL W P-5'-P-CCNC: 21 U/L (ref 1–33)
ANION GAP SERPL CALCULATED.3IONS-SCNC: 12 MMOL/L (ref 5–15)
AST SERPL-CCNC: 35 U/L (ref 1–32)
BASOPHILS # BLD AUTO: 0.02 10*3/MM3 (ref 0–0.2)
BASOPHILS NFR BLD AUTO: 0.2 % (ref 0–1.5)
BILIRUB SERPL-MCNC: 1.4 MG/DL (ref 0–1.2)
BUN SERPL-MCNC: 10 MG/DL (ref 8–23)
BUN/CREAT SERPL: 12.8 (ref 7–25)
CALCIUM SPEC-SCNC: 8.1 MG/DL (ref 8.6–10.5)
CHLORIDE SERPL-SCNC: 101 MMOL/L (ref 98–107)
CO2 SERPL-SCNC: 21 MMOL/L (ref 22–29)
CREAT SERPL-MCNC: 0.78 MG/DL (ref 0.57–1)
DEPRECATED RDW RBC AUTO: 40.6 FL (ref 37–54)
EGFRCR SERPLBLD CKD-EPI 2021: 73.6 ML/MIN/1.73
EOSINOPHIL # BLD AUTO: 0.1 10*3/MM3 (ref 0–0.4)
EOSINOPHIL NFR BLD AUTO: 1.1 % (ref 0.3–6.2)
ERYTHROCYTE [DISTWIDTH] IN BLOOD BY AUTOMATED COUNT: 12.6 % (ref 12.3–15.4)
GLOBULIN UR ELPH-MCNC: 2.4 GM/DL
GLUCOSE BLDC GLUCOMTR-MCNC: 146 MG/DL (ref 70–130)
GLUCOSE BLDC GLUCOMTR-MCNC: 58 MG/DL (ref 70–130)
GLUCOSE BLDC GLUCOMTR-MCNC: 69 MG/DL (ref 70–130)
GLUCOSE SERPL-MCNC: 88 MG/DL (ref 65–99)
HCT VFR BLD AUTO: 31.1 % (ref 34–46.6)
HGB BLD-MCNC: 10.7 G/DL (ref 12–15.9)
IMM GRANULOCYTES # BLD AUTO: 0.06 10*3/MM3 (ref 0–0.05)
IMM GRANULOCYTES NFR BLD AUTO: 0.6 % (ref 0–0.5)
LYMPHOCYTES # BLD AUTO: 1.06 10*3/MM3 (ref 0.7–3.1)
LYMPHOCYTES NFR BLD AUTO: 11.2 % (ref 19.6–45.3)
MAGNESIUM SERPL-MCNC: 2.5 MG/DL (ref 1.6–2.4)
MCH RBC QN AUTO: 30.7 PG (ref 26.6–33)
MCHC RBC AUTO-ENTMCNC: 34.4 G/DL (ref 31.5–35.7)
MCV RBC AUTO: 89.4 FL (ref 79–97)
MONOCYTES # BLD AUTO: 1.47 10*3/MM3 (ref 0.1–0.9)
MONOCYTES NFR BLD AUTO: 15.5 % (ref 5–12)
NEUTROPHILS NFR BLD AUTO: 6.75 10*3/MM3 (ref 1.7–7)
NEUTROPHILS NFR BLD AUTO: 71.4 % (ref 42.7–76)
NRBC BLD AUTO-RTO: 0 /100 WBC (ref 0–0.2)
PHOSPHATE SERPL-MCNC: 3.3 MG/DL (ref 2.5–4.5)
PLATELET # BLD AUTO: 252 10*3/MM3 (ref 140–450)
PMV BLD AUTO: 9.6 FL (ref 6–12)
POTASSIUM SERPL-SCNC: 3.7 MMOL/L (ref 3.5–5.2)
PROT SERPL-MCNC: 5.7 G/DL (ref 6–8.5)
RBC # BLD AUTO: 3.48 10*6/MM3 (ref 3.77–5.28)
SODIUM SERPL-SCNC: 134 MMOL/L (ref 136–145)
TRIGL SERPL-MCNC: 84 MG/DL (ref 0–150)
WBC NRBC COR # BLD AUTO: 9.46 10*3/MM3 (ref 3.4–10.8)

## 2025-07-27 PROCEDURE — 25010000002 ENOXAPARIN PER 10 MG: Performed by: SURGERY

## 2025-07-27 PROCEDURE — 82948 REAGENT STRIP/BLOOD GLUCOSE: CPT

## 2025-07-27 PROCEDURE — 85025 COMPLETE CBC W/AUTO DIFF WBC: CPT | Performed by: INTERNAL MEDICINE

## 2025-07-27 PROCEDURE — 80053 COMPREHEN METABOLIC PANEL: CPT | Performed by: SURGERY

## 2025-07-27 PROCEDURE — 83735 ASSAY OF MAGNESIUM: CPT | Performed by: SURGERY

## 2025-07-27 PROCEDURE — 99024 POSTOP FOLLOW-UP VISIT: CPT | Performed by: SURGERY

## 2025-07-27 PROCEDURE — 25010000002 MAGNESIUM SULFATE PER 500 MG OF MAGNESIUM: Performed by: SURGERY

## 2025-07-27 PROCEDURE — C1751 CATH, INF, PER/CENT/MIDLINE: HCPCS

## 2025-07-27 PROCEDURE — 25810000003 DEXTROSE 5 % AND SODIUM CHLORIDE 0.9 % 5-0.9 % SOLUTION: Performed by: INTERNAL MEDICINE

## 2025-07-27 PROCEDURE — 84478 ASSAY OF TRIGLYCERIDES: CPT | Performed by: SURGERY

## 2025-07-27 PROCEDURE — 25010000002 CALCIUM GLUCONATE PER 10 ML: Performed by: SURGERY

## 2025-07-27 PROCEDURE — 84100 ASSAY OF PHOSPHORUS: CPT | Performed by: SURGERY

## 2025-07-27 PROCEDURE — 25010000002 POTASSIUM CHLORIDE PER 2 MEQ OF POTASSIUM: Performed by: SURGERY

## 2025-07-27 RX ORDER — SODIUM CHLORIDE 9 MG/ML
40 INJECTION, SOLUTION INTRAVENOUS AS NEEDED
Status: DISCONTINUED | OUTPATIENT
Start: 2025-07-27 | End: 2025-07-30 | Stop reason: HOSPADM

## 2025-07-27 RX ORDER — SODIUM CHLORIDE 0.9 % (FLUSH) 0.9 %
10 SYRINGE (ML) INJECTION EVERY 12 HOURS SCHEDULED
Status: DISCONTINUED | OUTPATIENT
Start: 2025-07-27 | End: 2025-07-30 | Stop reason: HOSPADM

## 2025-07-27 RX ORDER — SODIUM CHLORIDE 0.9 % (FLUSH) 0.9 %
10 SYRINGE (ML) INJECTION EVERY 12 HOURS SCHEDULED
Status: DISCONTINUED | OUTPATIENT
Start: 2025-07-27 | End: 2025-07-28

## 2025-07-27 RX ORDER — DEXTROSE MONOHYDRATE AND SODIUM CHLORIDE 5; .9 G/100ML; G/100ML
75 INJECTION, SOLUTION INTRAVENOUS CONTINUOUS
Status: ACTIVE | OUTPATIENT
Start: 2025-07-27 | End: 2025-07-28

## 2025-07-27 RX ORDER — SODIUM CHLORIDE 0.9 % (FLUSH) 0.9 %
20 SYRINGE (ML) INJECTION AS NEEDED
Status: DISCONTINUED | OUTPATIENT
Start: 2025-07-27 | End: 2025-07-30 | Stop reason: HOSPADM

## 2025-07-27 RX ORDER — SODIUM CHLORIDE 0.9 % (FLUSH) 0.9 %
10 SYRINGE (ML) INJECTION AS NEEDED
Status: DISCONTINUED | OUTPATIENT
Start: 2025-07-27 | End: 2025-07-30 | Stop reason: HOSPADM

## 2025-07-27 RX ADMIN — DULOXETINE HYDROCHLORIDE 30 MG: 30 CAPSULE, DELAYED RELEASE ORAL at 10:00

## 2025-07-27 RX ADMIN — ATORVASTATIN CALCIUM 20 MG: 20 TABLET, FILM COATED ORAL at 10:00

## 2025-07-27 RX ADMIN — METOPROLOL SUCCINATE 25 MG: 25 TABLET, EXTENDED RELEASE ORAL at 20:32

## 2025-07-27 RX ADMIN — POTASSIUM PHOSPHATE, MONOBASIC POTASSIUM PHOSPHATE, DIBASIC: 224; 236 INJECTION, SOLUTION, CONCENTRATE INTRAVENOUS at 17:21

## 2025-07-27 RX ADMIN — FAMOTIDINE 20 MG: 20 TABLET, FILM COATED ORAL at 06:33

## 2025-07-27 RX ADMIN — ENOXAPARIN SODIUM 40 MG: 100 INJECTION SUBCUTANEOUS at 12:25

## 2025-07-27 RX ADMIN — AMLODIPINE BESYLATE 2.5 MG: 2.5 TABLET ORAL at 20:32

## 2025-07-27 RX ADMIN — Medication 10 ML: at 20:32

## 2025-07-27 RX ADMIN — DEXTROSE AND SODIUM CHLORIDE 75 ML/HR: 5; 900 INJECTION, SOLUTION INTRAVENOUS at 17:33

## 2025-07-27 NOTE — PLAN OF CARE
Problem: Adult Inpatient Plan of Care  Goal: Plan of Care Review  Outcome: Progressing  Flowsheets (Taken 7/27/2025 1600)  Outcome Evaluation: No acute changes. PICC line to be placed and TPN to start. WCTM.  Goal: Patient-Specific Goal (Individualized)  Outcome: Progressing  Goal: Absence of Hospital-Acquired Illness or Injury  Outcome: Progressing  Intervention: Identify and Manage Fall Risk  Recent Flowsheet Documentation  Taken 7/27/2025 1559 by Sandra Parker RN  Safety Promotion/Fall Prevention: safety round/check completed  Taken 7/27/2025 1415 by Sandra Parker RN  Safety Promotion/Fall Prevention: safety round/check completed  Taken 7/27/2025 1215 by Sandra Parker RN  Safety Promotion/Fall Prevention: safety round/check completed  Taken 7/27/2025 1015 by Sandra Parker RN  Safety Promotion/Fall Prevention: safety round/check completed  Taken 7/27/2025 0815 by Sandra Parker RN  Safety Promotion/Fall Prevention: safety round/check completed  Intervention: Prevent Skin Injury  Recent Flowsheet Documentation  Taken 7/27/2025 1559 by Sandra Parker RN  Body Position: position changed independently  Taken 7/27/2025 1415 by Sandra Parker RN  Body Position: position changed independently  Skin Protection: incontinence pads utilized  Taken 7/27/2025 1215 by Sandra Parker RN  Body Position: position changed independently  Taken 7/27/2025 1015 by Sandra Parker RN  Body Position: position changed independently  Taken 7/27/2025 0815 by Sandra Parker RN  Body Position: position changed independently  Skin Protection: incontinence pads utilized  Intervention: Prevent and Manage VTE (Venous Thromboembolism) Risk  Recent Flowsheet Documentation  Taken 7/27/2025 1415 by Sandra Parker RN  VTE Prevention/Management: SCDs (sequential compression devices) off  Taken 7/27/2025 0815 by Sandra Parker RN  VTE Prevention/Management: SCDs (sequential compression devices)  off  Intervention: Prevent Infection  Recent Flowsheet Documentation  Taken 7/27/2025 1559 by Sandra Parker RN  Infection Prevention: single patient room provided  Taken 7/27/2025 1415 by Sandra Parker RN  Infection Prevention: single patient room provided  Taken 7/27/2025 1215 by Sandra Parker RN  Infection Prevention: single patient room provided  Taken 7/27/2025 1015 by Sandra Parker RN  Infection Prevention: single patient room provided  Taken 7/27/2025 0815 by Sandra Parker RN  Infection Prevention: single patient room provided  Goal: Optimal Comfort and Wellbeing  Outcome: Progressing  Intervention: Provide Person-Centered Care  Recent Flowsheet Documentation  Taken 7/27/2025 1415 by Sandra Parker RN  Trust Relationship/Rapport:   care explained   choices provided  Taken 7/27/2025 0815 by Sandra Parker RN  Trust Relationship/Rapport:   care explained   choices provided  Goal: Readiness for Transition of Care  Outcome: Progressing     Problem: Skin Injury Risk Increased  Goal: Skin Health and Integrity  Outcome: Progressing  Intervention: Optimize Skin Protection  Recent Flowsheet Documentation  Taken 7/27/2025 1559 by Sandra Parekr RN  Head of Bed (HOB) Positioning: HOB elevated  Taken 7/27/2025 1415 by Sandra Parker RN  Pressure Reduction Techniques: frequent weight shift encouraged  Head of Bed (HOB) Positioning: HOB at 20-30 degrees  Pressure Reduction Devices: alternating pressure pump (MISBAH)  Skin Protection: incontinence pads utilized  Taken 7/27/2025 1215 by Sandra Parker RN  Head of Bed (HOB) Positioning: HOB elevated  Taken 7/27/2025 1015 by Sandra Parker RN  Head of Bed (HOB) Positioning: HOB elevated  Taken 7/27/2025 0815 by Sandra Parker RN  Pressure Reduction Techniques: frequent weight shift encouraged  Head of Bed (HOB) Positioning: HOB elevated  Pressure Reduction Devices: alternating pressure pump (MISBAH)  Skin Protection: incontinence  pads utilized     Problem: Fall Injury Risk  Goal: Absence of Fall and Fall-Related Injury  Outcome: Progressing  Intervention: Identify and Manage Contributors  Recent Flowsheet Documentation  Taken 7/27/2025 1559 by Sandra Parker RN  Medication Review/Management: medications reviewed  Taken 7/27/2025 1415 by Sandra Parker RN  Medication Review/Management: medications reviewed  Taken 7/27/2025 1215 by Sandra Parker RN  Medication Review/Management: medications reviewed  Taken 7/27/2025 0815 by Sandra Parker RN  Medication Review/Management: medications reviewed  Intervention: Promote Injury-Free Environment  Recent Flowsheet Documentation  Taken 7/27/2025 1559 by Sandra Parker RN  Safety Promotion/Fall Prevention: safety round/check completed  Taken 7/27/2025 1415 by Sandra Parker RN  Safety Promotion/Fall Prevention: safety round/check completed  Taken 7/27/2025 1215 by Sandra Parker RN  Safety Promotion/Fall Prevention: safety round/check completed  Taken 7/27/2025 1015 by Sandra Parker RN  Safety Promotion/Fall Prevention: safety round/check completed  Taken 7/27/2025 0815 by Sandra Parker RN  Safety Promotion/Fall Prevention: safety round/check completed   Goal Outcome Evaluation:              Outcome Evaluation: No acute changes. PICC line to be placed and TPN to start. WCTM.

## 2025-07-27 NOTE — SIGNIFICANT NOTE
"   07/27/25 1713   PICC Double Lumen 07/27/25 Right Basilic   Placement date: If unknown, DO NOT use \"Add Comment\" note/Placement time: If unknown, DO NOT use \"Add Comment\" note: 07/27/25 1655   Hand Hygiene Completed: Yes  Size (Fr): 4  Description (optional): Power PICC Lot # [id number] exp 04/30/2026  Length (c...   Site Assessment Clean;Dry;Intact   #1 Lumen Status Blood return noted;Capped;Flushed;Normal saline locked   #2 Lumen Status Blood return noted;Capped;Flushed;Normal saline locked   Length joe (cm) 0 cm   Line Care Connections checked and tightened   Extremity Circumference (cm) 30 cm   Dressing Type Border Dressing;Transparent;Securing device;Antimicrobial dressing/disc   Dressing Status Clean;Dry;Intact   Dressing Intervention New dressing   Dressing Change Due 08/03/25     Count:  3 needles, 2 guidewires, 1 scalpel      "

## 2025-07-27 NOTE — PLAN OF CARE
Goal Outcome Evaluation:  Plan of Care Reviewed With: patient, child        Progress: no change  Outcome Evaluation: for picc line insertion today. call light within reach. plan of care ongoing. daughter at bedside.a/o x 4. R.A

## 2025-07-27 NOTE — PROGRESS NOTES
Name: Itzel Edmond ADMIT: 2025   : 1940  PCP: Rhoda Luther MD    MRN: [id number] LOS: 8 days   AGE/SEX: 85 y.o. female  ROOM: [facility]   Subjective   Chief Complaint   Patient presents with    Abdominal Pain     Patient is sitting up in his chair and appears better clinically and is not in any distress.  Denies nausea, abdominal pain, shortness of breath, palpitations, dizziness.  Passing some gas.    Objective   Vital Signs  Temp:  [97.7 °F (36.5 °C)-98.6 °F (37 °C)] 98.6 °F (37 °C)  Heart Rate:  [75-88] 86  Resp:  [18] 18  BP: (158-199)/(64-75) 158/72  SpO2:  [94 %-99 %] 99 %  on   ;   Device (Oxygen Therapy): room air  Body mass index is 26.25 kg/m².    Physical Exam  Constitutional:       General: She is not in acute distress.  HENT:      Head: Normocephalic and atraumatic.      Mouth/Throat:      Mouth: Mucous membranes are dry.   Eyes:      General: No scleral icterus.     Extraocular Movements: Extraocular movements intact.      Pupils: Pupils are equal, round, and reactive to light.   Cardiovascular:      Rate and Rhythm: Normal rate and regular rhythm.      Heart sounds: Normal heart sounds.   Pulmonary:      Effort: Pulmonary effort is normal. No respiratory distress.   Abdominal:      General: There is no distension.      Palpations: Abdomen is soft.      Tenderness: There is no abdominal tenderness.      Comments: Diminished bowel sounds   Musculoskeletal:      Cervical back: Neck supple.      Right lower leg: No edema.      Left lower leg: No edema.   Neurological:      General: No focal deficit present.      Mental Status: She is alert and oriented to person, place, and time.   Psychiatric:         Behavior: Behavior normal.         Results Review:       I reviewed the patient's new clinical results.  Results from last 7 days   Lab Units 25  0616 25  0402   WBC 10*3/mm3 9.46 8.77   HEMOGLOBIN g/dL 10.7* 11.1*   PLATELETS 10*3/mm3 252 191     Results from last 7 days  "  Lab Units 07/27/25  0616 07/26/25  0556 07/24/25  0456 07/23/25  0402   SODIUM mmol/L 134* 132* 134* 134*   POTASSIUM mmol/L 3.7 4.2 4.2 4.3   CHLORIDE mmol/L 101 101 100 100   CO2 mmol/L 21.0* 22.0 20.7* 24.2   BUN mg/dL 10.0 11.0 17.0 16.0   CREATININE mg/dL 0.78 0.80 1.02* 0.96   GLUCOSE mg/dL 88 131* 84 101*   Estimated Creatinine Clearance: 41.5 mL/min (by C-G formula based on SCr of 0.78 mg/dL).  Results from last 7 days   Lab Units 07/27/25  0616   ALBUMIN g/dL 3.3*   BILIRUBIN mg/dL 1.4*   ALK PHOS U/L 79   AST (SGOT) U/L 35*   ALT (SGPT) U/L 21     Results from last 7 days   Lab Units 07/27/25  0616 07/26/25 2140 07/26/25  0556 07/24/25  0456 07/23/25  2143 07/23/25  0402   CALCIUM mg/dL 8.1*  --  7.6* 8.4*  --  8.0*   ALBUMIN g/dL 3.3*  --   --   --   --   --    MAGNESIUM mg/dL 2.5*  --  1.5* 1.8  --  1.6   PHOSPHORUS mg/dL 3.3 3.7 1.6* 2.7   < > 1.7*    < > = values in this interval not displayed.         Coag     HbA1C   Lab Results   Component Value Date    HGBA1C 5.55 11/29/2021     Infection     Radiology(recent) No radiology results for the last day  No results found for: \"TROPONINT\", \"TROPONINI\", \"BNP\"      No components found for: \"TSH;2\"    amLODIPine, 2.5 mg, Oral, Nightly  atorvastatin, 20 mg, Oral, Daily  DULoxetine, 30 mg, Oral, Daily  enoxaparin sodium, 40 mg, Subcutaneous, Q24H  famotidine, 20 mg, Oral, BID AC  metoprolol succinate XL, 25 mg, Oral, Nightly  ondansetron, 4 mg, Intravenous, Once  sodium chloride, 10 mL, Intravenous, Q12H      Adult Central 2-in-1 TPN,   Pharmacy to Dose TPN,       NPO Diet NPO Type: Sips with Meds, Ice Chips  Adult Central 2-in-1 TPN      Assessment & Plan      Active Hospital Problems    Diagnosis  POA    **SBO (small bowel obstruction) [K56.609]  Yes    ROSALIA (acute kidney injury) [N17.9]  Unknown    Nonrheumatic mitral valve regurgitation [I34.0]  Yes    JHOANA on CPAP [G47.33]  Yes    Stage 3a chronic kidney disease [N18.31]  Yes    JAMES (generalized anxiety " disorder) [F41.1]  Yes    Restless leg syndrome [G25.81]  Yes    Hypertension [I10]  Yes      Resolved Hospital Problems   No resolved problems to display.     This is an 85-year-old lady with a history of obstructive sleep apnea chronic kidney disease anxiety hypertension and restless leg syndrome who presents to the hospital with abdominal pain and nausea and is found to have small bowel obstruction and taken by general surgery to the operating room for lysis of adhesions on 7/19.     1. Small bowel obstruction, status post adhesion lysis on 7/19.  Still no return of bowel function therefore PICC line and TPN order has been placed on 7/6/2025 and due to be placed.  Encourage patient to ambulate and replace electrolytes as needed.  Surgical team following along as well.  Blood sugar dropped to 50s today and received half an amp of D50.    2.  Hypomagnesium /hypophosphatemia, secondary to poor oral intake and being replaced per protocol as needed.  Monitor lites closely.    3.  Acute kidney injury/CKD stage II, creatinine has improved from 1.02>0.78.  Will continue to monitor closely.    4.  Hypertension, continue with home metoprolol and amlodipine.    5.  Hyperlipidemia, on statins.    6.  Anxiety/depression, on Cymbalta.    7.  Hyperlipidemia, on statins.    8.  Elevated bilirubin, total bili noted to be 1.4 and likely secondary to Gilbert's syndrome.    9.  On Lovenox for DVT prophylaxis.    10.  CODE STATUS is full code.    Copy text on this note has been reviewed by me on 7/27/2025    Shoaib Giraldo MD  [facility]  07/27/25  14:29 EDT

## 2025-07-27 NOTE — CONSULTS
"Patient Name: Itzel Edmond  YOB: 1940  MRN: [id number]  Admission date: 7/19/2025  Reason for Encounter: MST 2-3 or Nursing Admission Screen and NPO/CLD x 3 days+    [facility] Clinical Nutrition Assessment     Subjective    Subjective Information     84 yo female with h/o JHOANA on BiPAP, CKD, HTN, anxiety, RLS, CAD, traumatic SDH, PVD here with worsening RLQ abdominal pain and found to have SBO. Taken to OR for exploratory laparotomy with lysis of adhesions.    7/22: POD#3 s/p ex lap with CLINTON. Awaiting return of bowel function. NPO/CLD x 3 days. Denies nausea today, + flatus, no BM yet. Meds/labs reviewed, D5W@75 ml/hr. MST 3 for \"unsure weight loss\" but pt has actually had weight gain per EMR. Will follow for return of bowel function post-op and ability to advance diet once post-op ileus resolves.     7/27: Consulted for TPN. Plan for PICC line insertion today for TPN. Still no return of bowel function. Electrolytes being monitored and replaced.     Assessment    H&P and Current Problems      H&P  Past Medical History:   Diagnosis Date    Allergic rhinitis     Atherosclerosis     CAD (coronary artery disease) 1/16/2019    Overview:  Mild non obstructive CAD on cath 1/2019, medical mgmt recommended    Carotid stenosis     Cataract     Cellulitis of right leg 06/2016    Chest discomfort 01/2019    Claudication of lower extremity     CMC arthritis     Colon polyps     FOLLOWED BY DR. NOLBERTO REYNOSO    Cystitis 11/19/2019    WOODARD (dyspnea on exertion) 10/2020    Dysphagia     Elevated serum creatinine 02/2021    Environmental allergies     Exercise intolerance     JAMES (generalized anxiety disorder) 1/20/2020    GERD (gastroesophageal reflux disease)     Heart murmur     Hemorrhoids     Hyperglycemia     Hyperkalemia 02/2020    Hyperlipidemia     MIXED    Hypertension     Influenza 02/14/2018    Insomnia     Myalgia 05/2016    Neuropathy 09/2017    OAB (overactive bladder)     JHOANA (obstructive sleep " apnea)     BiPap    PLMD (periodic limb movement disorder)     Precordial pain 2019    Premature atrial complexes     Rectal bleeding     Rectal prolapse 10/2020    Rectal ulcer 10/02/2020    5 MM SINGLE ULCER ON CY    Rectocele 2019    Renal insufficiency     Restless leg syndrome 10/2/2018    SAB (spontaneous )      A1    Serum potassium elevated 2016    Slow transit constipation 2020    Tachycardia 2013    Trigger finger of left thumb 10/21/2019    Urge incontinence     Urinary frequency     Vaginal atrophy       Past Surgical History:   Procedure Laterality Date    ANTERIOR AND POSTERIOR VAGINAL REPAIR N/A     BACK SURGERY      BILATERAL SALPINGO OOPHORECTOMY Bilateral     BLADDER SURGERY N/A     BLADDER SLING    YADI HOLE Right 2022    Procedure: YADI HOLE;  Surgeon: Austin Hilario MD;  Location: [facility] MAIN OR;  Service: Neurosurgery;  Laterality: Right;    CARDIAC CATHETERIZATION N/A 2019    Procedure: Left Heart Cath;  Surgeon: Christopher Rosenbaum MD;  Location:  [facility]AS[facility];  Service: Cardiology    CARDIAC CATHETERIZATION N/A 2019    Procedure: Coronary angiography;  Surgeon: Christopher Rosenbaum MD;  Location:  [facility];  Service: Cardiology    CARDIAC CATHETERIZATION N/A 2019    Procedure: Left ventriculography;  Surgeon: Christopher Rosenbaum MD;  Location:  [facility];  Service: Cardiology    CATARACT EXTRACTION Left 12/15/2015    WITH LENS IMPLANT, DR. AMRIT DIAZ AT [facility]    CATARACT EXTRACTION Right 2015     WITH LENS IMPLANT, DR. AMRIT DIAZ AT [facility]    COLONOSCOPY N/A 10/2/2020    2 TUBULAR ADENOMA POLYPS IN TRANSVERSE, HEMORRHOIDS, DIVERTICULOSIS, 5 MM ULCER IN RECTUM, OTHER BIOPSIES BENIGN, DR. NOLBERTO REYNOSO AT [facility]    COLONOSCOPY W/ POLYPECTOMY N/A 2015    1 CM TUBULAR ADENOMA POLYP IN TRANSVERSE, MODERATE DIVERTICULOSIS, HEMORRHOIDS, RESCOPE IN 5 YRS,   "MAYA MORLEYS AT [place]    EXPLORATORY LAPAROTOMY N/A 7/19/2025    Procedure: exploratory laparotomy, lysis of adhesions;  Surgeon: Terry Rios MD;  Location: [facility];  Service: General;  Laterality: N/A;    HEMORROIDECTOMY N/A     HERNIA REPAIR      HYSTERECTOMY N/A 1970    OVARIES IN TACT      Current Problems   Admission Diagnosis:  SBO (small bowel obstruction) [K56.609]  Abnormal CT scan [R93.89]    Problem List:    SBO (small bowel obstruction)    Hypertension    Restless leg syndrome    JAMES (generalized anxiety disorder)    Stage 3a chronic kidney disease    JHOANA on CPAP    Nonrheumatic mitral valve regurgitation    ROSALIA (acute kidney injury)       Anthropometrics      BMI, Height, Weight Estimated body mass index is 26.25 kg/m² as calculated from the following:    Height as of this encounter: 152.4 cm (60\").    Weight as of this encounter: 61 kg (134 lb 6.4 oz).    Weight Method: Standing scale       Trending Weight Changes Stable  No significant changes       Weight History  Wt Readings from Last 10 Encounters:   07/26/25 61 kg (134 lb 6.4 oz)   03/18/25 61.7 kg (136 lb)   07/23/24 64.4 kg (142 lb)   04/22/24 68 kg (150 lb)   04/16/24 68 kg (150 lb)   04/08/24 69.1 kg (152 lb 6.4 oz)   01/23/24 65.8 kg (145 lb)   08/29/23 65.4 kg (144 lb 1.6 oz)   04/11/23 64.9 kg (143 lb)   12/16/22 66.2 kg (146 lb)        Labs      Comment: Reviewed.     Results from last 7 days   Lab Units 07/27/25  0616 07/26/25  2140 07/26/25  0556 07/24/25  0456   SODIUM mmol/L 134*  --  132* 134*   POTASSIUM mmol/L 3.7  --  4.2 4.2   GLUCOSE mg/dL 88  --  131* 84   BUN mg/dL 10.0  --  11.0 17.0   CREATININE mg/dL 0.78  --  0.80 1.02*   CALCIUM mg/dL 8.1*  --  7.6* 8.4*   PHOSPHORUS mg/dL 3.3 3.7 1.6* 2.7   MAGNESIUM mg/dL 2.5*  --  1.5* 1.8   ALBUMIN g/dL 3.3*  --   --   --    BILIRUBIN mg/dL 1.4*  --   --   --    ALK PHOS U/L 79  --   --   --    AST (SGOT) U/L 35*  --   --   --    ALT (SGPT) U/L 21  --   --   --  "   TRIGLYCERIDES mg/dL 84  --   --   --      Results from last 7 days   Lab Units 07/27/25  0616 07/23/25  0402   PLATELETS 10*3/mm3 252 191   HEMOGLOBIN g/dL 10.7* 11.1*   HEMATOCRIT % 31.1* 32.9*     Lab Results   Component Value Date    HGBA1C 5.55 11/29/2021          Medications       Scheduled Medications amLODIPine, 2.5 mg, Oral, Nightly  atorvastatin, 20 mg, Oral, Daily  DULoxetine, 30 mg, Oral, Daily  enoxaparin sodium, 40 mg, Subcutaneous, Q24H  famotidine, 20 mg, Oral, BID AC  metoprolol succinate XL, 25 mg, Oral, Nightly  ondansetron, 4 mg, Intravenous, Once  sodium chloride, 10 mL, Intravenous, Q12H        Infusions dextrose 5 % and sodium chloride 0.9 %, 75 mL/hr, Last Rate: 75 mL/hr (07/26/25 1558)  Pharmacy to Dose TPN,         PRN Medications   acetaminophen **OR** acetaminophen **OR** acetaminophen    calcium carbonate    Calcium Replacement - Follow Nurse / BPA Driven Protocol    Magnesium Standard Dose Replacement - Follow Nurse / BPA Driven Protocol    melatonin    Morphine    nitroglycerin    ondansetron ODT **OR** ondansetron    Pharmacy to Dose TPN    Phosphorus Replacement - Follow Nurse / BPA Driven Protocol    Potassium Replacement - Follow Nurse / BPA Driven Protocol    prochlorperazine **OR** prochlorperazine **OR** prochlorperazine    sodium chloride    sodium chloride    sodium chloride     Physical Findings      Chewing/Swallowing    No issues identified at this time   Dentition Mouth/Teeth WDL: .WDL except, teeth   Teeth Symptoms: tooth/teeth missing   Edema   no edema    Gastrointestinal hypoactive bowel sounds, passing flatus, last bowel movement: 7/23   Skin surgical incision: abdomen    Lines/Drains none   I/O reviewed      Nutrition Focused Physical Exam     NFPE Not indicated at this time  07/22/25       Malnutrition Severity Assessment            (1)   Current Nutrition Orders & Evaluation of Intake      Oral Nutrition     Food Allergies  and Intolerances NKFA   Current PO Diet  NPO Diet NPO Type: Sips with Meds, Ice Chips   Oral Nutrition Supplement None     Trending % PO Intake NPO   (2)   Estimated Requirements         Weight used  61 kg    Calories  1525 - 1830 kcals (25-30 kcal/kg)    Protein  73 - 92 g (1.2 - 1.5 gm/kg)    Fluid  (1 mL/kcal)       Assessment & Plan   Nutrition Diagnosis and Goals       Nutrition Diagnosis Problem: Inadequate Protein Energy Intake  Etiology: Medical Diagnosis - SBO   Signs/Symptoms: Clear Liquid Diet        Goal(s) PO Diet Advances When Medically Appropriate  and Goals of Care are Established and Initiate PN  and Transition from PN to PO      Nutrition Intervention and Prescription       Intervention  Monitor for diet advancement, Start PN, Await initiation of PN, and Continue to monitor for plan of care      Diet Prescription     Supplement Prescription     Education Provided     (3)  TPN RECOMMENDATIONS    Initiate per pharmacy, pt at risk for RFS.    Goal TPN Recommendations:   Dextrose (kcal): 940 kcals  Amino Acids (gm): 90 g  Lipids/Frequency: 250 mL 20% lipids daily  MVI and Trace Elements per pharmacy    Nutrient Provision:  Calories  1,800 kcals   Protein   90 g     Monitoring/Evaluation       Monitor/Evaluation Per Protocol, I&O, PO Intake, GI Status, and Symptoms      Electronically signed by:  Jodie Dozier RD  07/27/25 09:15 EDT

## 2025-07-27 NOTE — PROGRESS NOTES
IMPRESSION & PLAN:  85-year-old lady postoperative day 8 from exploratory laparotomy with lysis of adhesions.  Ongoing postoperative ileus.  TPN and PICC line ordered.  Continue NPO except sips with meds and ice chips.    VTE Prophylaxis:  Pharmacologic & mechanical VTE prophylaxis orders are present.    CC:    Chief Complaint   Patient presents with    Abdominal Pain       HPI: No major change since yesterday.  No additional emesis.  Minimal flatus per rectum.  She is walking in the hallway multiple times per day.      PE:    VS:   Vitals:    07/27/25 0815   BP: 169/67   Pulse: 75   Resp: 18   Temp: 98.1 °F (36.7 °C)   SpO2: 94%        No intake or output data in the 24 hours ending 07/27/25 1302       CONST: Awake, alert  LUNGS: symmetric excursion, normal inspiratory effort  CV: regular rate, well perfused  Abdomen: Appropriately tender to palpation, lower midline incision well-approximated with staples      LABS:  Results from last 7 days   Lab Units 07/27/25  0616 07/23/25  0402   WBC 10*3/mm3 9.46 8.77   HEMOGLOBIN g/dL 10.7* 11.1*   HEMATOCRIT % 31.1* 32.9*   PLATELETS 10*3/mm3 252 191     Results from last 7 days   Lab Units 07/27/25  0616 07/26/25  0556 07/24/25  0456   SODIUM mmol/L 134* 132* 134*   POTASSIUM mmol/L 3.7 4.2 4.2   CHLORIDE mmol/L 101 101 100   CO2 mmol/L 21.0* 22.0 20.7*   BUN mg/dL 10.0 11.0 17.0   CREATININE mg/dL 0.78 0.80 1.02*   CALCIUM mg/dL 8.1* 7.6* 8.4*   BILIRUBIN mg/dL 1.4*  --   --    ALK PHOS U/L 79  --   --    ALT (SGPT) U/L 21  --   --    AST (SGOT) U/L 35*  --   --    GLUCOSE mg/dL 88 131* 84

## 2025-07-27 NOTE — PROGRESS NOTES
"[facility] Clinical Pharmacy Services: Total Parental Nutrition Initial Consult    Indication: Prolonged Paralytic Ileus  Route: peripheral (pt waiting on PICC line)  Type: standard    Relevant clinical data and objective history reviewed:  85 y.o. female 152.4 cm (60\") 61 kg (134 lb 6.4 oz)    Results from last 7 days   Lab Units 07/27/25  0616   SODIUM mmol/L 134*   POTASSIUM mmol/L 3.7   CHLORIDE mmol/L 101   CO2 mmol/L 21.0*   BUN mg/dL 10.0   CREATININE mg/dL 0.78   CALCIUM mg/dL 8.1*   ALBUMIN g/dL 3.3*   BILIRUBIN mg/dL 1.4*   ALK PHOS U/L 79   ALT (SGPT) U/L 21   AST (SGOT) U/L 35*   GLUCOSE mg/dL 88   MAGNESIUM mg/dL 2.5*   PHOSPHORUS mg/dL 3.3   TRIGLYCERIDES mg/dL 84        Estimated Creatinine Clearance: 41.5 mL/min (by C-G formula based on SCr of 0.78 mg/dL).    Active fluid orders: D5NS at 75 ml/hr (stopping prior to TPN start)    Dietary Orders (From admission, onward)       Start     Ordered    07/24/25 1328  NPO Diet NPO Type: Sips with Meds, Ice Chips  Diet Effective Now        Question Answer Comment   NPO Type Sips with Meds    NPO Type Ice Chips        07/24/25 1327                  Assessment  Ideal Body Weight: 45.5 kg  Body Weight Used for Calculations: 61 kg  Daily Est. Remi: 0749-0119 kCal/Day  Estimated Fluid Requirements: 1800 mL/day    Goal per Dietician recommendation:   Protein: 90 grams/day   Dextrose: 940 Kcal/day   Lipids: 250 ml of 20% lipid infusion 7 days/week    Plan  Will start TPN at half goal and will taper up as appropriate, patient is at risk for refeeding syndrome.   Formula is appropriate for peripheral administration while waiting for PICC line (Osmolarity= 837).   Will initiate TPN with the following macros:   Protein/Dextrose/Lipids: 45g/500kcal/0    Volume: 1800 ml (75 mL/hr over 24 hrs daily)    Patient received several electrolyte replacement protocol doses yesterday (Calcium 2g, Mag 6 g, NaPO4 30 mmol).  Will add the following electrolytes/additives to the TPN " and adjust accordingly in the coming days.    Sodium Chloride: 70 mEq   Sodium Acetate:    Sodium Phosphate:    Potassium Chloride: 50 mEq   Potassium Acetate:    Potassium Phosphate: 22 mEq   Calcium Gluconate: 9 mEq   Magnesium Sulfate: 10 mEq   MVI for TPN   Trace Elements                  Labs to be ordered: Daily CMP, Mag, Phos    Pharmacy will continue to follow.     Allison Upton [facility]  Clinical Pharmacist

## 2025-07-28 LAB
ALBUMIN SERPL-MCNC: 3.1 G/DL (ref 3.5–5.2)
ALBUMIN/GLOB SERPL: 1.3 G/DL
ALP SERPL-CCNC: 79 U/L (ref 39–117)
ALT SERPL W P-5'-P-CCNC: 28 U/L (ref 1–33)
ANION GAP SERPL CALCULATED.3IONS-SCNC: 8.9 MMOL/L (ref 5–15)
AST SERPL-CCNC: 40 U/L (ref 1–32)
BASOPHILS # BLD AUTO: 0.01 10*3/MM3 (ref 0–0.2)
BASOPHILS NFR BLD AUTO: 0.1 % (ref 0–1.5)
BILIRUB SERPL-MCNC: 0.8 MG/DL (ref 0–1.2)
BUN SERPL-MCNC: 10 MG/DL (ref 8–23)
BUN/CREAT SERPL: 13.3 (ref 7–25)
CALCIUM SPEC-SCNC: 8.1 MG/DL (ref 8.6–10.5)
CHLORIDE SERPL-SCNC: 100 MMOL/L (ref 98–107)
CO2 SERPL-SCNC: 23.1 MMOL/L (ref 22–29)
CREAT SERPL-MCNC: 0.75 MG/DL (ref 0.57–1)
DEPRECATED RDW RBC AUTO: 40 FL (ref 37–54)
EGFRCR SERPLBLD CKD-EPI 2021: 77.2 ML/MIN/1.73
EOSINOPHIL # BLD AUTO: 0.13 10*3/MM3 (ref 0–0.4)
EOSINOPHIL NFR BLD AUTO: 1.3 % (ref 0.3–6.2)
ERYTHROCYTE [DISTWIDTH] IN BLOOD BY AUTOMATED COUNT: 12.3 % (ref 12.3–15.4)
GLOBULIN UR ELPH-MCNC: 2.3 GM/DL
GLUCOSE BLDC GLUCOMTR-MCNC: 112 MG/DL (ref 70–130)
GLUCOSE BLDC GLUCOMTR-MCNC: 125 MG/DL (ref 70–130)
GLUCOSE BLDC GLUCOMTR-MCNC: 143 MG/DL (ref 70–130)
GLUCOSE BLDC GLUCOMTR-MCNC: 154 MG/DL (ref 70–130)
GLUCOSE SERPL-MCNC: 170 MG/DL (ref 65–99)
HCT VFR BLD AUTO: 29.2 % (ref 34–46.6)
HGB BLD-MCNC: 9.7 G/DL (ref 12–15.9)
IMM GRANULOCYTES # BLD AUTO: 0.13 10*3/MM3 (ref 0–0.05)
IMM GRANULOCYTES NFR BLD AUTO: 1.3 % (ref 0–0.5)
LYMPHOCYTES # BLD AUTO: 0.87 10*3/MM3 (ref 0.7–3.1)
LYMPHOCYTES NFR BLD AUTO: 9 % (ref 19.6–45.3)
MAGNESIUM SERPL-MCNC: 1.9 MG/DL (ref 1.6–2.4)
MCH RBC QN AUTO: 29.7 PG (ref 26.6–33)
MCHC RBC AUTO-ENTMCNC: 33.2 G/DL (ref 31.5–35.7)
MCV RBC AUTO: 89.3 FL (ref 79–97)
MONOCYTES # BLD AUTO: 1.41 10*3/MM3 (ref 0.1–0.9)
MONOCYTES NFR BLD AUTO: 14.5 % (ref 5–12)
NEUTROPHILS NFR BLD AUTO: 7.15 10*3/MM3 (ref 1.7–7)
NEUTROPHILS NFR BLD AUTO: 73.8 % (ref 42.7–76)
NRBC BLD AUTO-RTO: 0 /100 WBC (ref 0–0.2)
PHOSPHATE SERPL-MCNC: 2 MG/DL (ref 2.5–4.5)
PHOSPHATE SERPL-MCNC: 2.6 MG/DL (ref 2.5–4.5)
PLATELET # BLD AUTO: 259 10*3/MM3 (ref 140–450)
PMV BLD AUTO: 9.8 FL (ref 6–12)
POTASSIUM SERPL-SCNC: 3.7 MMOL/L (ref 3.5–5.2)
PROT SERPL-MCNC: 5.4 G/DL (ref 6–8.5)
RBC # BLD AUTO: 3.27 10*6/MM3 (ref 3.77–5.28)
SODIUM SERPL-SCNC: 132 MMOL/L (ref 136–145)
WBC NRBC COR # BLD AUTO: 9.7 10*3/MM3 (ref 3.4–10.8)

## 2025-07-28 PROCEDURE — 84100 ASSAY OF PHOSPHORUS: CPT | Performed by: SURGERY

## 2025-07-28 PROCEDURE — 25810000003 SODIUM CHLORIDE 0.9 % SOLUTION: Performed by: SURGERY

## 2025-07-28 PROCEDURE — 25810000003 DEXTROSE 5 % AND SODIUM CHLORIDE 0.9 % 5-0.9 % SOLUTION: Performed by: INTERNAL MEDICINE

## 2025-07-28 PROCEDURE — 25010000002 POTASSIUM CHLORIDE PER 2 MEQ OF POTASSIUM: Performed by: INTERNAL MEDICINE

## 2025-07-28 PROCEDURE — 85025 COMPLETE CBC W/AUTO DIFF WBC: CPT | Performed by: INTERNAL MEDICINE

## 2025-07-28 PROCEDURE — 80053 COMPREHEN METABOLIC PANEL: CPT | Performed by: SURGERY

## 2025-07-28 PROCEDURE — 25010000002 ENOXAPARIN PER 10 MG: Performed by: SURGERY

## 2025-07-28 PROCEDURE — 99024 POSTOP FOLLOW-UP VISIT: CPT | Performed by: SURGERY

## 2025-07-28 PROCEDURE — 83735 ASSAY OF MAGNESIUM: CPT | Performed by: SURGERY

## 2025-07-28 PROCEDURE — 82948 REAGENT STRIP/BLOOD GLUCOSE: CPT

## 2025-07-28 PROCEDURE — 25010000002 MAGNESIUM SULFATE PER 500 MG OF MAGNESIUM: Performed by: INTERNAL MEDICINE

## 2025-07-28 PROCEDURE — 25010000002 CALCIUM GLUCONATE PER 10 ML: Performed by: INTERNAL MEDICINE

## 2025-07-28 RX ORDER — FENTANYL/ROPIVACAINE/NS/PF 2-625MCG/1
15 PLASTIC BAG, INJECTION (ML) EPIDURAL ONCE
Status: COMPLETED | OUTPATIENT
Start: 2025-07-28 | End: 2025-07-28

## 2025-07-28 RX ADMIN — POTASSIUM PHOSPHATE, MONOBASIC POTASSIUM PHOSPHATE, DIBASIC INJECTION, 15 MMOL: 236; 224 SOLUTION, CONCENTRATE INTRAVENOUS at 12:09

## 2025-07-28 RX ADMIN — CALCIUM GLUCONATE: 98 INJECTION INTRAVENOUS at 17:35

## 2025-07-28 RX ADMIN — FAMOTIDINE 20 MG: 20 TABLET, FILM COATED ORAL at 17:36

## 2025-07-28 RX ADMIN — FAMOTIDINE 20 MG: 20 TABLET, FILM COATED ORAL at 05:40

## 2025-07-28 RX ADMIN — Medication 10 ML: at 21:15

## 2025-07-28 RX ADMIN — ZINC OXIDE 1 APPLICATION: 200 OINTMENT TOPICAL at 21:13

## 2025-07-28 RX ADMIN — DULOXETINE HYDROCHLORIDE 30 MG: 30 CAPSULE, DELAYED RELEASE ORAL at 12:08

## 2025-07-28 RX ADMIN — ENOXAPARIN SODIUM 40 MG: 100 INJECTION SUBCUTANEOUS at 12:07

## 2025-07-28 RX ADMIN — DEXTROSE AND SODIUM CHLORIDE 75 ML/HR: 5; 900 INJECTION, SOLUTION INTRAVENOUS at 05:40

## 2025-07-28 RX ADMIN — ATORVASTATIN CALCIUM 20 MG: 20 TABLET, FILM COATED ORAL at 12:08

## 2025-07-28 RX ADMIN — AMLODIPINE BESYLATE 2.5 MG: 2.5 TABLET ORAL at 21:13

## 2025-07-28 RX ADMIN — Medication 10 ML: at 12:09

## 2025-07-28 RX ADMIN — METOPROLOL SUCCINATE 25 MG: 25 TABLET, EXTENDED RELEASE ORAL at 21:13

## 2025-07-28 RX ADMIN — Medication 10 ML: at 21:13

## 2025-07-28 NOTE — NURSING NOTE
Reason for Visit: WOC Team consult for rash to torso, under breasts, and back. Pt is post-op day 9 from exploratory laparotomy with lysis of adhesions. She has not been on antibiotics; however, she is noted to have a scattered maculopapular rash to torso and back with concentrated areas under her karine breasts, Left > Right. She also is noted to have had a skin reaction to the medline leads, which have been switched out.     Treatment Plan/Recommendations: Begin Magic Barrier cream to all areas of rash, can use pillow cases in between skin folds to prevent skin on skin friction. Orders placed into EPIC.     Wound Team Follow up Plan: Follow up later this week to assess effectiveness of interventions.

## 2025-07-28 NOTE — PLAN OF CARE
Goal Outcome Evaluation:    VSS, up with assist x1 and walker. Family at bedside. TPN and clear liquid diet being tolerated. Call light in reach, room near nursing station.

## 2025-07-28 NOTE — PROGRESS NOTES
"Patient Name: Itzel Edmond  YOB: 1940  MRN: [id number]  Admission date: 7/19/2025  Reason for Encounter: Follow-up/Progress Note    [facility] Clinical Nutrition Assessment   Recommend:  Adjust Goal TPN Macros to:    1800 mL volume (@75 ml/hr) + 250 mL 20% lipids 3x/week      Amino Acids   80g (320 kcals)    Dextrose  800 kcals (235 g)    Lipids  500 kcals/day (250 ml 20%)   Total Calories  1600 kcals    MVI and Trace Elements per pharmacy. Replace e-lytes as needed daily per pharmacy.    2.   ADAT once ileus resolves per surgery team.    RD to follow closely.     Subjective    Subjective Information     84 yo female with h/o JHOANA on BiPAP, CKD, HTN, anxiety, RLS, CAD, traumatic SDH, PVD here with worsening RLQ abdominal pain and found to have SBO. Taken to OR for exploratory laparotomy with lysis of adhesions.    7/22: POD#3 s/p ex lap with CLINTON. Awaiting return of bowel function. NPO/CLD x 3 days. Denies nausea today, + flatus, no BM yet. Meds/labs reviewed, D5W@75 ml/hr. MST 3 for \"unsure weight loss\" but pt has actually had weight gain per EMR. Will follow for return of bowel function post-op and ability to advance diet once post-op ileus resolves.     7/27: Consulted for TPN. Plan for PICC line insertion today for TPN. Still no return of bowel function. Electrolytes being monitored and replaced.    7/28: TPN started 7/27. TPN Day #2 today increased to: 772 kcals total: 68g AA/ 500 kcals Dex/no Lipids. On Clear liquid diet with some appetite, tolerated 50% po intake of CLD at lunch today. 2 small BM's and some flatus per surgery notes, not recorded in I/O's. Adjusted TPN macros goal slightly. Awaiting ability to advance diet once ileus resolves.      Assessment    H&P and Current Problems          Current Problems   Admission Diagnosis:  SBO (small bowel obstruction) [K56.609]  Abnormal CT scan [R93.89]    Problem List:    SBO (small bowel obstruction)    Hypertension    Restless leg " "syndrome    JAMES (generalized anxiety disorder)    Stage 3a chronic kidney disease    JHOANA on CPAP    Nonrheumatic mitral valve regurgitation    ROSALIA (acute kidney injury)       Anthropometrics      BMI, Height, Weight Estimated body mass index is 26.05 kg/m² as calculated from the following:    Height as of this encounter: 152.4 cm (60\").    Weight as of this encounter: 60.5 kg (133 lb 6.1 oz).    Weight Method: Standing scale       Trending Weight Changes Stable  No significant changes       Weight History  Wt Readings from Last 10 Encounters:   07/28/25 60.5 kg (133 lb 6.1 oz)   03/18/25 61.7 kg (136 lb)   07/23/24 64.4 kg (142 lb)   04/22/24 68 kg (150 lb)   04/16/24 68 kg (150 lb)   04/08/24 69.1 kg (152 lb 6.4 oz)   01/23/24 65.8 kg (145 lb)   08/29/23 65.4 kg (144 lb 1.6 oz)   04/11/23 64.9 kg (143 lb)   12/16/22 66.2 kg (146 lb)        Labs      Comment: Reviewed.     Results from last 7 days   Lab Units 07/28/25  0516 07/27/25  0616 07/26/25  2140 07/26/25  0556   SODIUM mmol/L 132* 134*  --  132*   POTASSIUM mmol/L 3.7 3.7  --  4.2   GLUCOSE mg/dL 170* 88  --  131*   BUN mg/dL 10.0 10.0  --  11.0   CREATININE mg/dL 0.75 0.78  --  0.80   CALCIUM mg/dL 8.1* 8.1*  --  7.6*   PHOSPHORUS mg/dL 2.0* 3.3 3.7 1.6*   MAGNESIUM mg/dL 1.9 2.5*  --  1.5*   ALBUMIN g/dL 3.1* 3.3*  --   --    BILIRUBIN mg/dL 0.8 1.4*  --   --    ALK PHOS U/L 79 79  --   --    AST (SGOT) U/L 40* 35*  --   --    ALT (SGPT) U/L 28 21  --   --    TRIGLYCERIDES mg/dL  --  84  --   --      Results from last 7 days   Lab Units 07/28/25  0516 07/27/25  0616 07/23/25  0402   PLATELETS 10*3/mm3 259 252 191   HEMOGLOBIN g/dL 9.7* 10.7* 11.1*   HEMATOCRIT % 29.2* 31.1* 32.9*     Lab Results   Component Value Date    HGBA1C 5.55 11/29/2021          Medications       Scheduled Medications amLODIPine, 2.5 mg, Oral, Nightly  atorvastatin, 20 mg, Oral, Daily  DULoxetine, 30 mg, Oral, Daily  enoxaparin sodium, 40 mg, Subcutaneous, Q24H  famotidine, 20 mg, " Oral, BID AC  hydrocortisone-bacitracin-zinc oxide-nystatin, 1 Application, Topical, TID  metoprolol succinate XL, 25 mg, Oral, Nightly  ondansetron, 4 mg, Intravenous, Once  sodium chloride, 10 mL, Intravenous, Q12H  sodium chloride, 10 mL, Intravenous, Q12H        Infusions Adult Central 2-in-1 TPN, , Last Rate: 75 mL/hr at 07/27/25 1721  Adult Central 2-in-1 TPN,   dextrose 5 % and sodium chloride 0.9 %, 75 mL/hr, Last Rate: Stopped (07/28/25 1145)  Pharmacy to Dose TPN,         PRN Medications   acetaminophen **OR** acetaminophen **OR** acetaminophen    calcium carbonate    Calcium Replacement - Follow Nurse / BPA Driven Protocol    Magnesium Standard Dose Replacement - Follow Nurse / BPA Driven Protocol    melatonin    Morphine    nitroglycerin    ondansetron ODT **OR** ondansetron    Pharmacy to Dose TPN    Phosphorus Replacement - Follow Nurse / BPA Driven Protocol    Potassium Replacement - Follow Nurse / BPA Driven Protocol    prochlorperazine **OR** prochlorperazine **OR** prochlorperazine    sodium chloride    sodium chloride    sodium chloride    sodium chloride    sodium chloride    sodium chloride    sodium chloride    sodium chloride     Physical Findings      Chewing/Swallowing    No issues identified at this time   Dentition Mouth/Teeth WDL: .WDL except, teeth   Teeth Symptoms: tooth/teeth missing   Edema   no edema    Gastrointestinal hypoactive bowel sounds, passing flatus, last bowel movement: 7/27 x2 (small)   Skin surgical incision: abdomen    Lines/Drains none   I/O reviewed      Nutrition Focused Physical Exam     NFPE Not indicated at this time  07/22/25       Malnutrition Severity Assessment            (1)   Current Nutrition Orders & Evaluation of Intake      Oral Nutrition     Food Allergies  and Intolerances NKFA   Current PO Diet Adult Central 2-in-1 TPN  Diet: Liquid; Clear Liquid; Fluid Consistency: Thin (IDDSI 0)  Adult Central 2-in-1 TPN   Oral Nutrition Supplement None     Trending  % PO Intake 50% CLD at lunch today   (2)   Estimated Requirements         Weight used  61 kg    Calories  1525 - 1830 kcals (25-30 kcal/kg)    Protein  73 - 92 g (1.2 - 1.5 gm/kg)    Fluid  (1 mL/kcal)       Assessment & Plan   Nutrition Diagnosis and Goals       Nutrition Diagnosis Problem: Inadequate Protein Energy Intake  Etiology: Medical Diagnosis - SBO   Signs/Symptoms: Clear Liquid Diet        Goal(s) PO Diet Advances When Medically Appropriate  and Goals of Care are Established and Initiate PN  and Transition from PN to PO      Nutrition Intervention and Prescription       Intervention  Monitor for diet advancement, Start PN, Await initiation of PN, and Continue to monitor for plan of care      Diet Prescription ADAT once ileus resolves and ok with surgery team    Supplement Prescription     Education Provided     (3)  TPN RECOMMENDATIONS:      Adjust Goal TPN Macros to:    1800 mL volume (@75 ml/hr) + 250 mL 20% lipids 3x/week      Amino Acids   80g (320 kcals)    Dextrose  800 kcals (235 g)    Lipids  500 kcals/day (250 ml 20%)   Total Calories  1600 kcals    MVI and Trace Elements per pharmacy        Monitoring/Evaluation       Monitor/Evaluation Per Protocol, I&O, PO Intake, GI Status, and Symptoms      Electronically signed by:  Vee Benitez RD  07/28/25 17:18 EDT

## 2025-07-28 NOTE — PLAN OF CARE
Goal Outcome Evaluation:  Plan of Care Reviewed With: patient      Pt pleasant, alert and oriented x 4. Daughter at bedside assisting pt with any needs. TPN and IV fluids running. Pt ambulating to bathroom with 1 assist. Pt declines nausea and pain. Incision site does has some areas around the staples that are pink. No drainage noted. Vitals WDL's. Pt did have one small BM.

## 2025-07-28 NOTE — PROGRESS NOTES
"[facility] Clinical Pharmacy Services: Total Parental Nutrition Initial Consult    Indication: Prolonged Paralytic Ileus  Route: central placed 7/27  Type: standard    Relevant clinical data and objective history reviewed:  85 y.o. female 152.4 cm (60\") 61 kg (134 lb 6.4 oz)    Results from last 7 days   Lab Units 07/27/25  0616   SODIUM mmol/L 134*   POTASSIUM mmol/L 3.7   CHLORIDE mmol/L 101   CO2 mmol/L 21.0*   BUN mg/dL 10.0   CREATININE mg/dL 0.78   CALCIUM mg/dL 8.1*   ALBUMIN g/dL 3.3*   BILIRUBIN mg/dL 1.4*   ALK PHOS U/L 79   ALT (SGPT) U/L 21   AST (SGOT) U/L 35*   GLUCOSE mg/dL 88   MAGNESIUM mg/dL 2.5*   PHOSPHORUS mg/dL 3.3   TRIGLYCERIDES mg/dL 84        Estimated Creatinine Clearance: 41.5 mL/min (by C-G formula based on SCr of 0.78 mg/dL).    Active fluid orders: D5NS at 75 ml/hr was not stopped prior to TPN start, will recommend switching to NS today    Dietary Orders (From admission, onward)       Start     Ordered    07/24/25 1328  NPO Diet NPO Type: Sips with Meds, Ice Chips  Diet Effective Now        Question Answer Comment   NPO Type Sips with Meds    NPO Type Ice Chips        07/24/25 1327                  Assessment  Ideal Body Weight: 45.5 kg  Body Weight Used for Calculations: 61 kg  Daily Est. Remi: 1765-1017 kCal/Day  Estimated Fluid Requirements: 1800 mL/day    Goal per Dietician recommendation:   Protein: 90 grams/day   Dextrose: 940 Kcal/day   Lipids: 250 ml of 20% lipid infusion 7 days/week    Plan  Phos and mag dropped since yesterday. Potassium stable from yesterday. Will not advance CHO today for risk of refeeding. Will re-evaluate tomorrow for opportunities to advance TPN further  Will change TPN with the following macros:   Protein/Dextrose/Lipids: 68g/500kcal/0    Volume: 1800 ml (75 mL/hr over 24 hrs daily)    Within last 24 hours, patient has received 15 mmol Kphos   Will add the following electrolytes/additives to the TPN and adjust accordingly in the coming days. "    Sodium Chloride: 70 mEq   Sodium Acetate:    Sodium Phosphate:    Potassium Chloride: 50 mEq   Potassium Acetate:    Potassium Phosphate: 22 mEq   Calcium Gluconate: 9 mEq   Magnesium Sulfate: 10 mEq   MVI for TPN 10 mL (was already in prior TPN)   Trace Elements 1 mL (was already in prior TPN)                  Labs to be ordered: Daily CMP, Mag, Phos    Pharmacy will continue to follow.     Conner Redman II, PharmD  Hematology/Oncology Clinical Pharmacist Specialist

## 2025-07-28 NOTE — CASE MANAGEMENT/SOCIAL WORK
Continued Stay Note  [facility]     Patient Name: Itzel Edmond  MRN: [id number]  Today's Date: 7/28/2025    Admit Date: 7/19/2025    Plan: Home with family to transport.   Discharge Plan       Row Name 07/28/25 1817       Plan    Plan Home with family to transport.    Patient/Family in Agreement with Plan yes    Plan Comments Spoke with patient and family at bedside during beside rounding. Patient and family both states that patient's plan remains to return home with family.                   Discharge Codes    No documentation.                 Expected Discharge Date and Time       Expected Discharge Date Expected Discharge Time    Jul 26, 2025               Sandra Carvalho RN

## 2025-07-28 NOTE — PROGRESS NOTES
Name: Itzel Edmond ADMIT: 2025   : 1940  PCP: Rhoda Luther MD    MRN: [id number] LOS: 9 days   AGE/SEX: 85 y.o. female  ROOM: [facility]   Subjective   Chief Complaint   Patient presents with    Abdominal Pain     Patient is sitting up in his chair and appears better clinically and is not in any distress.  Denies nausea, vomiting, abdominal pain, shortness of breath, palpitations, dizziness.      Objective   Vital Signs  Temp:  [97.6 °F (36.4 °C)-98.6 °F (37 °C)] 98.1 °F (36.7 °C)  Heart Rate:  [74-86] 74  Resp:  [16-20] 20  BP: (144-174)/(56-72) 174/62  SpO2:  [84 %-99 %] 84 %  on   ;   Device (Oxygen Therapy): room air  Body mass index is 26.05 kg/m².    Physical Exam  Constitutional:       General: She is not in acute distress.  HENT:      Head: Normocephalic and atraumatic.      Mouth/Throat:      Mouth: Mucous membranes are dry.   Eyes:      General: No scleral icterus.     Extraocular Movements: Extraocular movements intact.      Pupils: Pupils are equal, round, and reactive to light.   Cardiovascular:      Rate and Rhythm: Normal rate and regular rhythm.      Heart sounds: Normal heart sounds.   Pulmonary:      Effort: Pulmonary effort is normal. No respiratory distress.   Abdominal:      General: There is no distension.      Palpations: Abdomen is soft.      Tenderness: There is no abdominal tenderness.      Comments: Diminished bowel sounds   Musculoskeletal:      Cervical back: Neck supple.      Right lower leg: No edema.      Left lower leg: No edema.   Neurological:      General: No focal deficit present.      Mental Status: She is alert and oriented to person, place, and time.   Psychiatric:         Behavior: Behavior normal.         Results Review:       I reviewed the patient's new clinical results.  Results from last 7 days   Lab Units 25  0516 25  0616 25  0402   WBC 10*3/mm3 9.70 9.46 8.77   HEMOGLOBIN g/dL 9.7* 10.7* 11.1*   PLATELETS 10*3/mm3 259 252 191  "    Results from last 7 days   Lab Units 07/28/25  0516 07/27/25  0616 07/26/25  0556 07/24/25  0456   SODIUM mmol/L 132* 134* 132* 134*   POTASSIUM mmol/L 3.7 3.7 4.2 4.2   CHLORIDE mmol/L 100 101 101 100   CO2 mmol/L 23.1 21.0* 22.0 20.7*   BUN mg/dL 10.0 10.0 11.0 17.0   CREATININE mg/dL 0.75 0.78 0.80 1.02*   GLUCOSE mg/dL 170* 88 131* 84   Estimated Creatinine Clearance: 43 mL/min (by C-G formula based on SCr of 0.75 mg/dL).  Results from last 7 days   Lab Units 07/28/25  0516 07/27/25  0616   ALBUMIN g/dL 3.1* 3.3*   BILIRUBIN mg/dL 0.8 1.4*   ALK PHOS U/L 79 79   AST (SGOT) U/L 40* 35*   ALT (SGPT) U/L 28 21     Results from last 7 days   Lab Units 07/28/25  0516 07/27/25  0616 07/26/25  2140 07/26/25  0556 07/24/25  0456   CALCIUM mg/dL 8.1* 8.1*  --  7.6* 8.4*   ALBUMIN g/dL 3.1* 3.3*  --   --   --    MAGNESIUM mg/dL 1.9 2.5*  --  1.5* 1.8   PHOSPHORUS mg/dL 2.0* 3.3 3.7 1.6* 2.7         Coag     HbA1C   Lab Results   Component Value Date    HGBA1C 5.55 11/29/2021     Infection     Radiology(recent) No radiology results for the last day  No results found for: \"TROPONINT\", \"TROPONINI\", \"BNP\"      No components found for: \"TSH;2\"    amLODIPine, 2.5 mg, Oral, Nightly  atorvastatin, 20 mg, Oral, Daily  DULoxetine, 30 mg, Oral, Daily  enoxaparin sodium, 40 mg, Subcutaneous, Q24H  famotidine, 20 mg, Oral, BID AC  hydrocortisone-bacitracin-zinc oxide-nystatin, 1 Application, Topical, TID  metoprolol succinate XL, 25 mg, Oral, Nightly  ondansetron, 4 mg, Intravenous, Once  sodium chloride, 10 mL, Intravenous, Q12H  sodium chloride, 10 mL, Intravenous, Q12H      Adult Central 2-in-1 TPN, , Last Rate: 75 mL/hr at 07/27/25 1721  Adult Central 2-in-1 TPN,   dextrose 5 % and sodium chloride 0.9 %, 75 mL/hr, Last Rate: Stopped (07/28/25 1145)  Pharmacy to Dose TPN,       Adult Central 2-in-1 TPN  Diet: Liquid; Clear Liquid; Fluid Consistency: Thin (IDDSI 0)  Adult Central 2-in-1 TPN      Assessment & Plan      Active " Hospital Problems    Diagnosis  POA    **SBO (small bowel obstruction) [K56.609]  Yes    ROSALIA (acute kidney injury) [N17.9]  Unknown    Nonrheumatic mitral valve regurgitation [I34.0]  Yes    JHOANA on CPAP [G47.33]  Yes    Stage 3a chronic kidney disease [N18.31]  Yes    JAMES (generalized anxiety disorder) [F41.1]  Yes    Restless leg syndrome [G25.81]  Yes    Hypertension [I10]  Yes      Resolved Hospital Problems   No resolved problems to display.     This is an 85-year-old lady with a history of obstructive sleep apnea chronic kidney disease anxiety hypertension and restless leg syndrome who presents to the hospital with abdominal pain and nausea and is found to have small bowel obstruction and taken by general surgery to the operating room for lysis of adhesions on 7/19.     1. Small bowel obstruction, status post adhesion lysis on 7/19.  Still no return of bowel function therefore PICC line was placed and TPN was started on 7/27/2025.  Encourage patient to ambulate and replace electrolytes as needed.  Surgical team following along as well.     2.  Hypomagnesium /hypophosphatemia, secondary to poor oral intake and being replaced per protocol as needed.  Monitor lites closely.  Monitor for refeeding syndrome as well.    3.  Acute kidney injury/CKD stage II, creatinine has improved from 1.02>0.78>0.75.  Will continue to monitor closely.    4.  Hypertension, continue with home metoprolol and amlodipine.    5.  Hyperlipidemia, on statins.    6.  Anxiety/depression, on Cymbalta.    7.  Hyperlipidemia, on statins.    8.  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  Total bili now back to normal.    9.  On Lovenox for DVT prophylaxis.    10.  CODE STATUS is full code.    Copy text on this note has been reviewed by me on 7/28/2025    Shoaib Giraldo MD  [facility]  07/28/25  14:29 EDT

## 2025-07-28 NOTE — PROGRESS NOTES
Postop day 9 exploratory laparotomy, lysis of adhesions    Subjective:  Overall feels well.  Had a couple of very small bowel movements and a little bit of gas.  Denies any nausea and no real abdominal pain.    Objective:  Afebrile, vital stable  General: Awake and alert, no distress  Abdomen: Soft, incision in good order, no significant tenderness    CBC remains unremarkable    Assessment and plan:  - Small bowel obstruction, now 9 days out from exploratory laparotomy with significant postop ileus  - Clinically may be slightly better today.  She had a couple of very small bowel movements and some flatus and she does have some appetite  - We will try clear liquids  - Continue TPN for now    Terry Rios MD  General and Endoscopic Surgery  [facility], [place], operated by [facility]    [address], [address]  [place], [place], [address]  P: [phone]  F: [phone]

## 2025-07-29 LAB
ALBUMIN SERPL-MCNC: 3 G/DL (ref 3.5–5.2)
ALBUMIN/GLOB SERPL: 1.2 G/DL
ALP SERPL-CCNC: 86 U/L (ref 39–117)
ALT SERPL W P-5'-P-CCNC: 29 U/L (ref 1–33)
ANION GAP SERPL CALCULATED.3IONS-SCNC: 9.2 MMOL/L (ref 5–15)
AST SERPL-CCNC: 38 U/L (ref 1–32)
BACTERIA UR QL AUTO: NORMAL /HPF
BASOPHILS # BLD AUTO: 0.02 10*3/MM3 (ref 0–0.2)
BASOPHILS NFR BLD AUTO: 0.2 % (ref 0–1.5)
BILIRUB SERPL-MCNC: 0.9 MG/DL (ref 0–1.2)
BILIRUB UR QL STRIP: NEGATIVE
BUN SERPL-MCNC: 13 MG/DL (ref 8–23)
BUN/CREAT SERPL: 17.6 (ref 7–25)
CALCIUM SPEC-SCNC: 8.5 MG/DL (ref 8.6–10.5)
CHLORIDE SERPL-SCNC: 101 MMOL/L (ref 98–107)
CLARITY UR: CLEAR
CO2 SERPL-SCNC: 22.8 MMOL/L (ref 22–29)
COLOR UR: YELLOW
CREAT SERPL-MCNC: 0.74 MG/DL (ref 0.57–1)
DEPRECATED RDW RBC AUTO: 40.3 FL (ref 37–54)
EGFRCR SERPLBLD CKD-EPI 2021: 78.4 ML/MIN/1.73
EOSINOPHIL # BLD AUTO: 0.19 10*3/MM3 (ref 0–0.4)
EOSINOPHIL NFR BLD AUTO: 2 % (ref 0.3–6.2)
ERYTHROCYTE [DISTWIDTH] IN BLOOD BY AUTOMATED COUNT: 12.3 % (ref 12.3–15.4)
GLOBULIN UR ELPH-MCNC: 2.5 GM/DL
GLUCOSE BLDC GLUCOMTR-MCNC: 101 MG/DL (ref 65–99)
GLUCOSE BLDC GLUCOMTR-MCNC: 112 MG/DL (ref 70–130)
GLUCOSE BLDC GLUCOMTR-MCNC: 160 MG/DL (ref 70–130)
GLUCOSE SERPL-MCNC: 115 MG/DL (ref 65–99)
GLUCOSE UR STRIP-MCNC: NEGATIVE MG/DL
HCT VFR BLD AUTO: 29.9 % (ref 34–46.6)
HGB BLD-MCNC: 10.2 G/DL (ref 12–15.9)
HGB UR QL STRIP.AUTO: ABNORMAL
HYALINE CASTS UR QL AUTO: NORMAL /LPF
IMM GRANULOCYTES # BLD AUTO: 0.07 10*3/MM3 (ref 0–0.05)
IMM GRANULOCYTES NFR BLD AUTO: 0.8 % (ref 0–0.5)
KETONES UR QL STRIP: NEGATIVE
LEUKOCYTE ESTERASE UR QL STRIP.AUTO: NEGATIVE
LYMPHOCYTES # BLD AUTO: 1.07 10*3/MM3 (ref 0.7–3.1)
LYMPHOCYTES NFR BLD AUTO: 11.5 % (ref 19.6–45.3)
MAGNESIUM SERPL-MCNC: 1.6 MG/DL (ref 1.6–2.4)
MCH RBC QN AUTO: 30.6 PG (ref 26.6–33)
MCHC RBC AUTO-ENTMCNC: 34.1 G/DL (ref 31.5–35.7)
MCV RBC AUTO: 89.8 FL (ref 79–97)
MONOCYTES # BLD AUTO: 1.37 10*3/MM3 (ref 0.1–0.9)
MONOCYTES NFR BLD AUTO: 14.7 % (ref 5–12)
NEUTROPHILS NFR BLD AUTO: 6.58 10*3/MM3 (ref 1.7–7)
NEUTROPHILS NFR BLD AUTO: 70.8 % (ref 42.7–76)
NITRITE UR QL STRIP: NEGATIVE
NRBC BLD AUTO-RTO: 0 /100 WBC (ref 0–0.2)
PH UR STRIP.AUTO: 6 [PH] (ref 5–8)
PHOSPHATE SERPL-MCNC: 3 MG/DL (ref 2.5–4.5)
PLATELET # BLD AUTO: 251 10*3/MM3 (ref 140–450)
PMV BLD AUTO: 9.7 FL (ref 6–12)
POTASSIUM SERPL-SCNC: 4.7 MMOL/L (ref 3.5–5.2)
PROT SERPL-MCNC: 5.5 G/DL (ref 6–8.5)
PROT UR QL STRIP: NEGATIVE
RBC # BLD AUTO: 3.33 10*6/MM3 (ref 3.77–5.28)
RBC # UR STRIP: NORMAL /HPF
REF LAB TEST METHOD: NORMAL
SODIUM SERPL-SCNC: 133 MMOL/L (ref 136–145)
SP GR UR STRIP: 1.01 (ref 1–1.03)
SQUAMOUS #/AREA URNS HPF: NORMAL /HPF
UROBILINOGEN UR QL STRIP: ABNORMAL
WBC # UR STRIP: NORMAL /HPF
WBC NRBC COR # BLD AUTO: 9.3 10*3/MM3 (ref 3.4–10.8)

## 2025-07-29 PROCEDURE — 82948 REAGENT STRIP/BLOOD GLUCOSE: CPT

## 2025-07-29 PROCEDURE — 99024 POSTOP FOLLOW-UP VISIT: CPT | Performed by: STUDENT IN AN ORGANIZED HEALTH CARE EDUCATION/TRAINING PROGRAM

## 2025-07-29 PROCEDURE — 80053 COMPREHEN METABOLIC PANEL: CPT | Performed by: SURGERY

## 2025-07-29 PROCEDURE — 83735 ASSAY OF MAGNESIUM: CPT | Performed by: SURGERY

## 2025-07-29 PROCEDURE — 84100 ASSAY OF PHOSPHORUS: CPT | Performed by: SURGERY

## 2025-07-29 PROCEDURE — 81001 URINALYSIS AUTO W/SCOPE: CPT | Performed by: INTERNAL MEDICINE

## 2025-07-29 PROCEDURE — 85025 COMPLETE CBC W/AUTO DIFF WBC: CPT | Performed by: INTERNAL MEDICINE

## 2025-07-29 PROCEDURE — 25010000002 ENOXAPARIN PER 10 MG: Performed by: SURGERY

## 2025-07-29 RX ORDER — HYDROCHLOROTHIAZIDE 25 MG/1
25 TABLET ORAL DAILY
Status: DISCONTINUED | OUTPATIENT
Start: 2025-07-29 | End: 2025-07-30 | Stop reason: HOSPADM

## 2025-07-29 RX ORDER — LOSARTAN POTASSIUM 100 MG/1
100 TABLET ORAL
Status: DISCONTINUED | OUTPATIENT
Start: 2025-07-29 | End: 2025-07-30 | Stop reason: HOSPADM

## 2025-07-29 RX ADMIN — Medication 10 ML: at 10:05

## 2025-07-29 RX ADMIN — HYDROCHLOROTHIAZIDE 25 MG: 25 TABLET ORAL at 18:44

## 2025-07-29 RX ADMIN — LOSARTAN POTASSIUM 100 MG: 100 TABLET, FILM COATED ORAL at 18:44

## 2025-07-29 RX ADMIN — ZINC OXIDE 1 APPLICATION: 200 OINTMENT TOPICAL at 17:12

## 2025-07-29 RX ADMIN — ZINC OXIDE 1 APPLICATION: 200 OINTMENT TOPICAL at 20:42

## 2025-07-29 RX ADMIN — FAMOTIDINE 20 MG: 20 TABLET, FILM COATED ORAL at 17:08

## 2025-07-29 RX ADMIN — METOPROLOL SUCCINATE 25 MG: 25 TABLET, EXTENDED RELEASE ORAL at 20:42

## 2025-07-29 RX ADMIN — ENOXAPARIN SODIUM 40 MG: 100 INJECTION SUBCUTANEOUS at 12:35

## 2025-07-29 RX ADMIN — Medication 10 ML: at 20:42

## 2025-07-29 RX ADMIN — FAMOTIDINE 20 MG: 20 TABLET, FILM COATED ORAL at 10:05

## 2025-07-29 RX ADMIN — DULOXETINE HYDROCHLORIDE 30 MG: 30 CAPSULE, DELAYED RELEASE ORAL at 10:05

## 2025-07-29 RX ADMIN — ATORVASTATIN CALCIUM 20 MG: 20 TABLET, FILM COATED ORAL at 10:05

## 2025-07-29 RX ADMIN — ZINC OXIDE 1 APPLICATION: 200 OINTMENT TOPICAL at 10:00

## 2025-07-29 RX ADMIN — AMLODIPINE BESYLATE 2.5 MG: 2.5 TABLET ORAL at 20:41

## 2025-07-29 NOTE — PLAN OF CARE
Goal Outcome Evaluation: Pt advanced to regular diet. TPN discontinued. BP running a little higher than normal. Page out to Dr. Giraldo as pt is not on her home BP meds. Per surgery, if pt tolerates advanced diet, she will be cleared from their standpoint to discharge. Pt is having bowel movements. Call light in reach, family at bedside.

## 2025-07-29 NOTE — PROGRESS NOTES
4/1/2024       RE: Sakshi Jaeger  [address] [id number]     Dear Colleague,    Thank you for referring your patient, Sakshi Jaeger, to the [facility]ASC[facility] [place] [facility]. Please see a copy of my visit note below.    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  Lateral aspect of the wound is healed however the mid segment of the wound is dehisced but does not appear infected and is unchanged from her last visit a week ago.    1 nylon suture removed and the wound was packed with half-inch packing strip.  She will continue to pack the wound twice daily at home.  Home wound care nurse has been arranged.  We again discussed the importance of diligent wound care and smoking cessation.  Will plan for to return to clinic for wound check next week      Patient was seen and discussed with staff, Dr. Mitchell.    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  This wound has been slow to heal and she is was recently seen in our nurse practitioner office and was started on a course of doxycycline for concern for wound infection.  She  General Surgery Progress Note    Postop day 10 exploratory laparotomy, lysis of adhesions     Subjective:  Patient states she is feeling great.  She drank some clear liquids but wants to eat real food.  Denies any nausea or vomiting.  Had a good large bowel movement this morning.  Denies any abdominal pain.  Is eager to get her staples out.     Objective:  Afebrile, vital stable  General: Awake and alert, no distress, sitting up in the chair interactive, cooperative, well-appearing  Abdomen: Soft, incision in good order with minimal erythema surrounding the staples only, no tenderness or guarding     WBC 9.3  Hemoglobin 10.2, platelets 251  BUN 13, creatinine 0.74, EGFR 78.4, sodium 133, potassium 4.7, CO2 22.8  UA negative for leuk esterase or nitrites, no bacteria on microscopy     Assessment and plan:  - Small bowel obstruction, now 10 days out from exploratory laparotomy.  Course complicated by significant postop ileus which has resolved.  - Trial regular diet. Stop TPN.   - Staples to remain until 14 days postop.  - Possibly ready for discharge home tomorrow if continues to tolerate increased PO intake.    Marycruz Shanks M.D.  General, Robotic, and Endoscopic Surgery  [facility]    [address], [address]  [place], [place], [address]  P: [phone]  F: [phone]      reports no changes to the wound and has been cleaning it daily with Betadine and covering with canal dressing.  Previous times of admitted arranged for home care which her and her  have previously refused though now returns for a home wound care nurse to come once a week.  She denies any fevers or chills or drainage from the wound.         Medications:     Current Outpatient Medications:     acetaminophen (TYLENOL) 325 MG tablet, Take 2 tablets (650 mg) by mouth every 6 hours, Disp: 24 tablet, Rfl: 0    amLODIPine (NORVASC) 5 MG tablet, Take 1 tablet (5 mg) by mouth daily, Disp: 90 tablet, Rfl: 3    aspirin (ASA) 81 MG tablet, Take 81 mg by mouth daily, Disp:  , Rfl:     calcium carbonate 600 mg-vitamin D 400 units (CALCIUM 600 + D) 600-400 MG-UNIT per tablet, Take 2 tablets by mouth daily, Disp: , Rfl:     doxycycline hyclate (VIBRAMYCIN) 100 MG capsule, Take 1 capsule (100 mg) by mouth 2 times daily for 14 days, Disp: 28 capsule, Rfl: 0    fluocinolone acetonide oil 0.01 % ear drops, Place 0.25 mLs (5 drops) into both ears twice a week, Disp: 20 mL, Rfl: 3    folic acid (FOLVITE) 1 MG tablet, Take 1 mg by mouth daily, Disp: , Rfl:     gabapentin (NEURONTIN) 300 MG capsule, Take 1 capsule (300 mg) by mouth 3 times daily, Disp: 270 capsule, Rfl: 0    leflunomide (ARAVA) 20 MG tablet, Take 1 tablet by mouth every 24 hours, Disp: , Rfl:     Lidocaine (LIDOCARE) 4 % Patch, Place 1 patch onto the skin every 24 hours To prevent lidocaine toxicity, patient should be patch free for 12 hrs daily., Disp: , Rfl:     lisinopril (ZESTRIL) 20 MG tablet, TAKE 1 TABLET BY MOUTH EVERY DAY, Disp: 90 tablet, Rfl: 0    methotrexate 2.5 MG tablet, Take 20 mg by mouth every 7 days On Saturdays, Disp: , Rfl:     metoprolol succinate ER (TOPROL XL) 50 MG 24 hr tablet, TAKE ONE TABLET BY MOUTH EVERY DAY, Disp: 90 tablet, Rfl: 0    multivitamin w/minerals (THERA-VIT-M) tablet, Take 1 tablet by mouth daily, Disp: , Rfl:     oxyCODONE  (ROXICODONE) 5 MG tablet, Take 1 tablet (5 mg) by mouth 2 times daily as needed for pain, Disp: 14 tablet, Rfl: 0    polyethylene glycol (MIRALAX) 17 GM/Dose powder, Take 17 g by mouth daily, Disp: 510 g, Rfl: 0    predniSONE (DELTASONE) 5 MG tablet, Take 1 tablet (5 mg) by mouth daily, Disp: , Rfl: 0    simvastatin (ZOCOR) 20 MG tablet, Take 1 tablet (20 mg) by mouth At Bedtime, Disp: 90 tablet, Rfl: 1         Physical Exam:   /75 (BP Location: Left arm, Patient Position: Sitting, Cuff Size: Adult Small)   Pulse 66   LMP  (LMP Unknown)   SpO2 93%   Wt Readings from Last 1 Encounters:   03/14/24 45.4 kg (100 lb)     There is no height or weight on file to calculate BMI.    EXAM:  Frail, elderly female appears older than stated age, resting comfortably in exam chair  Nonlabored breathing on room air  Palpable right femoral pulse  Right above-knee amputation wound has healed over the lateral aspect, the mid segment and medial aspects of areas of superficial dehiscence where nylon sutures remain.  Largest area of dehiscence is the middle of the wound, approximately 1.5 cm with fibrinous slough at the base, no drainage, induration, minimally tender.                     Data:   Imaging:  No pertinent recent imaging  TcPO2 measurements from 12/20 assessment 3 manage 71-79 in the right thigh, adequate for wound healing         Attestation signed by Bryon Mitchell MD at 4/5/2024  6:46 PM (Updated):  Patient seen and examined with Dr. Giraldo, my senior vascular surgery resident.  I corroborated the history and reperformed physical examination.  Agree with findings as documented in their note with the exception as documented below.    Small area of superficial wound breakdown without purulence.  She continues to smoke, which we discussed again.  Smoking cessation crucial for wound healing.  She has very few options left if this fails.  Santi tells us they are working on home health care.  Removed sutures  to allow wound packing which we will start BID.      15 minutes spent on the day of encounter doing chart review from our system and care everywhere, history and exam, documentation, coordinating care, and further activities as noted with over half spent counseling.       Again, thank you for allowing me to participate in the care of your patient.      Sincerely,    Bryon Mitchell MD

## 2025-07-29 NOTE — PROGRESS NOTES
Name: Itzle Edmond ADMIT: 2025   : 1940  PCP: Rhoda Luther MD    MRN: [id number] LOS: 10 days   AGE/SEX: 85 y.o. female  ROOM: [facility]   Subjective   Chief Complaint   Patient presents with    Abdominal Pain     Patient is sitting up in his chair and appears better clinically and is not in any distress.  Denies nausea, vomiting, abdominal pain, shortness of breath, palpitations, dizziness.  Did have a bowel movement yesterday.    Objective   Vital Signs  Temp:  [97.7 °F (36.5 °C)-98.1 °F (36.7 °C)] 98.1 °F (36.7 °C)  Heart Rate:  [76-85] 80  Resp:  [20] 20  BP: (154-171)/(60-75) 171/75  SpO2:  [99 %-100 %] 100 %  on   ;   Device (Oxygen Therapy): room air  Body mass index is 26.05 kg/m².    Physical Exam  Constitutional:       General: She is not in acute distress.  HENT:      Head: Normocephalic and atraumatic.      Mouth/Throat:      Mouth: Mucous membranes are dry.   Eyes:      General: No scleral icterus.     Extraocular Movements: Extraocular movements intact.      Pupils: Pupils are equal, round, and reactive to light.   Cardiovascular:      Rate and Rhythm: Normal rate and regular rhythm.      Heart sounds: Normal heart sounds.   Pulmonary:      Effort: Pulmonary effort is normal. No respiratory distress.   Abdominal:      General: There is no distension.      Palpations: Abdomen is soft.      Tenderness: There is no abdominal tenderness.      Comments: Diminished bowel sounds   Musculoskeletal:      Cervical back: Neck supple.      Right lower leg: No edema.      Left lower leg: No edema.   Neurological:      General: No focal deficit present.      Mental Status: She is alert and oriented to person, place, and time.   Psychiatric:         Behavior: Behavior normal.         Results Review:       I reviewed the patient's new clinical results.  Results from last 7 days   Lab Units 25  0555 25  0516 25  0616 25  0402   WBC 10*3/mm3 9.30 9.70 9.46 8.77   HEMOGLOBIN  "g/dL 10.2* 9.7* 10.7* 11.1*   PLATELETS 10*3/mm3 251 259 252 191     Results from last 7 days   Lab Units 07/29/25  0555 07/28/25  0516 07/27/25  0616 07/26/25  0556   SODIUM mmol/L 133* 132* 134* 132*   POTASSIUM mmol/L 4.7 3.7 3.7 4.2   CHLORIDE mmol/L 101 100 101 101   CO2 mmol/L 22.8 23.1 21.0* 22.0   BUN mg/dL 13.0 10.0 10.0 11.0   CREATININE mg/dL 0.74 0.75 0.78 0.80   GLUCOSE mg/dL 115* 170* 88 131*   Estimated Creatinine Clearance: 43.5 mL/min (by C-G formula based on SCr of 0.74 mg/dL).  Results from last 7 days   Lab Units 07/29/25  0555 07/28/25  0516 07/27/25  0616   ALBUMIN g/dL 3.0* 3.1* 3.3*   BILIRUBIN mg/dL 0.9 0.8 1.4*   ALK PHOS U/L 86 79 79   AST (SGOT) U/L 38* 40* 35*   ALT (SGPT) U/L 29 28 21     Results from last 7 days   Lab Units 07/29/25  0555 07/28/25  1853 07/28/25  0516 07/27/25  0616 07/26/25  2140 07/26/25  0556   CALCIUM mg/dL 8.5*  --  8.1* 8.1*  --  7.6*   ALBUMIN g/dL 3.0*  --  3.1* 3.3*  --   --    MAGNESIUM mg/dL 1.6  --  1.9 2.5*  --  1.5*   PHOSPHORUS mg/dL 3.0 2.6 2.0* 3.3   < > 1.6*    < > = values in this interval not displayed.         Coag     HbA1C   Lab Results   Component Value Date    HGBA1C 5.55 11/29/2021     Infection     Radiology(recent) No radiology results for the last day  No results found for: \"TROPONINT\", \"TROPONINI\", \"BNP\"      No components found for: \"TSH;2\"    amLODIPine, 2.5 mg, Oral, Nightly  atorvastatin, 20 mg, Oral, Daily  DULoxetine, 30 mg, Oral, Daily  enoxaparin sodium, 40 mg, Subcutaneous, Q24H  famotidine, 20 mg, Oral, BID AC  Fat Emulsion Plant Based, 125 mL, Intravenous, Q24H (TPN)  hydrocortisone-bacitracin-zinc oxide-nystatin, 1 Application, Topical, TID  metoprolol succinate XL, 25 mg, Oral, Nightly  ondansetron, 4 mg, Intravenous, Once  sodium chloride, 10 mL, Intravenous, Q12H  sodium chloride, 10 mL, Intravenous, Q12H           Diet: Regular/House; Fluid Consistency: Thin (IDDSI 0)      Assessment & Plan      Active Hospital Problems    " Diagnosis  POA    **SBO (small bowel obstruction) [K56.609]  Yes    ROSALIA (acute kidney injury) [N17.9]  Unknown    Nonrheumatic mitral valve regurgitation [I34.0]  Yes    JHOANA on CPAP [G47.33]  Yes    Stage 3a chronic kidney disease [N18.31]  Yes    JAMES (generalized anxiety disorder) [F41.1]  Yes    Restless leg syndrome [G25.81]  Yes    Hypertension [I10]  Yes      Resolved Hospital Problems   No resolved problems to display.     This is an 85-year-old lady with a history of obstructive sleep apnea chronic kidney disease anxiety hypertension and restless leg syndrome who presents to the hospital with abdominal pain and nausea and is found to have small bowel obstruction and taken by general surgery to the operating room for lysis of adhesions on 7/19.     1. Small bowel obstruction, status post adhesion lysis on 7/19.  PICC line was placed and TPN was started on 7/27/2025.  Did have a bowel movement on 7/28/2025 evening.  Currently on clear liquids and further advancement of diet as per surgical team.    2.  Hypomagnesium /hypophosphatemia, secondary to poor oral intake and being replaced per protocol as needed.  Monitor lites closely.  Monitor for refeeding syndrome as well.    3.  Acute kidney injury/CKD stage II, creatinine has improved from 1.02>0.78>0.75.  Will continue to monitor closely.    4.  Hypertension, continue with home metoprolol and amlodipine.    5.  Hyperlipidemia, on statins.    6.  Anxiety/depression, on Cymbalta.    7.  Hyperlipidemia, on statins.    8.  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  Total bili now back to normal.    9.  On Lovenox for DVT prophylaxis.    10.  CODE STATUS is full code.    Copy text on this note has been reviewed by me on 7/29/2025    Shoaib Giraldo MD  [facility]  07/29/25  14:29 EDT

## 2025-07-29 NOTE — PLAN OF CARE
Goal Outcome Evaluation:   Pt is alert and oriented. VSS with no acute changes over night. Magic barrier cream applied on rash. Ambulated to the bathroom with assist x1. Midline incision open to air with no drainage. Tolerating TPN and clear liquid diet. Daughter at bedside. Call light within reach. Safety measure maintained. Plan of care continues.

## 2025-07-29 NOTE — PROGRESS NOTES
Nutrition Services    Patient Name: Itzel Edmond  YOB: 1940  MRN: [id number]  Admission date: 7/19/2025    PROGRESS NOTE      Encounter Information: Pt tolerated CLD well and had large BM 7/28. Pt advanced to regular diet today after lunch, no meals yet. TPN continues Day#3 and increased to 75% of goal today. Plans to d/c TPN as long as pt tolerates regular diet.        PO Diet: Diet: Regular/House; Fluid Consistency: Thin (IDDSI 0)   PO Supplements: None    PO Intake:  Tolerated CLD, no meals yet on regular diet       Current nutrition support: TPN Day #3 @ 75 ml/hr: 1360 kcals total: 90gAA/750 kcal Dex/125 ml 20% Lipids   Nutrition support review: Tolerating CLD, remains hyponatremic, advanced to Regular diet after lunch today. Plans to d/c TPN if tolerates diet.        Weight: Weight: 60.5 kg (133 lb 6.1 oz) (07/28/25 0504)       Medications: reviewed   Labs: reviewed Na 133       GI Function:  last bowel movement: 7/28 x 1 large       Nutrition Intervention Updates: Regular diet  D/C TPN in am       Results from last 7 days   Lab Units 07/29/25  0555 07/28/25  0516 07/27/25  0616   SODIUM mmol/L 133* 132* 134*   POTASSIUM mmol/L 4.7 3.7 3.7   CHLORIDE mmol/L 101 100 101   CO2 mmol/L 22.8 23.1 21.0*   BUN mg/dL 13.0 10.0 10.0   CREATININE mg/dL 0.74 0.75 0.78   CALCIUM mg/dL 8.5* 8.1* 8.1*   BILIRUBIN mg/dL 0.9 0.8 1.4*   ALK PHOS U/L 86 79 79   ALT (SGPT) U/L 29 28 21   AST (SGOT) U/L 38* 40* 35*   GLUCOSE mg/dL 115* 170* 88     Results from last 7 days   Lab Units 07/29/25  0555 07/28/25  1853 07/28/25  0516 07/27/25  0616   MAGNESIUM mg/dL 1.6  --  1.9 2.5*   PHOSPHORUS mg/dL 3.0   < > 2.0* 3.3   HEMOGLOBIN g/dL 10.2*  --  9.7* 10.7*   HEMATOCRIT % 29.9*  --  29.2* 31.1*   TRIGLYCERIDES mg/dL  --   --   --  84    < > = values in this interval not displayed.     COVID19   Date Value Ref Range Status   07/22/2022 Not Detected Not Detected - Ref. Range Final     Lab Results   Component Value  Date    HGBA1C 5.55 11/29/2021       RD to follow up per protocol.    Electronically signed by:  Vee Benitez RD  07/29/25 17:24 EDT

## 2025-07-29 NOTE — PROGRESS NOTES
"[facility] Clinical Pharmacy Services: Total Parental Nutrition Initial Consult    Indication: Prolonged Paralytic Ileus  Route: central placed 7/27  Type: standard    Relevant clinical data and objective history reviewed:  85 y.o. female 152.4 cm (60\") 60.5 kg (133 lb 6.1 oz)    Results from last 7 days   Lab Units 07/29/25  0555 07/28/25  0516 07/27/25  0616   SODIUM mmol/L 133*   < > 134*   POTASSIUM mmol/L 4.7   < > 3.7   CHLORIDE mmol/L 101   < > 101   CO2 mmol/L 22.8   < > 21.0*   BUN mg/dL 13.0   < > 10.0   CREATININE mg/dL 0.74   < > 0.78   CALCIUM mg/dL 8.5*   < > 8.1*   ALBUMIN g/dL 3.0*   < > 3.3*   BILIRUBIN mg/dL 0.9   < > 1.4*   ALK PHOS U/L 86   < > 79   ALT (SGPT) U/L 29   < > 21   AST (SGOT) U/L 38*   < > 35*   GLUCOSE mg/dL 115*   < > 88   MAGNESIUM mg/dL 1.6   < > 2.5*   PHOSPHORUS mg/dL 3.0   < > 3.3   TRIGLYCERIDES mg/dL  --   --  84    < > = values in this interval not displayed.        Estimated Creatinine Clearance: 43.5 mL/min (by C-G formula based on SCr of 0.74 mg/dL).    Active fluid orders: None    Dietary Orders (From admission, onward)       Start     Ordered    07/28/25 0740  Diet: Liquid; Clear Liquid; Fluid Consistency: Thin (IDDSI 0)  Diet Effective Now        References:    Diet Order Definitions   Question Answer Comment   Diets: Liquid    Liquid Diet: Clear Liquid    Fluid Consistency: Thin (IDDSI 0)        07/28/25 0739                  Assessment  Ideal Body Weight: 45.5 kg  Body Weight Used for Calculations: 61 kg  Daily Est. Remi: 2712-4798 kCal/Day  Estimated Fluid Requirements: 1800 mL/day    Goal per Dietician recommendation:   Protein: 90 grams/day   Dextrose: 940 Kcal/day   Lipids: 250 ml of 20% lipid infusion 7 days/week    Plan  Phos and mag normalize. Sodium has been low so will increase sodium chloride to 77 mEq. Will advance CHO and start lipids today.   Will change TPN with the following macros:   Protein/Dextrose/Lipids: 90g/750kcal/125 mL    Volume: 1800 ml " (75 mL/hr over 24 hrs daily)    Within last 24 hours, patient has received 15 mmol Kphos   Will add the following electrolytes/additives to the TPN and adjust accordingly in the coming days.    Sodium Chloride: 77 mEq   Sodium Acetate:    Sodium Phosphate:    Potassium Chloride: 50 mEq   Potassium Acetate:    Potassium Phosphate: 22 mEq   Calcium Gluconate: 9 mEq   Magnesium Sulfate: 10 mEq   MVI for TPN 10 mL (was already in prior TPN)   Trace Elements 1 mL (was already in prior TPN)                  Labs to be ordered: Daily CMP, Mag, Phos    Pharmacy will continue to follow.     Conner Redman II, PharmD  Hematology/Oncology Clinical Pharmacist Specialist

## 2025-07-30 ENCOUNTER — READMISSION MANAGEMENT (OUTPATIENT)
Dept: CALL CENTER | Facility: HOSPITAL | Age: 85
End: 2025-07-30
Payer: MEDICARE

## 2025-07-30 ENCOUNTER — TELEPHONE (OUTPATIENT)
Dept: SURGERY | Facility: CLINIC | Age: 85
End: 2025-07-30
Payer: MEDICARE

## 2025-07-30 VITALS
RESPIRATION RATE: 18 BRPM | DIASTOLIC BLOOD PRESSURE: 83 MMHG | WEIGHT: 133.38 LBS | SYSTOLIC BLOOD PRESSURE: 134 MMHG | HEART RATE: 93 BPM | HEIGHT: 60 IN | OXYGEN SATURATION: 96 % | TEMPERATURE: 98 F | BODY MASS INDEX: 26.19 KG/M2

## 2025-07-30 LAB
ALBUMIN SERPL-MCNC: 3.3 G/DL (ref 3.5–5.2)
ALBUMIN/GLOB SERPL: 1.2 G/DL
ALP SERPL-CCNC: 122 U/L (ref 39–117)
ALT SERPL W P-5'-P-CCNC: 48 U/L (ref 1–33)
ANION GAP SERPL CALCULATED.3IONS-SCNC: 13 MMOL/L (ref 5–15)
AST SERPL-CCNC: 65 U/L (ref 1–32)
BASOPHILS # BLD AUTO: 0.02 10*3/MM3 (ref 0–0.2)
BASOPHILS NFR BLD AUTO: 0.2 % (ref 0–1.5)
BILIRUB SERPL-MCNC: 0.8 MG/DL (ref 0–1.2)
BUN SERPL-MCNC: 14 MG/DL (ref 8–23)
BUN/CREAT SERPL: 17.3 (ref 7–25)
CALCIUM SPEC-SCNC: 8.5 MG/DL (ref 8.6–10.5)
CHLORIDE SERPL-SCNC: 99 MMOL/L (ref 98–107)
CO2 SERPL-SCNC: 19 MMOL/L (ref 22–29)
CREAT SERPL-MCNC: 0.81 MG/DL (ref 0.57–1)
DEPRECATED RDW RBC AUTO: 42.4 FL (ref 37–54)
EGFRCR SERPLBLD CKD-EPI 2021: 70.4 ML/MIN/1.73
EOSINOPHIL # BLD AUTO: 0.23 10*3/MM3 (ref 0–0.4)
EOSINOPHIL NFR BLD AUTO: 2.3 % (ref 0.3–6.2)
ERYTHROCYTE [DISTWIDTH] IN BLOOD BY AUTOMATED COUNT: 12.3 % (ref 12.3–15.4)
GLOBULIN UR ELPH-MCNC: 2.7 GM/DL
GLUCOSE SERPL-MCNC: 121 MG/DL (ref 65–99)
HCT VFR BLD AUTO: 37 % (ref 34–46.6)
HGB BLD-MCNC: 11.9 G/DL (ref 12–15.9)
IMM GRANULOCYTES # BLD AUTO: 0.11 10*3/MM3 (ref 0–0.05)
IMM GRANULOCYTES NFR BLD AUTO: 1.1 % (ref 0–0.5)
LYMPHOCYTES # BLD AUTO: 1.1 10*3/MM3 (ref 0.7–3.1)
LYMPHOCYTES NFR BLD AUTO: 11.2 % (ref 19.6–45.3)
MAGNESIUM SERPL-MCNC: 1.5 MG/DL (ref 1.6–2.4)
MCH RBC QN AUTO: 30.1 PG (ref 26.6–33)
MCHC RBC AUTO-ENTMCNC: 32.2 G/DL (ref 31.5–35.7)
MCV RBC AUTO: 93.4 FL (ref 79–97)
MONOCYTES # BLD AUTO: 1.16 10*3/MM3 (ref 0.1–0.9)
MONOCYTES NFR BLD AUTO: 11.8 % (ref 5–12)
NEUTROPHILS NFR BLD AUTO: 7.19 10*3/MM3 (ref 1.7–7)
NEUTROPHILS NFR BLD AUTO: 73.4 % (ref 42.7–76)
NRBC BLD AUTO-RTO: 0 /100 WBC (ref 0–0.2)
PHOSPHATE SERPL-MCNC: 3.3 MG/DL (ref 2.5–4.5)
PLATELET # BLD AUTO: 272 10*3/MM3 (ref 140–450)
PMV BLD AUTO: 9.5 FL (ref 6–12)
POTASSIUM SERPL-SCNC: 4.3 MMOL/L (ref 3.5–5.2)
PROT SERPL-MCNC: 6 G/DL (ref 6–8.5)
RBC # BLD AUTO: 3.96 10*6/MM3 (ref 3.77–5.28)
SODIUM SERPL-SCNC: 131 MMOL/L (ref 136–145)
WBC NRBC COR # BLD AUTO: 9.81 10*3/MM3 (ref 3.4–10.8)

## 2025-07-30 PROCEDURE — 83735 ASSAY OF MAGNESIUM: CPT | Performed by: SURGERY

## 2025-07-30 PROCEDURE — 25010000002 ENOXAPARIN PER 10 MG: Performed by: SURGERY

## 2025-07-30 PROCEDURE — 25010000002 MAGNESIUM SULFATE 2 GM/50ML SOLUTION: Performed by: INTERNAL MEDICINE

## 2025-07-30 PROCEDURE — 99024 POSTOP FOLLOW-UP VISIT: CPT | Performed by: STUDENT IN AN ORGANIZED HEALTH CARE EDUCATION/TRAINING PROGRAM

## 2025-07-30 PROCEDURE — 63710000001 DIPHENHYDRAMINE PER 50 MG: Performed by: INTERNAL MEDICINE

## 2025-07-30 PROCEDURE — 85025 COMPLETE CBC W/AUTO DIFF WBC: CPT | Performed by: INTERNAL MEDICINE

## 2025-07-30 PROCEDURE — 84100 ASSAY OF PHOSPHORUS: CPT | Performed by: SURGERY

## 2025-07-30 PROCEDURE — 80053 COMPREHEN METABOLIC PANEL: CPT | Performed by: SURGERY

## 2025-07-30 RX ORDER — DIPHENHYDRAMINE HCL 25 MG
25 CAPSULE ORAL ONCE
Status: COMPLETED | OUTPATIENT
Start: 2025-07-30 | End: 2025-07-30

## 2025-07-30 RX ORDER — MAGNESIUM SULFATE HEPTAHYDRATE 40 MG/ML
2 INJECTION, SOLUTION INTRAVENOUS
Status: COMPLETED | OUTPATIENT
Start: 2025-07-30 | End: 2025-07-30

## 2025-07-30 RX ADMIN — MAGNESIUM SULFATE HEPTAHYDRATE 2 G: 40 INJECTION, SOLUTION INTRAVENOUS at 08:05

## 2025-07-30 RX ADMIN — Medication 10 ML: at 08:19

## 2025-07-30 RX ADMIN — LOSARTAN POTASSIUM 100 MG: 100 TABLET, FILM COATED ORAL at 08:05

## 2025-07-30 RX ADMIN — MAGNESIUM SULFATE HEPTAHYDRATE 2 G: 40 INJECTION, SOLUTION INTRAVENOUS at 10:11

## 2025-07-30 RX ADMIN — ENOXAPARIN SODIUM 40 MG: 100 INJECTION SUBCUTANEOUS at 11:37

## 2025-07-30 RX ADMIN — ATORVASTATIN CALCIUM 20 MG: 20 TABLET, FILM COATED ORAL at 08:05

## 2025-07-30 RX ADMIN — HYDROCHLOROTHIAZIDE 25 MG: 25 TABLET ORAL at 08:05

## 2025-07-30 RX ADMIN — ZINC OXIDE 1 APPLICATION: 200 OINTMENT TOPICAL at 08:19

## 2025-07-30 RX ADMIN — FAMOTIDINE 20 MG: 20 TABLET, FILM COATED ORAL at 08:05

## 2025-07-30 RX ADMIN — DIPHENHYDRAMINE HYDROCHLORIDE 25 MG: 25 CAPSULE ORAL at 11:37

## 2025-07-30 RX ADMIN — DULOXETINE HYDROCHLORIDE 30 MG: 30 CAPSULE, DELAYED RELEASE ORAL at 08:05

## 2025-07-30 RX ADMIN — MAGNESIUM SULFATE HEPTAHYDRATE 2 G: 40 INJECTION, SOLUTION INTRAVENOUS at 11:37

## 2025-07-30 NOTE — PROGRESS NOTES
General Surgery Progress Note    Postop day 11 exploratory laparotomy, lysis of adhesions     Subjective:  Patient is doing very well. Denies NV and is tolerating regular diet. Continues to have bowel movements. Pain is well controlled. She is eager to go home.     Objective:  Afebrile, vital stable  General: Awake and alert, no distress, resting in bed in street clothes, interactive, cooperative, well-appearing  Abdomen: Soft, incision in good order with stable minimal erythema surrounding the staples only, no tenderness or guarding       Assessment and plan:  - Small bowel obstruction, now 11 days out from exploratory laparotomy.  Course complicated by significant postop ileus which has resolved.  - She is stable for discharge. Patient to follow up in the office on Friday for staple removal. She should see Dr. Rios in 2 weeks for postop check. Counseled her on signs/symptoms concerning for need to call the office or return to the ER sooner. Discharge instructions also given. Patient agreeable.    Marycruz Shanks M.D.  General, Robotic, and Endoscopic Surgery  [facility]    [address] [place] [address], [address]  [place], [place], [address]  P: [phone]  F: [phone]

## 2025-07-30 NOTE — NURSING NOTE
AVS printed and reviewed with both pt and daughter at bedside. PICC removed per order. Educated on discharge instructions, pt verbalized understanding and is stable at time of discharge.

## 2025-07-30 NOTE — OUTREACH NOTE
Prep Survey      Flowsheet Row Responses   [facility] facility patient discharged from? [place]   Is LACE score < 7 ? No   Eligibility Readm Mgmt   Discharge diagnosis SBO (small bowel obstruction)   Does the patient have one of the following disease processes/diagnoses(primary or secondary)? Other   Does the patient have Home health ordered? No   Is there a DME ordered? No   Prep survey completed? Yes            TYSON SMALL - Registered Nurse

## 2025-07-30 NOTE — PLAN OF CARE
Goal Outcome Evaluation:   Pt is alert and oriented. VSS with no acute changes over night. Pt care provided per protocol. Magic barrier cream applied on rash. Ambulated to the bathroom with assist x1. Midline incision open to air with no drainage. Granddaughter at bedside throughout the night. Call light within reach. Safety measure maintained. Plan of care continues.

## 2025-07-30 NOTE — TELEPHONE ENCOUNTER
Pt's daughter called wanting to get Pt scheduled for staple removal this Friday (14 day post op). Wanting to know how to proceed with scheduling this Pt on Friday. Thanks

## 2025-07-30 NOTE — DISCHARGE INSTRUCTIONS
Exploratory laparotomy  POST OP RECOMMENDATIONS  Dr. Marycruz Shanks  [phone]  ACTIVITIES:  Expect to rest most of the first week after discharge from hospital, but do get up several times daily to reduce the risk of getting a clot in your legs.  No strenuous activity or lifting over  20 lbs. for six weeks.   No driving while taking narcotic pain medication. You must be off pain meds 24 hours before driving after that visit unless otherwise instructed.  You can climb stairs, and walking is encouraged.  SYMPTOMS:  Avoiding constipation is important after this surgery as it is common when taking pain medication.  Over the counter laxatives, such as Miralax, can be used temporarily to avoid this.   Fatigue and decreased stamina is not unusual for about a week or so after surgery due to anesthesia and the surgery itself.  Try to take walks with some mild activity between resting.  It is not unusual for your gastrointestinal system to take 1-2 months to get back to your normal routine and you may have long term changes towards looser bowel movements.  WOUND SITE:  Dressings may occasionally have spots of blood on them.  As long as it is dry, these do not need to be changed.  If it is soaked, then the dressing should be removed. It can be recovered with a dry bandage such as a gauze pad or band-aid.  Skin irritation, redness or itching can prompt removal of the bandage earlier if present.  Follow-up in the office in 2 days for staple removal.  Okay to shower starting 24 hours after surgery.  Do not scrub your incisions, but let soap and water run over them.  MEALS:  A soft diet is recommended for the first 1-2 days after discharge from the hospital.  A normal diet may then be started as tolerated after that. Follow dietary guidelines when specified  Expect to eat less after this procedure and fill up somewhat faster.   Do NOT take pain pills on an empty stomach.  FOLLOW UP:  Call and make a post-operative appointment  with Dr. Rios for 2 weeks from discharge.  Please call me for any questions or concerns not addressed above.  Call if you develop fever, chills, intractable nausea/vomiting, severe constipation, severe abdominal pain, or other concerns.      Marycruz Shanks M.D.  General, Robotic, and Endoscopic Surgery  [facility]    [address], [address]  [place], [place], [address]  P: [phone]  F: [phone]

## 2025-07-30 NOTE — DISCHARGE SUMMARY
Patient Name: Itzel Edmond  : 1940  MRN: [id number]    Date of Admission: 2025  Date of Discharge:  2025  Primary Care Physician: Rhoda Luther MD      Chief Complaint:   Abdominal Pain      Discharge Diagnoses     Active Hospital Problems    Diagnosis  POA    **SBO (small bowel obstruction) [K56.609]  Yes    ROSALIA (acute kidney injury) [N17.9]  Unknown    Nonrheumatic mitral valve regurgitation [I34.0]  Yes    JHOANA on CPAP [G47.33]  Yes    Stage 3a chronic kidney disease [N18.31]  Yes    JAMES (generalized anxiety disorder) [F41.1]  Yes    Restless leg syndrome [G25.81]  Yes    Hypertension [I10]  Yes      Resolved Hospital Problems   No resolved problems to display.        Hospital Course     Patient is a 85-year-old female who has past medical history remarkable for hypertension, anxiety and depression, history of sleep apnea on BiPAP, gastroesophageal reflux disease, history of traumatic subdural hematoma, chronic kidney disease and prior history of hysterectomy and nonobstructive coronary artery disease and carotid stenosis and peripheral vascular disease was in her usual state of health until few weeks ago when she started having off-and-on right lower abdominal discomfort that comes and goes without any intervention and resolve on its own. Few days ago she started having worsening abdominal discomfort in the right lower abdominal area and this time it did not improve and last night she had episode of nausea and vomiting. Because of her persistent discomfort she was brought to the emergency room for further evaluation and was noted to have small bowel obstruction of closed-loop thigh for which general surgery service consulted. Patient was taken to the OR emergently from ER and was seen postoperatively with the family members at the bedside. Patient has midline incision after having undergone exploratory laparotomy and small bowel enterolysis. Operative report suggest no evidence of  closed-loop obstruction and no enterotomy noted. Patient is feeling overall better except for surgical incision.       1. Small bowel obstruction, status post adhesion lysis on 7/19.  PICC line was placed and TPN was started on 7/27/2025.  Did have a bowel movement on 7/28/2025 evening.  Therefore diet was further advanced to solids and TPN was discontinued on 7/29/2025.  Did have a bowel movement this morning as well and denies nausea, vomiting, abdominal pain.  Given her overall improvement she is being discharged home and will follow-up with surgery as an outpatient basis.     2.  Hypomagnesium /hypophosphatemia, replace per protocol.     3.  Acute kidney injury/CKD stage II, creatinine has improved from 1.02>0.78>0.75.  Will continue to monitor closely.     4.  Hypertension, continue with home metoprolol and amlodipine.     5.  Hyperlipidemia, on statins.     6.  Anxiety/depression, on Cymbalta.     7.  Hyperlipidemia, on statins.     8.  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  Total bili now back to normal.     Copy text on this note has been reviewed by me on 7/30/2025      Day of Discharge       Physical Exam:  Temp:  [97.7 °F (36.5 °C)-98.1 °F (36.7 °C)] 98 °F (36.7 °C)  Heart Rate:  [80-93] 93  Resp:  [18-20] 18  BP: (134-174)/(75-83) 134/83  Body mass index is 26.05 kg/m².  Physical Exam  Constitutional:       General: She is not in acute distress.  HENT:      Head: Normocephalic and atraumatic.      Mouth/Throat:      Mouth: Mucous membranes are moist.  Eyes:      General: No scleral icterus.     Extraocular Movements: Extraocular movements intact.      Pupils: Pupils are equal, round, and reactive to light.   Cardiovascular:      Rate and Rhythm: Normal rate and regular rhythm.      Heart sounds: Normal heart sounds.   Pulmonary:      Effort: Pulmonary effort is normal. No respiratory distress.   Abdominal:      General: There is no distension.       Palpations: Abdomen is soft.      Tenderness: There is no abdominal tenderness.      Comments: Bowel sounds present, midline incision has staples in place  Musculoskeletal:      Cervical back: Neck supple.      Right lower leg: No edema.      Left lower leg: No edema.   Neurological:      General: No focal deficit present.      Mental Status: She is alert and oriented to person, place, and time.   Psychiatric:         Behavior: Behavior normal.     Consultants     Consult Orders (all) (From admission, onward)       Start     Ordered    07/26/25 1551  Inpatient Nutrition Consult  Once        Provider:  (Not yet assigned)    07/26/25 1551    07/26/25 1551  Inpatient IV Team Consult PICC 2 Lumens  Once        Provider:  (Not yet assigned)    07/26/25 1551    07/19/25 1434  [facility] (on-call MD unless specified) Details  Once        Specialty:  Hospitalist  Provider:  Mookie Patel MD    07/19/25 1434    07/19/25 1433  Surgery (on-call MD unless specified)  Once        Specialty:  General Surgery  Provider:  Terry Rios MD    07/19/25 1432                  Procedures     exploratory laparotomy, lysis of adhesions    Imaging Results (All)       Procedure Component Value Units Date/Time    CT Abdomen Pelvis With Contrast [[id number]] Collected: 07/19/25 1359     Updated: 07/19/25 1417    Narrative:      CT ABDOMEN AND PELVIS WITH IV CONTRAST     HISTORY:    Left lower quadrant pain and tenderness     TECHNIQUE: Radiation dose reduction techniques were utilized, including  automated exposure control and exposure modulation based on body size.   3 mm images were obtained through the abdomen and pelvis with IV  contrast.     COMPARISON:    None       Impression:      FINDINGS AND IMPRESSION:  No free intraperitoneal air is seen. THE MAJORITY OF THE SMALL BOWEL IS  DILATED WITH AIR-FLUID LEVELS. TRANSITION POINT DISTALLY IS PRESENT  WITHIN THE RIGHT LOWER QUADRANT (AXIAL IMAGE 102 AND 101WITH  DECOMPRESSION OF THE AND  TERMINAL ILEUM. THERE IS ALSO RELATIVE  TRANSITION POINT PROXIMALLY ON AXIAL IMAGE 89 WITH MORE PROXIMAL SMALL  BOWEL LOOPS NOT SIGNIFICANTLY DISTENDED. FINDINGS ARE MOST SUGGESTIVE OF  SMALL BOWEL OBSTRUCTION AND UNFORTUNATELY CLOSED-LOOP CONFIGURATION  CANNOT BE EXCLUDED. GENERAL SURGERY CONSULTATION IS RECOMMENDED.     Small amount of free fluid is present. No abdominal pelvic adenopathy by  size criteria. Small hiatal hernia. Stomach has a mildly thickened  appearance can be seen in setting of gastritis in the appropriate  context. Correlation is recommended with follow-up endoscopy if  clinically indicated.     There is colonic diverticulosis. Free fluid layering in the pelvis  contacts a long segment of the sigmoid colon with suggestion of  associated fat stranding in this area. Diverticulitis cannot be excluded  in the appropriate context correlation with patient history is  recommended.     The appendix is not seen. Subcentimeter hepatic lesions are too small to  characterize. Larger cystic density lesion within the right hepatic lobe  likely benign. The gallbladder, pancreas, spleen and adrenal glands have  an unremarkable postcontrast CT appearance. Subcentimeter renal lesions  are too small to characterize. Significant asymmetric atrophy of the  left kidney. No hydronephrosis.     Uterus surgically absent. No suspicious lytic or blastic osseous  lesions.     This report was finalized on 7/19/2025 2:13 PM by Dr. Mateo Vaz M.D  on Workstation: [id number]       XR Chest 1 View [[id number]] Collected: 07/19/25 1354     Updated: 07/19/25 1400    Narrative:      Portable chest radiograph     HISTORY: Chest pain     TECHNIQUE: Single AP portable radiograph of the chest     COMPARISON: Chest radiograph 11/28/2021       Impression:      FINDINGS AND IMPRESSION:  Asymmetric pulm opacification is present within the left lung base  suggestive atelectasis versus pneumonia in the appropriate context.  Correlation  with patient history is recommended with follow-up chest CT  if indicated. Given the asymmetry, least continued tension follow-up  chest radiographs in 6 to 8 weeks recommended to ensure resolution  exclude the possibility malignancy.     No pneumothorax is seen. Cardiac silhouette is at the upper limits of  normal in size..     This report was finalized on 7/19/2025 1:57 PM by Dr. Mtaeo Vaz M.D  on Workstation: [id number]               Results for orders placed during the hospital encounter of 04/22/24    Adult Transthoracic Echo Complete W/ Cont if Necessary Per Protocol 04/22/2024  3:54 PM    Interpretation Summary    Left ventricular systolic function is normal. Calculated left ventricular EF = 60.7%    Left ventricular diastolic function was normal.    Pertinent Labs     Results from last 7 days   Lab Units 07/30/25  0646 07/29/25  0555 07/28/25  0516 07/27/25  0616   WBC 10*3/mm3 9.81 9.30 9.70 9.46   HEMOGLOBIN g/dL 11.9* 10.2* 9.7* 10.7*   PLATELETS 10*3/mm3 272 251 259 252     Results from last 7 days   Lab Units 07/30/25  0646 07/29/25  0555 07/28/25  0516 07/27/25  0616   SODIUM mmol/L 131* 133* 132* 134*   POTASSIUM mmol/L 4.3 4.7 3.7 3.7   CHLORIDE mmol/L 99 101 100 101   CO2 mmol/L 19.0* 22.8 23.1 21.0*   BUN mg/dL 14.0 13.0 10.0 10.0   CREATININE mg/dL 0.81 0.74 0.75 0.78   GLUCOSE mg/dL 121* 115* 170* 88   EGFR mL/min/1.73 70.4 78.4 77.2 73.6     Results from last 7 days   Lab Units 07/30/25  0646 07/29/25  0555 07/28/25  0516 07/27/25  0616   ALBUMIN g/dL 3.3* 3.0* 3.1* 3.3*   BILIRUBIN mg/dL 0.8 0.9 0.8 1.4*   ALK PHOS U/L 122* 86 79 79   AST (SGOT) U/L 65* 38* 40* 35*   ALT (SGPT) U/L 48* 29 28 21     Results from last 7 days   Lab Units 07/30/25  0646 07/29/25  0555 07/28/25  1853 07/28/25  0516 07/27/25  0616   CALCIUM mg/dL 8.5* 8.5*  --  8.1* 8.1*   ALBUMIN g/dL 3.3* 3.0*  --  3.1* 3.3*   MAGNESIUM mg/dL 1.5* 1.6  --  1.9 2.5*   PHOSPHORUS mg/dL 3.3 3.0 2.6 2.0* 3.3       Results from last  7 days   Lab Units 07/23/25  1717 07/23/25  1613   HSTROP T ng/L 28* 28*       Results from last 7 days   Lab Units 07/27/25  0616   TRIGLYCERIDES mg/dL 84             Test Results Pending at Discharge     Pending Results       None              Discharge Details        Discharge Medications        Continue These Medications        Instructions Start Date   amLODIPine 2.5 MG tablet  Commonly known as: NORVASC   1.25 mg, Oral, Nightly      atorvastatin 20 MG tablet  Commonly known as: LIPITOR   20 mg, Oral, Daily      DULoxetine 30 MG capsule  Commonly known as: CYMBALTA   30 mg, Daily      hydroCHLOROthiazide 25 MG tablet   25 mg, Daily      losartan 100 MG tablet  Commonly known as: COZAAR   100 mg, Daily      metoprolol succinate XL 25 MG 24 hr tablet  Commonly known as: TOPROL-XL   25 mg, Nightly      pramipexole 0.25 MG tablet  Commonly known as: MIRAPEX   0.5 mg, Oral, Nightly               Allergies   Allergen Reactions    Gabapentin Dizziness     Other reaction(s): Dizziness    Pregabalin Dizziness     Vision problems, dizziness    Sulfamethoxazole-Trimethoprim Itching and Nausea And Vomiting    Trazodone Unknown - High Severity    Ace Inhibitors Cough     Cough    Lisinopril Cough       Discharge Disposition:  Home or Self Care      Discharge Diet:  Diet Order   Procedures    Diet: Regular/House; Fluid Consistency: Thin (IDDSI 0)       Discharge Activity:   Activity Instructions       Activity as Tolerated              CODE STATUS:    Code Status and Medical Interventions: CPR (Attempt to Resuscitate); Full Support   Ordered at: 07/19/25 4796     Code Status (Patient has no pulse and is not breathing):    CPR (Attempt to Resuscitate)     Medical Interventions (Patient has pulse or is breathing):    Full Support       No future appointments.  Additional Instructions for the Follow-ups that You Need to Schedule       Discharge Follow-up with PCP   As directed       Currently Documented PCP:    Rhoda Luther,  MD    PCP Phone Number:    [phone]     Follow Up Details: 1 week        Discharge Follow-up with Specified Provider: ; 1 Week   As directed      To:    Follow Up: 1 Week               Follow-up Information       Rhoda Luther MD .    Specialty: Internal Medicine  Why: 1 week  Contact information:  [address]  [place] [id number]  [phone]                             Additional Instructions for the Follow-ups that You Need to Schedule       Discharge Follow-up with PCP   As directed       Currently Documented PCP:    Rhoda Luther MD    PCP Phone Number:    [phone]     Follow Up Details: 1 week        Discharge Follow-up with Specified Provider: ; 1 Week   As directed      To:    Follow Up: 1 Week            Time Spent on Discharge:  Greater than 30 minutes      Shoaib Giraldo MD  [facility]  07/30/25  11:41 EDT

## 2025-07-31 ENCOUNTER — TELEPHONE (OUTPATIENT)
Dept: SURGERY | Facility: CLINIC | Age: 85
End: 2025-07-31
Payer: MEDICARE

## 2025-07-31 NOTE — CASE MANAGEMENT/SOCIAL WORK
Case Management Discharge Note      Final Note: Home with family to transport.    Provided Post Acute Provider List?: N/A  N/A Provider List Comment: Denies need  Provided Post Acute Provider Quality & Resource List?: N/A  N/A Quality & Resource List Comment: Denies need    Selected Continued Care - Discharged on 7/30/2025 Admission date: 7/19/2025 - Discharge disposition: Home or Self Care      Destination    No services have been selected for the patient.                Durable Medical Equipment    No services have been selected for the patient.                Dialysis/Infusion    No services have been selected for the patient.                Home Medical Care    No services have been selected for the patient.                Therapy    No services have been selected for the patient.                Community Resources    No services have been selected for the patient.                Community & DME    No services have been selected for the patient.                    Transportation Services  Transportation: Private Transportation  Private: Car    Final Discharge Disposition Code: 01 - home or self-care

## 2025-08-01 ENCOUNTER — OFFICE VISIT (OUTPATIENT)
Dept: SURGERY | Facility: CLINIC | Age: 85
End: 2025-08-01
Payer: MEDICARE

## 2025-08-01 VITALS
DIASTOLIC BLOOD PRESSURE: 64 MMHG | WEIGHT: 129 LBS | HEIGHT: 60 IN | BODY MASS INDEX: 25.32 KG/M2 | OXYGEN SATURATION: 100 % | HEART RATE: 63 BPM | SYSTOLIC BLOOD PRESSURE: 126 MMHG | TEMPERATURE: 98.1 F

## 2025-08-01 DIAGNOSIS — Z98.890 STATUS POST EXPLORATORY LAPAROTOMY: Primary | ICD-10-CM

## 2025-08-18 ENCOUNTER — OFFICE VISIT (OUTPATIENT)
Dept: SURGERY | Facility: CLINIC | Age: 85
End: 2025-08-18
Payer: MEDICARE

## 2025-08-18 VITALS
BODY MASS INDEX: 25.29 KG/M2 | DIASTOLIC BLOOD PRESSURE: 78 MMHG | WEIGHT: 128.8 LBS | HEIGHT: 60 IN | OXYGEN SATURATION: 99 % | SYSTOLIC BLOOD PRESSURE: 122 MMHG | HEART RATE: 68 BPM

## 2025-08-18 DIAGNOSIS — Z98.890 STATUS POST EXPLORATORY LAPAROTOMY: Primary | ICD-10-CM

## 2025-08-18 PROCEDURE — 1160F RVW MEDS BY RX/DR IN RCRD: CPT | Performed by: SURGERY

## 2025-08-18 PROCEDURE — 1159F MED LIST DOCD IN RCRD: CPT | Performed by: SURGERY

## 2025-08-18 PROCEDURE — 99024 POSTOP FOLLOW-UP VISIT: CPT | Performed by: SURGERY

## (undated) DEVICE — GLIDESHEATH SLENDER STAINLESS STEEL KIT: Brand: GLIDESHEATH SLENDER

## (undated) DEVICE — SUT SILK 2/0 SH CR8 18IN CR8 C012D

## (undated) DEVICE — MAYFIELD® DISPOSABLE ADULT SKULL PIN (PLASTIC BASE): Brand: MAYFIELD®

## (undated) DEVICE — ANTIBACTERIAL UNDYED BRAIDED (POLYGLACTIN 910), SYNTHETIC ABSORBABLE SUTURE: Brand: COATED VICRYL

## (undated) DEVICE — GLV SURG BIOGEL LTX PF 8

## (undated) DEVICE — Device

## (undated) DEVICE — GLV SURG BIOGEL LTX PF 8 1/2

## (undated) DEVICE — GLV SURG BIOGEL LTX PF 7 1/2

## (undated) DEVICE — 1010 S-DRAPE TOWEL DRAPE 10/BX: Brand: STERI-DRAPE™

## (undated) DEVICE — PK PROC MAJ 40

## (undated) DEVICE — ADHS SKIN SURG TISS VISC PREMIERPRO EXOFIN HI/VISC FAST/DRY

## (undated) DEVICE — APPL CHLORAPREP HI/LITE 26ML ORNG

## (undated) DEVICE — STPLR SKIN VISISTAT WD 35CT

## (undated) DEVICE — DISPOSABLE BIPOLAR CABLE 12FT. (3.6M): Brand: KIRWAN

## (undated) DEVICE — TUBING, SUCTION, 1/4" X 10', STRAIGHT: Brand: MEDLINE

## (undated) DEVICE — GW EMR FIX EXCHG J STD .035 3MM 260CM

## (undated) DEVICE — LN SMPL CO2 SHTRM SD STREAM W/M LUER

## (undated) DEVICE — 1LYRTR 16FR10ML100%SIL UMS SNP: Brand: MEDLINE INDUSTRIES, INC.

## (undated) DEVICE — PK CATH CARD 40

## (undated) DEVICE — CANN O2 ETCO2 FITS ALL CONN CO2 SMPL A/ 7IN DISP LF

## (undated) DEVICE — TUBING, SUCTION, 1/4" X 20', STRAIGHT: Brand: MEDLINE INDUSTRIES, INC.

## (undated) DEVICE — ELECTRD BLD EZ CLN MOD XLNG 2.75IN

## (undated) DEVICE — GLV SURG SENSICARE PI LF PF 7.5 GRN STRL

## (undated) DEVICE — PK CRANI 40

## (undated) DEVICE — ADAPT CLN BIOGUARD AIR/H2O DISP

## (undated) DEVICE — SPNG GZ WOVN 4X4IN 12PLY 10/BX STRL

## (undated) DEVICE — SUT MNCRYL PLS ANTIB UD 4/0 PS2 18IN

## (undated) DEVICE — TOOL MR8-9AC60M MR8 9CM ACORN 6MM 2FLT: Brand: MIDAS REX MR8

## (undated) DEVICE — KT ORCA ORCAPOD DISP STRL

## (undated) DEVICE — PATIENT RETURN ELECTRODE, SINGLE-USE, CONTACT QUALITY MONITORING, ADULT, WITH 9FT CORD, FOR PATIENTS WEIGING OVER 33LBS. (15KG): Brand: MEGADYNE

## (undated) DEVICE — SUT VIC PLS CTD ANTIB 2/0 18IN VCP111G

## (undated) DEVICE — PENCL ES MEGADINE EZ/CLEAN BUTN W/HOLSTR 10FT

## (undated) DEVICE — CATH DIAG IMPULSE FR4 5F 100CM

## (undated) DEVICE — DRP SLUSH WARMR MACH CIR 44X44IN

## (undated) DEVICE — SOL ISO/ALC RUB 70PCT 4OZ

## (undated) DEVICE — SUT SILK 2/0 TIES 18IN A185H

## (undated) DEVICE — THE SINGLE USE ETRAP – POLYP TRAP IS USED FOR SUCTION RETRIEVAL OF ENDOSCOPICALLY REMOVED POLYPS.: Brand: ETRAP

## (undated) DEVICE — SENSR O2 OXIMAX FNGR A/ 18IN NONSTR

## (undated) DEVICE — SUT NUROLON 4/0 TF18 CR8 I8IN C584D

## (undated) DEVICE — CATH VENT MIV RADL PIG ST TIP 5F 110CM

## (undated) DEVICE — TOWEL,OR,DSP,ST,BLUE,STD,4/PK,20PK/CS: Brand: MEDLINE

## (undated) DEVICE — CONN TBG Y 5 IN 1 LF STRL

## (undated) DEVICE — SMOKE EVACUATION TUBING WITH 7/8 IN TO 1/4 IN REDUCER: Brand: BUFFALO FILTER

## (undated) DEVICE — SNAR POLYP SENSATION STDOVL 27 240 BX40

## (undated) DEVICE — WOUND RETRACTOR AND PROTECTOR: Brand: ALEXIS WOUND PROTECTOR-RETRACTOR

## (undated) DEVICE — KT MANIFLD CARDIAC

## (undated) DEVICE — THE TORRENT IRRIGATION SCOPE CONNECTOR IS USED WITH THE TORRENT IRRIGATION TUBING TO PROVIDE IRRIGATION FLUIDS SUCH AS STERILE WATER DURING GASTROINTESTINAL ENDOSCOPIC PROCEDURES WHEN USED IN CONJUNCTION WITH AN IRRIGATION PUMP (OR ELECTROSURGICAL UNIT).: Brand: TORRENT

## (undated) DEVICE — TOOL MR8-F2/7TA23 MR8 F2/7CM TAPER 2.3MM: Brand: MIDAS REX MR8

## (undated) DEVICE — PENCL SMOKE/EVAC MEGADYNE TELESCP 10FT

## (undated) DEVICE — CATH DIAG IMPULSE FL3.5 5F 100CM

## (undated) DEVICE — GLV SURG SENSICARE PI PF LF 8.5 GRN STRL